# Patient Record
Sex: MALE | Race: BLACK OR AFRICAN AMERICAN | NOT HISPANIC OR LATINO | Employment: OTHER | URBAN - METROPOLITAN AREA
[De-identification: names, ages, dates, MRNs, and addresses within clinical notes are randomized per-mention and may not be internally consistent; named-entity substitution may affect disease eponyms.]

---

## 2017-03-09 ENCOUNTER — LAB (OUTPATIENT)
Dept: LAB | Facility: HOSPITAL | Age: 62
End: 2017-03-09

## 2017-03-09 ENCOUNTER — OFFICE VISIT (OUTPATIENT)
Dept: ONCOLOGY | Facility: CLINIC | Age: 62
End: 2017-03-09

## 2017-03-09 VITALS
HEIGHT: 71 IN | DIASTOLIC BLOOD PRESSURE: 86 MMHG | HEART RATE: 71 BPM | TEMPERATURE: 97.5 F | RESPIRATION RATE: 20 BRPM | OXYGEN SATURATION: 98 % | SYSTOLIC BLOOD PRESSURE: 152 MMHG | WEIGHT: 245.6 LBS | BODY MASS INDEX: 34.38 KG/M2

## 2017-03-09 DIAGNOSIS — D59.10 AUTOIMMUNE HEMOLYTIC ANEMIA (HCC): ICD-10-CM

## 2017-03-09 DIAGNOSIS — C91.10 CLL (CHRONIC LYMPHOCYTIC LEUKEMIA) (HCC): ICD-10-CM

## 2017-03-09 DIAGNOSIS — C91.10 CLL (CHRONIC LYMPHOCYTIC LEUKEMIA) (HCC): Primary | ICD-10-CM

## 2017-03-09 LAB
BASOPHILS # BLD AUTO: 0.04 10*3/MM3 (ref 0–0.1)
BASOPHILS NFR BLD AUTO: 0.4 % (ref 0–1.1)
DEPRECATED RDW RBC AUTO: 43.8 FL (ref 37–49)
EOSINOPHIL # BLD AUTO: 0.13 10*3/MM3 (ref 0–0.36)
EOSINOPHIL NFR BLD AUTO: 1.3 % (ref 1–5)
ERYTHROCYTE [DISTWIDTH] IN BLOOD BY AUTOMATED COUNT: 12.6 % (ref 11.7–14.5)
HCT VFR BLD AUTO: 40.5 % (ref 40–49)
HGB BLD-MCNC: 13.5 G/DL (ref 13.5–16.5)
HGB RETIC QN: 34.8 PG (ref 29.8–36.1)
IMM GRANULOCYTES # BLD: 0.02 10*3/MM3 (ref 0–0.03)
IMM GRANULOCYTES NFR BLD: 0.2 % (ref 0–0.5)
IMM RETICS NFR: 8.2 % (ref 3–15.8)
LYMPHOCYTES # BLD AUTO: 7.1 10*3/MM3 (ref 1–3.5)
LYMPHOCYTES NFR BLD AUTO: 72.6 % (ref 20–49)
MCH RBC QN AUTO: 31.3 PG (ref 27–33)
MCHC RBC AUTO-ENTMCNC: 33.3 G/DL (ref 32–35)
MCV RBC AUTO: 93.8 FL (ref 83–97)
MONOCYTES # BLD AUTO: 0.38 10*3/MM3 (ref 0.25–0.8)
MONOCYTES NFR BLD AUTO: 3.9 % (ref 4–12)
NEUTROPHILS # BLD AUTO: 2.11 10*3/MM3 (ref 1.5–7)
NEUTROPHILS NFR BLD AUTO: 21.6 % (ref 39–75)
NRBC BLD MANUAL-RTO: 0 /100 WBC (ref 0–0)
PLATELET # BLD AUTO: 185 10*3/MM3 (ref 150–375)
PMV BLD AUTO: 10.6 FL (ref 8.9–12.1)
RBC # BLD AUTO: 4.32 10*6/MM3 (ref 4.3–5.5)
RETICS/RBC NFR AUTO: 1.66 % (ref 0.6–2)
WBC NRBC COR # BLD: 9.78 10*3/MM3 (ref 4–10)

## 2017-03-09 PROCEDURE — 36416 COLLJ CAPILLARY BLOOD SPEC: CPT | Performed by: INTERNAL MEDICINE

## 2017-03-09 PROCEDURE — 99213 OFFICE O/P EST LOW 20 MIN: CPT | Performed by: INTERNAL MEDICINE

## 2017-03-09 PROCEDURE — 85025 COMPLETE CBC W/AUTO DIFF WBC: CPT | Performed by: INTERNAL MEDICINE

## 2017-03-09 PROCEDURE — 85046 RETICYTE/HGB CONCENTRATE: CPT | Performed by: INTERNAL MEDICINE

## 2017-03-09 RX ORDER — ATORVASTATIN CALCIUM 40 MG/1
TABLET, FILM COATED ORAL
Refills: 3 | Status: ON HOLD | COMMUNITY
Start: 2017-01-09 | End: 2022-05-05 | Stop reason: SDUPTHER

## 2017-03-09 NOTE — PROGRESS NOTES
[facility] GROUP OUTPATIENT FOLLOW UP CLINIC VISIT    REASON FOR FOLLOW-UP:    1.  Chronic lymphocytic leukemia  2.  Autoimmune hemolytic anemia associated with CLL.  He is now off of steroids.  He received 4 weeks of weekly Rituxan.         CLL (chronic lymphocytic leukemia)    11/25/2015 Initial Diagnosis    CLL (chronic lymphocytic leukemia)      12/1/2015 - 12/23/2015 Chemotherapy    Rituxan weekly x4         HISTORY OF PRESENT ILLNESS:  Aida Bal is a 62 y.o. male who returns today for follow up of the above issue.  He continues to do well.  His only issue currently is erectile dysfunction and he is going to speak with his primary care provider regarding this and his upcoming visit.  He just wanted to make sure today that any potential treatment of this would not affect the CLL.  He denies any fevers or chills.  No palpable lymphadenopathy.  No jaundice.      PAST MEDICAL, SURGICAL, FAMILY, AND SOCIAL HISTORIES were reviewed with the patient and in the electronic medical record, and were updated if indicated.    ALLERGIES:  No Known Allergies    MEDICATIONS:  The medication list has been reviewed with the patient by the medical assistant, and the list has been updated in the electronic medical record, which I reviewed.  Medication dosages and frequencies were confirmed to be accurate.    REVIEW OF SYSTEMS:  PAIN:  See Vital Signs below.  GENERAL:  No fevers, chills, night sweats, or unintended weight loss.  SKIN:  No rashes or non-healing lesions.  HEME/LYMPH:  No abnormal bleeding.  No palpable lymphadenopathy.  EYES:  No vision changes or diplopia.  ENT:  No sore throat or difficulty swallowing.  Otherwise see history of present illness  RESPIRATORY:  No cough, shortness of breath, hemoptysis, or wheezing.  CARDIOVASCULAR:  No chest pain, palpitations, orthopnea, or dyspnea on exertion.  GASTROINTESTINAL:  No abdominal pain, nausea, vomiting, constipation, diarrhea, melena, or  "hematochezia.  GENITOURINARY:  No dysuria or hematuria.  Erectile dysfunction  MUSCULOSKELETAL:  No muscle or joint pain.  NEUROLOGIC:  No dizziness, loss of consciousness, or seizures.  PSYCHIATRIC:  No depression, anxiety, or mood changes.    Vitals:    03/09/17 1114   BP: 152/86   Pulse: 71   Resp: 20   Temp: 97.5 °F (36.4 °C)   TempSrc: Oral   SpO2: 98%   Weight: 245 lb 9.6 oz (111 kg)   Height: 70.67\" (179.5 cm)   PainSc: 0-No pain       PHYSICAL EXAMINATION:  GENERAL:  Well-developed well-nourished male; awake, alert and oriented, in no acute distress.  SKIN:  Warm and dry, without rashes, purpura, or petechiae.  HEAD:  Normocephalic, atraumatic.  EYES:  Pupils equal, round and reactive to light.  Extraocular movements intact.  Conjunctivae normal.  EARS:  Hearing intact.  NOSE:  Septum midline.  No excoriations or nasal discharge.  MOUTH:  No stomatitis or ulcers.  Lips are normal.  THROAT:  Oropharynx without lesions or exudates.  NECK:  Supple with good range of motion; no thyromegaly or masses; no JVD or bruits.  LYMPHATICS:  No cervical, supraclavicular, axillary, or inguinal lymphadenopathy.  CHEST:  Lungs are clear to auscultation bilaterally.  No wheezes, rales, or rhonchi.  HEART:  Regular rate; normal rhythm.  No murmurs, gallops or rubs.  ABDOMEN:  Soft, non-tender, non-distended.  Normal active bowel sounds.  No organomegaly.  EXTREMITIES:  No clubbing, cyanosis, or edema.  NEUROLOGICAL:  No focal neurologic deficits.    DIAGNOSTIC DATA:  Lab Results   Component Value Date    WBC 9.78 03/09/2017    HGB 13.5 03/09/2017    HCT 40.5 03/09/2017    MCV 93.8 03/09/2017     03/09/2017       IMAGING:  None for review    ASSESSMENT:  This is a 62 y.o. male with chronic lymphocytic leukemia and related autoimmune hemolytic anemia, currently in remission with no evidence for hemolysis.    1.  CLL: Outside of the Rituxan he received for 4 weeks for this and the autoimmune hemolytic anemia he has not " required any specific treatment.  His white blood cell count is now normal.  2.  Autoimmune hemolytic anemia: He remains off steroids.  His hemoglobin is normal.  3.  Steroid related diabetes: He is now off of insulin and states that his blood sugars are within normal limits.  He is no longer seeing endocrinology.    PLAN:  1.  Return in 6 months for follow-up with a CBC that day.  He will notify us if he is having any new problems prior to that.

## 2017-05-23 ENCOUNTER — PREP FOR SURGERY (OUTPATIENT)
Dept: OTHER | Facility: HOSPITAL | Age: 62
End: 2017-05-23

## 2017-05-23 DIAGNOSIS — Z86.010 HISTORY OF COLON POLYPS: ICD-10-CM

## 2017-05-23 DIAGNOSIS — Z12.11 COLON CANCER SCREENING: Primary | ICD-10-CM

## 2017-05-23 DIAGNOSIS — Z80.0 FAMILY HISTORY OF COLON CANCER: ICD-10-CM

## 2017-07-24 ENCOUNTER — HOSPITAL ENCOUNTER (OUTPATIENT)
Facility: HOSPITAL | Age: 62
Setting detail: HOSPITAL OUTPATIENT SURGERY
Discharge: HOME | End: 2017-07-24
Attending: SURGERY | Admitting: SURGERY

## 2017-07-24 ENCOUNTER — ANESTHESIA (OUTPATIENT)
Dept: GASTROENTEROLOGY | Facility: HOSPITAL | Age: 62
End: 2017-07-24

## 2017-07-24 ENCOUNTER — ANESTHESIA EVENT (OUTPATIENT)
Dept: GASTROENTEROLOGY | Facility: HOSPITAL | Age: 62
End: 2017-07-24

## 2017-07-24 VITALS
TEMPERATURE: 97.7 F | OXYGEN SATURATION: 99 % | BODY MASS INDEX: 32.18 KG/M2 | HEIGHT: 72 IN | DIASTOLIC BLOOD PRESSURE: 85 MMHG | HEART RATE: 67 BPM | RESPIRATION RATE: 20 BRPM | SYSTOLIC BLOOD PRESSURE: 137 MMHG | WEIGHT: 237.56 LBS

## 2017-07-24 DIAGNOSIS — Z86.010 HISTORY OF COLON POLYPS: ICD-10-CM

## 2017-07-24 DIAGNOSIS — Z80.0 FAMILY HISTORY OF COLON CANCER: ICD-10-CM

## 2017-07-24 DIAGNOSIS — Z12.11 COLON CANCER SCREENING: ICD-10-CM

## 2017-07-24 PROCEDURE — S0260 H&P FOR SURGERY: HCPCS | Performed by: SURGERY

## 2017-07-24 PROCEDURE — 45380 COLONOSCOPY AND BIOPSY: CPT | Performed by: SURGERY

## 2017-07-24 PROCEDURE — 45381 COLONOSCOPY SUBMUCOUS NJX: CPT | Performed by: SURGERY

## 2017-07-24 PROCEDURE — 25010000002 PROPOFOL 10 MG/ML EMULSION: Performed by: ANESTHESIOLOGY

## 2017-07-24 PROCEDURE — 88305 TISSUE EXAM BY PATHOLOGIST: CPT | Performed by: SURGERY

## 2017-07-24 RX ORDER — PROMETHAZINE HYDROCHLORIDE 25 MG/ML
12.5 INJECTION, SOLUTION INTRAMUSCULAR; INTRAVENOUS ONCE AS NEEDED
Status: DISCONTINUED | OUTPATIENT
Start: 2017-07-24 | End: 2017-07-24 | Stop reason: HOSPADM

## 2017-07-24 RX ORDER — SODIUM CHLORIDE, SODIUM LACTATE, POTASSIUM CHLORIDE, CALCIUM CHLORIDE 600; 310; 30; 20 MG/100ML; MG/100ML; MG/100ML; MG/100ML
1000 INJECTION, SOLUTION INTRAVENOUS CONTINUOUS PRN
Status: DISCONTINUED | OUTPATIENT
Start: 2017-07-24 | End: 2017-07-24 | Stop reason: HOSPADM

## 2017-07-24 RX ORDER — PROMETHAZINE HYDROCHLORIDE 25 MG/1
25 SUPPOSITORY RECTAL ONCE AS NEEDED
Status: DISCONTINUED | OUTPATIENT
Start: 2017-07-24 | End: 2017-07-24 | Stop reason: HOSPADM

## 2017-07-24 RX ORDER — PROPOFOL 10 MG/ML
VIAL (ML) INTRAVENOUS CONTINUOUS PRN
Status: DISCONTINUED | OUTPATIENT
Start: 2017-07-24 | End: 2017-07-24 | Stop reason: SURG

## 2017-07-24 RX ORDER — PROMETHAZINE HYDROCHLORIDE 25 MG/1
25 TABLET ORAL ONCE AS NEEDED
Status: DISCONTINUED | OUTPATIENT
Start: 2017-07-24 | End: 2017-07-24 | Stop reason: HOSPADM

## 2017-07-24 RX ADMIN — SODIUM CHLORIDE, POTASSIUM CHLORIDE, SODIUM LACTATE AND CALCIUM CHLORIDE 1000 ML: 600; 310; 30; 20 INJECTION, SOLUTION INTRAVENOUS at 13:29

## 2017-07-24 RX ADMIN — SODIUM CHLORIDE, POTASSIUM CHLORIDE, SODIUM LACTATE AND CALCIUM CHLORIDE: 600; 310; 30; 20 INJECTION, SOLUTION INTRAVENOUS at 13:47

## 2017-07-24 RX ADMIN — PROPOFOL 200 MCG/KG/MIN: 10 INJECTION, EMULSION INTRAVENOUS at 13:50

## 2017-07-24 NOTE — ANESTHESIA POSTPROCEDURE EVALUATION
Patient: Aida Bal    Procedure Summary     Date Anesthesia Start Anesthesia Stop Room / Location    07/24/17 [id number]  [facility] 7 /  [facility]       Procedure Diagnosis Surgeon Provider    COLONOSCOPY TO CECUM WITH COLD POLYPECTOMY, TATOO PLACEMENT (N/A ) Family history of colon cancer; Colon cancer screening; History of colon polyps  (Family history of colon cancer [Z80.0]; Colon cancer screening [Z12.11]; History of colon polyps [Z86.010]) MD Luther Doyle MD          Anesthesia Type: MAC  Last vitals  BP   137/85 (07/24/17 1455)    Temp   36.5 °C (97.7 °F) (07/24/17 1445)    Pulse   67 (07/24/17 1455)   Resp   20 (07/24/17 1455)    SpO2   99 % (07/24/17 1455)      Post Anesthesia Care and Evaluation    Patient location during evaluation: bedside  Patient participation: complete - patient participated  Level of consciousness: awake and alert  Pain management: adequate  Airway patency: patent  Anesthetic complications: No anesthetic complications  PONV Status: none  Cardiovascular status: acceptable  Respiratory status: acceptable  Hydration status: acceptable

## 2017-07-24 NOTE — DISCHARGE INSTRUCTIONS
WHAT ARE DIVERTICULOSIS AND DIVERTICULITIS?  Many people have small pouches in their colons that bulge outward through weak spots, like an inner tube that pokes through weak places in a tire.  Each pouch is called a diverticulum.  The condition of having diverticula is DIVERTICULOSIS.  The condition becomes more common as people age.  About half of all people over the age of 60 have diverticulosis    When pouches become infected or inflamed, the condition is called DIVERTICULITIS.  This happens in 10% to 25% of people with diverticulosis.  Diverticulosis and diverticulitis are also called DIVERTICULAR DISEASE.     WHAT ARE THE SYMPTOMS?  Diverticulosis - Most people do not have any discomfort or symptoms.  However, symptoms may include mild cramps, bloating, and constipation.  Other diseases such as irritable bowel syndrome (IBS) and stomach ulcers cause similar problems, so these symptoms do not always mean a person has diverticulosis.  You should visit your doctor if you have these troubling symptoms.    Diverticulitis - The most common symptom is abdominal pain.  The most common sign is tenderness around the left side of the lower abdomen.  If infection is the cause, fever, nausea, vomiting, chills, cramping, and constipation may occur as well.  The severity depends on the extent of the infection and complications.    WHAT ARE THE COMPLICATIONS?  Diverticulitis can lead to bleeding, infections,perforations or tears, or blockages.  These complications always require treatment to prevent them from proggressing and causing serous illness.    Bleeding from a diverticula is a rare complication.  When this occurs, blood may appear in the toilet or in your stool.  Bleeding can be severe, but it may stop by itself and not require treatment.  Doctors believe bleeding diverticula are caused by a small blood vessel in a diverticulum that weakens and finally bursts.  If you have bleeding from the rectum, you should see your  doctor.  If the bleeding does not stop you may need surgery.    Abscess, Perforation, and Peritonitis - The infection causing diverticulitis often clears up after a few days of treatment with antibiotics.  If the condition gets worse, an abscess may form in the colon.  An abscess is an infected area with pus that may cause swelling and destroy tissue.  Sometimes the infected diverticula may develop small holes, called perforations.  These perforations allow pus to leak out of the colon into the abdominal area.  If the abscess is small and remains in the colon, it may clear up after treatment with antibiotics.  If not, the doctor may need to drain it.  A large abscess can become a serious problem if the infection leaks out and contaminates areas outside the colon.  Infection that spreads into the abdominal cavity is called peritonitis.  Peritonitis requires immediate surgery toclean the abdominal cavity and remove the damaged part of the colon.  Without surgery, peritonitis can be fatal.    FISTULA  A fistula is an abnormal connection of tissue between two organs or between an organ and the skin.  When damaged tissues come into contact with each other during infection, they sometimes stick together.  If they heal that way, a fistula forms.  When diverticulitis-related infection spreads through out the colon, the colon's tissue may stick to nearby tissues.  The organs usually involved are the bladder, small intestine, and skin.  The problem can be corrected with surgery to remove the fistula and affected part of the colon.    INTESTINAL OBSTRUCTION  The scarring caused by infection may cause partial or total blockage of the large intestine.  When this happens, the colon is unable to move bowel contents normally.  When the obstruction totally blocks the intestine, emergency surgery is necessary.  Partial blockage is not an emergency, so the surgery to correct it can be planned.    WHAT CAUSES DIVERTICULAR  DISEASE  Although not proven, the dominant theory is that a low-fiber diet is the main cause of diverticular disease.  The disease was first noticed in the [place] in the early 1900s.  At about the same time, processed foods were introduced into the American diet.  Many processed foods contain refined, low-fiber flour.  Unlike whole-wheat flour, refined flour has no wheat bran.    Diverticular disease is common in developed or industrialized countries-particularly the [place], [place], and [place]-where low-fiber diets are common.  The disease is rare in countries of [place] and [place], where people eat high-fiber vegetable diets.    Fiber is the part of fruits, vegetables, and whole grains that the body cannot digest.  Some fiber dissolves easily in water (soluble fiber).  It takes on a soft, jelly-like texture in the intestines.  Some fiber passes almost unchanged through the intestines (insoluble fiber).  Both kinds of fiber help make stools soft and easy to pass.  Fiber also prevents constipation.    Constipation makes the muscles strain to move stool that is too hard.  It is the main cause of increased pressure in the colon.  This excess pressure might cause the weak spots in the colon to bulge out and become diverticula.  Diverticulitis occurs when diverticula become infected or inflamed.  Doctors are not certain what causes the infection.  It may begin when stool or bacteria are caught in the diverticula.  An attack of diverticulitis can develop suddenly and without warning.    HOW DOES THE DOCTOR DIAGNOSE DIVERTICULAR DISEASE  The doctor asks about medical history, does a physical exam, and may perform one or more diagnostic tests.  Because most people do not have symptoms, diverticulosis is often found through tests ordered for another ailment.    When taking a medical history, the doctor may ask about bowel habits, symptoms, pain, diet, and medications.  The physical exam usually involves a  digital rectal exam.  To preform this test. The doctor inserts a gloved, lubricated finger into the rectum to detect tenderness, blockage, or blood.  The doctor may check stool for signs of bleeding and test blood for signs of infection.  The doctor may also order x-rays or other tests.    WHAT IS THE TREATMENT FOR DIVERTICULAR DISEASE  Increasing the amount of fiber in the diet may reduce symptoms of diverticulosis and prevent complications such as diverticulitis.  Fiber keeps stool soft and lowers pressure inside the colon so that bowel contents can move through easily.  The American Dietetic Association. Recommends 20 to 35 grams of fiber each day.  The doctor may also recommend taking a fiber product such as Citrucel or Metamucil once a dya.  These products are mixed water and provide about 2 to 3.5 grams of fiber per  Tablespoon, mixed with 8 ounces of water.    Avoidance of nuts, popcorn, and sunflower, pumpkin, hope, and sesame seeds has been recommended by physicians out of fear that food particles could enter, block, or irritate the diverticula.  However, no scientific data support this treatment measure.  Eating a high-fiber diet is the only requirement highly emphasized across the medical literature.  Eliminating specific foods is not necessary.  The seeds in tomatoes, zucchini, cucumbers, strawberries, and raspberries, as well as poppy seeds, are generally considered harmless.  People differ in amounts and types of foods the can eat.  Decisions about diet should be made based on what works best for each person.  Keeping a food diary may help identify what foods may cause symptoms.    If cramps, bloating, and constipation are problems, the doctor may prescribe  Short course of pain medication.  However, many medications affect emptying of the colon, an undesirable side effect for people with diverticulosis.    DIVERTICULITIS  Treatment focuses on clearing up the infection and inflammation, resting the  colon, and preventing or minimizing complications.  An attack of diverticulitis without complications may respond to antibiotics within a few days if treated early.  To help the colon rest, the doctor may recommend bed rest and a liquid diet, along with a pain reliever.    An acute attack with severe pain or sever infection may require a hospital stay.  Most acute cases of diverticulitis are treated with antibiotics and a liquid diet.  The antibiotics are given by injection into a vein.  In some cases, however, surgery may be necessary.    WHEN IS SURGERY NECESSARY  If attacks are severe or frequent, the doctor may advise surgery.  The surgeon removes the affected part of the colon and joins the remaining sections.  This typed of surgery, called colon resection, aims to keep attacks from coming back and to prevent complications.  The doctor may also recommend surgery for complications of a fistula or intestinal obstruction.    If antibiotics do not correct an attack, emergency surgery may be required.  Other reasons for emergency surgery include a large abscess, perforation, peritonitis, or continued bleeding.    Emergency surgery usually involves 2 operations.  The first will clear the infected abdominal cavity and remove part of the colon.  Because infection and sometimes obstruction, it is not safe to rejoin the colon during the first operation.  Instead, the surgeon creates a temporary hole, or stoma, in the abdomen.  The end of the colon is connected to the hole, a procedure called a colostomy, to allow normal eating and bowel movements.  The stool goes into a bag attached to the opening in the abdomen.  In the second operation, the surgeon rejoins the ends of the colon.      For the next 24 hours patient needs to be with a responsible adult.    For 24 hours DO NOT drive, operate machinery, appliances, drink alcohol, make important decisions or sign legal documents.    Start with a light or bland diet and  advance to regular diet as tolerated.    Follow recommendations on procedure report provided by your doctor.    Call Dr Caro for problems [phone]. Call for pathology results in one week.    Problems may include but not limited to: large amounts of bleeding, trouble breathing, repeated vomiting, severe unrelieved pain, fever or chills.

## 2017-07-24 NOTE — PLAN OF CARE
Problem: Patient Care Overview (Adult)  Goal: Discharge Needs Assessment  Outcome: Ongoing (interventions implemented as appropriate)    Problem: GI Endoscopy (Adult)  Goal: Signs and Symptoms of Listed Potential Problems Will be Absent or Manageable (GI Endoscopy)  Outcome: Ongoing (interventions implemented as appropriate)    07/24/17 1313   GI Endoscopy   Problems Assessed (GI Endoscopy) all   Problems Present (GI Endoscopy) none

## 2017-07-24 NOTE — H&P
General Surgery  History and Physical    CC: Screening for colon cancer, family history of colon cancer    HPI: The patient is a pleasant 62 y.o. year-old gentleman who presents today for a repeat screening colonoscopy.  His last colonoscopy was in 2011 by Dr. Han and significant only for diverticulosis but no polyps.  He had a previous colonoscopy 20 years ago that was significant for a few benign polyps.  He denies any melena, hematochezia, or unintentional weight loss.  His mother has a history of colon cancer diagnosed at age 72.    Past Medical History: Autoimmune hemolytic anemia, left carotid artery occlusion, coronary artery disease, diabetes mellitus, gout, hyper lipidemia, hypertension, leukemia    Past Surgical History: Knee surgery    Medications: Atorvastatin, aspirin, prazosin, losartan, amlodipine, vitamin D3, allopurinol    Allergies: No known drug allergies    Family History: Mother with history of colon cancer, father with history of throat cancer    Social History: , no alcohol use, nonsmoker    ROS: A comprehensive review of systems was conducted and negative for melena, hematochezia, or unintentional weight loss  All other systems reviewed and negative    Physical Exam:  Vitals:    07/24/17 1322   BP: 134/75   Pulse: 80   Temp: 97.2 °F (36.2 °C)   SpO2: 98%     General: No acute distress, well-nourished & well-developed  HEAD: normocephalic, atraumatic  EYES: normal conjunctiva, sclera anicteric  EARS: grossly normal hearing  NECK: supple, no thyromegaly  CARDIOVASCULAR: regular rate and rhythm  RESPIRATORY: clear to auscultation bilaterally  GASTROINTESTINAL: soft, nontender, non-distended  PSYCHIATRIC: oriented x3, normal mood and affect    ASSESSMENT & PLAN  Mr. Bal is a 62-year-old gentleman with a family history of colon cancer in his mother who presents today for a repeat screening colonoscopy.  He has been counseled on the risks of the procedure to include bleeding from  polypectomy, abdominal discomfort following the procedure, and possible colon perforation.  Despite these risks, he has consented to proceed.    Fannie Caro MD  General and Endoscopic Surgery  [facility]    [address], [address]  [place], [place], [address]  P: [phone]  F: [phone]

## 2017-07-24 NOTE — ANESTHESIA PREPROCEDURE EVALUATION
Anesthesia Evaluation            Airway   Mallampati: I  TM distance: >3 FB  Neck ROM: full  no difficulty expected  Dental - normal exam     Pulmonary     breath sounds clear to auscultation  Cardiovascular     Rhythm: regular  Rate: normal    (+) hypertension, CAD, PVD, hyperlipidemia      Neuro/Psych  GI/Hepatic/Renal/Endo    (+)  diabetes mellitus,     Musculoskeletal     Abdominal    Substance History      OB/GYN          Other   (+) blood dyscrasia   history of cancer remission                                    Anesthesia Plan    ASA 3     MAC

## 2017-07-24 NOTE — OP NOTE
Operative Note :  Fannie Caro MD      Aida Bal  1955    Procedure Date: 17    Pre-op Diagnosis:  · Screening for colon cancer, family history of colon cancer, personal history of polyps    Post-op Diagnosis:  · Large, sessile cecal polyp and minimal sigmoid diverticulosis    Procedure:   · Flexible colonoscopy to the cecum with piecemeal cold forcep polypectomy and tattoo with Bisi ink    Surgeon: Fannie Caro MD    Assistant: None    Anesthesia:  MAC (monitored anesthetic care)    Estimated Blood Loss: None    Specimens: Cecal polyp (removed piecemeal)    Complications: None    Indications:  · Mr. Bal is a 62-year-old Greek with a personal history of colon polyps seen on a colonoscopy over 20 years ago as well as a family history of colon cancer in his mother who was diagnosed at age 72 with advanced disease.  She was  at age 78, 6 years after her diagnosis.  He has no melena, hematochezia, or unintentional weight loss.  He has been counseled on the risks of the procedure, and has consented to proceed.    Findings:   · Large, 4 cm x 5 cm sessile colon polyp within the cecum just adjacent to the piecemeal orifice removed piecemeal fashion with the cold biopsy forceps and tattooed with Bisi ink    Description of procedure:  The patient was brought to the endoscopy suite and cristal in the left lateral decubitus position.  A surgical timeout was completed.  Continuous propofol anesthesia was administered.  A digital rectal exam was performed, revealing no abnormalities.  An adult colonoscope was then inserted through the anus and passed under direct visualization to the level of the cecum.  The cecum was identified via the ileocecal valve as well as the appendiceal orifice.  There is a large, 4 cm x 5 cm, sessile polyp within the cecal bowel just adjacent to the appendiceal orifice.  This was removed in a piecemeal fashion with the cold biopsy forceps and then tattooed with  Bisi ink.  The scope was then further withdrawn, examining all circumferential walls of the ascending, transverse, descending, and sigmoid colon.  There were 2 small sigmoid diverticula which were not bleeding or acutely inflamed.  There were no other noted colon polyps.  The scope was then retroflexed in the rectum, revealing no internal hemorrhoids.  The scope was then withdrawn and the colon desufflated.  The patient tolerated the procedure quite well and was transferred to the recovery area in stable condition.  He had a very good bowel prep.  I will contact him as soon as the pathology results are available to discuss further recommendations.  If the pathology returns cancerous, this will warrant staging CTs and CEA level measurement followed by likely surgical resection of his ascending colon.  If the pathology results are benign, he will require a repeat colonoscopy in 3 months time to ensure that the polyp has not regrown.    Fannie Caro MD  General and Endoscopic Surgery  [facility]    [address], [address]  [place], [place], [address]  P: [phone]  F: [phone]

## 2017-07-25 ENCOUNTER — TELEPHONE (OUTPATIENT)
Dept: SURGERY | Facility: CLINIC | Age: 62
End: 2017-07-25

## 2017-07-25 LAB
LAB AP CASE REPORT: NORMAL
Lab: NORMAL
PATH REPORT.FINAL DX SPEC: NORMAL
PATH REPORT.GROSS SPEC: NORMAL

## 2017-07-25 NOTE — TELEPHONE ENCOUNTER
I called and spoke with the patient relaying the pathology information of a large tubulovillous adenoma of the cecum that was removed piecemeal yesterday during his colonoscopy.  Luckily, there are no signs of high-grade dysplasia or carcinoma in situ identified on histopathology.  For this reason, I would like to go ahead and repeat a colonoscopy in 3 months time to ensure that the large sessile polyp has not grown back.  The patient expressed understanding.    Fannie Caro MD  General and Endoscopic Surgery  [facility]    [address], [address]  [place], [place], [address]  P: [phone]  F: [phone]

## 2017-10-02 ENCOUNTER — OFFICE VISIT (OUTPATIENT)
Dept: ONCOLOGY | Facility: CLINIC | Age: 62
End: 2017-10-02

## 2017-10-02 ENCOUNTER — LAB (OUTPATIENT)
Dept: LAB | Facility: HOSPITAL | Age: 62
End: 2017-10-02

## 2017-10-02 VITALS
HEIGHT: 72 IN | DIASTOLIC BLOOD PRESSURE: 84 MMHG | WEIGHT: 240.8 LBS | TEMPERATURE: 97.6 F | SYSTOLIC BLOOD PRESSURE: 146 MMHG | HEART RATE: 85 BPM | OXYGEN SATURATION: 100 % | BODY MASS INDEX: 32.61 KG/M2 | RESPIRATION RATE: 18 BRPM

## 2017-10-02 DIAGNOSIS — C91.10 CLL (CHRONIC LYMPHOCYTIC LEUKEMIA) (HCC): ICD-10-CM

## 2017-10-02 DIAGNOSIS — D59.10 AUTOIMMUNE HEMOLYTIC ANEMIA (HCC): ICD-10-CM

## 2017-10-02 DIAGNOSIS — C91.10 CLL (CHRONIC LYMPHOCYTIC LEUKEMIA) (HCC): Primary | ICD-10-CM

## 2017-10-02 LAB
ALBUMIN SERPL-MCNC: 4.3 G/DL (ref 3.5–5.2)
ALBUMIN/GLOB SERPL: 1.7 G/DL (ref 1.1–2.4)
ALP SERPL-CCNC: 121 U/L (ref 38–116)
ALT SERPL W P-5'-P-CCNC: 15 U/L (ref 0–41)
ANION GAP SERPL CALCULATED.3IONS-SCNC: 12.2 MMOL/L
AST SERPL-CCNC: 19 U/L (ref 0–40)
BASOPHILS # BLD AUTO: 0.07 10*3/MM3 (ref 0–0.1)
BASOPHILS NFR BLD AUTO: 0.4 % (ref 0–1.1)
BILIRUB SERPL-MCNC: 0.6 MG/DL (ref 0.1–1.2)
BUN BLD-MCNC: 15 MG/DL (ref 6–20)
BUN/CREAT SERPL: 14.4 (ref 7.3–30)
CALCIUM SPEC-SCNC: 9.6 MG/DL (ref 8.5–10.2)
CHLORIDE SERPL-SCNC: 101 MMOL/L (ref 98–107)
CO2 SERPL-SCNC: 28.8 MMOL/L (ref 22–29)
CREAT BLD-MCNC: 1.04 MG/DL (ref 0.7–1.3)
DEPRECATED RDW RBC AUTO: 44.5 FL (ref 37–49)
EOSINOPHIL # BLD AUTO: 0.2 10*3/MM3 (ref 0–0.36)
EOSINOPHIL NFR BLD AUTO: 1.1 % (ref 1–5)
ERYTHROCYTE [DISTWIDTH] IN BLOOD BY AUTOMATED COUNT: 12.4 % (ref 11.7–14.5)
GFR SERPL CREATININE-BSD FRML MDRD: 87 ML/MIN/1.73
GLOBULIN UR ELPH-MCNC: 2.6 GM/DL (ref 1.8–3.5)
GLUCOSE BLD-MCNC: 103 MG/DL (ref 74–124)
HAPTOGLOB SERPL-MCNC: 181 MG/DL (ref 30–200)
HCT VFR BLD AUTO: 37.1 % (ref 40–49)
HGB BLD-MCNC: 12 G/DL (ref 13.5–16.5)
HGB RETIC QN: 36 PG (ref 29.8–36.1)
IMM GRANULOCYTES # BLD: 0.05 10*3/MM3 (ref 0–0.03)
IMM GRANULOCYTES NFR BLD: 0.3 % (ref 0–0.5)
IMM RETICS NFR: 11.1 % (ref 3–15.8)
LDH SERPL-CCNC: 202 U/L (ref 99–259)
LYMPHOCYTES # BLD AUTO: 13.11 10*3/MM3 (ref 1–3.5)
LYMPHOCYTES NFR BLD AUTO: 73.8 % (ref 20–49)
MCH RBC QN AUTO: 31.5 PG (ref 27–33)
MCHC RBC AUTO-ENTMCNC: 32.3 G/DL (ref 32–35)
MCV RBC AUTO: 97.4 FL (ref 83–97)
MONOCYTES # BLD AUTO: 0.51 10*3/MM3 (ref 0.25–0.8)
MONOCYTES NFR BLD AUTO: 2.9 % (ref 4–12)
NEUTROPHILS # BLD AUTO: 3.83 10*3/MM3 (ref 1.5–7)
NEUTROPHILS NFR BLD AUTO: 21.5 % (ref 39–75)
NRBC BLD MANUAL-RTO: 0 /100 WBC (ref 0–0)
PLATELET # BLD AUTO: 232 10*3/MM3 (ref 150–375)
PMV BLD AUTO: 10.1 FL (ref 8.9–12.1)
POTASSIUM BLD-SCNC: 4 MMOL/L (ref 3.5–4.7)
PROT SERPL-MCNC: 6.9 G/DL (ref 6.3–8)
RBC # BLD AUTO: 3.81 10*6/MM3 (ref 4.3–5.5)
RETICS/RBC NFR AUTO: 1.84 % (ref 0.6–2)
SODIUM BLD-SCNC: 142 MMOL/L (ref 134–145)
WBC NRBC COR # BLD: 17.77 10*3/MM3 (ref 4–10)

## 2017-10-02 PROCEDURE — 86880 COOMBS TEST DIRECT: CPT | Performed by: INTERNAL MEDICINE

## 2017-10-02 PROCEDURE — 80053 COMPREHEN METABOLIC PANEL: CPT | Performed by: INTERNAL MEDICINE

## 2017-10-02 PROCEDURE — 83615 LACTATE (LD) (LDH) ENZYME: CPT | Performed by: INTERNAL MEDICINE

## 2017-10-02 PROCEDURE — 36415 COLL VENOUS BLD VENIPUNCTURE: CPT | Performed by: INTERNAL MEDICINE

## 2017-10-02 PROCEDURE — 83010 ASSAY OF HAPTOGLOBIN QUANT: CPT | Performed by: INTERNAL MEDICINE

## 2017-10-02 PROCEDURE — 36416 COLLJ CAPILLARY BLOOD SPEC: CPT | Performed by: INTERNAL MEDICINE

## 2017-10-02 PROCEDURE — 85046 RETICYTE/HGB CONCENTRATE: CPT | Performed by: INTERNAL MEDICINE

## 2017-10-02 PROCEDURE — 85025 COMPLETE CBC W/AUTO DIFF WBC: CPT | Performed by: INTERNAL MEDICINE

## 2017-10-02 PROCEDURE — 99213 OFFICE O/P EST LOW 20 MIN: CPT | Performed by: INTERNAL MEDICINE

## 2017-10-02 NOTE — PROGRESS NOTES
[facility] GROUP OUTPATIENT FOLLOW UP CLINIC VISIT    REASON FOR FOLLOW-UP:    1.  Chronic lymphocytic leukemia  2.  Autoimmune hemolytic anemia associated with CLL.  He was treated with steroids and he received 4 weeks of weekly Rituxan.         CLL (chronic lymphocytic leukemia)    11/25/2015 Initial Diagnosis     CLL (chronic lymphocytic leukemia)         12/1/2015 - 12/23/2015 Chemotherapy     Rituxan weekly x4            HISTORY OF PRESENT ILLNESS:  Aida Bal is a 62 y.o. male who returns today for follow up of the above issue.  He generally feels well.  He does have some current issues with allergic rhinitis with nasal congestion.  He is on antihistamine and nasal steroid.  He follows up with Dr. Marsh his primary care provider on Wednesday.    PAST MEDICAL, SURGICAL, FAMILY, AND SOCIAL HISTORIES were reviewed with the patient and in the electronic medical record, and were updated if indicated.    ALLERGIES:  No Known Allergies    MEDICATIONS:  The medication list has been reviewed with the patient by the medical assistant, and the list has been updated in the electronic medical record, which I reviewed.  Medication dosages and frequencies were confirmed to be accurate.    REVIEW OF SYSTEMS:  PAIN:  See Vital Signs below.  GENERAL:  No fevers, chills, night sweats, or unintended weight loss.  SKIN:  No rashes or non-healing lesions.  HEME/LYMPH:  No abnormal bleeding.  No palpable lymphadenopathy.  EYES:  No vision changes or diplopia.  ENT:  No sore throat or difficulty swallowing.  Otherwise see history of present illness  RESPIRATORY:  No cough, shortness of breath, hemoptysis, or wheezing.  CARDIOVASCULAR:  No chest pain, palpitations, orthopnea, or dyspnea on exertion.  GASTROINTESTINAL:  No abdominal pain, nausea, vomiting, constipation, diarrhea, melena, or hematochezia.  GENITOURINARY:  No dysuria or hematuria.  Erectile dysfunction  MUSCULOSKELETAL:  No muscle or joint  "pain.  NEUROLOGIC:  No dizziness, loss of consciousness, or seizures.  PSYCHIATRIC:  No depression, anxiety, or mood changes.    Vitals:    10/02/17 1033   BP: 146/84   Pulse: 85   Resp: 18   Temp: 97.6 °F (36.4 °C)   TempSrc: Oral   SpO2: 100%   Weight: 240 lb 12.8 oz (109 kg)   Height: 72\" (182.9 cm)   PainSc: 0-No pain       PHYSICAL EXAMINATION:  GENERAL:  Well-developed well-nourished male; awake, alert and oriented, in no acute distress.  SKIN:  Warm and dry, without rashes, purpura, or petechiae.  HEAD:  Normocephalic, atraumatic.  EYES:  Pupils equal, round and reactive to light.  Extraocular movements intact.  Conjunctivae normal.  EARS:  Hearing intact.  NOSE:  Septum midline.  No excoriations or nasal discharge.  MOUTH:  No stomatitis or ulcers.  Lips are normal.  THROAT:  Oropharynx without lesions or exudates.  NECK:  Supple with good range of motion; no thyromegaly or masses; no JVD or bruits.  LYMPHATICS:  No cervical, supraclavicular, or axillary lymphadenopathy.  CHEST:  Lungs are clear to auscultation bilaterally.  No wheezes, rales, or rhonchi.  HEART:  Regular rate; normal rhythm.  No murmurs, gallops or rubs.  ABDOMEN:  Soft, non-tender, non-distended.  Normal active bowel sounds.  No organomegaly.  EXTREMITIES:  No clubbing, cyanosis, or edema.  NEUROLOGICAL:  No focal neurologic deficits.    DIAGNOSTIC DATA:  Lab Results   Component Value Date    WBC 17.77 (H) 10/02/2017    HGB 12.0 (L) 10/02/2017    HCT 37.1 (L) 10/02/2017    MCV 97.4 (H) 10/02/2017     10/02/2017       IMAGING:  None for review    ASSESSMENT:  This is a 62 y.o. male with chronic lymphocytic leukemia and related autoimmune hemolytic anemia.He has been in remission but now his white blood cell count is more elevated and his hemoglobin has dropped.    1.  CLL: Outside of the Rituxan he received for 4 weeks for this and the autoimmune hemolytic anemia he has not required any specific treatment.  His white blood cell count " has been normal but now is more elevated.  This alone is not a reason to treat but his hemoglobin is lower making me concerned about recurrent hemolysis.  2.  Autoimmune hemolytic anemia:  Hemoglobin is a little bit lower.  Further evaluation as noted below.  3.  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  4.  Allergic rhinitis: Continues antihistamines and nasal steroid and he will follow up with primary care.    PLAN:  1.  Labs today including reticulocyte count, CMP, LDH, haptoglobin, and ADOLFO.  2.  I will notify him of any abnormal results.  3.  I will see him back for close follow-up in about 4-6 weeks with a CBC and reticulocyte count.  If his labs are looking worse we may need to consider further treatment for his CLL.

## 2017-10-03 ENCOUNTER — TELEPHONE (OUTPATIENT)
Dept: ONCOLOGY | Facility: CLINIC | Age: 62
End: 2017-10-03

## 2017-10-03 LAB
DAT C3: NEGATIVE
DAT IGG-SP REAG RBC-IMP: POSITIVE
DAT POLY-SP REAG RBC QL: POSITIVE

## 2017-10-03 NOTE — TELEPHONE ENCOUNTER
Spoke with wife and advised her that his hemolysis labs were negative, so he does not seem to be destroying red blood cells at this time.  Follow up as scheduled.

## 2017-11-14 ENCOUNTER — LAB (OUTPATIENT)
Dept: LAB | Facility: HOSPITAL | Age: 62
End: 2017-11-14

## 2017-11-14 ENCOUNTER — OFFICE VISIT (OUTPATIENT)
Dept: ONCOLOGY | Facility: CLINIC | Age: 62
End: 2017-11-14

## 2017-11-14 VITALS
DIASTOLIC BLOOD PRESSURE: 62 MMHG | HEART RATE: 78 BPM | RESPIRATION RATE: 18 BRPM | SYSTOLIC BLOOD PRESSURE: 124 MMHG | OXYGEN SATURATION: 99 % | HEIGHT: 72 IN | BODY MASS INDEX: 32.56 KG/M2 | TEMPERATURE: 97.7 F | WEIGHT: 240.4 LBS

## 2017-11-14 DIAGNOSIS — C91.10 CLL (CHRONIC LYMPHOCYTIC LEUKEMIA) (HCC): ICD-10-CM

## 2017-11-14 DIAGNOSIS — C91.10 CLL (CHRONIC LYMPHOCYTIC LEUKEMIA) (HCC): Primary | ICD-10-CM

## 2017-11-14 DIAGNOSIS — D59.10 AUTOIMMUNE HEMOLYTIC ANEMIA (HCC): ICD-10-CM

## 2017-11-14 LAB
BASOPHILS # BLD AUTO: 0.04 10*3/MM3 (ref 0–0.1)
BASOPHILS NFR BLD AUTO: 0.2 % (ref 0–1.1)
DEPRECATED RDW RBC AUTO: 46.5 FL (ref 37–49)
EOSINOPHIL # BLD AUTO: 0.12 10*3/MM3 (ref 0–0.36)
EOSINOPHIL NFR BLD AUTO: 0.6 % (ref 1–5)
ERYTHROCYTE [DISTWIDTH] IN BLOOD BY AUTOMATED COUNT: 13 % (ref 11.7–14.5)
HCT VFR BLD AUTO: 36.6 % (ref 40–49)
HGB BLD-MCNC: 12.1 G/DL (ref 13.5–16.5)
HGB RETIC QN: 34.9 PG (ref 29.8–36.1)
IMM GRANULOCYTES # BLD: 0.05 10*3/MM3 (ref 0–0.03)
IMM GRANULOCYTES NFR BLD: 0.2 % (ref 0–0.5)
IMM RETICS NFR: 10.6 % (ref 3–15.8)
LYMPHOCYTES # BLD AUTO: 17.85 10*3/MM3 (ref 1–3.5)
LYMPHOCYTES NFR BLD AUTO: 84.6 % (ref 20–49)
MCH RBC QN AUTO: 32.5 PG (ref 27–33)
MCHC RBC AUTO-ENTMCNC: 33.1 G/DL (ref 32–35)
MCV RBC AUTO: 98.4 FL (ref 83–97)
MONOCYTES # BLD AUTO: 0.88 10*3/MM3 (ref 0.25–0.8)
MONOCYTES NFR BLD AUTO: 4.2 % (ref 4–12)
NEUTROPHILS # BLD AUTO: 2.15 10*3/MM3 (ref 1.5–7)
NEUTROPHILS NFR BLD AUTO: 10.2 % (ref 39–75)
NRBC BLD MANUAL-RTO: 0 /100 WBC (ref 0–0)
PLATELET # BLD AUTO: 221 10*3/MM3 (ref 150–375)
PMV BLD AUTO: 10.1 FL (ref 8.9–12.1)
RBC # BLD AUTO: 3.72 10*6/MM3 (ref 4.3–5.5)
RETICS/RBC NFR AUTO: 2.05 % (ref 0.6–2)
WBC NRBC COR # BLD: 21.09 10*3/MM3 (ref 4–10)

## 2017-11-14 PROCEDURE — 36415 COLL VENOUS BLD VENIPUNCTURE: CPT | Performed by: INTERNAL MEDICINE

## 2017-11-14 PROCEDURE — 85046 RETICYTE/HGB CONCENTRATE: CPT | Performed by: INTERNAL MEDICINE

## 2017-11-14 PROCEDURE — 99213 OFFICE O/P EST LOW 20 MIN: CPT | Performed by: INTERNAL MEDICINE

## 2017-11-14 PROCEDURE — 85025 COMPLETE CBC W/AUTO DIFF WBC: CPT | Performed by: INTERNAL MEDICINE

## 2017-11-14 NOTE — PROGRESS NOTES
[facility] GROUP OUTPATIENT FOLLOW UP CLINIC VISIT    REASON FOR FOLLOW-UP:    1.  Chronic lymphocytic leukemia  2.  Autoimmune hemolytic anemia associated with CLL.  He was treated with steroids and he received 4 weeks of weekly Rituxan.         CLL (chronic lymphocytic leukemia)    11/25/2015 Initial Diagnosis     CLL (chronic lymphocytic leukemia)         12/1/2015 - 12/23/2015 Chemotherapy     Rituxan weekly x4            HISTORY OF PRESENT ILLNESS:  Aida Bal is a 62 y.o. male who returns today for follow up of the above issue.I last saw him on 10/2/2017.  At that time his hemoglobin had dropped a little bit to 12 and his white blood cell count was elevated at 17,000.  A complete laboratory panel was performed and there was no evidence for hemolysis.    Today his white blood cell count is up slightly but his hemoglobin remained stable.  He continues to feel very well.  No jaundice.  No lymphadenopathy.  No fatigue.  No fevers or chills.    PAST MEDICAL, SURGICAL, FAMILY, AND SOCIAL HISTORIES were reviewed with the patient and in the electronic medical record, and were updated if indicated.    ALLERGIES:  No Known Allergies    MEDICATIONS:  The medication list has been reviewed with the patient by the medical assistant, and the list has been updated in the electronic medical record, which I reviewed.  Medication dosages and frequencies were confirmed to be accurate.    REVIEW OF SYSTEMS:  PAIN:  See Vital Signs below.  GENERAL:  No fevers, chills, night sweats, or unintended weight loss.  SKIN:  No rashes or non-healing lesions.  HEME/LYMPH:  No abnormal bleeding.  No palpable lymphadenopathy.  EYES:  No vision changes or diplopia.  ENT:  No sore throat or difficulty swallowing.  Otherwise see history of present illness  RESPIRATORY:  No cough, shortness of breath, hemoptysis, or wheezing.  CARDIOVASCULAR:  No chest pain, palpitations, orthopnea, or dyspnea on exertion.  GASTROINTESTINAL:  No  "abdominal pain, nausea, vomiting, constipation, diarrhea, melena, or hematochezia.  GENITOURINARY:  No dysuria or hematuria.  Erectile dysfunction  MUSCULOSKELETAL:  No muscle or joint pain.  NEUROLOGIC:  No dizziness, loss of consciousness, or seizures.  PSYCHIATRIC:  No depression, anxiety, or mood changes.    Vitals:    11/14/17 1057   BP: 124/62   Pulse: 78   Resp: 18   Temp: 97.7 °F (36.5 °C)   TempSrc: Oral   SpO2: 99%   Weight: 240 lb 6.4 oz (109 kg)   Height: 72\" (182.9 cm)   PainSc: 0-No pain       PHYSICAL EXAMINATION:  GENERAL:  Well-developed well-nourished male; awake, alert and oriented, in no acute distress.  SKIN:  Warm and dry, without rashes, purpura, or petechiae.  HEAD:  Normocephalic, atraumatic.  EYES:  Pupils equal, round and reactive to light.  Extraocular movements intact.  Conjunctivae normal.  EARS:  Hearing intact.  NOSE:  Septum midline.  No excoriations or nasal discharge.  MOUTH:  No stomatitis or ulcers.  Lips are normal.  THROAT:  Oropharynx without lesions or exudates.  NECK:  Supple with good range of motion; no thyromegaly or masses; no JVD or bruits.  LYMPHATICS:  No cervical, supraclavicular, or axillary lymphadenopathy.  CHEST:  Lungs are clear to auscultation bilaterally.  No wheezes, rales, or rhonchi.  HEART:  Regular rate; normal rhythm.  No murmurs, gallops or rubs.  ABDOMEN:  Soft, non-tender, non-distended.  Normal active bowel sounds.  No organomegaly.  EXTREMITIES:  No clubbing, cyanosis, or edema.  NEUROLOGICAL:  No focal neurologic deficits.    DIAGNOSTIC DATA:  Lab Results   Component Value Date    WBC 21.09 (H) 11/14/2017    HGB 12.1 (L) 11/14/2017    HCT 36.6 (L) 11/14/2017    MCV 98.4 (H) 11/14/2017     11/14/2017       IMAGING:  None for review    ASSESSMENT:  This is a 62 y.o. male with chronic lymphocytic leukemia and related autoimmune hemolytic anemia.  He has been in remission.  His white blood cell count now is a little bit high and he is mildly " anemic.  Numbers are stable compared with early October.  We will continue to monitor.  He is asymptomatic.    1.  CLL: Outside of the Rituxan he received for 4 weeks for this and the autoimmune hemolytic anemia he has not required any specific treatment.  His white blood cell count has been normal but now is more elevated.  This alone is not a reason to treat.  When I saw him on 10/2/2017 his hemoglobin was lower at 12.0.  I was concerned about hemolysis but a complete laboratory evaluation for this was unremarkable.  Labs today are stable.  2.  Autoimmune hemolytic anemia:  Hemoglobin is a little bit lower than it had been.  Hemolysis labs negative.  Stable.  3.  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    PLAN:  1.  Return in 3 months for follow-up with a CBC and reticulocyte count.  I advised him today to notify us if he develops any new symptoms prior to that.

## 2018-02-06 ENCOUNTER — LAB (OUTPATIENT)
Dept: LAB | Facility: HOSPITAL | Age: 63
End: 2018-02-06

## 2018-02-06 ENCOUNTER — OFFICE VISIT (OUTPATIENT)
Dept: ONCOLOGY | Facility: CLINIC | Age: 63
End: 2018-02-06

## 2018-02-06 VITALS
HEART RATE: 70 BPM | TEMPERATURE: 98.2 F | OXYGEN SATURATION: 99 % | RESPIRATION RATE: 16 BRPM | HEIGHT: 74 IN | DIASTOLIC BLOOD PRESSURE: 72 MMHG | WEIGHT: 242 LBS | SYSTOLIC BLOOD PRESSURE: 122 MMHG | BODY MASS INDEX: 31.06 KG/M2

## 2018-02-06 DIAGNOSIS — C91.10 CLL (CHRONIC LYMPHOCYTIC LEUKEMIA) (HCC): ICD-10-CM

## 2018-02-06 DIAGNOSIS — C91.10 CLL (CHRONIC LYMPHOCYTIC LEUKEMIA) (HCC): Primary | ICD-10-CM

## 2018-02-06 LAB
BASOPHILS # BLD AUTO: 0.07 10*3/MM3 (ref 0–0.1)
BASOPHILS NFR BLD AUTO: 0.2 % (ref 0–1.1)
DEPRECATED RDW RBC AUTO: 47.3 FL (ref 37–49)
EOSINOPHIL # BLD AUTO: 0.26 10*3/MM3 (ref 0–0.36)
EOSINOPHIL NFR BLD AUTO: 0.8 % (ref 1–5)
ERYTHROCYTE [DISTWIDTH] IN BLOOD BY AUTOMATED COUNT: 13.1 % (ref 11.7–14.5)
HCT VFR BLD AUTO: 34.5 % (ref 40–49)
HGB BLD-MCNC: 11.1 G/DL (ref 13.5–16.5)
HGB RETIC QN: 34.2 PG (ref 29.8–36.1)
IMM GRANULOCYTES # BLD: 0.04 10*3/MM3 (ref 0–0.03)
IMM GRANULOCYTES NFR BLD: 0.1 % (ref 0–0.5)
IMM RETICS NFR: 21.8 % (ref 3–15.8)
LYMPHOCYTES # BLD AUTO: 28.9 10*3/MM3 (ref 1–3.5)
LYMPHOCYTES NFR BLD AUTO: 90.2 % (ref 20–49)
MCH RBC QN AUTO: 32.2 PG (ref 27–33)
MCHC RBC AUTO-ENTMCNC: 32.2 G/DL (ref 32–35)
MCV RBC AUTO: 100 FL (ref 83–97)
MONOCYTES # BLD AUTO: 0.71 10*3/MM3 (ref 0.25–0.8)
MONOCYTES NFR BLD AUTO: 2.2 % (ref 4–12)
NEUTROPHILS # BLD AUTO: 2.07 10*3/MM3 (ref 1.5–7)
NEUTROPHILS NFR BLD AUTO: 6.5 % (ref 39–75)
NRBC BLD MANUAL-RTO: 0 /100 WBC (ref 0–0)
PLATELET # BLD AUTO: 234 10*3/MM3 (ref 150–375)
PMV BLD AUTO: 10.2 FL (ref 8.9–12.1)
RBC # BLD AUTO: 3.45 10*6/MM3 (ref 4.3–5.5)
RETICS/RBC NFR AUTO: 2.18 % (ref 0.6–2)
WBC NRBC COR # BLD: 32.05 10*3/MM3 (ref 4–10)

## 2018-02-06 PROCEDURE — 36416 COLLJ CAPILLARY BLOOD SPEC: CPT | Performed by: INTERNAL MEDICINE

## 2018-02-06 PROCEDURE — 85046 RETICYTE/HGB CONCENTRATE: CPT | Performed by: INTERNAL MEDICINE

## 2018-02-06 PROCEDURE — 99213 OFFICE O/P EST LOW 20 MIN: CPT | Performed by: INTERNAL MEDICINE

## 2018-02-06 PROCEDURE — 85025 COMPLETE CBC W/AUTO DIFF WBC: CPT | Performed by: INTERNAL MEDICINE

## 2018-02-06 NOTE — PROGRESS NOTES
"[facility] GROUP OUTPATIENT FOLLOW UP CLINIC VISIT    REASON FOR FOLLOW-UP:    1.  Chronic lymphocytic leukemia  2.  Autoimmune hemolytic anemia associated with CLL.  He was treated with steroids and he received 4 weeks of weekly Rituxan.         CLL (chronic lymphocytic leukemia)    11/25/2015 Initial Diagnosis     CLL (chronic lymphocytic leukemia)         12/1/2015 - 12/23/2015 Chemotherapy     Rituxan weekly x4            HISTORY OF PRESENT ILLNESS:  Aida Bal is a 62 y.o. male who returns today for follow up of the above issue.  He is recovering from a severe upper respiratory infection which caused fever to 102, cough, congestion, and profound fatigue.  He states that a flu test was negative.  He medicated with over the counter medication.  He is \"90%\" better at this point.  No lymphadenopathy.      PAST MEDICAL, SURGICAL, FAMILY, AND SOCIAL HISTORIES were reviewed with the patient and in the electronic medical record, and were updated if indicated.    ALLERGIES:  No Known Allergies    MEDICATIONS:  The medication list has been reviewed with the patient by the medical assistant, and the list has been updated in the electronic medical record, which I reviewed.  Medication dosages and frequencies were confirmed to be accurate.    REVIEW OF SYSTEMS:  PAIN:  See Vital Signs below.  GENERAL:  No fevers, chills, night sweats, or unintended weight loss.  See HPI.    SKIN:  No rashes or non-healing lesions.  HEME/LYMPH:  No abnormal bleeding.  No palpable lymphadenopathy.  EYES:  No vision changes or diplopia.  ENT:  See history of present illness  RESPIRATORY:  See HPI  CARDIOVASCULAR:  No chest pain, palpitations, orthopnea, or dyspnea on exertion.  GASTROINTESTINAL:  No abdominal pain, nausea, vomiting, constipation, diarrhea, melena, or hematochezia.  GENITOURINARY:  No dysuria or hematuria.  Erectile dysfunction  MUSCULOSKELETAL:  No muscle or joint pain.  NEUROLOGIC:  No dizziness, loss of " "consciousness, or seizures.  PSYCHIATRIC:  No depression, anxiety, or mood changes.    Vitals:    02/06/18 1135   BP: 122/72   Pulse: 70   Resp: 16   Temp: 98.2 °F (36.8 °C)   TempSrc: Oral   SpO2: 99%   Weight: 110 kg (242 lb)   Height: 189.2 cm (74.49\")   PainSc: 0-No pain       PHYSICAL EXAMINATION:  GENERAL:  Well-developed well-nourished male; awake, alert and oriented, in no acute distress.  SKIN:  Warm and dry, without rashes, purpura, or petechiae.  HEAD:  Normocephalic, atraumatic.  EYES:  Pupils equal, round and reactive to light.  Extraocular movements intact.  Conjunctivae normal.  EARS:  Hearing intact.  NOSE:  Septum midline.  No excoriations or nasal discharge.  MOUTH:  No stomatitis or ulcers.  Lips are normal.  THROAT:  Oropharynx without lesions or exudates.  NECK:  Supple with good range of motion; no thyromegaly or masses; no JVD or bruits.  LYMPHATICS:  No cervical, supraclavicular, or axillary lymphadenopathy.  CHEST:  Lungs are clear to auscultation bilaterally.  No wheezes, rales, or rhonchi.  HEART:  Regular rate; normal rhythm.  No murmurs, gallops or rubs.  ABDOMEN:  Soft, non-tender, non-distended.  Normal active bowel sounds.  No organomegaly.  EXTREMITIES:  No clubbing, cyanosis, or edema.  NEUROLOGICAL:  No focal neurologic deficits.    DIAGNOSTIC DATA:  Lab Results   Component Value Date    WBC 32.05 (H) 02/06/2018    HGB 11.1 (L) 02/06/2018    HCT 34.5 (L) 02/06/2018    .0 (H) 02/06/2018     02/06/2018       IMAGING:  None for review    ASSESSMENT:  This is a 62 y.o. male with chronic lymphocytic leukemia and related autoimmune hemolytic anemia.    1.  CLL: Outside of the Rituxan he received for 4 weeks for this and the autoimmune hemolytic anemia he has not required any specific treatment.  His white blood cell count continues to elevate.  He is otherwise asymptomatic.  He did have a recent viral upper respiratory infection which could be contributing to this.  There " is no palpable lymphadenopathy.  If his white blood cell count continues to increase we will plan for CT imaging and he may require treatment later this year.    2.  Autoimmune hemolytic anemia:  Hemoglobin is a little bit lower than it had been.  Hemolysis labs negative.    3.  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    4.  Recent viral upper respiratory infection which he is recovering from    PLAN:  1.  I will see him back in about 2 months for follow-up with a CBC and reticulocyte count.  If his white blood cell count continues to increase we will pursue CT imaging to evaluate for lymphadenopathy.  If his hemoglobin continues to decrease we will further evaluate for hemolysis although recent hemolysis labs were negative.

## 2018-04-10 ENCOUNTER — TELEPHONE (OUTPATIENT)
Dept: SURGERY | Facility: CLINIC | Age: 63
End: 2018-04-10

## 2018-04-10 NOTE — TELEPHONE ENCOUNTER
----- Message from Fannie Caro MD sent at 4/9/2018 12:08 PM EDT -----  Regarding: RE: COLONOSCOPY  Yes, please!  ----- Message -----  From: Aileen Campos  Sent: 4/9/2018   9:49 AM  To: Fannei Caro MD  Subject: COLONOSCOPY                                      Dr Marsh's ofc called asking when the patient was due for a repeat cscope. According to the telephone message in July 2017, he was due in Oct 2017. There was never a recall set up. Do you want me to go ahead and send him out a cscope packet?     04/10/18 sent cscope packet to patient.

## 2018-04-17 ENCOUNTER — LAB (OUTPATIENT)
Dept: LAB | Facility: HOSPITAL | Age: 63
End: 2018-04-17

## 2018-04-17 ENCOUNTER — OFFICE VISIT (OUTPATIENT)
Dept: ONCOLOGY | Facility: CLINIC | Age: 63
End: 2018-04-17

## 2018-04-17 VITALS
RESPIRATION RATE: 16 BRPM | DIASTOLIC BLOOD PRESSURE: 64 MMHG | HEIGHT: 74 IN | BODY MASS INDEX: 31.44 KG/M2 | HEART RATE: 79 BPM | WEIGHT: 245 LBS | SYSTOLIC BLOOD PRESSURE: 132 MMHG | OXYGEN SATURATION: 99 % | TEMPERATURE: 98.5 F

## 2018-04-17 DIAGNOSIS — C91.10 CLL (CHRONIC LYMPHOCYTIC LEUKEMIA) (HCC): ICD-10-CM

## 2018-04-17 DIAGNOSIS — C91.10 CLL (CHRONIC LYMPHOCYTIC LEUKEMIA) (HCC): Primary | ICD-10-CM

## 2018-04-17 LAB
BASOPHILS # BLD AUTO: 0.04 10*3/MM3 (ref 0–0.1)
BASOPHILS NFR BLD AUTO: 0.1 % (ref 0–1.1)
DEPRECATED RDW RBC AUTO: 50 FL (ref 37–49)
EOSINOPHIL # BLD AUTO: 0.1 10*3/MM3 (ref 0–0.36)
EOSINOPHIL NFR BLD AUTO: 0.2 % (ref 1–5)
ERYTHROCYTE [DISTWIDTH] IN BLOOD BY AUTOMATED COUNT: 13.6 % (ref 11.7–14.5)
HCT VFR BLD AUTO: 33.6 % (ref 40–49)
HGB BLD-MCNC: 10.9 G/DL (ref 13.5–16.5)
HGB RETIC QN: 37.3 PG (ref 29.8–36.1)
IMM GRANULOCYTES # BLD: 0.11 10*3/MM3 (ref 0–0.03)
IMM GRANULOCYTES NFR BLD: 0.2 % (ref 0–0.5)
IMM RETICS NFR: 15.4 % (ref 3–15.8)
LYMPHOCYTES # BLD AUTO: 48.72 10*3/MM3 (ref 1–3.5)
LYMPHOCYTES NFR BLD AUTO: 89.3 % (ref 20–49)
MCH RBC QN AUTO: 33 PG (ref 27–33)
MCHC RBC AUTO-ENTMCNC: 32.4 G/DL (ref 32–35)
MCV RBC AUTO: 101.8 FL (ref 83–97)
MONOCYTES # BLD AUTO: 2.94 10*3/MM3 (ref 0.25–0.8)
MONOCYTES NFR BLD AUTO: 5.4 % (ref 4–12)
NEUTROPHILS # BLD AUTO: 2.66 10*3/MM3 (ref 1.5–7)
NEUTROPHILS NFR BLD AUTO: 4.8 % (ref 39–75)
NRBC BLD MANUAL-RTO: 0 /100 WBC (ref 0–0)
PLATELET # BLD AUTO: 206 10*3/MM3 (ref 150–375)
PMV BLD AUTO: 9.9 FL (ref 8.9–12.1)
RBC # BLD AUTO: 3.3 10*6/MM3 (ref 4.3–5.5)
RETICS/RBC NFR AUTO: 2.37 % (ref 0.6–2)
WBC NRBC COR # BLD: 54.57 10*3/MM3 (ref 4–10)

## 2018-04-17 PROCEDURE — 85025 COMPLETE CBC W/AUTO DIFF WBC: CPT | Performed by: INTERNAL MEDICINE

## 2018-04-17 PROCEDURE — 99213 OFFICE O/P EST LOW 20 MIN: CPT | Performed by: INTERNAL MEDICINE

## 2018-04-17 PROCEDURE — 36416 COLLJ CAPILLARY BLOOD SPEC: CPT | Performed by: INTERNAL MEDICINE

## 2018-04-17 PROCEDURE — 85046 RETICYTE/HGB CONCENTRATE: CPT | Performed by: INTERNAL MEDICINE

## 2018-04-17 RX ORDER — DOXAZOSIN MESYLATE 4 MG/1
TABLET ORAL
Refills: 3 | COMMUNITY
Start: 2018-04-04

## 2018-04-17 NOTE — PROGRESS NOTES
[facility] GROUP OUTPATIENT FOLLOW UP CLINIC VISIT    REASON FOR FOLLOW-UP:    1.  Chronic lymphocytic leukemia  2.  Autoimmune hemolytic anemia associated with CLL.  He was treated with steroids and he received 4 weeks of weekly Rituxan.         CLL (chronic lymphocytic leukemia)    11/25/2015 Initial Diagnosis     CLL (chronic lymphocytic leukemia)         12/1/2015 - 12/23/2015 Chemotherapy     Rituxan weekly x4            HISTORY OF PRESENT ILLNESS:  Aida Bal is a 63 y.o. male who returns today for follow up of the above issue.  He overall is feeling well.  He denies any fevers or chills.  No palpable lymphadenopathy.  He was prescribed an incorrect dose of an antihypertensive recently and was taking too much medication as a result of this with resulting lightheadedness.  This has been corrected.     PAST MEDICAL, SURGICAL, FAMILY, AND SOCIAL HISTORIES were reviewed with the patient and in the electronic medical record, and were updated if indicated.    ALLERGIES:  No Known Allergies    MEDICATIONS:  The medication list has been reviewed with the patient by the medical assistant, and the list has been updated in the electronic medical record, which I reviewed.  Medication dosages and frequencies were confirmed to be accurate.    REVIEW OF SYSTEMS:  PAIN:  See Vital Signs below.  GENERAL:  No fevers, chills, night sweats, or unintended weight loss.   SKIN:  No rashes or non-healing lesions.  HEME/LYMPH:  No abnormal bleeding.  No palpable lymphadenopathy.  EYES:  No vision changes or diplopia.  ENT:  See history of present illness  RESPIRATORY:  See HPI  CARDIOVASCULAR:  No chest pain, palpitations, orthopnea, or dyspnea on exertion.  GASTROINTESTINAL:  No abdominal pain, nausea, vomiting, constipation, diarrhea, melena, or hematochezia.  GENITOURINARY:  No dysuria or hematuria.  Erectile dysfunction  MUSCULOSKELETAL:  No muscle or joint pain.  NEUROLOGIC:  No dizziness, loss of consciousness, or  "seizures.  PSYCHIATRIC:  No depression, anxiety, or mood changes.    Vitals:    04/17/18 1431   BP: 132/64   Pulse: 79   Resp: 16   Temp: 98.5 °F (36.9 °C)   TempSrc: Oral   SpO2: 99%   Weight: 111 kg (245 lb)   Height: 189.2 cm (74.49\")   PainSc: 0-No pain       PHYSICAL EXAMINATION:  GENERAL:  Well-developed well-nourished male; awake, alert and oriented, in no acute distress.  SKIN:  Warm and dry, without rashes, purpura, or petechiae.  HEAD:  Normocephalic, atraumatic.  EYES:  Pupils equal, round and reactive to light.  Extraocular movements intact.  Conjunctivae normal.  EARS:  Hearing intact.  NOSE:  Septum midline.  No excoriations or nasal discharge.  MOUTH:  No stomatitis or ulcers.  Lips are normal.  THROAT:  Oropharynx without lesions or exudates.  NECK:  Supple with good range of motion; no thyromegaly or masses; no JVD or bruits.  LYMPHATICS:  No cervical, supraclavicular, axillary or inguinal lymphadenopathy.  CHEST:  Lungs are clear to auscultation bilaterally.  No wheezes, rales, or rhonchi.  HEART:  Regular rate; normal rhythm.  No murmurs, gallops or rubs.  ABDOMEN:  Soft, non-tender, non-distended.  Normal active bowel sounds.  No organomegaly.  EXTREMITIES:  No clubbing, cyanosis, or edema.  NEUROLOGICAL:  No focal neurologic deficits.    DIAGNOSTIC DATA:  Results for orders placed or performed in visit on 04/17/18   Retic With IRF & RET-He   Result Value Ref Range    Immature Reticulocyte Fraction 15.4 3.0 - 15.8 %    Reticulocyte % 2.37 (H) 0.60 - 2.00 %    Reticulocyte Hgb 37.3 (H) 29.8 - 36.1 pg   CBC Auto Differential   Result Value Ref Range    WBC 54.57 (H) 4.00 - 10.00 10*3/mm3    RBC 3.30 (L) 4.30 - 5.50 10*6/mm3    Hemoglobin 10.9 (L) 13.5 - 16.5 g/dL    Hematocrit 33.6 (L) 40.0 - 49.0 %    .8 (H) 83.0 - 97.0 fL    MCH 33.0 27.0 - 33.0 pg    MCHC 32.4 32.0 - 35.0 g/dL    RDW 13.6 11.7 - 14.5 %    RDW-SD 50.0 (H) 37.0 - 49.0 fl    MPV 9.9 8.9 - 12.1 fL    Platelets 206 150 - 375 " 10*3/mm3    Neutrophil % 4.8 (L) 39.0 - 75.0 %    Lymphocyte % 89.3 (H) 20.0 - 49.0 %    Monocyte % 5.4 4.0 - 12.0 %    Eosinophil % 0.2 (L) 1.0 - 5.0 %    Basophil % 0.1 0.0 - 1.1 %    Immature Grans % 0.2 0.0 - 0.5 %    Neutrophils, Absolute 2.66 1.50 - 7.00 10*3/mm3    Lymphocytes, Absolute 48.72 (H) 1.00 - 3.50 10*3/mm3    Monocytes, Absolute 2.94 (H) 0.25 - 0.80 10*3/mm3    Eosinophils, Absolute 0.10 0.00 - 0.36 10*3/mm3    Basophils, Absolute 0.04 0.00 - 0.10 10*3/mm3    Immature Grans, Absolute 0.11 (H) 0.00 - 0.03 10*3/mm3    nRBC 0.0 0.0 - 0.0 /100 WBC     IMAGING:  None reviewed    ASSESSMENT:  This is a 63 y.o. male with chronic lymphocytic leukemia and related autoimmune hemolytic anemia.    1.  CLL: Outside of the Rituxan he received for 4 weeks for this and the autoimmune hemolytic anemia he has not required any specific treatment.  His white blood cell count continues to elevate.  He is otherwise asymptomatic.  There is no palpable lymphadenopathy.  Because his white blood cell count continues to increase I will request a CT scan of the chest abdomen and pelvis to further evaluate for lymphadenopathy.  If lymphadenopathy is present, we will need to consider some treatment.  I will need to review old molecular studies if available before deciding on any therapy.    2.  Autoimmune hemolytic anemia:  Hemoglobin is a little bit lower than it had been.  Hemolysis labs were negative a few months ago.  Retic count is stable.      3.  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    PLAN:  1.  CT chest abdomen and pelvis to look for lymphadenopathy  2.  I will notify him with the results.  If no significant lymphadenopathy, I plan to see him back in about 2 months with labs.

## 2018-04-20 ENCOUNTER — APPOINTMENT (OUTPATIENT)
Dept: LAB | Facility: HOSPITAL | Age: 63
End: 2018-04-20

## 2018-04-25 ENCOUNTER — HOSPITAL ENCOUNTER (OUTPATIENT)
Dept: CT IMAGING | Facility: HOSPITAL | Age: 63
Discharge: HOME | End: 2018-04-25
Attending: INTERNAL MEDICINE | Admitting: INTERNAL MEDICINE

## 2018-04-25 DIAGNOSIS — C91.10 CLL (CHRONIC LYMPHOCYTIC LEUKEMIA) (HCC): ICD-10-CM

## 2018-04-25 LAB — CREAT BLDA-MCNC: 1 MG/DL (ref 0.6–1.3)

## 2018-04-25 PROCEDURE — 25010000002 IOPAMIDOL 61 % SOLUTION: Performed by: INTERNAL MEDICINE

## 2018-04-25 PROCEDURE — 71260 CT THORAX DX C+: CPT

## 2018-04-25 PROCEDURE — 74177 CT ABD & PELVIS W/CONTRAST: CPT

## 2018-04-25 PROCEDURE — 82565 ASSAY OF CREATININE: CPT

## 2018-04-25 RX ADMIN — IOPAMIDOL 85 ML: 612 INJECTION, SOLUTION INTRAVENOUS at 07:57

## 2018-05-03 NOTE — PROGRESS NOTES
FISH results from the time of diagnosis reviewed and negative for tested abnormalities.    CT imaging from 4/25/2018 reviewed.  New splenomegaly at 13.6 cm.  Lymphadenopathy in the abdomen measuring 3.2 cm in greatest extent.  Other slightly enlarged lymphadenopathy as well.  CT chest with some new mildly enlarged bilateral axillary nodes, largest on the left measuring 1.8 cm.      I spoke with him on the phone regarding this.  I do think that some treatment will be necessary in the near future.  I can see him back as scheduled in June and we will see what his white blood count is doing at that time and make some decisions regarding therapy.  Potentially bendamustine and Rituxan will be recommended.

## 2018-05-24 ENCOUNTER — PREP FOR SURGERY (OUTPATIENT)
Dept: OTHER | Facility: HOSPITAL | Age: 63
End: 2018-05-24

## 2018-05-24 DIAGNOSIS — D12.6 TUBULOVILLOUS ADENOMA OF COLON: Primary | ICD-10-CM

## 2018-06-04 PROBLEM — D12.6 TUBULOVILLOUS ADENOMA OF COLON: Status: ACTIVE | Noted: 2018-06-04

## 2018-06-20 ENCOUNTER — OFFICE VISIT (OUTPATIENT)
Dept: ONCOLOGY | Facility: CLINIC | Age: 63
End: 2018-06-20

## 2018-06-20 ENCOUNTER — LAB (OUTPATIENT)
Dept: LAB | Facility: HOSPITAL | Age: 63
End: 2018-06-20

## 2018-06-20 VITALS
HEART RATE: 90 BPM | SYSTOLIC BLOOD PRESSURE: 130 MMHG | WEIGHT: 239.2 LBS | DIASTOLIC BLOOD PRESSURE: 60 MMHG | OXYGEN SATURATION: 100 % | BODY MASS INDEX: 30.7 KG/M2 | RESPIRATION RATE: 16 BRPM | HEIGHT: 74 IN | TEMPERATURE: 98.3 F

## 2018-06-20 DIAGNOSIS — D53.9 MACROCYTIC ANEMIA: ICD-10-CM

## 2018-06-20 DIAGNOSIS — C91.10 CLL (CHRONIC LYMPHOCYTIC LEUKEMIA) (HCC): ICD-10-CM

## 2018-06-20 DIAGNOSIS — C91.10 CLL (CHRONIC LYMPHOCYTIC LEUKEMIA) (HCC): Primary | ICD-10-CM

## 2018-06-20 LAB
ABO GROUP BLD: NORMAL
ALBUMIN SERPL-MCNC: 4.4 G/DL (ref 3.5–5.2)
ALBUMIN/GLOB SERPL: 1.7 G/DL (ref 1.1–2.4)
ALP SERPL-CCNC: 139 U/L (ref 38–116)
ALT SERPL W P-5'-P-CCNC: 10 U/L (ref 0–41)
ANION GAP SERPL CALCULATED.3IONS-SCNC: 11.8 MMOL/L
AST SERPL-CCNC: 19 U/L (ref 0–40)
BASOPHILS # BLD AUTO: 0.01 10*3/MM3 (ref 0–0.1)
BASOPHILS NFR BLD AUTO: 0 % (ref 0–1.1)
BILIRUB SERPL-MCNC: 0.5 MG/DL (ref 0.1–1.2)
BLD GP AB SCN SERPL QL: POSITIVE
BUN BLD-MCNC: 23 MG/DL (ref 6–20)
BUN/CREAT SERPL: 18.7 (ref 7.3–30)
CALCIUM SPEC-SCNC: 9.7 MG/DL (ref 8.5–10.2)
CHLORIDE SERPL-SCNC: 102 MMOL/L (ref 98–107)
CO2 SERPL-SCNC: 28.2 MMOL/L (ref 22–29)
CREAT BLD-MCNC: 1.23 MG/DL (ref 0.7–1.3)
DAT C3: NEGATIVE
DAT IGG-SP REAG RBC-IMP: POSITIVE
DAT POLY-SP REAG RBC QL: POSITIVE
DEPRECATED RDW RBC AUTO: 56.8 FL (ref 37–49)
EOSINOPHIL # BLD AUTO: 0.09 10*3/MM3 (ref 0–0.36)
EOSINOPHIL NFR BLD AUTO: 0.1 % (ref 1–5)
ERYTHROCYTE [DISTWIDTH] IN BLOOD BY AUTOMATED COUNT: 14.1 % (ref 11.7–14.5)
FERRITIN SERPL-MCNC: 359.5 NG/ML (ref 30–400)
FOLATE SERPL-MCNC: 17.12 NG/ML (ref 4.78–24.2)
GFR SERPL CREATININE-BSD FRML MDRD: 72 ML/MIN/1.73
GLOBULIN UR ELPH-MCNC: 2.6 GM/DL (ref 1.8–3.5)
GLUCOSE BLD-MCNC: 105 MG/DL (ref 74–124)
HAPTOGLOB SERPL-MCNC: 155 MG/DL (ref 30–200)
HCT VFR BLD AUTO: 23.2 % (ref 40–49)
HGB BLD-MCNC: 6.9 G/DL (ref 13.5–16.5)
HGB RETIC QN: 36.9 PG (ref 29.8–36.1)
IMM GRANULOCYTES # BLD: 0.1 10*3/MM3 (ref 0–0.03)
IMM GRANULOCYTES NFR BLD: 0.1 % (ref 0–0.5)
IMM RETICS NFR: 13.7 % (ref 3–15.8)
IRON 24H UR-MRATE: 82 MCG/DL (ref 59–158)
IRON SATN MFR SERPL: 31 % (ref 14–48)
LDH SERPL-CCNC: 291 U/L (ref 99–259)
LYMPHOCYTES # BLD AUTO: 78.24 10*3/MM3 (ref 1–3.5)
LYMPHOCYTES NFR BLD AUTO: 92.7 % (ref 20–49)
MCH RBC QN AUTO: 33.5 PG (ref 27–33)
MCHC RBC AUTO-ENTMCNC: 29.7 G/DL (ref 32–35)
MCV RBC AUTO: 112.6 FL (ref 83–97)
MONOCYTES # BLD AUTO: 3.87 10*3/MM3 (ref 0.25–0.8)
MONOCYTES NFR BLD AUTO: 4.6 % (ref 4–12)
NEUTROPHILS # BLD AUTO: 2.05 10*3/MM3 (ref 1.5–7)
NEUTROPHILS NFR BLD AUTO: 2.5 % (ref 39–75)
NRBC BLD MANUAL-RTO: 0 /100 WBC (ref 0–0)
PLATELET # BLD AUTO: 251 10*3/MM3 (ref 150–375)
PMV BLD AUTO: 10 FL (ref 8.9–12.1)
POTASSIUM BLD-SCNC: 4.2 MMOL/L (ref 3.5–4.7)
PROT SERPL-MCNC: 7 G/DL (ref 6.3–8)
RBC # BLD AUTO: 2.06 10*6/MM3 (ref 4.3–5.5)
RETICS/RBC NFR AUTO: 1.22 % (ref 0.6–2)
RH BLD: POSITIVE
SODIUM BLD-SCNC: 142 MMOL/L (ref 134–145)
T&S EXPIRATION DATE: NORMAL
TIBC SERPL-MCNC: 265 MCG/DL (ref 249–505)
TRANSFERRIN SERPL-MCNC: 189 MG/DL (ref 200–360)
VIT B12 BLD-MCNC: 781 PG/ML (ref 211–946)
WBC NRBC COR # BLD: 84.36 10*3/MM3 (ref 4–10)

## 2018-06-20 PROCEDURE — 36415 COLL VENOUS BLD VENIPUNCTURE: CPT | Performed by: INTERNAL MEDICINE

## 2018-06-20 PROCEDURE — 83010 ASSAY OF HAPTOGLOBIN QUANT: CPT | Performed by: INTERNAL MEDICINE

## 2018-06-20 PROCEDURE — 86850 RBC ANTIBODY SCREEN: CPT

## 2018-06-20 PROCEDURE — 99215 OFFICE O/P EST HI 40 MIN: CPT | Performed by: INTERNAL MEDICINE

## 2018-06-20 PROCEDURE — 82746 ASSAY OF FOLIC ACID SERUM: CPT | Performed by: INTERNAL MEDICINE

## 2018-06-20 PROCEDURE — 86920 COMPATIBILITY TEST SPIN: CPT

## 2018-06-20 PROCEDURE — 84466 ASSAY OF TRANSFERRIN: CPT | Performed by: INTERNAL MEDICINE

## 2018-06-20 PROCEDURE — 82607 VITAMIN B-12: CPT | Performed by: INTERNAL MEDICINE

## 2018-06-20 PROCEDURE — 85025 COMPLETE CBC W/AUTO DIFF WBC: CPT

## 2018-06-20 PROCEDURE — 86901 BLOOD TYPING SEROLOGIC RH(D): CPT

## 2018-06-20 PROCEDURE — 83540 ASSAY OF IRON: CPT | Performed by: INTERNAL MEDICINE

## 2018-06-20 PROCEDURE — 86922 COMPATIBILITY TEST ANTIGLOB: CPT

## 2018-06-20 PROCEDURE — 86880 COOMBS TEST DIRECT: CPT | Performed by: INTERNAL MEDICINE

## 2018-06-20 PROCEDURE — 82728 ASSAY OF FERRITIN: CPT | Performed by: INTERNAL MEDICINE

## 2018-06-20 PROCEDURE — 80053 COMPREHEN METABOLIC PANEL: CPT

## 2018-06-20 PROCEDURE — 86870 RBC ANTIBODY IDENTIFICATION: CPT

## 2018-06-20 PROCEDURE — 85046 RETICYTE/HGB CONCENTRATE: CPT

## 2018-06-20 PROCEDURE — 83615 LACTATE (LD) (LDH) ENZYME: CPT

## 2018-06-20 PROCEDURE — 86900 BLOOD TYPING SEROLOGIC ABO: CPT

## 2018-06-20 RX ORDER — SODIUM CHLORIDE 9 MG/ML
250 INJECTION, SOLUTION INTRAVENOUS AS NEEDED
Status: CANCELLED | OUTPATIENT
Start: 2018-06-20

## 2018-06-20 RX ORDER — ZOLPIDEM TARTRATE 10 MG/1
TABLET ORAL
Refills: 0 | COMMUNITY
Start: 2018-04-18 | End: 2018-06-20

## 2018-06-20 RX ORDER — ACETAMINOPHEN 325 MG/1
650 TABLET ORAL ONCE
Status: CANCELLED | OUTPATIENT
Start: 2018-06-20 | End: 2018-06-20

## 2018-06-20 NOTE — PROGRESS NOTES
[facility] GROUP OUTPATIENT FOLLOW UP CLINIC VISIT    REASON FOR FOLLOW-UP:    1.  Chronic lymphocytic leukemia diagnosed in November 2015.  CLL FISH panel negative at that time.    2.  Autoimmune hemolytic anemia associated with CLL.  He was treated with steroids and he received 4 weeks of weekly Rituxan.  Otherwise no treatment has been performed  3.  CT imaging of the chest abdomen and pelvis from 4/25/2018 showed lymphadenopathy with mildly enlarged retroperitoneal and mesenteric lymph nodes with the largest measuring about 3.2 cm.  The spleen measured 13.6 cm.  Slight increase in lymphadenopathy compared with 11/27/2015.         CLL (chronic lymphocytic leukemia)    11/25/2015 Initial Diagnosis     CLL (chronic lymphocytic leukemia)         12/1/2015 - 12/23/2015 Chemotherapy     Rituxan weekly x4            HISTORY OF PRESENT ILLNESS:  Aida Bal is a 63 y.o. male who returns today for follow up of the above issue.  He has generally been feeling well although does have some fatigue.  No palpable lymphadenopathy.  No abdominal pain.  No visible scleral icterus.    PAST MEDICAL, SURGICAL, FAMILY, AND SOCIAL HISTORIES were reviewed with the patient and in the electronic medical record, and were updated if indicated.    ALLERGIES:  No Known Allergies    MEDICATIONS:  The medication list has been reviewed with the patient by the medical assistant, and the list has been updated in the electronic medical record, which I reviewed.  Medication dosages and frequencies were confirmed to be accurate.    REVIEW OF SYSTEMS:  PAIN:  See Vital Signs below.  GENERAL:  No fevers, chills, night sweats, or unintended weight loss.  Mild fatigue.  SKIN:  No rashes or non-healing lesions.  HEME/LYMPH:  No abnormal bleeding.  No palpable lymphadenopathy.  EYES:  No vision changes or diplopia.  ENT:  No sore throat or rhinorrhea  RESPIRATORY:  No shortness of breath or cough  CARDIOVASCULAR:  No chest pain, palpitations,  "orthopnea, or dyspnea on exertion.  GASTROINTESTINAL:  No abdominal pain, nausea, vomiting, constipation, diarrhea, melena, or hematochezia.  GENITOURINARY:  No dysuria or hematuria.  Erectile dysfunction  MUSCULOSKELETAL:  No muscle or joint pain.  NEUROLOGIC:  No dizziness, loss of consciousness, or seizures.  PSYCHIATRIC:  No depression, anxiety, or mood changes.    Vitals:    06/20/18 1103   BP: 130/60   Pulse: 90   Resp: 16   Temp: 98.3 °F (36.8 °C)   SpO2: 100%   Weight: 109 kg (239 lb 3.2 oz)   Height: 189.2 cm (74.49\")   PainSc: 0-No pain       PHYSICAL EXAMINATION:  GENERAL:  Well-developed well-nourished male; awake, alert and oriented, in no acute distress.  SKIN:  Warm and dry, without rashes, purpura, or petechiae.  HEAD:  Normocephalic, atraumatic.  EYES:  Pupils equal, round and reactive to light.  Extraocular movements intact.  Conjunctivae muddy.  EARS:  Hearing intact.  NOSE:  Septum midline.  No excoriations or nasal discharge.  MOUTH:  No stomatitis or ulcers.  Lips are normal.  THROAT:  Oropharynx without lesions or exudates.  NECK:  Supple with good range of motion; no thyromegaly or masses; no JVD or bruits.  LYMPHATICS:  No cervical, supraclavicular, axillary or inguinal lymphadenopathy.  CHEST:  Lungs are clear to auscultation bilaterally.  No wheezes, rales, or rhonchi.  HEART:  Regular rate; normal rhythm.  No murmurs, gallops or rubs.  ABDOMEN:  Soft, non-tender, non-distended.  Normal active bowel sounds.  No organomegaly.  EXTREMITIES:  No clubbing, cyanosis, or edema.  NEUROLOGICAL:  No focal neurologic deficits.    DIAGNOSTIC DATA:  Results for orders placed or performed in visit on 06/20/18   Retic With IRF & RET-He   Result Value Ref Range    Immature Reticulocyte Fraction 13.7 3.0 - 15.8 %    Reticulocyte % 1.22 0.60 - 2.00 %    Reticulocyte Hgb 36.9 (H) 29.8 - 36.1 pg   CBC Auto Differential   Result Value Ref Range    WBC 84.36 (H) 4.00 - 10.00 10*3/mm3    RBC 2.06 (L) 4.30 - " 5.50 10*6/mm3    Hemoglobin 6.9 (C) 13.5 - 16.5 g/dL    Hematocrit 23.2 (L) 40.0 - 49.0 %    .6 (H) 83.0 - 97.0 fL    MCH 33.5 (H) 27.0 - 33.0 pg    MCHC 29.7 (L) 32.0 - 35.0 g/dL    RDW 14.1 11.7 - 14.5 %    RDW-SD 56.8 (H) 37.0 - 49.0 fl    MPV 10.0 8.9 - 12.1 fL    Platelets 251 150 - 375 10*3/mm3    Neutrophil % 2.5 (L) 39.0 - 75.0 %    Lymphocyte % 92.7 (H) 20.0 - 49.0 %    Monocyte % 4.6 4.0 - 12.0 %    Eosinophil % 0.1 (L) 1.0 - 5.0 %    Basophil % 0.0 0.0 - 1.1 %    Immature Grans % 0.1 0.0 - 0.5 %    Neutrophils, Absolute 2.05 1.50 - 7.00 10*3/mm3    Lymphocytes, Absolute 78.24 (H) 1.00 - 3.50 10*3/mm3    Monocytes, Absolute 3.87 (H) 0.25 - 0.80 10*3/mm3    Eosinophils, Absolute 0.09 0.00 - 0.36 10*3/mm3    Basophils, Absolute 0.01 0.00 - 0.10 10*3/mm3    Immature Grans, Absolute 0.10 (H) 0.00 - 0.03 10*3/mm3    nRBC 0.0 0.0 - 0.0 /100 WBC     IMAGING:  CT images from 4/25/2018 personally reviewed.  Multiple mildly enlarged lymph nodes are present with mild bilateral axillary lymphadenopathy that was new since 2015 with the largest axillary lymph node and left axilla measuring 1.8 cm.  Multiple retroperitoneal and mesenteric lymph nodes noted as well.    I have reviewed his peripheral blood smear.  There is some anisocytosis and poikilocytosis present.  Possibility of red cells.  There is an increased population of mature-appearing lymphocytes present.  No smudge cells.  Platelets are adequate in number and normal in morphology.       IMPRESSION:  Mild bilateral axillary lymphadenopathy that is new since  the preceding CT chest in 2015. The largest axillary lymph node is in  the left axilla, and measures up to 1.8 cm in diameter.    IMPRESSION:  Multiple mildly enlarged lymph nodes in the retroperitoneum  and small bowel mesentery and both pelvic sidewalls showing slight  increase in size since the preceding CT scan dated 11/27/2015.  Borderline splenomegaly is also now evident.    ASSESSMENT:  This  is a 63 y.o. male with chronic lymphocytic leukemia and related autoimmune hemolytic anemia.    1.  CLL: Outside of the Rituxan he received for 4 weeks for this and the autoimmune hemolytic anemia he has not required any specific treatment.  His white blood cell count continues to elevate.  He is otherwise asymptomatic.  There is no palpable lymphadenopathy.  At his last visit his white blood cell count was increasing and I requested a CT scan of the chest abdomen pelvis which did indicate some lymphadenopathy but nothing large enough to warrant treatment.  His white blood cell count has increased again today.  See below.    2.  Macrocytic anemia: He has a history of autoimmune hemolytic anemia when he presented with CLL back in November 2015.  His reticulocyte count was very elevated at that time.  His hemoglobin dropped to as low as about 5.  He responded to steroids initially intravenously and then with prednisone and he was treated with 4 weeks of Rituxan.    Today he has worsening anemia with a hemoglobin of 6.9.  The obvious concern would be recurrent hemolysis.  He denies any bleeding.  However, his reticulocyte count is normal at 1.22%.  His bilirubin is normal (it was mildly elevated when he had hemolysis in 2015).  His LDH is mildly elevated but it was extremely elevated when he presented with hemolysis in the past.  Therefore, laboratory values today at this point are not necessarily consistent with recurrent hemolytic anemia.  His platelet count is normal which is reassuring that his bone marrow is not packed with CLL.  We await the results of iron studies, vitamin B12, and folic acid levels.  I have also requested a ADOLFO and haptoglobin.  We will transfuse 2 units of packed red blood cells.  Once I have the results of his other laboratory studies we will determine how to proceed.  If things seem to be more consistent with hemolysis we will plan for 4 weeks of Rituxan and I will start him on prednisone 1  mg/kg daily.  If not, we will treat the CLL most likely with enema skin and Rituxan.    3.  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  If he requires further steroids this will become an issue again.    PLAN:  1.  Follow-up pending labs as noted above to determine the cause of his anemia.  2.  Transfuse 2 units of packed red blood cells.  3.  Follow-up and further plans based on laboratory results.  I will call him later today once I have more laboratory data.

## 2018-06-21 ENCOUNTER — INFUSION (OUTPATIENT)
Dept: ONCOLOGY | Facility: HOSPITAL | Age: 63
End: 2018-06-21

## 2018-06-21 ENCOUNTER — TELEPHONE (OUTPATIENT)
Dept: ONCOLOGY | Facility: CLINIC | Age: 63
End: 2018-06-21

## 2018-06-21 DIAGNOSIS — C91.10 CLL (CHRONIC LYMPHOCYTIC LEUKEMIA) (HCC): ICD-10-CM

## 2018-06-21 DIAGNOSIS — C91.10 CLL (CHRONIC LYMPHOID LEUKEMIA) WITH FAILED REMISSION (HCC): Primary | ICD-10-CM

## 2018-06-21 DIAGNOSIS — D53.9 MACROCYTIC ANEMIA: ICD-10-CM

## 2018-06-21 LAB
FOLATE BLD-MCNC: 355.2 NG/ML
FOLATE RBC-MCNC: 1538 NG/ML
HCT VFR BLD AUTO: 23.1 % (ref 37.5–51)

## 2018-06-21 PROCEDURE — 86901 BLOOD TYPING SEROLOGIC RH(D): CPT

## 2018-06-21 PROCEDURE — 86880 COOMBS TEST DIRECT: CPT

## 2018-06-21 PROCEDURE — 86870 RBC ANTIBODY IDENTIFICATION: CPT

## 2018-06-21 PROCEDURE — 86978 RBC PRETREATMENT SERUM: CPT

## 2018-06-21 PROCEDURE — 86900 BLOOD TYPING SEROLOGIC ABO: CPT

## 2018-06-21 PROCEDURE — 86905 BLOOD TYPING RBC ANTIGENS: CPT

## 2018-06-21 PROCEDURE — 86860 RBC ANTIBODY ELUTION: CPT

## 2018-06-21 PROCEDURE — 86850 RBC ANTIBODY SCREEN: CPT

## 2018-06-21 NOTE — TELEPHONE ENCOUNTER
----- Message from Toñito Cheng MD sent at 6/21/2018 12:24 PM EDT -----  Regarding: follow up  Please arrange:    1.  CBC with nurse review in 1 week  2.  1 unit appointment with me with a CBC in 2 weeks, mid-day one day during my hospital week.     Thanks,  SANDRA

## 2018-06-21 NOTE — TELEPHONE ENCOUNTER
His ADOLFO is positive but it has been positive and otherwise labs are not consistent with hemolysis.  Therefore, the etiology of his anemia remains obscure.  He has red blood cell transfusion scheduled for Saturday.  We will check a CBC next week with nurse review and I will see him back in 2 weeks with labs.  We may elect to pursue a bone marrow aspiration and biopsy.  We may simply need to treat the CLL.  There has been no obvious bleeding.  He is not iron deficient.  He is not vitamin B12 deficient.      I spoke with him regarding this on the telephone today.

## 2018-06-22 PROCEDURE — 86902 BLOOD TYPE ANTIGEN DONOR EA: CPT

## 2018-06-23 ENCOUNTER — INFUSION (OUTPATIENT)
Dept: ONCOLOGY | Facility: HOSPITAL | Age: 63
End: 2018-06-23

## 2018-06-23 VITALS
OXYGEN SATURATION: 98 % | DIASTOLIC BLOOD PRESSURE: 67 MMHG | TEMPERATURE: 98 F | RESPIRATION RATE: 18 BRPM | HEART RATE: 75 BPM | SYSTOLIC BLOOD PRESSURE: 111 MMHG

## 2018-06-23 DIAGNOSIS — D53.9 MACROCYTIC ANEMIA: ICD-10-CM

## 2018-06-23 DIAGNOSIS — C91.10 CLL (CHRONIC LYMPHOCYTIC LEUKEMIA) (HCC): ICD-10-CM

## 2018-06-23 PROCEDURE — 36430 TRANSFUSION BLD/BLD COMPNT: CPT

## 2018-06-23 PROCEDURE — 86900 BLOOD TYPING SEROLOGIC ABO: CPT

## 2018-06-23 PROCEDURE — P9016 RBC LEUKOCYTES REDUCED: HCPCS

## 2018-06-23 RX ORDER — ACETAMINOPHEN 325 MG/1
650 TABLET ORAL ONCE
Status: COMPLETED | OUTPATIENT
Start: 2018-06-23 | End: 2018-06-23

## 2018-06-23 RX ORDER — SODIUM CHLORIDE 9 MG/ML
250 INJECTION, SOLUTION INTRAVENOUS AS NEEDED
Status: DISCONTINUED | OUTPATIENT
Start: 2018-06-23 | End: 2018-06-23 | Stop reason: HOSPADM

## 2018-06-23 RX ADMIN — ACETAMINOPHEN 650 MG: 325 TABLET, FILM COATED ORAL at 07:37

## 2018-06-24 LAB
ABO + RH BLD: NORMAL
ABO + RH BLD: NORMAL
BH BB BLOOD EXPIRATION DATE: NORMAL
BH BB BLOOD EXPIRATION DATE: NORMAL
BH BB BLOOD TYPE BARCODE: 9500
BH BB BLOOD TYPE BARCODE: 9500
BH BB DISPENSE STATUS: NORMAL
BH BB DISPENSE STATUS: NORMAL
BH BB PRODUCT CODE: NORMAL
BH BB PRODUCT CODE: NORMAL
BH BB UNIT NUMBER: NORMAL
BH BB UNIT NUMBER: NORMAL
CROSSMATCH INTERPRETATION: NORMAL
CROSSMATCH INTERPRETATION: NORMAL
UNIT  ABO: NORMAL
UNIT  ABO: NORMAL
UNIT  RH: NORMAL
UNIT  RH: NORMAL

## 2018-06-27 ENCOUNTER — APPOINTMENT (OUTPATIENT)
Dept: LAB | Facility: HOSPITAL | Age: 63
End: 2018-06-27

## 2018-06-27 ENCOUNTER — TELEPHONE (OUTPATIENT)
Dept: ONCOLOGY | Facility: CLINIC | Age: 63
End: 2018-06-27

## 2018-06-27 ENCOUNTER — CLINICAL SUPPORT (OUTPATIENT)
Dept: ONCOLOGY | Facility: HOSPITAL | Age: 63
End: 2018-06-27

## 2018-06-27 ENCOUNTER — LAB (OUTPATIENT)
Dept: LAB | Facility: HOSPITAL | Age: 63
End: 2018-06-27

## 2018-06-27 DIAGNOSIS — D59.10 AUTOIMMUNE HEMOLYTIC ANEMIA (HCC): Primary | ICD-10-CM

## 2018-06-27 DIAGNOSIS — D53.9 MACROCYTIC ANEMIA: Primary | ICD-10-CM

## 2018-06-27 DIAGNOSIS — C91.10 CLL (CHRONIC LYMPHOCYTIC LEUKEMIA) (HCC): Primary | ICD-10-CM

## 2018-06-27 DIAGNOSIS — C91.10 CLL (CHRONIC LYMPHOID LEUKEMIA) WITH FAILED REMISSION (HCC): ICD-10-CM

## 2018-06-27 DIAGNOSIS — D53.9 MACROCYTIC ANEMIA: ICD-10-CM

## 2018-06-27 LAB
ABO GROUP BLD: NORMAL
BASOPHILS # BLD AUTO: 0.03 10*3/MM3 (ref 0–0.1)
BASOPHILS NFR BLD AUTO: 0 % (ref 0–1.1)
BLD GP AB SCN SERPL QL: POSITIVE
DEPRECATED RDW RBC AUTO: 61.7 FL (ref 37–49)
EOSINOPHIL # BLD AUTO: 0.09 10*3/MM3 (ref 0–0.36)
EOSINOPHIL NFR BLD AUTO: 0.1 % (ref 1–5)
ERYTHROCYTE [DISTWIDTH] IN BLOOD BY AUTOMATED COUNT: 16.9 % (ref 11.7–14.5)
HCT VFR BLD AUTO: 24.3 % (ref 40–49)
HGB BLD-MCNC: 7.6 G/DL (ref 13.5–16.5)
HGB RETIC QN: 36 PG (ref 29.8–36.1)
IMM GRANULOCYTES # BLD: 0.13 10*3/MM3 (ref 0–0.03)
IMM GRANULOCYTES NFR BLD: 0.2 % (ref 0–0.5)
IMM RETICS NFR: 14.9 % (ref 3–15.8)
LYMPHOCYTES # BLD AUTO: 66.84 10*3/MM3 (ref 1–3.5)
LYMPHOCYTES NFR BLD AUTO: 89 % (ref 20–49)
MCH RBC QN AUTO: 32.2 PG (ref 27–33)
MCHC RBC AUTO-ENTMCNC: 31.3 G/DL (ref 32–35)
MCV RBC AUTO: 103 FL (ref 83–97)
MONOCYTES # BLD AUTO: 4.25 10*3/MM3 (ref 0.25–0.8)
MONOCYTES NFR BLD AUTO: 5.7 % (ref 4–12)
NEUTROPHILS # BLD AUTO: 3.78 10*3/MM3 (ref 1.5–7)
NEUTROPHILS NFR BLD AUTO: 5 % (ref 39–75)
NRBC BLD MANUAL-RTO: 0 /100 WBC (ref 0–0)
PLATELET # BLD AUTO: 218 10*3/MM3 (ref 150–375)
PMV BLD AUTO: 10.4 FL (ref 8.9–12.1)
RBC # BLD AUTO: 2.36 10*6/MM3 (ref 4.3–5.5)
RETICS/RBC NFR AUTO: 0.83 % (ref 0.6–2)
RH BLD: POSITIVE
T&S EXPIRATION DATE: NORMAL
WARM AUTOANTIBODY: NORMAL
WBC NRBC COR # BLD: 75.12 10*3/MM3 (ref 4–10)

## 2018-06-27 PROCEDURE — 86901 BLOOD TYPING SEROLOGIC RH(D): CPT | Performed by: INTERNAL MEDICINE

## 2018-06-27 PROCEDURE — 85025 COMPLETE CBC W/AUTO DIFF WBC: CPT | Performed by: INTERNAL MEDICINE

## 2018-06-27 PROCEDURE — 86880 COOMBS TEST DIRECT: CPT

## 2018-06-27 PROCEDURE — 86870 RBC ANTIBODY IDENTIFICATION: CPT

## 2018-06-27 PROCEDURE — 86901 BLOOD TYPING SEROLOGIC RH(D): CPT

## 2018-06-27 PROCEDURE — 86978 RBC PRETREATMENT SERUM: CPT

## 2018-06-27 PROCEDURE — 86900 BLOOD TYPING SEROLOGIC ABO: CPT | Performed by: INTERNAL MEDICINE

## 2018-06-27 PROCEDURE — 86920 COMPATIBILITY TEST SPIN: CPT

## 2018-06-27 PROCEDURE — 36416 COLLJ CAPILLARY BLOOD SPEC: CPT | Performed by: INTERNAL MEDICINE

## 2018-06-27 PROCEDURE — 86921 COMPATIBILITY TEST INCUBATE: CPT

## 2018-06-27 PROCEDURE — 36415 COLL VENOUS BLD VENIPUNCTURE: CPT | Performed by: INTERNAL MEDICINE

## 2018-06-27 PROCEDURE — 85046 RETICYTE/HGB CONCENTRATE: CPT | Performed by: INTERNAL MEDICINE

## 2018-06-27 PROCEDURE — 86900 BLOOD TYPING SEROLOGIC ABO: CPT

## 2018-06-27 PROCEDURE — 86922 COMPATIBILITY TEST ANTIGLOB: CPT

## 2018-06-27 PROCEDURE — 86850 RBC ANTIBODY SCREEN: CPT | Performed by: INTERNAL MEDICINE

## 2018-06-27 PROCEDURE — 86860 RBC ANTIBODY ELUTION: CPT

## 2018-06-27 PROCEDURE — 86902 BLOOD TYPE ANTIGEN DONOR EA: CPT

## 2018-06-27 RX ORDER — DIPHENHYDRAMINE HCL 25 MG
25 CAPSULE ORAL ONCE
Status: CANCELLED | OUTPATIENT
Start: 2018-06-27 | End: 2018-06-27

## 2018-06-27 RX ORDER — SODIUM CHLORIDE 9 MG/ML
250 INJECTION, SOLUTION INTRAVENOUS AS NEEDED
Status: CANCELLED | OUTPATIENT
Start: 2018-06-27

## 2018-06-27 RX ORDER — PREDNISONE 50 MG/1
100 TABLET ORAL DAILY
Qty: 10 TABLET | Refills: 0 | Status: SHIPPED | OUTPATIENT
Start: 2018-06-27 | End: 2018-07-05

## 2018-06-27 RX ORDER — ACETAMINOPHEN 325 MG/1
650 TABLET ORAL ONCE
Status: CANCELLED | OUTPATIENT
Start: 2018-06-27 | End: 2018-06-27

## 2018-06-27 NOTE — TELEPHONE ENCOUNTER
Left message on voice mail regarding the need for a bone marrow biopsy to determine the cause of his anemia.  Possibility of pure red cell aplasia.

## 2018-06-27 NOTE — PROGRESS NOTES
Pt presents for RN review.Pt has no complaints.  HGB 7.6. Per Dr. Cheng transfuse 2 units of PRBC. He will also start Prednisone 100 mg daily until his appt with Dr. Cheng on  7/5. Pt sent to lab for type and screen. Appt for transfusion set up by scheduling for 830 am Friday. His transfusions have antibodies added to them that requires a 2 day lead time. Pt V/U.     Lab Results   Component Value Date    WBC 75.12 (H) 06/27/2018    HGB 7.6 (C) 06/27/2018    HCT 24.3 (L) 06/27/2018    .0 (H) 06/27/2018     06/27/2018

## 2018-06-27 NOTE — PROGRESS NOTES
"Michelle at [facility] called stating patient needs 5 McAlester tubes when patient is typed and crossed and a full 48 hours after blood is received by [facility] to transfusion appointment. Patient called and asked to return to CBC today to have type and cross redrawn and new blood band applied. Patient stated he could return around 1 pm today 6/27/18. Spoke with Leonora at appointment desk and she added patient on for lab only today at 1 pm and was calling to reschedule transfusion from Friday 6/29/18 to Saturday 6/30/18. Spoke with Keiry in Lab and informed her patient will be returning at 1 pm today to have type and cross redrawn and will need a new blood band. Michelle at [facility] was called and informed patient will be returning today to have type and cross redrawn. Michelle requested that blood be \"walked over\" to [facility] as soon as possible. Keiry was called and informed of Michelle's request.  "

## 2018-06-28 ENCOUNTER — ANESTHESIA (OUTPATIENT)
Dept: GASTROENTEROLOGY | Facility: HOSPITAL | Age: 63
End: 2018-06-28

## 2018-06-28 ENCOUNTER — HOSPITAL ENCOUNTER (OUTPATIENT)
Facility: HOSPITAL | Age: 63
Setting detail: HOSPITAL OUTPATIENT SURGERY
Discharge: HOME | End: 2018-06-28
Attending: SURGERY | Admitting: SURGERY

## 2018-06-28 ENCOUNTER — ANESTHESIA EVENT (OUTPATIENT)
Dept: GASTROENTEROLOGY | Facility: HOSPITAL | Age: 63
End: 2018-06-28

## 2018-06-28 VITALS
SYSTOLIC BLOOD PRESSURE: 116 MMHG | BODY MASS INDEX: 30.67 KG/M2 | HEART RATE: 79 BPM | WEIGHT: 239 LBS | TEMPERATURE: 98.2 F | OXYGEN SATURATION: 100 % | HEIGHT: 74 IN | DIASTOLIC BLOOD PRESSURE: 67 MMHG | RESPIRATION RATE: 16 BRPM

## 2018-06-28 DIAGNOSIS — D12.6 TUBULOVILLOUS ADENOMA OF COLON: ICD-10-CM

## 2018-06-28 LAB
DAT C3: NEGATIVE
DAT IGG-SP REAG RBC-IMP: POSITIVE
DAT POLY-SP REAG RBC QL: POSITIVE
GLUCOSE BLDC GLUCOMTR-MCNC: 133 MG/DL (ref 70–130)

## 2018-06-28 PROCEDURE — 45385 COLONOSCOPY W/LESION REMOVAL: CPT | Performed by: SURGERY

## 2018-06-28 PROCEDURE — 45381 COLONOSCOPY SUBMUCOUS NJX: CPT | Performed by: SURGERY

## 2018-06-28 PROCEDURE — S0260 H&P FOR SURGERY: HCPCS | Performed by: SURGERY

## 2018-06-28 PROCEDURE — 25010000002 PROPOFOL 10 MG/ML EMULSION: Performed by: ANESTHESIOLOGY

## 2018-06-28 PROCEDURE — 82962 GLUCOSE BLOOD TEST: CPT

## 2018-06-28 PROCEDURE — 88305 TISSUE EXAM BY PATHOLOGIST: CPT | Performed by: SURGERY

## 2018-06-28 RX ORDER — SODIUM CHLORIDE, SODIUM LACTATE, POTASSIUM CHLORIDE, CALCIUM CHLORIDE 600; 310; 30; 20 MG/100ML; MG/100ML; MG/100ML; MG/100ML
30 INJECTION, SOLUTION INTRAVENOUS CONTINUOUS PRN
Status: DISCONTINUED | OUTPATIENT
Start: 2018-06-28 | End: 2018-06-28 | Stop reason: HOSPADM

## 2018-06-28 RX ORDER — PROPOFOL 10 MG/ML
VIAL (ML) INTRAVENOUS AS NEEDED
Status: DISCONTINUED | OUTPATIENT
Start: 2018-06-28 | End: 2018-06-28 | Stop reason: SURG

## 2018-06-28 RX ADMIN — PROPOFOL 200 MG: 10 INJECTION, EMULSION INTRAVENOUS at 09:13

## 2018-06-28 RX ADMIN — SODIUM CHLORIDE, POTASSIUM CHLORIDE, SODIUM LACTATE AND CALCIUM CHLORIDE 30 ML/HR: 600; 310; 30; 20 INJECTION, SOLUTION INTRAVENOUS at 08:25

## 2018-06-28 RX ADMIN — PROPOFOL 150 MG: 10 INJECTION, EMULSION INTRAVENOUS at 09:20

## 2018-06-28 NOTE — ANESTHESIA POSTPROCEDURE EVALUATION
"Patient: Aida Bal    Procedure Summary     Date:  06/28/18 Room / Location:  [facility] ENDOSCOPY 6 / [facility] ENDOSCOPY    Anesthesia Start:  0913 Anesthesia Stop:  0948    Procedure:  COLONOSCOPY to Cecum with Hot Snare Polypectomies, injected with 6 ML NS (N/A ) Diagnosis:       Tubulovillous adenoma of colon      (Tubulovillous adenoma of colon [D12.6])    Surgeon:  Fannie Caro MD Provider:  Haydee Bassett MD    Anesthesia Type:  MAC ASA Status:  3          Anesthesia Type: MAC  Last vitals  BP   116/67 (06/28/18 0958)   Temp   36.8 °C (98.2 °F) (06/28/18 0812)   Pulse   79 (06/28/18 0958)   Resp   16 (06/28/18 0958)     SpO2   100 % (06/28/18 0958)     Post Anesthesia Care and Evaluation    Patient location during evaluation: bedside  Patient participation: complete - patient participated  Level of consciousness: awake  Pain score: 0  Pain management: adequate  Airway patency: patent  Anesthetic complications: No anesthetic complications    Cardiovascular status: acceptable  Respiratory status: acceptable  Hydration status: acceptable    Comments: Blood pressure 116/67, pulse 79, temperature 36.8 °C (98.2 °F), temperature source Oral, resp. rate 16, height 189 cm (74.41\"), weight 108 kg (239 lb), SpO2 100 %.    No anesthesia care post op    "

## 2018-06-28 NOTE — ANESTHESIA PREPROCEDURE EVALUATION
Anesthesia Evaluation     Patient summary reviewed and Nursing notes reviewed   no history of anesthetic complications:  NPO Solid Status: > 8 hours  NPO Liquid Status: > 8 hours           Airway   Mallampati: II  TM distance: >3 FB  Neck ROM: full  no difficulty expected  Dental - normal exam   (+) upper dentures    Pulmonary - normal exam   (+) sleep apnea on CPAP,   (-) COPD, asthma, not a smoker, lung cancer  Cardiovascular - normal exam  Exercise tolerance: good (4-7 METS)    Rhythm: regular  Rate: normal    (+) hypertension well controlled less than 2 medications, CAD, PVD, hyperlipidemia,  carotid artery disease      Neuro/Psych  (+) TIA,     (-) seizures, CVA  GI/Hepatic/Renal/Endo    (+) obesity,   diabetes mellitus type 2 poorly controlled,   (-) hepatitis, liver disease, no renal disease, GI bleed    Musculoskeletal (-) negative ROS        ROS comment: Hx/o gout  Abdominal  - normal exam   Substance History - negative use     OB/GYN negative ob/gyn ROS         Other   (+) blood dyscrasia   history of cancer      Other Comment: Hx/o CLL                  Anesthesia Plan    ASA 3     MAC     intravenous induction   Anesthetic plan and risks discussed with patient.    Plan discussed with attending.

## 2018-06-29 ENCOUNTER — HOSPITAL ENCOUNTER (OUTPATIENT)
Dept: INFUSION THERAPY | Facility: HOSPITAL | Age: 63
End: 2018-06-29
Attending: INTERNAL MEDICINE

## 2018-06-29 DIAGNOSIS — C91.10 CLL (CHRONIC LYMPHOCYTIC LEUKEMIA) (HCC): ICD-10-CM

## 2018-06-29 LAB
CYTO UR: NORMAL
LAB AP CASE REPORT: NORMAL
PATH REPORT.FINAL DX SPEC: NORMAL
PATH REPORT.GROSS SPEC: NORMAL

## 2018-06-30 ENCOUNTER — INFUSION (OUTPATIENT)
Dept: ONCOLOGY | Facility: HOSPITAL | Age: 63
End: 2018-06-30

## 2018-06-30 VITALS
TEMPERATURE: 97.5 F | SYSTOLIC BLOOD PRESSURE: 112 MMHG | HEART RATE: 74 BPM | RESPIRATION RATE: 20 BRPM | DIASTOLIC BLOOD PRESSURE: 60 MMHG | OXYGEN SATURATION: 99 %

## 2018-06-30 DIAGNOSIS — D59.10 AUTOIMMUNE HEMOLYTIC ANEMIA (HCC): ICD-10-CM

## 2018-06-30 PROCEDURE — 86900 BLOOD TYPING SEROLOGIC ABO: CPT

## 2018-06-30 PROCEDURE — 63710000001 DIPHENHYDRAMINE PER 50 MG: Performed by: INTERNAL MEDICINE

## 2018-06-30 PROCEDURE — 36430 TRANSFUSION BLD/BLD COMPNT: CPT

## 2018-06-30 PROCEDURE — P9016 RBC LEUKOCYTES REDUCED: HCPCS

## 2018-06-30 RX ORDER — DIPHENHYDRAMINE HCL 25 MG
25 CAPSULE ORAL ONCE
Status: COMPLETED | OUTPATIENT
Start: 2018-06-30 | End: 2018-06-30

## 2018-06-30 RX ORDER — ACETAMINOPHEN 325 MG/1
650 TABLET ORAL ONCE
Status: COMPLETED | OUTPATIENT
Start: 2018-06-30 | End: 2018-06-30

## 2018-06-30 RX ORDER — SODIUM CHLORIDE 9 MG/ML
250 INJECTION, SOLUTION INTRAVENOUS AS NEEDED
Status: DISCONTINUED | OUTPATIENT
Start: 2018-06-30 | End: 2018-06-30 | Stop reason: HOSPADM

## 2018-06-30 RX ADMIN — ACETAMINOPHEN 650 MG: 325 TABLET, FILM COATED ORAL at 08:40

## 2018-06-30 RX ADMIN — DIPHENHYDRAMINE HYDROCHLORIDE 25 MG: 25 CAPSULE ORAL at 08:40

## 2018-07-02 LAB — WARM AUTOANTIBODY: NORMAL

## 2018-07-03 ENCOUNTER — HOSPITAL ENCOUNTER (OUTPATIENT)
Dept: CT IMAGING | Facility: HOSPITAL | Age: 63
Discharge: HOME | End: 2018-07-03
Attending: INTERNAL MEDICINE | Admitting: RADIOLOGY

## 2018-07-03 VITALS
HEIGHT: 72 IN | DIASTOLIC BLOOD PRESSURE: 76 MMHG | TEMPERATURE: 98.8 F | SYSTOLIC BLOOD PRESSURE: 124 MMHG | RESPIRATION RATE: 16 BRPM | BODY MASS INDEX: 32.1 KG/M2 | WEIGHT: 237 LBS | HEART RATE: 83 BPM | OXYGEN SATURATION: 98 %

## 2018-07-03 DIAGNOSIS — C91.10 CLL (CHRONIC LYMPHOCYTIC LEUKEMIA) (HCC): ICD-10-CM

## 2018-07-03 DIAGNOSIS — D53.9 MACROCYTIC ANEMIA: ICD-10-CM

## 2018-07-03 PROCEDURE — 77012 CT SCAN FOR NEEDLE BIOPSY: CPT

## 2018-07-03 PROCEDURE — 88305 TISSUE EXAM BY PATHOLOGIST: CPT | Performed by: INTERNAL MEDICINE

## 2018-07-03 RX ORDER — SODIUM CHLORIDE 0.9 % (FLUSH) 0.9 %
1-10 SYRINGE (ML) INJECTION AS NEEDED
Status: DISCONTINUED | OUTPATIENT
Start: 2018-07-03 | End: 2018-07-04 | Stop reason: HOSPADM

## 2018-07-03 RX ORDER — LIDOCAINE HYDROCHLORIDE 10 MG/ML
20 INJECTION, SOLUTION INFILTRATION; PERINEURAL ONCE
Status: COMPLETED | OUTPATIENT
Start: 2018-07-03 | End: 2018-07-03

## 2018-07-03 RX ADMIN — LIDOCAINE HYDROCHLORIDE 20 ML: 10 INJECTION, SOLUTION INFILTRATION; PERINEURAL at 08:15

## 2018-07-03 NOTE — DISCHARGE INSTRUCTIONS
EDUCATION /DISCHARGE INSTRUCTIONS  CT/US guided biopsy:  A biopsy is a procedure done to remove tissue for further analysis.  Before images are taken to locate the target area.  Images can be obtained using ultrasound, CT or MRI.  A physician will clean your skin with antiseptic soap, place a sterile towel around the site and administer a local anesthetic to numb the area.  The physician will then insert a special needle.  Sometimes images are taken of the needle after it is inserted to ensure the needle is in the correct area to be biopsied.   A sample is obtained and sent to the laboratory for study.  Occasionally the laboratory is unable to make a diagnosis from the sample and the procedure may need to be repeated.  Within a week the radiologist will send a report to your physician.  A pathologist will also examine the tissue and send a report.      Risks of the procedure include but are not limited to:   *  Bleeding    *  Infection   *  Puncture of surrounding organs *  Death     *  Lung collapse if the biopsy is near the chest which may require insertion of a       tube to re-inflate the lung if severe.      Benefits of the procedure:  Using x-ray helps to locate the area that requires a biopsy. The procedure is less invasive than a surgical procedure, there are no large incisions and it does not require anesthesia.      Alternatives to the procedure:  A biopsy can be performed surgically.  Risks of a surgical biopsy include exposure to anesthesia, infection, excessive bleeding and injury to abdominal organs.  A benefit of surgical biopsy is the ability to see the area to be biopsied and remove of a larger piece of tissue.    THIS EDUCATION INFORMATION WAS REVIEWED PRIOR TO PROCEDURE AND CONSENT. Patient initials__________________Time____0710_______________    Post Procedure:    *  Expect the biopsy site may be tender up to one week.    *  Rest today (no pushing pulling or straining).   *  Slowly increase  activity tomorrow.    *  If you received sedation do not drive for 24 hours.   *  Keep dressing clean and dry.   *  Leave dressing on puncture site for 24 hours.    *  You may shower when dressing removed.    Call your doctor if experiencing:   *  Signs of infection such as redness, swelling, excessive pain and / or foul        smelling drainage from the puncture site.   *  Chills or fever over 101 degrees (by mouth).   *  Unrelieved pain.   *  Any new or severe symptoms.   *  If experiencing sudden / severe shortness of breath or chest pain go to the       nearest emergency room.     Following the procedure:     Follow-up with the ordering physician as directed.    Continue to take other medications as directed by your physician unless    otherwise instructed.   If applicable, resume taking your blood thinners or Aspirin Wednesday July 4, 2018 after 12 noon       If you have any concerns please call the Radiology Nurses Desk at [phone].  You are the most important factor in your recovery.  Follow the above instructions carefully.

## 2018-07-03 NOTE — NURSING NOTE
To car via W/C with N/A.  Wife is driving him home.  No problems or concerns noted. Band aid and gauze dressing is dry and intact lower back.

## 2018-07-03 NOTE — H&P (VIEW-ONLY)
General Surgery  History and Physical     CC: Screening for colon cancer, history of adenomatous colon polyps     HPI: Mr. Bal is a pleasant 63 y.o. year-old gentleman who returns today for repeat colonoscopy. His last colonoscopy one year ago was significant for a large tubulovillous adenoma of the cecum removed piecemeal.  He has had two prior colonoscopies. One in 2011 by Dr. Han where diverticulosis was found but there were no polyps.  His first colonoscopy was 20 years ago and significant for a few benign polyps.  He denies any melena, hematochezia, or unintentional weight loss.  His mother has a history of colon cancer diagnosed at age 72.     Past Medical History: Autoimmune hemolytic anemia, left carotid artery occlusion, coronary artery disease, diabetes mellitus, gout, hyper lipidemia, hypertension, leukemia     Past Surgical History: Knee surgery     Medications: Atorvastatin, aspirin, prazosin, losartan, amlodipine, vitamin D3, allopurinol     Allergies: No known drug allergies     Family History: Mother with history of colon cancer, father with history of throat cancer     Social History: , no alcohol use, nonsmoker     ROS: A comprehensive review of systems was conducted and negative for melena, hematochezia, or unintentional weight loss  All other systems reviewed and negative    Physical Exam:  Vitals:    06/28/18 0812   BP: 128/63   Pulse: 86   Resp: 16   Temp: 98.2 °F (36.8 °C)   SpO2: 97%     General: No acute distress, well-nourished & well-developed  HEAD: normocephalic, atraumatic  EYES: normal conjunctiva, sclera anicteric  EARS: grossly normal hearing  NECK: supple, no thyromegaly  CARDIOVASCULAR: regular rate and rhythm  RESPIRATORY: clear to auscultation bilaterally  GASTROINTESTINAL: soft, nontender, non-distended  PSYCHIATRIC: oriented x3, normal mood and affect    ASSESSMENT & PLAN  Mr. Bal is a 63 year-old gentleman who returns today for repeat colonoscopy given a  history of a large TVA of the cecum removed piecemeal one year ago. He has been counseled on the risks of the procedure to include bleeding and possible colon perforation. Despite these risks, he has consented to proceed.    Fannie Caro MD  General and Endoscopic Surgery  [facility]    [address], [address]  [place], [place], [address]  P: [phone]  F: [phone]

## 2018-07-05 ENCOUNTER — OFFICE VISIT (OUTPATIENT)
Dept: ONCOLOGY | Facility: CLINIC | Age: 63
End: 2018-07-05

## 2018-07-05 ENCOUNTER — LAB (OUTPATIENT)
Dept: LAB | Facility: HOSPITAL | Age: 63
End: 2018-07-05

## 2018-07-05 VITALS
HEART RATE: 81 BPM | HEIGHT: 74 IN | TEMPERATURE: 98.5 F | BODY MASS INDEX: 31.42 KG/M2 | DIASTOLIC BLOOD PRESSURE: 64 MMHG | WEIGHT: 244.8 LBS | RESPIRATION RATE: 12 BRPM | OXYGEN SATURATION: 99 % | SYSTOLIC BLOOD PRESSURE: 122 MMHG

## 2018-07-05 DIAGNOSIS — C91.10 CLL (CHRONIC LYMPHOCYTIC LEUKEMIA) (HCC): Primary | ICD-10-CM

## 2018-07-05 DIAGNOSIS — D53.9 MACROCYTIC ANEMIA: ICD-10-CM

## 2018-07-05 LAB
BASOPHILS # BLD AUTO: 0.04 10*3/MM3 (ref 0–0.1)
BASOPHILS NFR BLD AUTO: 0.1 % (ref 0–1.1)
DEPRECATED RDW RBC AUTO: 69.3 FL (ref 37–49)
EOSINOPHIL # BLD AUTO: 0.1 10*3/MM3 (ref 0–0.36)
EOSINOPHIL NFR BLD AUTO: 0.3 % (ref 1–5)
ERYTHROCYTE [DISTWIDTH] IN BLOOD BY AUTOMATED COUNT: 18.6 % (ref 11.7–14.5)
HCT VFR BLD AUTO: 29.7 % (ref 40–49)
HGB BLD-MCNC: 9.5 G/DL (ref 13.5–16.5)
HGB RETIC QN: 36 PG (ref 29.8–36.1)
IMM GRANULOCYTES # BLD: 0.05 10*3/MM3 (ref 0–0.03)
IMM GRANULOCYTES NFR BLD: 0.2 % (ref 0–0.5)
IMM RETICS NFR: 8.2 % (ref 3–15.8)
LYMPHOCYTES # BLD AUTO: 26.31 10*3/MM3 (ref 1–3.5)
LYMPHOCYTES NFR BLD AUTO: 89 % (ref 20–49)
MCH RBC QN AUTO: 32.8 PG (ref 27–33)
MCHC RBC AUTO-ENTMCNC: 32 G/DL (ref 32–35)
MCV RBC AUTO: 102.4 FL (ref 83–97)
MONOCYTES # BLD AUTO: 0.71 10*3/MM3 (ref 0.25–0.8)
MONOCYTES NFR BLD AUTO: 2.4 % (ref 4–12)
NEUTROPHILS # BLD AUTO: 2.36 10*3/MM3 (ref 1.5–7)
NEUTROPHILS NFR BLD AUTO: 8 % (ref 39–75)
NRBC BLD MANUAL-RTO: 0 /100 WBC (ref 0–0)
PLATELET # BLD AUTO: 196 10*3/MM3 (ref 150–375)
PMV BLD AUTO: 10 FL (ref 8.9–12.1)
RBC # BLD AUTO: 2.9 10*6/MM3 (ref 4.3–5.5)
REF LAB TEST METHOD: NORMAL
RETICS/RBC NFR AUTO: 2.32 % (ref 0.6–2)
WBC NRBC COR # BLD: 29.57 10*3/MM3 (ref 4–10)

## 2018-07-05 PROCEDURE — 85025 COMPLETE CBC W/AUTO DIFF WBC: CPT | Performed by: INTERNAL MEDICINE

## 2018-07-05 PROCEDURE — 85046 RETICYTE/HGB CONCENTRATE: CPT | Performed by: INTERNAL MEDICINE

## 2018-07-05 PROCEDURE — 36415 COLL VENOUS BLD VENIPUNCTURE: CPT | Performed by: INTERNAL MEDICINE

## 2018-07-05 PROCEDURE — 99214 OFFICE O/P EST MOD 30 MIN: CPT | Performed by: INTERNAL MEDICINE

## 2018-07-05 RX ORDER — PREDNISONE 50 MG/1
100 TABLET ORAL DAILY
Qty: 14 TABLET | Refills: 0 | Status: SHIPPED | OUTPATIENT
Start: 2018-07-05 | End: 2018-07-12

## 2018-07-05 RX ORDER — SULFAMETHOXAZOLE AND TRIMETHOPRIM 800; 160 MG/1; MG/1
TABLET ORAL
Qty: 12 TABLET | Refills: 3 | Status: SHIPPED | OUTPATIENT
Start: 2018-07-05 | End: 2018-08-22

## 2018-07-05 NOTE — PROGRESS NOTES
[facility] GROUP OUTPATIENT FOLLOW UP CLINIC VISIT    REASON FOR FOLLOW-UP:    1.  Chronic lymphocytic leukemia diagnosed in November 2015.  CLL FISH panel negative at that time.    2.  Autoimmune hemolytic anemia associated with CLL.  He was treated with steroids and he received 4 weeks of weekly Rituxan.  Otherwise no treatment has been performed  3.  CT imaging of the chest abdomen and pelvis from 4/25/2018 showed lymphadenopathy with mildly enlarged retroperitoneal and mesenteric lymph nodes with the largest measuring about 3.2 cm.  The spleen measured 13.6 cm.  Slight increase in lymphadenopathy compared with 11/27/2015.         CLL (chronic lymphocytic leukemia) (CMS/[facility])    11/25/2015 Initial Diagnosis     CLL (chronic lymphocytic leukemia)         12/1/2015 - 12/23/2015 Chemotherapy     Rituxan weekly x4            HISTORY OF PRESENT ILLNESS:  Aida Bal is a 63 y.o. male who returns today for follow up of the above issue.      He was seen a couple of weeks ago his reticulocyte count was very low and he was very anemic.  There was no evidence for hemolysis.  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  I gave him prednisone 100 mg daily but somehow only 5 days were prescribed and he finished these several days ago.  He tolerated the steroids very well having no adverse effects from them.  He did have a bone marrow biopsy on Tuesday with the result pending.  He had a colonoscopy performed recently with benign findings.    PAST MEDICAL, SURGICAL, FAMILY, AND SOCIAL HISTORIES were reviewed with the patient and in the electronic medical record, and were updated if indicated.    ALLERGIES:  No Known Allergies    MEDICATIONS:  The medication list has been reviewed with the patient by the medical assistant, and the list has been updated in the electronic medical record, which I reviewed.  Medication dosages and frequencies were confirmed to be  "accurate.    REVIEW OF SYSTEMS:  PAIN:  See Vital Signs below.  GENERAL:  No fevers, chills, night sweats, or unintended weight loss.  Mild fatigue.  SKIN:  No rashes or non-healing lesions.  HEME/LYMPH:  No abnormal bleeding.  No palpable lymphadenopathy.  EYES:  No vision changes or diplopia.  ENT:  No sore throat or rhinorrhea  RESPIRATORY:  No shortness of breath or cough  CARDIOVASCULAR:  No chest pain, palpitations, orthopnea, or dyspnea on exertion.  GASTROINTESTINAL:  No abdominal pain, nausea, vomiting, constipation, diarrhea, melena, or hematochezia.  GENITOURINARY:  No dysuria or hematuria.  Erectile dysfunction  MUSCULOSKELETAL:  No muscle or joint pain.  NEUROLOGIC:  No dizziness, loss of consciousness, or seizures.  PSYCHIATRIC:  No depression, anxiety, or mood changes.    Vitals:    07/05/18 1134   BP: 122/64   Pulse: 81   Resp: 12   Temp: 98.5 °F (36.9 °C)   TempSrc: Oral   SpO2: 99%   Weight: 111 kg (244 lb 12.8 oz)   Height: 189.2 cm (74.49\")   PainSc: 0-No pain       PHYSICAL EXAMINATION:  GENERAL:  Well-developed well-nourished male; awake, alert and oriented, in no acute distress.  SKIN:  Warm and dry, without rashes, purpura, or petechiae.  HEAD:  Normocephalic, atraumatic.  EYES:  Pupils equal, round and reactive to light.  Extraocular movements intact.    [place] sclera  EARS:  Hearing intact.  NOSE:  Septum midline.  No excoriations or nasal discharge.  MOUTH:  No stomatitis or ulcers.  Lips are normal.  THROAT:  Oropharynx without lesions or exudates.  NECK:  Supple with good range of motion; no thyromegaly or masses; no JVD or bruits.  LYMPHATICS:  No cervical, supraclavicular, axillary or inguinal lymphadenopathy.  CHEST:  Lungs are clear to auscultation bilaterally.  No wheezes, rales, or rhonchi.  HEART:  Regular rate; normal rhythm.  No murmurs, gallops or rubs.  ABDOMEN:  Soft, non-tender, non-distended.  Normal active bowel sounds.  No organomegaly.  EXTREMITIES:  No clubbing, " cyanosis, or edema.  NEUROLOGICAL:  No focal neurologic deficits.    DIAGNOSTIC DATA:  Results for orders placed or performed in visit on 06/30/18   Type & Screen   Result Value Ref Range    ABO Type O     RH type Positive     Antibody Screen Positive     T&S Expiration Date 6/30/2018 11:59:59 PM    Antibody Identification   Result Value Ref Range    Warm Autoantibody WARM AUTO ANTIBODY    Direct Antiglobulin Test (Direct Sebastian)   Result Value Ref Range    ADOLFO Positive    ADOLFO, IgG   Result Value Ref Range    ADOLFO IgG Positive    Complement Direct Sebastian   Result Value Ref Range    ADOLFO C3 Negative    Elution & Antibody Identification, RBC   Result Value Ref Range    Panagglutination PANNAGGLUTINATION    Prepare RBC, 2 Units   Result Value Ref Range    Product Code I8545G42     Unit Number [id number]     UNIT  ABO O     UNIT  RH NEG     CROSSMATCH 1 INTERPRETATION Incompatible     Dispense Status PT     Blood Type ONEG     Blood Expiration Date 201807312359     Blood Type Barcode 9500     Product Code X1027D63     Unit Number [id number]     UNIT  ABO O     UNIT  RH NEG     CROSSMATCH 1 INTERPRETATION Incompatible     Dispense Status PT     Blood Type ONEG     Blood Expiration Date [id number]     Blood Type Barcode 9500      IMAGING:  CT images from 4/25/2018 personally reviewed.  Multiple mildly enlarged lymph nodes are present with mild bilateral axillary lymphadenopathy that was new since 2015 with the largest axillary lymph node and left axilla measuring 1.8 cm.  Multiple retroperitoneal and mesenteric lymph nodes noted as well.       IMPRESSION:  Mild bilateral axillary lymphadenopathy that is new since  the preceding CT chest in 2015. The largest axillary lymph node is in  the left axilla, and measures up to 1.8 cm in diameter.    IMPRESSION:  Multiple mildly enlarged lymph nodes in the retroperitoneum  and small bowel mesentery and both pelvic sidewalls showing slight  increase in size since the  preceding CT scan dated 11/27/2015.  Borderline splenomegaly is also now evident.    ASSESSMENT:  This is a 63 y.o. male with a history of chronic lymphocytic leukemia and related autoimmune hemolytic anemia.    1.  CLL: Outside of the Rituxan he received for 4 weeks for this and the autoimmune hemolytic anemia he has not required any specific treatment.  His white blood cell count continues to elevate.  He is otherwise asymptomatic.  There is no palpable lymphadenopathy.  At his last visit his white blood cell count was increasing and I requested a CT scan of the chest abdomen pelvis which did indicate some lymphadenopathy but nothing large enough to warrant treatment.  His white blood cell count has improved significantly today with steroids.    2.  Macrocytic anemia: He has a history of autoimmune hemolytic anemia when he presented with CLL back in November 2015.  His reticulocyte count was very elevated at that time.  His hemoglobin dropped to as low as about 5.  He responded to steroids initially intravenously and then with prednisone and he was treated with 4 weeks of Rituxan.    He was noted to have worsening anemia.  His reticulocyte count was low.  He initially did not respond very well to transfusions and he has required 4 units over the past couple of weeks.  There was no evidence for hemolysis although his ADOLFO is positive for IgG.  This has been persistently positive.  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  He got 5 days of prednisone at 100 mg which he finished several days ago.  I had intended for him to stay on this.  Therefore, I will again prescribed today prednisone 100 mg daily.  See the plan below.  Gradual taper over the next few weeks.    In addition, he did have a bone marrow aspiration and biopsy on 7/2 with the result pending.    3.  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  With further steroids this will become an issue again.    PLAN:  1.  Follow-up the bone  marrow biopsy results  2.  Resume prednisone 100 mg daily.  I gave him a prescription for 7 days today.  3.  Next Wednesday he will come in for a CBC with nurse review and I will review the results and if his hemoglobin is improved I will taper the prednisone to 80 mg daily.  4.  In 2 weeks he will return on Wednesday morning for a CBC with nurse review to show the results Dr. Orozco.  I spoke with her regarding this today.  If his numbers are better at that time we can reduce the prednisone dose to 60 mg daily.  5.  I will see him back in 3 weeks for follow-up with a CBC and reticulocyte count and CMP with further tapering of steroids if his blood counts are improved.  6.  I did prescribe Bactrim on Monday Wednesday and Friday for him today to take for PCP prophylaxis.

## 2018-07-06 LAB — ETHNIC BACKGROUND STATED: 73.9 MIU/ML (ref 2.6–18.5)

## 2018-07-10 ENCOUNTER — TELEPHONE (OUTPATIENT)
Dept: SURGERY | Facility: CLINIC | Age: 63
End: 2018-07-10

## 2018-07-10 ENCOUNTER — TELEPHONE (OUTPATIENT)
Dept: ONCOLOGY | Facility: HOSPITAL | Age: 63
End: 2018-07-10

## 2018-07-10 LAB
B19V DNA SPEC QL NAA+PROBE: NEGATIVE COPIES/ML
LOG10 PARVO QN PCR: NORMAL LOG10COPY/ML

## 2018-07-10 NOTE — TELEPHONE ENCOUNTER
PT'S WIFE CALLING ABOUT BIOPSY RESULTS. TOLD HER THAT MD WILL GO OVER THOSE RESULTS AT HIS MD APPT ON 7/25 A/O [facility] SHE V/U.

## 2018-07-10 NOTE — TELEPHONE ENCOUNTER
I called Mr. 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 I have recommended we repeat a colonoscopy in 1 year to ensure no recurrent polypoid tissue develops at the site of the TVA. He expressed understanding. Jacquelyn or Aileen, can you update his Health Maintenance tab and recall pool?    Thanks,  DEH

## 2018-07-10 NOTE — TELEPHONE ENCOUNTER
----- Message from Shanae Watts sent at 7/10/2018  1:10 PM EDT -----  Contact: [phone]  pts wife marilee  has questions  before his appt.

## 2018-07-11 ENCOUNTER — LAB (OUTPATIENT)
Dept: LAB | Facility: HOSPITAL | Age: 63
End: 2018-07-11

## 2018-07-11 ENCOUNTER — INFUSION (OUTPATIENT)
Dept: ONCOLOGY | Facility: HOSPITAL | Age: 63
End: 2018-07-11

## 2018-07-11 DIAGNOSIS — D53.9 MACROCYTIC ANEMIA: ICD-10-CM

## 2018-07-11 DIAGNOSIS — C91.10 CLL (CHRONIC LYMPHOCYTIC LEUKEMIA) (HCC): ICD-10-CM

## 2018-07-11 LAB
BASOPHILS # BLD AUTO: 0.02 10*3/MM3 (ref 0–0.1)
BASOPHILS NFR BLD AUTO: 0 % (ref 0–1.1)
DEPRECATED RDW RBC AUTO: 68.2 FL (ref 37–49)
EOSINOPHIL # BLD AUTO: 0 10*3/MM3 (ref 0–0.36)
EOSINOPHIL NFR BLD AUTO: 0 % (ref 1–5)
ERYTHROCYTE [DISTWIDTH] IN BLOOD BY AUTOMATED COUNT: 19 % (ref 11.7–14.5)
HCT VFR BLD AUTO: 30.7 % (ref 40–49)
HGB BLD-MCNC: 9.9 G/DL (ref 13.5–16.5)
HGB RETIC QN: 41.2 PG (ref 29.8–36.1)
IMM GRANULOCYTES # BLD: 0.21 10*3/MM3 (ref 0–0.03)
IMM GRANULOCYTES NFR BLD: 0.4 % (ref 0–0.5)
IMM RETICS NFR: 29.3 % (ref 3–15.8)
LYMPHOCYTES # BLD AUTO: 45.8 10*3/MM3 (ref 1–3.5)
LYMPHOCYTES NFR BLD AUTO: 86.5 % (ref 20–49)
MCH RBC QN AUTO: 32.6 PG (ref 27–33)
MCHC RBC AUTO-ENTMCNC: 32.2 G/DL (ref 32–35)
MCV RBC AUTO: 101 FL (ref 83–97)
MONOCYTES # BLD AUTO: 1.29 10*3/MM3 (ref 0.25–0.8)
MONOCYTES NFR BLD AUTO: 2.4 % (ref 4–12)
NEUTROPHILS # BLD AUTO: 5.62 10*3/MM3 (ref 1.5–7)
NEUTROPHILS NFR BLD AUTO: 10.7 % (ref 39–75)
NRBC BLD MANUAL-RTO: 0 /100 WBC (ref 0–0)
PLATELET # BLD AUTO: 210 10*3/MM3 (ref 150–375)
PMV BLD AUTO: 10.4 FL (ref 8.9–12.1)
RBC # BLD AUTO: 3.04 10*6/MM3 (ref 4.3–5.5)
RETICS/RBC NFR AUTO: 2.91 % (ref 0.6–2)
WBC NRBC COR # BLD: 52.94 10*3/MM3 (ref 4–10)

## 2018-07-11 PROCEDURE — 36415 COLL VENOUS BLD VENIPUNCTURE: CPT | Performed by: INTERNAL MEDICINE

## 2018-07-11 PROCEDURE — 85046 RETICYTE/HGB CONCENTRATE: CPT | Performed by: INTERNAL MEDICINE

## 2018-07-11 PROCEDURE — 85025 COMPLETE CBC W/AUTO DIFF WBC: CPT | Performed by: INTERNAL MEDICINE

## 2018-07-11 RX ORDER — PREDNISONE 20 MG/1
80 TABLET ORAL DAILY
Qty: 120 TABLET | Refills: 0 | Status: SHIPPED | OUTPATIENT
Start: 2018-07-11 | End: 2018-08-22

## 2018-07-11 NOTE — PROGRESS NOTES
CBC reviewed with pt. Pt voices no complaints. He continues on prednisone 100mg daily. Reviewed with Dr. Cheng. Per Dr. Cheng, we can taper pt to prednisone 80mg daily. He will have another review next week and we can hopefully taper further. We also need to keep an eye on his escalating WBC count. Informed pt and he v/u. Prednisone 20mg tablets (take 4 tablets daily) escribed to [facility].    Lab Results   Component Value Date    WBC 52.94 (H) 07/11/2018    HGB 9.9 (L) 07/11/2018    HCT 30.7 (L) 07/11/2018    .0 (H) 07/11/2018     07/11/2018

## 2018-07-18 ENCOUNTER — LAB (OUTPATIENT)
Dept: LAB | Facility: HOSPITAL | Age: 63
End: 2018-07-18

## 2018-07-18 ENCOUNTER — INFUSION (OUTPATIENT)
Dept: ONCOLOGY | Facility: HOSPITAL | Age: 63
End: 2018-07-18

## 2018-07-18 DIAGNOSIS — C91.10 CLL (CHRONIC LYMPHOCYTIC LEUKEMIA) (HCC): ICD-10-CM

## 2018-07-18 DIAGNOSIS — D53.9 MACROCYTIC ANEMIA: ICD-10-CM

## 2018-07-18 LAB
BASOPHILS # BLD AUTO: 0.03 10*3/MM3 (ref 0–0.1)
BASOPHILS NFR BLD AUTO: 0.1 % (ref 0–1.1)
DEPRECATED RDW RBC AUTO: 73.6 FL (ref 37–49)
EOSINOPHIL # BLD AUTO: 0 10*3/MM3 (ref 0–0.36)
EOSINOPHIL NFR BLD AUTO: 0 % (ref 1–5)
ERYTHROCYTE [DISTWIDTH] IN BLOOD BY AUTOMATED COUNT: 19.9 % (ref 11.7–14.5)
HCT VFR BLD AUTO: 32 % (ref 40–49)
HGB BLD-MCNC: 10.4 G/DL (ref 13.5–16.5)
HGB RETIC QN: 38.6 PG (ref 29.8–36.1)
IMM GRANULOCYTES # BLD: 0.16 10*3/MM3 (ref 0–0.03)
IMM GRANULOCYTES NFR BLD: 0.6 % (ref 0–0.5)
IMM RETICS NFR: 17.7 % (ref 3–15.8)
LYMPHOCYTES # BLD AUTO: 20.27 10*3/MM3 (ref 1–3.5)
LYMPHOCYTES NFR BLD AUTO: 76.7 % (ref 20–49)
MCH RBC QN AUTO: 33.2 PG (ref 27–33)
MCHC RBC AUTO-ENTMCNC: 32.5 G/DL (ref 32–35)
MCV RBC AUTO: 102.2 FL (ref 83–97)
MONOCYTES # BLD AUTO: 0.49 10*3/MM3 (ref 0.25–0.8)
MONOCYTES NFR BLD AUTO: 1.9 % (ref 4–12)
NEUTROPHILS # BLD AUTO: 5.47 10*3/MM3 (ref 1.5–7)
NEUTROPHILS NFR BLD AUTO: 20.7 % (ref 39–75)
NRBC BLD MANUAL-RTO: 0 /100 WBC (ref 0–0)
PLATELET # BLD AUTO: 163 10*3/MM3 (ref 150–375)
PMV BLD AUTO: 10.3 FL (ref 8.9–12.1)
RBC # BLD AUTO: 3.13 10*6/MM3 (ref 4.3–5.5)
RETICS/RBC NFR AUTO: 4 % (ref 0.6–2)
WBC NRBC COR # BLD: 26.42 10*3/MM3 (ref 4–10)

## 2018-07-18 PROCEDURE — 36415 COLL VENOUS BLD VENIPUNCTURE: CPT | Performed by: INTERNAL MEDICINE

## 2018-07-18 PROCEDURE — 85046 RETICYTE/HGB CONCENTRATE: CPT | Performed by: INTERNAL MEDICINE

## 2018-07-18 PROCEDURE — 85025 COMPLETE CBC W/AUTO DIFF WBC: CPT | Performed by: INTERNAL MEDICINE

## 2018-07-18 NOTE — PROGRESS NOTES
Lab Results   Component Value Date    WBC 26.42 (H) 07/18/2018    HGB 10.4 (L) 07/18/2018    HCT 32.0 (L) 07/18/2018    .2 (H) 07/18/2018     07/18/2018   feels good. No problems. Reviewed labs with Dr. Orozco. No changes. To continue on Prednisone 80 mg. To return in one week.

## 2018-07-25 ENCOUNTER — LAB (OUTPATIENT)
Dept: LAB | Facility: HOSPITAL | Age: 63
End: 2018-07-25

## 2018-07-25 ENCOUNTER — DOCUMENTATION (OUTPATIENT)
Dept: ONCOLOGY | Facility: CLINIC | Age: 63
End: 2018-07-25

## 2018-07-25 ENCOUNTER — OFFICE VISIT (OUTPATIENT)
Dept: ONCOLOGY | Facility: CLINIC | Age: 63
End: 2018-07-25

## 2018-07-25 VITALS
HEIGHT: 74 IN | DIASTOLIC BLOOD PRESSURE: 70 MMHG | TEMPERATURE: 98 F | BODY MASS INDEX: 30.72 KG/M2 | RESPIRATION RATE: 18 BRPM | HEART RATE: 68 BPM | WEIGHT: 239.4 LBS | OXYGEN SATURATION: 96 % | SYSTOLIC BLOOD PRESSURE: 124 MMHG

## 2018-07-25 DIAGNOSIS — D53.9 MACROCYTIC ANEMIA: ICD-10-CM

## 2018-07-25 DIAGNOSIS — C91.10 CLL (CHRONIC LYMPHOCYTIC LEUKEMIA) (HCC): Primary | ICD-10-CM

## 2018-07-25 DIAGNOSIS — C91.10 CLL (CHRONIC LYMPHOCYTIC LEUKEMIA) (HCC): ICD-10-CM

## 2018-07-25 LAB
ALBUMIN SERPL-MCNC: 4 G/DL (ref 3.5–5.2)
ALBUMIN/GLOB SERPL: 1.9 G/DL (ref 1.1–2.4)
ALP SERPL-CCNC: 80 U/L (ref 38–116)
ALT SERPL W P-5'-P-CCNC: 16 U/L (ref 0–41)
ANION GAP SERPL CALCULATED.3IONS-SCNC: 11.3 MMOL/L
AST SERPL-CCNC: 11 U/L (ref 0–40)
BASOPHILS # BLD AUTO: 0.03 10*3/MM3 (ref 0–0.1)
BASOPHILS NFR BLD AUTO: 0.1 % (ref 0–1.1)
BILIRUB SERPL-MCNC: 0.5 MG/DL (ref 0.1–1.2)
BUN BLD-MCNC: 24 MG/DL (ref 6–20)
BUN/CREAT SERPL: 22 (ref 7.3–30)
CALCIUM SPEC-SCNC: 9.5 MG/DL (ref 8.5–10.2)
CHLORIDE SERPL-SCNC: 99 MMOL/L (ref 98–107)
CO2 SERPL-SCNC: 29.7 MMOL/L (ref 22–29)
CREAT BLD-MCNC: 1.09 MG/DL (ref 0.7–1.3)
DEPRECATED RDW RBC AUTO: 77 FL (ref 37–49)
EOSINOPHIL # BLD AUTO: 0 10*3/MM3 (ref 0–0.36)
EOSINOPHIL NFR BLD AUTO: 0 % (ref 1–5)
ERYTHROCYTE [DISTWIDTH] IN BLOOD BY AUTOMATED COUNT: 19.9 % (ref 11.7–14.5)
GFR SERPL CREATININE-BSD FRML MDRD: 82 ML/MIN/1.73
GLOBULIN UR ELPH-MCNC: 2.1 GM/DL (ref 1.8–3.5)
GLUCOSE BLD-MCNC: 125 MG/DL (ref 74–124)
HCT VFR BLD AUTO: 33.3 % (ref 40–49)
HGB BLD-MCNC: 10.5 G/DL (ref 13.5–16.5)
HGB RETIC QN: 40.2 PG (ref 29.8–36.1)
IMM GRANULOCYTES # BLD: 0.16 10*3/MM3 (ref 0–0.03)
IMM GRANULOCYTES NFR BLD: 0.6 % (ref 0–0.5)
IMM RETICS NFR: 11.4 % (ref 3–15.8)
LYMPHOCYTES # BLD AUTO: 24.45 10*3/MM3 (ref 1–3.5)
LYMPHOCYTES NFR BLD AUTO: 84.5 % (ref 20–49)
MCH RBC QN AUTO: 33.4 PG (ref 27–33)
MCHC RBC AUTO-ENTMCNC: 31.5 G/DL (ref 32–35)
MCV RBC AUTO: 106.1 FL (ref 83–97)
MONOCYTES # BLD AUTO: 0.94 10*3/MM3 (ref 0.25–0.8)
MONOCYTES NFR BLD AUTO: 3.2 % (ref 4–12)
NEUTROPHILS # BLD AUTO: 3.36 10*3/MM3 (ref 1.5–7)
NEUTROPHILS NFR BLD AUTO: 11.6 % (ref 39–75)
NRBC BLD MANUAL-RTO: 0 /100 WBC (ref 0–0)
PLATELET # BLD AUTO: 144 10*3/MM3 (ref 150–375)
PMV BLD AUTO: 9.9 FL (ref 8.9–12.1)
POTASSIUM BLD-SCNC: 3.8 MMOL/L (ref 3.5–4.7)
PROT SERPL-MCNC: 6.1 G/DL (ref 6.3–8)
RBC # BLD AUTO: 3.14 10*6/MM3 (ref 4.3–5.5)
RETICS/RBC NFR AUTO: 2.67 % (ref 0.6–2)
SODIUM BLD-SCNC: 140 MMOL/L (ref 134–145)
WBC NRBC COR # BLD: 28.94 10*3/MM3 (ref 4–10)

## 2018-07-25 PROCEDURE — 99215 OFFICE O/P EST HI 40 MIN: CPT | Performed by: INTERNAL MEDICINE

## 2018-07-25 PROCEDURE — 80053 COMPREHEN METABOLIC PANEL: CPT | Performed by: INTERNAL MEDICINE

## 2018-07-25 PROCEDURE — 85025 COMPLETE CBC W/AUTO DIFF WBC: CPT | Performed by: INTERNAL MEDICINE

## 2018-07-25 PROCEDURE — 36415 COLL VENOUS BLD VENIPUNCTURE: CPT | Performed by: INTERNAL MEDICINE

## 2018-07-25 PROCEDURE — 85046 RETICYTE/HGB CONCENTRATE: CPT | Performed by: INTERNAL MEDICINE

## 2018-07-25 NOTE — PROGRESS NOTES
[facility] GROUP OUTPATIENT FOLLOW UP CLINIC VISIT    REASON FOR FOLLOW-UP:    1.  Chronic lymphocytic leukemia diagnosed in November 2015.  CLL FISH panel negative at that time.    2.  Autoimmune hemolytic anemia associated with CLL.  He was treated with steroids and he received 4 weeks of weekly Rituxan.  Otherwise no treatment has been performed  3.  CT imaging of the chest abdomen and pelvis from 4/25/2018 showed lymphadenopathy with mildly enlarged retroperitoneal and mesenteric lymph nodes with the largest measuring about 3.2 cm.  The spleen measured 13.6 cm.  Slight increase in lymphadenopathy compared with 11/27/2015.  4.  Acute anemia with a hemoglobin of 6.9 as of 6/20/2018.  Low reticulocyte count.  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  Prednisone initiated.  Erythropoietin 73.9.  Parvovirus B19 PCR negative.    5.  Bone marrow biopsy on 7/3/2018 with 70% CLL (87% by flow cytometry) with a t(14;18) translocation by cytogenetics, 3 copies of IGH in 87.5% of cells but negative for CCND1/IGH rearrangement.  Negative for all other rearrangements/deletions.  IgVH somatic hypermutation was not detected.                   CLL (chronic lymphocytic leukemia) (CMS/[facility])    11/25/2015 Initial Diagnosis     CLL (chronic lymphocytic leukemia)         12/1/2015 - 12/23/2015 Chemotherapy     Rituxan weekly x4            HISTORY OF PRESENT ILLNESS:  Aida Bal is a 63 y.o. male who returns today for follow up of the above issue.      He continues prednisone 80 mg daily which he tolerates well aside from increased energy in the mornings.  He has cut out sugar from his diet but has not checked his blood glucose levels.  He denies any lymphadenopathy.  No fevers or chills.  He continues Bactrim for prophylaxis.  He otherwise is doing well.    PAST MEDICAL, SURGICAL, FAMILY, AND SOCIAL HISTORIES were reviewed with the patient and in the electronic medical record, and were updated if indicated.    ALLERGIES:  No  "Known Allergies    MEDICATIONS:  The medication list has been reviewed with the patient by the medical assistant, and the list has been updated in the electronic medical record, which I reviewed.  Medication dosages and frequencies were confirmed to be accurate.    REVIEW OF SYSTEMS:  PAIN:  See Vital Signs below.  GENERAL:  No fevers, chills, night sweats, or unintended weight loss.  Mild fatigue.  SKIN:  No rashes or non-healing lesions.  HEME/LYMPH:  No abnormal bleeding.  No palpable lymphadenopathy.  EYES:  No vision changes or diplopia.  ENT:  No sore throat or rhinorrhea  RESPIRATORY:  No shortness of breath or cough  CARDIOVASCULAR:  No chest pain, palpitations, orthopnea, or dyspnea on exertion.  GASTROINTESTINAL:  No abdominal pain, nausea, vomiting, constipation, diarrhea, melena, or hematochezia.  GENITOURINARY:  No dysuria or hematuria.  Erectile dysfunction  MUSCULOSKELETAL:  No muscle or joint pain.  NEUROLOGIC:  No dizziness, loss of consciousness, or seizures.  PSYCHIATRIC:  No depression, anxiety, or mood changes.    Vitals:    07/25/18 0920   BP: 124/70   Pulse: 68   Resp: 18   Temp: 98 °F (36.7 °C)   TempSrc: Oral   SpO2: 96%   Weight: 109 kg (239 lb 6.4 oz)   Height: 189.2 cm (74.49\")   PainSc: 0-No pain       PHYSICAL EXAMINATION:  GENERAL:  Well-developed well-nourished male; awake, alert and oriented, in no acute distress.  SKIN:  Warm and dry, without rashes, purpura, or petechiae.  HEAD:  Normocephalic, atraumatic.  EYES:  Pupils equal, round and reactive to light.  Extraocular movements intact.    [place] sclera  EARS:  Hearing intact.  NOSE:  Septum midline.  No excoriations or nasal discharge.  MOUTH:  No stomatitis or ulcers.  Lips are normal.  THROAT:  Oropharynx without lesions or exudates.  NECK:  Supple with good range of motion; no thyromegaly or masses; no JVD or bruits.  LYMPHATICS:  No cervical, supraclavicular, axillary or inguinal lymphadenopathy.  CHEST:  Lungs are clear to " auscultation bilaterally.  No wheezes, rales, or rhonchi.  HEART:  Regular rate; normal rhythm.  No murmurs, gallops or rubs.  EXTREMITIES:  No clubbing, cyanosis, or edema.  NEUROLOGICAL:  No focal neurologic deficits.    DIAGNOSTIC DATA:  Results for orders placed or performed in visit on 07/25/18   Retic With IRF & RET-He   Result Value Ref Range    Immature Reticulocyte Fraction 11.4 3.0 - 15.8 %    Reticulocyte % 2.67 (H) 0.60 - 2.00 %    Reticulocyte Hgb 40.2 (H) 29.8 - 36.1 pg   CBC Auto Differential   Result Value Ref Range    WBC 28.94 (H) 4.00 - 10.00 10*3/mm3    RBC 3.14 (L) 4.30 - 5.50 10*6/mm3    Hemoglobin 10.5 (L) 13.5 - 16.5 g/dL    Hematocrit 33.3 (L) 40.0 - 49.0 %    .1 (H) 83.0 - 97.0 fL    MCH 33.4 (H) 27.0 - 33.0 pg    MCHC 31.5 (L) 32.0 - 35.0 g/dL    RDW 19.9 (H) 11.7 - 14.5 %    RDW-SD 77.0 (H) 37.0 - 49.0 fl    MPV 9.9 8.9 - 12.1 fL    Platelets 144 (L) 150 - 375 10*3/mm3    Neutrophil % 11.6 (L) 39.0 - 75.0 %    Lymphocyte % 84.5 (H) 20.0 - 49.0 %    Monocyte % 3.2 (L) 4.0 - 12.0 %    Eosinophil % 0.0 (L) 1.0 - 5.0 %    Basophil % 0.1 0.0 - 1.1 %    Immature Grans % 0.6 (H) 0.0 - 0.5 %    Neutrophils, Absolute 3.36 1.50 - 7.00 10*3/mm3    Lymphocytes, Absolute 24.45 (H) 1.00 - 3.50 10*3/mm3    Monocytes, Absolute 0.94 (H) 0.25 - 0.80 10*3/mm3    Eosinophils, Absolute 0.00 0.00 - 0.36 10*3/mm3    Basophils, Absolute 0.03 0.00 - 0.10 10*3/mm3    Immature Grans, Absolute 0.16 (H) 0.00 - 0.03 10*3/mm3    nRBC 0.0 0.0 - 0.0 /100 WBC     IMAGING:  CT images from 4/25/2018 personally reviewed.  Multiple mildly enlarged lymph nodes are present with mild bilateral axillary lymphadenopathy that was new since 2015 with the largest axillary lymph node and left axilla measuring 1.8 cm.  Multiple retroperitoneal and mesenteric lymph nodes noted as well.       IMPRESSION:  Mild bilateral axillary lymphadenopathy that is new since  the preceding CT chest in 2015. The largest axillary lymph node is  in  the left axilla, and measures up to 1.8 cm in diameter.    IMPRESSION:  Multiple mildly enlarged lymph nodes in the retroperitoneum  and small bowel mesentery and both pelvic sidewalls showing slight  increase in size since the preceding CT scan dated 11/27/2015.  Borderline splenomegaly is also now evident.    ASSESSMENT:  This is a 63 y.o. male with a history of chronic lymphocytic leukemia and related autoimmune hemolytic anemia.    1.  CLL: Outside of the Rituxan he received for 4 weeks for this and the autoimmune hemolytic anemia he has not required any specific treatment.  His white blood cell count continues to elevate.  He is otherwise asymptomatic.  There is no palpable lymphadenopathy.  His white blood cell count was increasing and I requested a CT scan of the chest abdomen pelvis which did indicate some lymphadenopathy but nothing large enough to warrant treatment.  His white blood cell count has improved significantly today with steroids.    At this point given the worsening anemia even though it has responded to steroids we need to treat his CLL particularly given the 70% involvement on his bone marrow biopsy.  We will plan to treat with ibrutinib 420 mg daily.  I explained potential adverse effects and gave him some written information regarding the medication today.  He will have a teaching session.    2.  Macrocytic anemia: He has a history of autoimmune hemolytic anemia when he presented with CLL back in November 2015.  His reticulocyte count was very elevated at that time.  His hemoglobin dropped to as low as about 5.  He responded to steroids initially intravenously and then with prednisone and he was treated with 4 weeks of Rituxan.    He was noted to have worsening anemia.  His reticulocyte count was low.  He initially did not respond very well to transfusions and he has required 4 units over the past couple of weeks.  There was no evidence for hemolysis although his ADOLFO is positive for IgG.   This has been persistently positive.  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  He has been on prednisone first at 100 mg daily and now 80 mg daily.  His hemoglobin has improved.  We will wean the prednisone.  He will take 60 mg daily for 5 days and then 40 mg daily for 5 days and then he will continue 20 mg daily until he sees us back.    PLAN:  1.  He will wean prednisone as follows: He will take 60 mg daily for 5 days and then 40 mg daily for 5 days and then 20 mg until he sees us back in the office with a CBC.  We will then decide if we can wean the prednisone further.  2.  We will plan to initiate therapy with ibrutinib 420 mg daily.  I gave him some written information regarding the drug today and explained potential adverse effects.  He will have a teaching session.  We will work to obtain this medication for him.  3.  He will continue Bactrim for prophylaxis while he is on greater than or equal to 20 mg of prednisone daily.  4.  Teaching session in the near future.  I will then have a nurse practitioner see him back a couple of weeks after that for oral adherence.  I will see him back in a couple weeks after that.

## 2018-07-25 NOTE — PROGRESS NOTES
Imbruvica PA submitted to [employer] through covermymeds.    Request approved-office will be faxed approval letter with dates.

## 2018-07-25 NOTE — PROGRESS NOTES
Imbruvica rx with copay card ($10 copay) faxed to Amber FORREST [phone]  Phone: [phone]    I have left a VM on pts mobile phone asking for a return call back. I wanted to let pt know the above information.

## 2018-07-25 NOTE — PROGRESS NOTES
Staff message rec from Dr Cheng about pt starting Imbruvica. See below.    Toñito Cheng MD sent to Haylee Montanez             Need to start ibrutinib 420 mg daily for him please.  Thanks,  AJ      I also rec a call from Danielle SMALL at scheduling-pt was making appts. I spoke with pt and let him know the process. I confirmed his rx plan is through his [employer] (commecial) and let him know that he is eligible for the Imbruvica copay card. I told pt I will start the process for approval through [employer].

## 2018-07-26 ENCOUNTER — DOCUMENTATION (OUTPATIENT)
Dept: ONCOLOGY | Facility: CLINIC | Age: 63
End: 2018-07-26

## 2018-07-31 ENCOUNTER — OFFICE VISIT (OUTPATIENT)
Dept: ONCOLOGY | Facility: CLINIC | Age: 63
End: 2018-07-31

## 2018-07-31 ENCOUNTER — APPOINTMENT (OUTPATIENT)
Dept: LAB | Facility: HOSPITAL | Age: 63
End: 2018-07-31

## 2018-07-31 VITALS — BODY MASS INDEX: 30.54 KG/M2 | WEIGHT: 241 LBS

## 2018-07-31 DIAGNOSIS — C91.10 CLL (CHRONIC LYMPHOCYTIC LEUKEMIA) (HCC): Primary | ICD-10-CM

## 2018-07-31 PROCEDURE — 99215 OFFICE O/P EST HI 40 MIN: CPT | Performed by: NURSE PRACTITIONER

## 2018-07-31 PROCEDURE — G0463 HOSPITAL OUTPT CLINIC VISIT: HCPCS | Performed by: NURSE PRACTITIONER

## 2018-07-31 RX ORDER — ONDANSETRON HYDROCHLORIDE 8 MG/1
TABLET, FILM COATED ORAL
Qty: 30 TABLET | Refills: 2 | Status: SHIPPED | OUTPATIENT
Start: 2018-07-31 | End: 2020-10-21 | Stop reason: SDDI

## 2018-07-31 NOTE — PROGRESS NOTES
Subjective     No primary care provider on file.    PATIENT NAME:  Aida Bal  YOB: 1955  PATIENTS AGE:  63 y.o.  PATIENTS SEX:  male  DATE OF SERVICE:  07/31/2018  PROVIDER:  ABDIEL Don      __________ORAL CHEMOTHERAPY PATIENT EDUCATION__________    PATIENT EDUCATION:  Today I met with the patient to discuss ORAL chemotherapy/biotherapy recommended for treatment of his disease.  Also discussed were medication administration, adherence, and proper handling/disposal.  The patient received the Oral Chemotherapy/Biotherapy Plan Summary including diagnosis and specific treatment plan.    SIDE EFFECTS:  Common side effects were discussed with the patient and significant other.  Discussion included hair loss/discoloration, anemia/fatigue, infection/chills/fever, appetite, bleeding risk/precautions, constipation, diarrhea, mouth sores, taste alteration, loss of appetite,nausea/vomiting, peripheral neuropathy, skin/nail changes, rash, muscle aches/weakness, photosensitivity, weight gain/loss, hearing loss, dizziness, menopausal symptoms, menstrrual irregularity, reproductive risk, high blood pressure, heart damage, liver damage, lung damage, kidney damage, DVT/PE risk, fluid retention, pleural/pericardial effusion, somnolence, electrolyte/LFT imbalance.    Discussion also included side effects specific to drugs in the treatment plan, specifically Ibrutinib.    A total of 40 minutes were spent with the patient, with 100% of time spent in education and counseling.  The patient's wife would like it noted that the patient does have frequent sinus issues and we reviewed the reasons to call.      ABDIEL Don

## 2018-08-02 LAB
CYTO UR: NORMAL
LAB AP CASE REPORT: NORMAL
LAB AP CLINICAL INFORMATION: NORMAL
Lab: NORMAL
PATH REPORT.ADDENDUM SPEC: NORMAL
PATH REPORT.FINAL DX SPEC: NORMAL
PATH REPORT.GROSS SPEC: NORMAL

## 2018-08-14 LAB — PANAGGLUTINATION: NORMAL

## 2018-08-15 ENCOUNTER — LAB (OUTPATIENT)
Dept: LAB | Facility: HOSPITAL | Age: 63
End: 2018-08-15

## 2018-08-15 ENCOUNTER — OFFICE VISIT (OUTPATIENT)
Dept: ONCOLOGY | Facility: CLINIC | Age: 63
End: 2018-08-15

## 2018-08-15 VITALS
DIASTOLIC BLOOD PRESSURE: 68 MMHG | WEIGHT: 238.6 LBS | OXYGEN SATURATION: 99 % | RESPIRATION RATE: 14 BRPM | BODY MASS INDEX: 30.62 KG/M2 | HEART RATE: 76 BPM | TEMPERATURE: 98.1 F | SYSTOLIC BLOOD PRESSURE: 132 MMHG | HEIGHT: 74 IN

## 2018-08-15 DIAGNOSIS — C91.10 CLL (CHRONIC LYMPHOCYTIC LEUKEMIA) (HCC): Primary | ICD-10-CM

## 2018-08-15 DIAGNOSIS — C91.10 CLL (CHRONIC LYMPHOCYTIC LEUKEMIA) (HCC): ICD-10-CM

## 2018-08-15 DIAGNOSIS — D53.9 MACROCYTIC ANEMIA: ICD-10-CM

## 2018-08-15 DIAGNOSIS — T45.1X5A CHEMOTHERAPY INDUCED NEUTROPENIA (HCC): ICD-10-CM

## 2018-08-15 DIAGNOSIS — D70.1 CHEMOTHERAPY INDUCED NEUTROPENIA (HCC): ICD-10-CM

## 2018-08-15 LAB
ALBUMIN SERPL-MCNC: 4.3 G/DL (ref 3.5–5.2)
ALBUMIN/GLOB SERPL: 2.3 G/DL (ref 1.1–2.4)
ALP SERPL-CCNC: 79 U/L (ref 38–116)
ALT SERPL W P-5'-P-CCNC: 20 U/L (ref 0–41)
ANION GAP SERPL CALCULATED.3IONS-SCNC: 12.6 MMOL/L
AST SERPL-CCNC: 14 U/L (ref 0–40)
BASOPHILS # BLD AUTO: 0 10*3/MM3 (ref 0–0.1)
BASOPHILS NFR BLD AUTO: 0 % (ref 0–1.1)
BILIRUB SERPL-MCNC: 0.5 MG/DL (ref 0.1–1.2)
BUN BLD-MCNC: 17 MG/DL (ref 6–20)
BUN/CREAT SERPL: 17.5 (ref 7.3–30)
CALCIUM SPEC-SCNC: 9.3 MG/DL (ref 8.5–10.2)
CHLORIDE SERPL-SCNC: 100 MMOL/L (ref 98–107)
CO2 SERPL-SCNC: 28.4 MMOL/L (ref 22–29)
CREAT BLD-MCNC: 0.97 MG/DL (ref 0.7–1.3)
DEPRECATED RDW RBC AUTO: 69.6 FL (ref 37–49)
EOSINOPHIL # BLD AUTO: 0 10*3/MM3 (ref 0–0.36)
EOSINOPHIL NFR BLD AUTO: 0 % (ref 1–5)
ERYTHROCYTE [DISTWIDTH] IN BLOOD BY AUTOMATED COUNT: 17.5 % (ref 11.7–14.5)
GFR SERPL CREATININE-BSD FRML MDRD: 94 ML/MIN/1.73
GLOBULIN UR ELPH-MCNC: 1.9 GM/DL (ref 1.8–3.5)
GLUCOSE BLD-MCNC: 153 MG/DL (ref 74–124)
HCT VFR BLD AUTO: 33.6 % (ref 40–49)
HGB BLD-MCNC: 10.7 G/DL (ref 13.5–16.5)
IMM GRANULOCYTES # BLD: 0.08 10*3/MM3 (ref 0–0.03)
IMM GRANULOCYTES NFR BLD: 1.3 % (ref 0–0.5)
LYMPHOCYTES # BLD AUTO: 5.7 10*3/MM3 (ref 1–3.5)
LYMPHOCYTES NFR BLD AUTO: 89.5 % (ref 20–49)
MCH RBC QN AUTO: 34.3 PG (ref 27–33)
MCHC RBC AUTO-ENTMCNC: 31.8 G/DL (ref 32–35)
MCV RBC AUTO: 107.7 FL (ref 83–97)
MONOCYTES # BLD AUTO: 0.05 10*3/MM3 (ref 0.25–0.8)
MONOCYTES NFR BLD AUTO: 0.8 % (ref 4–12)
NEUTROPHILS # BLD AUTO: 0.54 10*3/MM3 (ref 1.5–7)
NEUTROPHILS NFR BLD AUTO: 8.4 % (ref 39–75)
NRBC BLD MANUAL-RTO: 0 /100 WBC (ref 0–0)
PLATELET # BLD AUTO: 129 10*3/MM3 (ref 150–375)
PMV BLD AUTO: 10.2 FL (ref 8.9–12.1)
POTASSIUM BLD-SCNC: 3.9 MMOL/L (ref 3.5–4.7)
PROT SERPL-MCNC: 6.2 G/DL (ref 6.3–8)
RBC # BLD AUTO: 3.12 10*6/MM3 (ref 4.3–5.5)
SODIUM BLD-SCNC: 141 MMOL/L (ref 134–145)
WBC NRBC COR # BLD: 6.37 10*3/MM3 (ref 4–10)

## 2018-08-15 PROCEDURE — 99214 OFFICE O/P EST MOD 30 MIN: CPT | Performed by: NURSE PRACTITIONER

## 2018-08-15 PROCEDURE — 85025 COMPLETE CBC W/AUTO DIFF WBC: CPT | Performed by: INTERNAL MEDICINE

## 2018-08-15 PROCEDURE — 80053 COMPREHEN METABOLIC PANEL: CPT | Performed by: INTERNAL MEDICINE

## 2018-08-15 PROCEDURE — 36415 COLL VENOUS BLD VENIPUNCTURE: CPT | Performed by: INTERNAL MEDICINE

## 2018-08-15 NOTE — PROGRESS NOTES
[facility] GROUP OUTPATIENT FOLLOW UP CLINIC VISIT    REASON FOR FOLLOW-UP:    1.  Chronic lymphocytic leukemia diagnosed in November 2015.  CLL FISH panel negative at that time.    2.  Autoimmune hemolytic anemia associated with CLL.  He was treated with steroids and he received 4 weeks of weekly Rituxan.  Otherwise no treatment has been performed  3.  CT imaging of the chest abdomen and pelvis from 4/25/2018 showed lymphadenopathy with mildly enlarged retroperitoneal and mesenteric lymph nodes with the largest measuring about 3.2 cm.  The spleen measured 13.6 cm.  Slight increase in lymphadenopathy compared with 11/27/2015.  4.  Acute anemia with a hemoglobin of 6.9 as of 6/20/2018.  Low reticulocyte count.  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  Prednisone initiated.  Erythropoietin 73.9.  Parvovirus B19 PCR negative.    5.  Bone marrow biopsy on 7/3/2018 with 70% CLL (87% by flow cytometry) with a t(14;18) translocation by cytogenetics, 3 copies of IGH in 87.5% of cells but negative for CCND1/IGH rearrangement.  Negative for all other rearrangements/deletions.  IgVH somatic hypermutation was not detected.                   CLL (chronic lymphocytic leukemia) (CMS/[facility])    11/25/2015 Initial Diagnosis     CLL (chronic lymphocytic leukemia)         12/1/2015 - 12/23/2015 Chemotherapy     Rituxan weekly x4            HISTORY OF PRESENT ILLNESS:  Aida Bal is a 63 y.o. male with the above-mentioned Ensure returns today for reevaluation.  He started on Imbruvica about 3 weeks ago.  He feels like he is tolerating this medication well.  He denies any noticeable side effects at all since starting the medication.  He also continues on prednisone 20 mg per day, which she has been on for the last week.  He takes this medicine in the morning with his breakfast.  He reports that he is sleeping well.  He denies fevers or chills.  He reports he is breathing well and  denies issues with shortness of breath or cough.   "He denies issues with nausea, vomiting, diarrhea, or constipation.  He denies skin rash, or other concerns.      PAST MEDICAL, SURGICAL, FAMILY, AND SOCIAL HISTORIES were reviewed with the patient and in the electronic medical record, and were updated if indicated.    ALLERGIES:  No Known Allergies    MEDICATIONS:  The medication list has been reviewed with the patient by the medical assistant, and the list has been updated in the electronic medical record, which I reviewed.  Medication dosages and frequencies were confirmed to be accurate.    Review of Systems   Constitutional: Positive for fatigue. Negative for appetite change, chills, fever and unexpected weight change.   HENT:   Negative for mouth sores, nosebleeds, sore throat and trouble swallowing.    Respiratory: Negative for cough and shortness of breath.    Cardiovascular: Negative for chest pain and leg swelling.   Gastrointestinal: Negative for abdominal pain, constipation, diarrhea, nausea and vomiting.   Endocrine: Negative for hot flashes.   Genitourinary: Negative for difficulty urinating.    Musculoskeletal: Negative for arthralgias and myalgias.   Skin: Negative for rash.   Neurological: Negative for dizziness and extremity weakness.   Hematological: Negative for adenopathy. Does not bruise/bleed easily.   Psychiatric/Behavioral: Negative for sleep disturbance.       Vitals:    08/15/18 1038   BP: 132/68   Pulse: 76   Resp: 14   Temp: 98.1 °F (36.7 °C)   TempSrc: Oral   SpO2: 99%   Weight: 108 kg (238 lb 9.6 oz)   Height: 189.2 cm (74.49\")   PainSc: 0-No pain     Physical Exam   Constitutional: He is oriented to person, place, and time. He appears well-developed and well-nourished. No distress.   HENT:   Head: Normocephalic and atraumatic.   Mouth/Throat: Oropharynx is clear and moist and mucous membranes are normal. No oropharyngeal exudate.   Eyes: Pupils are equal, round, and reactive to light.   Neck: Normal range of motion.   Cardiovascular: " Normal rate, regular rhythm and normal heart sounds.    No murmur heard.  Pulmonary/Chest: Effort normal and breath sounds normal. No respiratory distress. He has no wheezes. He has no rhonchi. He has no rales.   Abdominal: Soft. Normal appearance and bowel sounds are normal. He exhibits no distension. There is no hepatosplenomegaly.   Musculoskeletal: Normal range of motion. He exhibits no edema.   Neurological: He is alert and oriented to person, place, and time.   Skin: Skin is warm and dry. No rash noted.   Psychiatric: He has a normal mood and affect.   Vitals reviewed.    DIAGNOSTIC DATA:  Results for orders placed or performed in visit on 08/15/18   Comprehensive Metabolic Panel   Result Value Ref Range    Glucose 153 (H) 74 - 124 mg/dL    BUN 17 6 - 20 mg/dL    Creatinine 0.97 0.70 - 1.30 mg/dL    Sodium 141 134 - 145 mmol/L    Potassium 3.9 3.5 - 4.7 mmol/L    Chloride 100 98 - 107 mmol/L    CO2 28.4 22.0 - 29.0 mmol/L    Calcium 9.3 8.5 - 10.2 mg/dL    Total Protein 6.2 (L) 6.3 - 8.0 g/dL    Albumin 4.30 3.50 - 5.20 g/dL    ALT (SGPT) 20 0 - 41 U/L    AST (SGOT) 14 0 - 40 U/L    Alkaline Phosphatase 79 38 - 116 U/L    Total Bilirubin 0.5 0.1 - 1.2 mg/dL    eGFR  African Amer 94 >60 mL/min/1.73    Globulin 1.9 1.8 - 3.5 gm/dL    A/G Ratio 2.3 1.1 - 2.4 g/dL    BUN/Creatinine Ratio 17.5 7.3 - 30.0    Anion Gap 12.6 mmol/L   CBC Auto Differential   Result Value Ref Range    WBC 6.37 4.00 - 10.00 10*3/mm3    RBC 3.12 (L) 4.30 - 5.50 10*6/mm3    Hemoglobin 10.7 (L) 13.5 - 16.5 g/dL    Hematocrit 33.6 (L) 40.0 - 49.0 %    .7 (H) 83.0 - 97.0 fL    MCH 34.3 (H) 27.0 - 33.0 pg    MCHC 31.8 (L) 32.0 - 35.0 g/dL    RDW 17.5 (H) 11.7 - 14.5 %    RDW-SD 69.6 (H) 37.0 - 49.0 fl    MPV 10.2 8.9 - 12.1 fL    Platelets 129 (L) 150 - 375 10*3/mm3    Neutrophil % 8.4 (L) 39.0 - 75.0 %    Lymphocyte % 89.5 (H) 20.0 - 49.0 %    Monocyte % 0.8 (L) 4.0 - 12.0 %    Eosinophil % 0.0 (L) 1.0 - 5.0 %    Basophil % 0.0 0.0 -  1.1 %    Immature Grans % 1.3 (H) 0.0 - 0.5 %    Neutrophils, Absolute 0.54 (L) 1.50 - 7.00 10*3/mm3    Lymphocytes, Absolute 5.70 (H) 1.00 - 3.50 10*3/mm3    Monocytes, Absolute 0.05 (L) 0.25 - 0.80 10*3/mm3    Eosinophils, Absolute 0.00 0.00 - 0.36 10*3/mm3    Basophils, Absolute 0.00 0.00 - 0.10 10*3/mm3    Immature Grans, Absolute 0.08 (H) 0.00 - 0.03 10*3/mm3    nRBC 0.0 0.0 - 0.0 /100 WBC     IMAGING:  CT images from 4/25/2018 personally reviewed.  Multiple mildly enlarged lymph nodes are present with mild bilateral axillary lymphadenopathy that was new since 2015 with the largest axillary lymph node and left axilla measuring 1.8 cm.  Multiple retroperitoneal and mesenteric lymph nodes noted as well.       IMPRESSION:  Mild bilateral axillary lymphadenopathy that is new since  the preceding CT chest in 2015. The largest axillary lymph node is in  the left axilla, and measures up to 1.8 cm in diameter.    IMPRESSION:  Multiple mildly enlarged lymph nodes in the retroperitoneum  and small bowel mesentery and both pelvic sidewalls showing slight  increase in size since the preceding CT scan dated 11/27/2015.  Borderline splenomegaly is also now evident.    ASSESSMENT:  This is a 63 y.o. male with a history of chronic lymphocytic leukemia and related autoimmune hemolytic anemia.    1.  CLL: Outside of the Rituxan he received for 4 weeks for this and the autoimmune hemolytic anemia he has not required any specific treatment.  His white blood cell count continues to elevate.  He is otherwise asymptomatic.  There is no palpable lymphadenopathy.  His white blood cell count was increasing and I requested a CT scan of the chest abdomen pelvis which did indicate some lymphadenopathy but nothing large enough to warrant treatment.  His white blood cell count has improved significantly today with steroids.    At this point given the worsening anemia even though it has responded to steroids we need to treat his CLL  particularly given the 70% involvement on his bone marrow biopsy.  We will plan to treat with ibrutinib 420 mg daily.     Patient started Imbruvica about 3 weeks ago (he received medication on 7/27/18).  He is tolerating the medication well so far without any noticeable side effects.  His white blood cell count today has decreased to 6.37, with an ANC of 0.54.  I have reviewed with Dr. Cheng.  The patient will continue on his current dose of Imbruvica.  We'll bring him back in one week to recheck a CBC with nurse practitioner follow-up.  I reviewed neutropenic cautions with the patient as well.    2.  Macrocytic anemia: He has a history of autoimmune hemolytic anemia when he presented with CLL back in November 2015.  His reticulocyte count was very elevated at that time.  His hemoglobin dropped to as low as about 5.  He responded to steroids initially intravenously and then with prednisone and he was treated with 4 weeks of Rituxan.    He was noted to have worsening anemia.  His reticulocyte count was low.  He initially did not respond very well to transfusions and he has required 4 units over the past couple of weeks.  There was no evidence for hemolysis although his ADOLFO is positive for IgG.  This has been persistently positive.  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  He has been on prednisone first at 100 mg daily and now 80 mg daily.  His hemoglobin has improved.  We will wean the prednisone.  He will take 60 mg daily for 5 days and then 40 mg daily for 5 days and then he will continue 20 mg daily until he sees us back.    As of 8/15/2018 patient will decrease the prednisone to 10 mg daily.  When he returns in one week for reevaluation we will decide if we can further taper the prednisone.    PLAN:  1.  Continue Imbruvica 420 mg daily.  2.  Decrease prednisone to 10 mg daily.  3.  Neutropenic precautions were reviewed with the patient, especially to call for fever of 100.5 or greater or any  infectious symptoms.  4.  Return for follow-up visit with nurse practitioner in 1 week with repeat CBC and reevaluation.          A conversation was held with the patient today regarding CHEMO/BIOTHERAPY, specifically Imbruvica.   We reviewed the proper handling of the medication, as well as proper storage.  The patient also knows to dispose of the medication through the pharmacy or the office.  The treatment schedule was reviewed, including any scheduled breaks in therapy.  The patient verbalized understanding of how to take the medication, with or without food, and any food-drug interactions of which they should be aware.  The patient’s status was reviewed, including any possible side effects s/he may be experiencing.  Lab and appointment schedule were reviewed.  Patient concerns and questions were addressed and answered.  Approximately 20 minutes were spent in education and counseling on oral medication adherence via face to face counseling.

## 2018-08-22 ENCOUNTER — OFFICE VISIT (OUTPATIENT)
Dept: ONCOLOGY | Facility: CLINIC | Age: 63
End: 2018-08-22

## 2018-08-22 ENCOUNTER — LAB (OUTPATIENT)
Dept: LAB | Facility: HOSPITAL | Age: 63
End: 2018-08-22

## 2018-08-22 VITALS
HEIGHT: 74 IN | RESPIRATION RATE: 16 BRPM | HEART RATE: 75 BPM | BODY MASS INDEX: 30.47 KG/M2 | TEMPERATURE: 97.9 F | OXYGEN SATURATION: 100 % | DIASTOLIC BLOOD PRESSURE: 70 MMHG | WEIGHT: 237.4 LBS | SYSTOLIC BLOOD PRESSURE: 140 MMHG

## 2018-08-22 DIAGNOSIS — C91.10 CLL (CHRONIC LYMPHOCYTIC LEUKEMIA) (HCC): Primary | ICD-10-CM

## 2018-08-22 DIAGNOSIS — D53.9 MACROCYTIC ANEMIA: ICD-10-CM

## 2018-08-22 DIAGNOSIS — C91.10 CLL (CHRONIC LYMPHOCYTIC LEUKEMIA) (HCC): ICD-10-CM

## 2018-08-22 DIAGNOSIS — D59.10 AUTOIMMUNE HEMOLYTIC ANEMIA (HCC): ICD-10-CM

## 2018-08-22 LAB
BASOPHILS # BLD AUTO: 0.02 10*3/MM3 (ref 0–0.1)
BASOPHILS NFR BLD AUTO: 0.1 % (ref 0–1.1)
DEPRECATED RDW RBC AUTO: 65.5 FL (ref 37–49)
EOSINOPHIL # BLD AUTO: 0.02 10*3/MM3 (ref 0–0.36)
EOSINOPHIL NFR BLD AUTO: 0.1 % (ref 1–5)
ERYTHROCYTE [DISTWIDTH] IN BLOOD BY AUTOMATED COUNT: 16.7 % (ref 11.7–14.5)
HCT VFR BLD AUTO: 33.3 % (ref 40–49)
HGB BLD-MCNC: 10.6 G/DL (ref 13.5–16.5)
IMM GRANULOCYTES # BLD: 0.08 10*3/MM3 (ref 0–0.03)
IMM GRANULOCYTES NFR BLD: 0.4 % (ref 0–0.5)
LYMPHOCYTES # BLD AUTO: 15.78 10*3/MM3 (ref 1–3.5)
LYMPHOCYTES NFR BLD AUTO: 82.5 % (ref 20–49)
MCH RBC QN AUTO: 34.1 PG (ref 27–33)
MCHC RBC AUTO-ENTMCNC: 31.8 G/DL (ref 32–35)
MCV RBC AUTO: 107.1 FL (ref 83–97)
MONOCYTES # BLD AUTO: 1.06 10*3/MM3 (ref 0.25–0.8)
MONOCYTES NFR BLD AUTO: 5.5 % (ref 4–12)
NEUTROPHILS # BLD AUTO: 2.17 10*3/MM3 (ref 1.5–7)
NEUTROPHILS NFR BLD AUTO: 11.4 % (ref 39–75)
NRBC BLD MANUAL-RTO: 0.2 /100 WBC (ref 0–0)
PLATELET # BLD AUTO: 177 10*3/MM3 (ref 150–375)
PMV BLD AUTO: 10.2 FL (ref 8.9–12.1)
RBC # BLD AUTO: 3.11 10*6/MM3 (ref 4.3–5.5)
WBC NRBC COR # BLD: 19.13 10*3/MM3 (ref 4–10)

## 2018-08-22 PROCEDURE — 99213 OFFICE O/P EST LOW 20 MIN: CPT | Performed by: NURSE PRACTITIONER

## 2018-08-22 PROCEDURE — 36415 COLL VENOUS BLD VENIPUNCTURE: CPT | Performed by: NURSE PRACTITIONER

## 2018-08-22 PROCEDURE — 85025 COMPLETE CBC W/AUTO DIFF WBC: CPT | Performed by: NURSE PRACTITIONER

## 2018-08-22 NOTE — PROGRESS NOTES
[facility] GROUP OUTPATIENT FOLLOW UP CLINIC VISIT    REASON FOR FOLLOW-UP:    1.  Chronic lymphocytic leukemia diagnosed in November 2015.  CLL FISH panel negative at that time.    2.  Autoimmune hemolytic anemia associated with CLL.  He was treated with steroids and he received 4 weeks of weekly Rituxan.  Otherwise no treatment has been performed  3.  CT imaging of the chest abdomen and pelvis from 4/25/2018 showed lymphadenopathy with mildly enlarged retroperitoneal and mesenteric lymph nodes with the largest measuring about 3.2 cm.  The spleen measured 13.6 cm.  Slight increase in lymphadenopathy compared with 11/27/2015.  4.  Acute anemia with a hemoglobin of 6.9 as of 6/20/2018.  Low reticulocyte count.  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  Prednisone initiated.  Erythropoietin 73.9.  Parvovirus B19 PCR negative.    5.  Bone marrow biopsy on 7/3/2018 with 70% CLL (87% by flow cytometry) with a t(14;18) translocation by cytogenetics, 3 copies of IGH in 87.5% of cells but negative for CCND1/IGH rearrangement.  Negative for all other rearrangements/deletions.  IgVH somatic hypermutation was not detected.                   CLL (chronic lymphocytic leukemia) (CMS/[facility])    11/25/2015 Initial Diagnosis     CLL (chronic lymphocytic leukemia)         12/1/2015 - 12/23/2015 Chemotherapy     Rituxan weekly x4            HISTORY OF PRESENT ILLNESS:  Aida Bal is a 63 y.o. male with the above-mentioned history who is here today for reevaluation.  He started on Imbruvica about a month ago.  He is overall tolerating this medication quite well.  He denies any side effects since starting the medication.  We also have him on prednisone, currently at 10 mg per day since the past week.  He denies issues with nausea, vomiting, diarrhea, constipation.  Denies fevers or chills.  He denies any skin rash.  He denies shortness of breath or cough.    PAST MEDICAL, SURGICAL, FAMILY, AND SOCIAL HISTORIES were reviewed with the  "patient and in the electronic medical record, and were updated if indicated.    ALLERGIES:  No Known Allergies    MEDICATIONS:  The medication list has been reviewed with the patient by the medical assistant, and the list has been updated in the electronic medical record, which I reviewed.  Medication dosages and frequencies were confirmed to be accurate.    Review of Systems   Constitutional: Positive for fatigue. Negative for appetite change, chills, fever and unexpected weight change.   HENT:   Negative for mouth sores, nosebleeds, sore throat and trouble swallowing.    Respiratory: Negative for cough and shortness of breath.    Cardiovascular: Negative for chest pain and leg swelling.   Gastrointestinal: Negative for abdominal pain, constipation, diarrhea, nausea and vomiting.   Endocrine: Negative for hot flashes.   Genitourinary: Negative for difficulty urinating.    Musculoskeletal: Negative for arthralgias and myalgias.   Skin: Negative for rash.   Neurological: Negative for dizziness and extremity weakness.   Hematological: Negative for adenopathy. Does not bruise/bleed easily.   Psychiatric/Behavioral: Negative for sleep disturbance.   8/22/2018 review of systems is unchanged from above.    Vitals:    08/22/18 1121   BP: 140/70   Pulse: 75   Resp: 16   Temp: 97.9 °F (36.6 °C)   SpO2: 100%   Weight: 108 kg (237 lb 6.4 oz)   Height: 189.2 cm (74.49\")   PainSc: 0-No pain     Physical Exam   Constitutional: He is oriented to person, place, and time. He appears well-developed and well-nourished. No distress.   HENT:   Head: Normocephalic and atraumatic.   Eyes: Pupils are equal, round, and reactive to light.   Neck: Normal range of motion.   Cardiovascular: Normal rate, regular rhythm and normal heart sounds.    No murmur heard.  Pulmonary/Chest: Effort normal and breath sounds normal. No respiratory distress. He has no wheezes. He has no rhonchi. He has no rales.   Musculoskeletal: Normal range of motion. He " exhibits no edema.   Neurological: He is alert and oriented to person, place, and time.   Skin: Skin is warm and dry. No rash noted.   Psychiatric: He has a normal mood and affect.   Vitals reviewed.    DIAGNOSTIC DATA:  Results for orders placed or performed in visit on 08/22/18   CBC Auto Differential   Result Value Ref Range    WBC 19.13 (H) 4.00 - 10.00 10*3/mm3    RBC 3.11 (L) 4.30 - 5.50 10*6/mm3    Hemoglobin 10.6 (L) 13.5 - 16.5 g/dL    Hematocrit 33.3 (L) 40.0 - 49.0 %    .1 (H) 83.0 - 97.0 fL    MCH 34.1 (H) 27.0 - 33.0 pg    MCHC 31.8 (L) 32.0 - 35.0 g/dL    RDW 16.7 (H) 11.7 - 14.5 %    RDW-SD 65.5 (H) 37.0 - 49.0 fl    MPV 10.2 8.9 - 12.1 fL    Platelets 177 150 - 375 10*3/mm3    Neutrophil % 11.4 (L) 39.0 - 75.0 %    Lymphocyte % 82.5 (H) 20.0 - 49.0 %    Monocyte % 5.5 4.0 - 12.0 %    Eosinophil % 0.1 (L) 1.0 - 5.0 %    Basophil % 0.1 0.0 - 1.1 %    Immature Grans % 0.4 0.0 - 0.5 %    Neutrophils, Absolute 2.17 1.50 - 7.00 10*3/mm3    Lymphocytes, Absolute 15.78 (H) 1.00 - 3.50 10*3/mm3    Monocytes, Absolute 1.06 (H) 0.25 - 0.80 10*3/mm3    Eosinophils, Absolute 0.02 0.00 - 0.36 10*3/mm3    Basophils, Absolute 0.02 0.00 - 0.10 10*3/mm3    Immature Grans, Absolute 0.08 (H) 0.00 - 0.03 10*3/mm3    nRBC 0.2 (H) 0.0 - 0.0 /100 WBC     IMAGING:  CT images from 4/25/2018 personally reviewed.  Multiple mildly enlarged lymph nodes are present with mild bilateral axillary lymphadenopathy that was new since 2015 with the largest axillary lymph node and left axilla measuring 1.8 cm.  Multiple retroperitoneal and mesenteric lymph nodes noted as well.       IMPRESSION:  Mild bilateral axillary lymphadenopathy that is new since  the preceding CT chest in 2015. The largest axillary lymph node is in  the left axilla, and measures up to 1.8 cm in diameter.    IMPRESSION:  Multiple mildly enlarged lymph nodes in the retroperitoneum  and small bowel mesentery and both pelvic sidewalls showing slight  increase in  size since the preceding CT scan dated 11/27/2015.  Borderline splenomegaly is also now evident.    ASSESSMENT:  This is a 63 y.o. male with a history of chronic lymphocytic leukemia and related autoimmune hemolytic anemia.    1.  CLL: Outside of the Rituxan he received for 4 weeks for this and the autoimmune hemolytic anemia he has not required any specific treatment.  His white blood cell count continues to elevate.  He is otherwise asymptomatic.  There is no palpable lymphadenopathy.  His white blood cell count was increasing and I requested a CT scan of the chest abdomen pelvis which did indicate some lymphadenopathy but nothing large enough to warrant treatment.  His white blood cell count has improved significantly today with steroids.    At this point given the worsening anemia even though it has responded to steroids we need to treat his CLL particularly given the 70% involvement on his bone marrow biopsy.  We will plan to treat with ibrutinib 420 mg daily.     Patient started Imbruvica around the end of July  (he received medication on 7/27/18).  He is tolerating the medication well so far without any noticeable side effects. 8/15/18 WBC decreased to 6.37, with an ANC of 0.54.  Patient continued on his current dose of Imbruvica and we had him return to day for reevaluation.  He continues to tolerate Imbruvica quite well.  His white blood cell count today is 19.13.  He will continue his current dose of Imbruvica and return in one week for follow-up visit with Dr. Cheng.      2.  Macrocytic anemia: He has a history of autoimmune hemolytic anemia when he presented with CLL back in November 2015.  His reticulocyte count was very elevated at that time.  His hemoglobin dropped to as low as about 5.  He responded to steroids initially intravenously and then with prednisone and he was treated with 4 weeks of Rituxan.    He was noted to have worsening anemia.  His reticulocyte count was low.  He initially did not  respond very well to transfusions and he has required 4 units over the past couple of weeks.  There was no evidence for hemolysis although his ADOLFO is positive for IgG.  This has been persistently positive.  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  He has been on prednisone first at 100 mg daily and now 80 mg daily.  His hemoglobin has improved.  We will wean the prednisone.  He will take 60 mg daily for 5 days and then 40 mg daily for 5 days and then he will continue 20 mg daily until he sees us back.    As of 8/15/2018 patient will decrease the prednisone to 10 mg daily.   8/22/2018 prednisone discontinued along with prophylactic Bactrim.    PLAN:  1.  Continue Imbruvica 420 mg daily.  2.  Discontinue prednisone and Bactrim.  3.  Return in one week for reevaluation by Dr. Cheng with repeat CBC and CMP.        A conversation was held with the patient today regarding CHEMO/BIOTHERAPY, specifically Imbruvica.   We reviewed the proper handling of the medication, as well as proper storage.  The patient also knows to dispose of the medication through the pharmacy or the office.  The treatment schedule was reviewed, including any scheduled breaks in therapy.  The patient verbalized understanding of how to take the medication, with or without food, and any food-drug interactions of which they should be aware.  The patient’s status was reviewed, including any possible side effects s/he may be experiencing.  Lab and appointment schedule were reviewed.  Patient concerns and questions were addressed and answered.  Approximately 15 minutes were spent in education and counseling on oral medication adherence via face to face counseling.

## 2018-08-29 ENCOUNTER — OFFICE VISIT (OUTPATIENT)
Dept: ONCOLOGY | Facility: CLINIC | Age: 63
End: 2018-08-29

## 2018-08-29 ENCOUNTER — LAB (OUTPATIENT)
Dept: LAB | Facility: HOSPITAL | Age: 63
End: 2018-08-29

## 2018-08-29 VITALS
SYSTOLIC BLOOD PRESSURE: 118 MMHG | OXYGEN SATURATION: 97 % | DIASTOLIC BLOOD PRESSURE: 56 MMHG | BODY MASS INDEX: 30.24 KG/M2 | RESPIRATION RATE: 18 BRPM | HEART RATE: 80 BPM | TEMPERATURE: 97.8 F | HEIGHT: 74 IN | WEIGHT: 235.6 LBS

## 2018-08-29 DIAGNOSIS — C91.10 CLL (CHRONIC LYMPHOCYTIC LEUKEMIA) (HCC): Primary | ICD-10-CM

## 2018-08-29 DIAGNOSIS — C91.10 CLL (CHRONIC LYMPHOCYTIC LEUKEMIA) (HCC): ICD-10-CM

## 2018-08-29 DIAGNOSIS — D59.10 AUTOIMMUNE HEMOLYTIC ANEMIA (HCC): ICD-10-CM

## 2018-08-29 LAB
ALBUMIN SERPL-MCNC: 4.4 G/DL (ref 3.5–5.2)
ALBUMIN/GLOB SERPL: 2.1 G/DL (ref 1.1–2.4)
ALP SERPL-CCNC: 105 U/L (ref 38–116)
ALT SERPL W P-5'-P-CCNC: 15 U/L (ref 0–41)
ANION GAP SERPL CALCULATED.3IONS-SCNC: 11.6 MMOL/L
AST SERPL-CCNC: 17 U/L (ref 0–40)
BASOPHILS # BLD AUTO: 0.09 10*3/MM3 (ref 0–0.1)
BASOPHILS NFR BLD AUTO: 0.2 % (ref 0–1.1)
BILIRUB SERPL-MCNC: 0.4 MG/DL (ref 0.1–1.2)
BUN BLD-MCNC: 19 MG/DL (ref 6–20)
BUN/CREAT SERPL: 17.4 (ref 7.3–30)
CALCIUM SPEC-SCNC: 9.3 MG/DL (ref 8.5–10.2)
CHLORIDE SERPL-SCNC: 102 MMOL/L (ref 98–107)
CO2 SERPL-SCNC: 28.4 MMOL/L (ref 22–29)
CREAT BLD-MCNC: 1.09 MG/DL (ref 0.7–1.3)
DEPRECATED RDW RBC AUTO: 64.8 FL (ref 37–49)
EOSINOPHIL # BLD AUTO: 0.08 10*3/MM3 (ref 0–0.36)
EOSINOPHIL NFR BLD AUTO: 0.2 % (ref 1–5)
ERYTHROCYTE [DISTWIDTH] IN BLOOD BY AUTOMATED COUNT: 16.1 % (ref 11.7–14.5)
GFR SERPL CREATININE-BSD FRML MDRD: 82 ML/MIN/1.73
GLOBULIN UR ELPH-MCNC: 2.1 GM/DL (ref 1.8–3.5)
GLUCOSE BLD-MCNC: 176 MG/DL (ref 74–124)
HCT VFR BLD AUTO: 36 % (ref 40–49)
HGB BLD-MCNC: 11.1 G/DL (ref 13.5–16.5)
IMM GRANULOCYTES # BLD: 0.07 10*3/MM3 (ref 0–0.03)
IMM GRANULOCYTES NFR BLD: 0.2 % (ref 0–0.5)
LYMPHOCYTES # BLD AUTO: 40.01 10*3/MM3 (ref 1–3.5)
LYMPHOCYTES NFR BLD AUTO: 93.2 % (ref 20–49)
MCH RBC QN AUTO: 33.5 PG (ref 27–33)
MCHC RBC AUTO-ENTMCNC: 30.8 G/DL (ref 32–35)
MCV RBC AUTO: 108.8 FL (ref 83–97)
MONOCYTES # BLD AUTO: 0.5 10*3/MM3 (ref 0.25–0.8)
MONOCYTES NFR BLD AUTO: 1.2 % (ref 4–12)
NEUTROPHILS # BLD AUTO: 2.2 10*3/MM3 (ref 1.5–7)
NEUTROPHILS NFR BLD AUTO: 5 % (ref 39–75)
NRBC BLD MANUAL-RTO: 0 /100 WBC (ref 0–0)
PLATELET # BLD AUTO: 151 10*3/MM3 (ref 150–375)
PMV BLD AUTO: 10 FL (ref 8.9–12.1)
POTASSIUM BLD-SCNC: 3.7 MMOL/L (ref 3.5–4.7)
PROT SERPL-MCNC: 6.5 G/DL (ref 6.3–8)
RBC # BLD AUTO: 3.31 10*6/MM3 (ref 4.3–5.5)
SODIUM BLD-SCNC: 142 MMOL/L (ref 134–145)
WBC NRBC COR # BLD: 42.95 10*3/MM3 (ref 4–10)

## 2018-08-29 PROCEDURE — 99214 OFFICE O/P EST MOD 30 MIN: CPT | Performed by: INTERNAL MEDICINE

## 2018-08-29 PROCEDURE — 36415 COLL VENOUS BLD VENIPUNCTURE: CPT | Performed by: INTERNAL MEDICINE

## 2018-08-29 PROCEDURE — 85025 COMPLETE CBC W/AUTO DIFF WBC: CPT | Performed by: INTERNAL MEDICINE

## 2018-08-29 PROCEDURE — 80053 COMPREHEN METABOLIC PANEL: CPT | Performed by: INTERNAL MEDICINE

## 2018-08-29 NOTE — PROGRESS NOTES
[facility] GROUP OUTPATIENT FOLLOW UP CLINIC VISIT    REASON FOR FOLLOW-UP:    1.  Chronic lymphocytic leukemia diagnosed in November 2015.  CLL FISH panel negative at that time.    2.  Autoimmune hemolytic anemia associated with CLL.  He was treated with steroids and he received 4 weeks of weekly Rituxan.  Otherwise no treatment has been performed  3.  CT imaging of the chest abdomen and pelvis from 4/25/2018 showed lymphadenopathy with mildly enlarged retroperitoneal and mesenteric lymph nodes with the largest measuring about 3.2 cm.  The spleen measured 13.6 cm.  Slight increase in lymphadenopathy compared with 11/27/2015.  4.  Acute anemia with a hemoglobin of 6.9 as of 6/20/2018.  Low reticulocyte count.  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  Prednisone initiated.  Erythropoietin 73.9.  Parvovirus B19 PCR negative.  Steroids complete on 8/22/2018.  5.  Bone marrow biopsy on 7/3/2018 with 70% CLL (87% by flow cytometry) with a t(14;18) translocation by cytogenetics, 3 copies of IGH in 87.5% of cells but negative for CCND1/IGH rearrangement.  Negative for all other rearrangements/deletions.  IgVH somatic hypermutation was not detected.            6.  Therapy with ibrutinib initiated at the end of July 2018      HISTORY OF PRESENT ILLNESS:  Aida Bal is a 63 y.o. male who returns today for follow up of the above issue.      He continues ibrutinib which was initiated about a month ago.  Last week he discontinued steroids and Bactrim.  He tolerates the ibrutinib very well with no adverse effects at this point.  He is having some left knee pain and will see orthopedic surgery next week for this.  He has had surgery on his knee before.  He denies any redness or swelling.    PAST MEDICAL, SURGICAL, FAMILY, AND SOCIAL HISTORIES were reviewed with the patient and in the electronic medical record, and were updated if indicated.    ALLERGIES:  No Known Allergies    MEDICATIONS:  The medication list has been  "reviewed with the patient by the medical assistant, and the list has been updated in the electronic medical record, which I reviewed.  Medication dosages and frequencies were confirmed to be accurate.    REVIEW OF SYSTEMS:  PAIN:  See Vital Signs below.  GENERAL:  No fevers, chills, night sweats, or unintended weight loss.  Mild fatigue.  SKIN:  No rashes or non-healing lesions.  HEME/LYMPH:  No abnormal bleeding.  No palpable lymphadenopathy.  EYES:  No vision changes or diplopia.  ENT:  No sore throat or rhinorrhea  RESPIRATORY:  No shortness of breath or cough  CARDIOVASCULAR:  No chest pain, palpitations, orthopnea, or dyspnea on exertion.  GASTROINTESTINAL:  No abdominal pain, nausea, vomiting, constipation, diarrhea, melena, or hematochezia.  GENITOURINARY:  No dysuria or hematuria.  Erectile dysfunction  MUSCULOSKELETAL:  Left knee pain  NEUROLOGIC:  No dizziness, loss of consciousness, or seizures.  PSYCHIATRIC:  No depression, anxiety, or mood changes.    Vitals:    08/29/18 0906   BP: 118/56   Pulse: 80   Resp: 18   Temp: 97.8 °F (36.6 °C)   TempSrc: Oral   SpO2: 97%   Weight: 107 kg (235 lb 9.6 oz)   Height: 189.2 cm (74.49\")   PainSc: 5  Comment: left knee pain       PHYSICAL EXAMINATION:  GENERAL:  Well-developed well-nourished male; awake, alert and oriented, in no acute distress.  SKIN:  Warm and dry, without rashes, purpura, or petechiae.  HEAD:  Normocephalic, atraumatic.  EYES:  Pupils equal, round and reactive to light.  Extraocular movements intact.    [place] sclera  EARS:  Hearing intact.  NOSE:  Septum midline.  No excoriations or nasal discharge.  MOUTH:  No stomatitis or ulcers.  Lips are normal.  THROAT:  Oropharynx without lesions or exudates.  NECK:  Supple with good range of motion; no thyromegaly or masses; no JVD or bruits.  LYMPHATICS:  No cervical, supraclavicular, axillary lymphadenopathy.  CHEST:  Lungs are clear to auscultation bilaterally.  No wheezes, rales, or rhonchi.  HEART:  " Regular rate; normal rhythm.  No murmurs, gallops or rubs.  EXTREMITIES:  No clubbing, cyanosis, or edema.  NEUROLOGICAL:  No focal neurologic deficits.    DIAGNOSTIC DATA:  Results for orders placed or performed in visit on 08/29/18   CBC Auto Differential   Result Value Ref Range    WBC 42.95 (H) 4.00 - 10.00 10*3/mm3    RBC 3.31 (L) 4.30 - 5.50 10*6/mm3    Hemoglobin 11.1 (L) 13.5 - 16.5 g/dL    Hematocrit 36.0 (L) 40.0 - 49.0 %    .8 (H) 83.0 - 97.0 fL    MCH 33.5 (H) 27.0 - 33.0 pg    MCHC 30.8 (L) 32.0 - 35.0 g/dL    RDW 16.1 (H) 11.7 - 14.5 %    RDW-SD 64.8 (H) 37.0 - 49.0 fl    MPV 10.0 8.9 - 12.1 fL    Platelets 151 150 - 375 10*3/mm3    Neutrophil % 5.0 (L) 39.0 - 75.0 %    Lymphocyte % 93.2 (H) 20.0 - 49.0 %    Monocyte % 1.2 (L) 4.0 - 12.0 %    Eosinophil % 0.2 (L) 1.0 - 5.0 %    Basophil % 0.2 0.0 - 1.1 %    Immature Grans % 0.2 0.0 - 0.5 %    Neutrophils, Absolute 2.20 1.50 - 7.00 10*3/mm3    Lymphocytes, Absolute 40.01 (H) 1.00 - 3.50 10*3/mm3    Monocytes, Absolute 0.50 0.25 - 0.80 10*3/mm3    Eosinophils, Absolute 0.08 0.00 - 0.36 10*3/mm3    Basophils, Absolute 0.09 0.00 - 0.10 10*3/mm3    Immature Grans, Absolute 0.07 (H) 0.00 - 0.03 10*3/mm3    nRBC 0.0 0.0 - 0.0 /100 WBC     IMAGING:  CT images from 4/25/2018 personally reviewed.  Multiple mildly enlarged lymph nodes are present with mild bilateral axillary lymphadenopathy that was new since 2015 with the largest axillary lymph node and left axilla measuring 1.8 cm.  Multiple retroperitoneal and mesenteric lymph nodes noted as well.       IMPRESSION:  Mild bilateral axillary lymphadenopathy that is new since  the preceding CT chest in 2015. The largest axillary lymph node is in  the left axilla, and measures up to 1.8 cm in diameter.    IMPRESSION:  Multiple mildly enlarged lymph nodes in the retroperitoneum  and small bowel mesentery and both pelvic sidewalls showing slight  increase in size since the preceding CT scan dated  11/27/2015.  Borderline splenomegaly is also now evident.    ASSESSMENT:  This is a 63 y.o. male with a history of chronic lymphocytic leukemia and related autoimmune hemolytic anemia.    1.  CLL: Outside of the Rituxan he received for 4 weeks for this and the autoimmune hemolytic anemia he has not required any specific treatment.  His white blood cell count continues to elevate.  He is otherwise asymptomatic.  There is no palpable lymphadenopathy.  His white blood cell count was increasing and I requested a CT scan of the chest abdomen pelvis which did indicate some lymphadenopathy but nothing large enough to warrant treatment.  His white blood cell count improved significantly with steroids.    At this point given the worsening anemia even though it has responded to steroids we needed to treat his CLL particularly given the 70% involvement on his bone marrow biopsy.  We are treating with ibrutinib 420 mg daily, which was initiated at the end of July 2018.    2.  Macrocytic anemia: He has a history of autoimmune hemolytic anemia when he presented with CLL back in November 2015.  His reticulocyte count was very elevated at that time.  His hemoglobin dropped to as low as about 5.  He responded to steroids initially intravenously and then with prednisone and he was treated with 4 weeks of Rituxan.    He was noted to have worsening anemia.  His reticulocyte count was low.  He initially did not respond very well to transfusions and he required 4 units of packed red cells  There was no evidence for hemolysis although his ADOLFO is positive for IgG.  This has been persistently positive.  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  He responded to steroids.  He discontinued steroids as of 8/22/2018.    PLAN:  1.  Continue ibrutinib 420 mg daily  2.  CBC with nurse review in 3 weeks and I will see him back in 6 weeks with a CBC and CMP  3.  He is going to have some sun exposure over the weekend and I advised  applying sunscreen and wearing UV protective clothing

## 2018-09-19 ENCOUNTER — CLINICAL SUPPORT (OUTPATIENT)
Dept: ONCOLOGY | Facility: HOSPITAL | Age: 63
End: 2018-09-19

## 2018-09-19 ENCOUNTER — LAB (OUTPATIENT)
Dept: LAB | Facility: HOSPITAL | Age: 63
End: 2018-09-19

## 2018-09-19 DIAGNOSIS — C91.10 CLL (CHRONIC LYMPHOCYTIC LEUKEMIA) (HCC): ICD-10-CM

## 2018-09-19 DIAGNOSIS — D59.10 AUTOIMMUNE HEMOLYTIC ANEMIA (HCC): ICD-10-CM

## 2018-09-19 LAB
BASOPHILS # BLD AUTO: 0.08 10*3/MM3 (ref 0–0.1)
BASOPHILS NFR BLD AUTO: 0.2 % (ref 0–1.1)
DEPRECATED RDW RBC AUTO: 54.9 FL (ref 37–49)
EOSINOPHIL # BLD AUTO: 0.08 10*3/MM3 (ref 0–0.36)
EOSINOPHIL NFR BLD AUTO: 0.2 % (ref 1–5)
ERYTHROCYTE [DISTWIDTH] IN BLOOD BY AUTOMATED COUNT: 14.2 % (ref 11.7–14.5)
HCT VFR BLD AUTO: 35.9 % (ref 40–49)
HGB BLD-MCNC: 11.4 G/DL (ref 13.5–16.5)
IMM GRANULOCYTES # BLD: 0.05 10*3/MM3 (ref 0–0.03)
IMM GRANULOCYTES NFR BLD: 0.1 % (ref 0–0.5)
LYMPHOCYTES # BLD AUTO: 32.75 10*3/MM3 (ref 1–3.5)
LYMPHOCYTES NFR BLD AUTO: 92.3 % (ref 20–49)
MCH RBC QN AUTO: 33.6 PG (ref 27–33)
MCHC RBC AUTO-ENTMCNC: 31.8 G/DL (ref 32–35)
MCV RBC AUTO: 105.9 FL (ref 83–97)
MONOCYTES # BLD AUTO: 0.09 10*3/MM3 (ref 0.25–0.8)
MONOCYTES NFR BLD AUTO: 0.3 % (ref 4–12)
NEUTROPHILS # BLD AUTO: 2.44 10*3/MM3 (ref 1.5–7)
NEUTROPHILS NFR BLD AUTO: 6.9 % (ref 39–75)
NRBC BLD MANUAL-RTO: 0 /100 WBC (ref 0–0)
PLATELET # BLD AUTO: 137 10*3/MM3 (ref 150–375)
PMV BLD AUTO: 11.2 FL (ref 8.9–12.1)
RBC # BLD AUTO: 3.39 10*6/MM3 (ref 4.3–5.5)
WBC NRBC COR # BLD: 35.49 10*3/MM3 (ref 4–10)

## 2018-09-19 PROCEDURE — 85025 COMPLETE CBC W/AUTO DIFF WBC: CPT | Performed by: INTERNAL MEDICINE

## 2018-09-19 PROCEDURE — 36416 COLLJ CAPILLARY BLOOD SPEC: CPT | Performed by: INTERNAL MEDICINE

## 2018-09-19 NOTE — PROGRESS NOTES
Pt presents for RN review. He states he feels great. CBC reviewed and is satisfactory for this pt at this time. He is tolerating the 420 mg of Imbruvia without issue. Copy of labs given and next appt confirmed. Pt V/U.   Lab Results   Component Value Date    WBC 35.49 (H) 09/19/2018    HGB 11.4 (L) 09/19/2018    HCT 35.9 (L) 09/19/2018    .9 (H) 09/19/2018     (L) 09/19/2018

## 2018-10-10 ENCOUNTER — LAB (OUTPATIENT)
Dept: LAB | Facility: HOSPITAL | Age: 63
End: 2018-10-10

## 2018-10-10 ENCOUNTER — OFFICE VISIT (OUTPATIENT)
Dept: ONCOLOGY | Facility: CLINIC | Age: 63
End: 2018-10-10

## 2018-10-10 VITALS
DIASTOLIC BLOOD PRESSURE: 74 MMHG | RESPIRATION RATE: 18 BRPM | HEART RATE: 70 BPM | SYSTOLIC BLOOD PRESSURE: 126 MMHG | BODY MASS INDEX: 30.72 KG/M2 | HEIGHT: 74 IN | TEMPERATURE: 98 F | OXYGEN SATURATION: 99 % | WEIGHT: 239.4 LBS

## 2018-10-10 DIAGNOSIS — C91.10 CLL (CHRONIC LYMPHOCYTIC LEUKEMIA) (HCC): Primary | ICD-10-CM

## 2018-10-10 DIAGNOSIS — C91.10 CLL (CHRONIC LYMPHOCYTIC LEUKEMIA) (HCC): ICD-10-CM

## 2018-10-10 DIAGNOSIS — D59.10 AUTOIMMUNE HEMOLYTIC ANEMIA (HCC): ICD-10-CM

## 2018-10-10 LAB
ALBUMIN SERPL-MCNC: 4.5 G/DL (ref 3.5–5.2)
ALBUMIN/GLOB SERPL: 2.6 G/DL (ref 1.1–2.4)
ALP SERPL-CCNC: 103 U/L (ref 38–116)
ALT SERPL W P-5'-P-CCNC: 10 U/L (ref 0–41)
ANION GAP SERPL CALCULATED.3IONS-SCNC: 9.2 MMOL/L
AST SERPL-CCNC: 16 U/L (ref 0–40)
BASOPHILS # BLD AUTO: 0.03 10*3/MM3 (ref 0–0.1)
BASOPHILS NFR BLD AUTO: 0.2 % (ref 0–1.1)
BILIRUB SERPL-MCNC: 0.6 MG/DL (ref 0.1–1.2)
BUN BLD-MCNC: 15 MG/DL (ref 6–20)
BUN/CREAT SERPL: 15.6 (ref 7.3–30)
CALCIUM SPEC-SCNC: 9.1 MG/DL (ref 8.5–10.2)
CHLORIDE SERPL-SCNC: 102 MMOL/L (ref 98–107)
CO2 SERPL-SCNC: 30.8 MMOL/L (ref 22–29)
CREAT BLD-MCNC: 0.96 MG/DL (ref 0.7–1.3)
DEPRECATED RDW RBC AUTO: 52.1 FL (ref 37–49)
EOSINOPHIL # BLD AUTO: 0.06 10*3/MM3 (ref 0–0.36)
EOSINOPHIL NFR BLD AUTO: 0.3 % (ref 1–5)
ERYTHROCYTE [DISTWIDTH] IN BLOOD BY AUTOMATED COUNT: 13.4 % (ref 11.7–14.5)
GFR SERPL CREATININE-BSD FRML MDRD: 95 ML/MIN/1.73
GLOBULIN UR ELPH-MCNC: 1.7 GM/DL (ref 1.8–3.5)
GLUCOSE BLD-MCNC: 108 MG/DL (ref 74–124)
HCT VFR BLD AUTO: 36.2 % (ref 40–49)
HGB BLD-MCNC: 11.3 G/DL (ref 13.5–16.5)
IMM GRANULOCYTES # BLD: 0.02 10*3/MM3 (ref 0–0.03)
IMM GRANULOCYTES NFR BLD: 0.1 % (ref 0–0.5)
LYMPHOCYTES # BLD AUTO: 18.01 10*3/MM3 (ref 1–3.5)
LYMPHOCYTES NFR BLD AUTO: 92.8 % (ref 20–49)
MCH RBC QN AUTO: 33.3 PG (ref 27–33)
MCHC RBC AUTO-ENTMCNC: 31.2 G/DL (ref 32–35)
MCV RBC AUTO: 106.8 FL (ref 83–97)
MONOCYTES # BLD AUTO: 0.15 10*3/MM3 (ref 0.25–0.8)
MONOCYTES NFR BLD AUTO: 0.8 % (ref 4–12)
NEUTROPHILS # BLD AUTO: 1.14 10*3/MM3 (ref 1.5–7)
NEUTROPHILS NFR BLD AUTO: 5.8 % (ref 39–75)
NRBC BLD MANUAL-RTO: 0 /100 WBC (ref 0–0)
PLATELET # BLD AUTO: 141 10*3/MM3 (ref 150–375)
PMV BLD AUTO: 10.7 FL (ref 8.9–12.1)
POTASSIUM BLD-SCNC: 3.7 MMOL/L (ref 3.5–4.7)
PROT SERPL-MCNC: 6.2 G/DL (ref 6.3–8)
RBC # BLD AUTO: 3.39 10*6/MM3 (ref 4.3–5.5)
SODIUM BLD-SCNC: 142 MMOL/L (ref 134–145)
WBC NRBC COR # BLD: 19.41 10*3/MM3 (ref 4–10)

## 2018-10-10 PROCEDURE — 80053 COMPREHEN METABOLIC PANEL: CPT | Performed by: INTERNAL MEDICINE

## 2018-10-10 PROCEDURE — 99214 OFFICE O/P EST MOD 30 MIN: CPT | Performed by: INTERNAL MEDICINE

## 2018-10-10 PROCEDURE — 36415 COLL VENOUS BLD VENIPUNCTURE: CPT | Performed by: INTERNAL MEDICINE

## 2018-10-10 PROCEDURE — 85025 COMPLETE CBC W/AUTO DIFF WBC: CPT | Performed by: INTERNAL MEDICINE

## 2018-10-10 RX ORDER — PROMETHAZINE HYDROCHLORIDE, PHENYLEPHRINE HYDROCHLORIDE AND CODEINE PHOSPHATE 6.25; 5; 1 MG/5ML; MG/5ML; MG/5ML
SOLUTION ORAL AS NEEDED
COMMUNITY
Start: 2018-10-09 | End: 2020-10-21

## 2018-10-10 NOTE — PROGRESS NOTES
[facility] GROUP OUTPATIENT FOLLOW UP CLINIC VISIT    REASON FOR FOLLOW-UP:    1.  Chronic lymphocytic leukemia diagnosed in November 2015.  CLL FISH panel negative at that time.    2.  Autoimmune hemolytic anemia associated with CLL.  He was treated with steroids and he received 4 weeks of weekly Rituxan.  Otherwise no treatment has been performed  3.  CT imaging of the chest abdomen and pelvis from 4/25/2018 showed lymphadenopathy with mildly enlarged retroperitoneal and mesenteric lymph nodes with the largest measuring about 3.2 cm.  The spleen measured 13.6 cm.  Slight increase in lymphadenopathy compared with 11/27/2015.  4.  Acute anemia with a hemoglobin of 6.9 as of 6/20/2018.  Low reticulocyte count.  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  Prednisone initiated.  Erythropoietin 73.9.  Parvovirus B19 PCR negative.  Steroids complete on 8/22/2018.  5.  Bone marrow biopsy on 7/3/2018 with 70% CLL (87% by flow cytometry) with a t(14;18) translocation by cytogenetics, 3 copies of IGH in 87.5% of cells but negative for CCND1/IGH rearrangement.  Negative for all other rearrangements/deletions.  IgVH somatic hypermutation was not detected.            6.  Therapy with ibrutinib initiated at the end of July 2018      HISTORY OF PRESENT ILLNESS:  Aida Bal is a 63 y.o. male who returns today for follow up of the above issue.      He continues ibrutinib which she tolerates very well.  He denies any diarrhea but his bowel movements might be a little more frequent.  He denies any other adverse effects of the medication.    He does inquired today as to whether he can get a shingles shot.    PAST MEDICAL, SURGICAL, FAMILY, AND SOCIAL HISTORIES were reviewed with the patient and in the electronic medical record, and were updated if indicated.    ALLERGIES:  No Known Allergies    MEDICATIONS:  The medication list has been reviewed with the patient by the medical assistant, and the list has been updated in the  "electronic medical record, which I reviewed.  Medication dosages and frequencies were confirmed to be accurate.    REVIEW OF SYSTEMS:  PAIN:  See Vital Signs below.  GENERAL:  No fevers, chills, night sweats, or unintended weight loss.  Mild fatigue.  SKIN:  No rashes or non-healing lesions.  HEME/LYMPH:  No abnormal bleeding.  No palpable lymphadenopathy.  EYES:  No vision changes or diplopia.  ENT:  No sore throat or rhinorrhea  RESPIRATORY:  No shortness of breath or cough  CARDIOVASCULAR:  No chest pain, palpitations, orthopnea, or dyspnea on exertion.  GASTROINTESTINAL:  No abdominal pain, nausea, vomiting, constipation, diarrhea, melena, or hematochezia.  GENITOURINARY:  No dysuria or hematuria.  Erectile dysfunction  MUSCULOSKELETAL:  Left knee pain  NEUROLOGIC:  No dizziness, loss of consciousness, or seizures.  PSYCHIATRIC:  No depression, anxiety, or mood changes.    Vitals:    10/10/18 0936   BP: 126/74   Pulse: 70   Resp: 18   Temp: 98 °F (36.7 °C)   TempSrc: Oral   SpO2: 99%   Weight: 109 kg (239 lb 6.4 oz)   Height: 189.2 cm (74.49\")   PainSc: 0-No pain       PHYSICAL EXAMINATION:  GENERAL:  Well-developed well-nourished male; awake, alert and oriented, in no acute distress.  SKIN:  Warm and dry, without rashes, purpura, or petechiae.  HEAD:  Normocephalic, atraumatic.  EYES:  Pupils equal, round and reactive to light.  Extraocular movements intact.    [place] sclera  EARS:  Hearing intact.  NOSE:  Septum midline.  No excoriations or nasal discharge.  MOUTH:  No stomatitis or ulcers.  Lips are normal.  THROAT:  Oropharynx without lesions or exudates.  NECK:  Supple with good range of motion; no thyromegaly or masses; no JVD or bruits.  LYMPHATICS:  No cervical, supraclavicular, axillary lymphadenopathy.  CHEST:  Lungs are clear to auscultation bilaterally.  No wheezes, rales, or rhonchi.  HEART:  Regular rate; normal rhythm.  No murmurs, gallops or rubs.  EXTREMITIES:  No clubbing, cyanosis, or " edema.  NEUROLOGICAL:  No focal neurologic deficits.    DIAGNOSTIC DATA:  Results for orders placed or performed in visit on 10/10/18   CBC Auto Differential   Result Value Ref Range    WBC 19.41 (H) 4.00 - 10.00 10*3/mm3    RBC 3.39 (L) 4.30 - 5.50 10*6/mm3    Hemoglobin 11.3 (L) 13.5 - 16.5 g/dL    Hematocrit 36.2 (L) 40.0 - 49.0 %    .8 (H) 83.0 - 97.0 fL    MCH 33.3 (H) 27.0 - 33.0 pg    MCHC 31.2 (L) 32.0 - 35.0 g/dL    RDW 13.4 11.7 - 14.5 %    RDW-SD 52.1 (H) 37.0 - 49.0 fl    MPV 10.7 8.9 - 12.1 fL    Platelets 141 (L) 150 - 375 10*3/mm3    Neutrophil % 5.8 (L) 39.0 - 75.0 %    Lymphocyte % 92.8 (H) 20.0 - 49.0 %    Monocyte % 0.8 (L) 4.0 - 12.0 %    Eosinophil % 0.3 (L) 1.0 - 5.0 %    Basophil % 0.2 0.0 - 1.1 %    Immature Grans % 0.1 0.0 - 0.5 %    Neutrophils, Absolute 1.14 (L) 1.50 - 7.00 10*3/mm3    Lymphocytes, Absolute 18.01 (H) 1.00 - 3.50 10*3/mm3    Monocytes, Absolute 0.15 (L) 0.25 - 0.80 10*3/mm3    Eosinophils, Absolute 0.06 0.00 - 0.36 10*3/mm3    Basophils, Absolute 0.03 0.00 - 0.10 10*3/mm3    Immature Grans, Absolute 0.02 0.00 - 0.03 10*3/mm3    nRBC 0.0 0.0 - 0.0 /100 WBC     IMAGING:  CT images from 4/25/2018 personally reviewed.  Multiple mildly enlarged lymph nodes are present with mild bilateral axillary lymphadenopathy that was new since 2015 with the largest axillary lymph node and left axilla measuring 1.8 cm.  Multiple retroperitoneal and mesenteric lymph nodes noted as well.       IMPRESSION:  Mild bilateral axillary lymphadenopathy that is new since  the preceding CT chest in 2015. The largest axillary lymph node is in  the left axilla, and measures up to 1.8 cm in diameter.    IMPRESSION:  Multiple mildly enlarged lymph nodes in the retroperitoneum  and small bowel mesentery and both pelvic sidewalls showing slight  increase in size since the preceding CT scan dated 11/27/2015.  Borderline splenomegaly is also now evident.    ASSESSMENT:  This is a 63 y.o. male with a  history of chronic lymphocytic leukemia and related autoimmune hemolytic anemia.    1.  CLL: Outside of the Rituxan he received for 4 weeks for this and the autoimmune hemolytic anemia he has not required any specific treatment.  His white blood cell count continues to elevate.  He is otherwise asymptomatic.  There is no palpable lymphadenopathy.  His white blood cell count was increasing and I requested a CT scan of the chest abdomen pelvis which did indicate some lymphadenopathy but nothing large enough to warrant treatment.  His white blood cell count improved significantly with steroids.    Given the worsening anemia even though it has responded to steroids we needed to treat his CLL particularly given the 70% involvement on his bone marrow biopsy.  We are treating with ibrutinib 420 mg daily, which was initiated at the end of July 2018.    2.  Macrocytic anemia: He has a history of autoimmune hemolytic anemia when he presented with CLL back in November 2015.  His reticulocyte count was very elevated at that time.  His hemoglobin dropped to as low as about 5.  He responded to steroids initially intravenously and then with prednisone and he was treated with 4 weeks of Rituxan.    He was noted to have worsening anemia.  His reticulocyte count was low.  He initially did not respond very well to transfusions and he required 4 units of packed red cells  There was no evidence for hemolysis although his ADOLFO is positive for IgG.  This has been persistently positive.  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  He responded to steroids.  He discontinued steroids as of 8/22/2018.  Hemoglobin has overall improved.    PLAN:  1.  Continue ibrutinib 420 mg daily  2.  I will see him back in about 2 months for follow-up with a CBC and CMP.  3.  Due to the underlying CLL and immunosuppression related to that, I do NOT recommend that he receive a zoster vaccination.

## 2018-12-05 ENCOUNTER — LAB (OUTPATIENT)
Dept: LAB | Facility: HOSPITAL | Age: 63
End: 2018-12-05

## 2018-12-05 ENCOUNTER — OFFICE VISIT (OUTPATIENT)
Dept: ONCOLOGY | Facility: CLINIC | Age: 63
End: 2018-12-05

## 2018-12-05 VITALS
DIASTOLIC BLOOD PRESSURE: 78 MMHG | HEART RATE: 72 BPM | TEMPERATURE: 98 F | WEIGHT: 243.3 LBS | RESPIRATION RATE: 16 BRPM | BODY MASS INDEX: 31.22 KG/M2 | HEIGHT: 74 IN | SYSTOLIC BLOOD PRESSURE: 156 MMHG | OXYGEN SATURATION: 98 %

## 2018-12-05 DIAGNOSIS — C91.10 CLL (CHRONIC LYMPHOCYTIC LEUKEMIA) (HCC): Primary | ICD-10-CM

## 2018-12-05 DIAGNOSIS — C91.10 CLL (CHRONIC LYMPHOCYTIC LEUKEMIA) (HCC): ICD-10-CM

## 2018-12-05 LAB
ALBUMIN SERPL-MCNC: 4.7 G/DL (ref 3.5–5.2)
ALBUMIN/GLOB SERPL: 2.4 G/DL (ref 1.1–2.4)
ALP SERPL-CCNC: 118 U/L (ref 38–116)
ALT SERPL W P-5'-P-CCNC: 14 U/L (ref 0–41)
ANION GAP SERPL CALCULATED.3IONS-SCNC: 10.9 MMOL/L
AST SERPL-CCNC: 19 U/L (ref 0–40)
BASOPHILS # BLD AUTO: 0.03 10*3/MM3 (ref 0–0.1)
BASOPHILS NFR BLD AUTO: 0.2 % (ref 0–1.1)
BILIRUB SERPL-MCNC: 0.6 MG/DL (ref 0.1–1.2)
BUN BLD-MCNC: 15 MG/DL (ref 6–20)
BUN/CREAT SERPL: 13.8 (ref 7.3–30)
CALCIUM SPEC-SCNC: 9.5 MG/DL (ref 8.5–10.2)
CHLORIDE SERPL-SCNC: 102 MMOL/L (ref 98–107)
CO2 SERPL-SCNC: 30.1 MMOL/L (ref 22–29)
CREAT BLD-MCNC: 1.09 MG/DL (ref 0.7–1.3)
DEPRECATED RDW RBC AUTO: 50.5 FL (ref 37–49)
EOSINOPHIL # BLD AUTO: 0.05 10*3/MM3 (ref 0–0.36)
EOSINOPHIL NFR BLD AUTO: 0.3 % (ref 1–5)
ERYTHROCYTE [DISTWIDTH] IN BLOOD BY AUTOMATED COUNT: 13.2 % (ref 11.7–14.5)
GFR SERPL CREATININE-BSD FRML MDRD: 82 ML/MIN/1.73
GLOBULIN UR ELPH-MCNC: 2 GM/DL (ref 1.8–3.5)
GLUCOSE BLD-MCNC: 103 MG/DL (ref 74–124)
HCT VFR BLD AUTO: 36.5 % (ref 40–49)
HGB BLD-MCNC: 11.5 G/DL (ref 13.5–16.5)
IMM GRANULOCYTES # BLD: 0.01 10*3/MM3 (ref 0–0.03)
IMM GRANULOCYTES NFR BLD: 0.1 % (ref 0–0.5)
LYMPHOCYTES # BLD AUTO: 18.48 10*3/MM3 (ref 1–3.5)
LYMPHOCYTES NFR BLD AUTO: 93.5 % (ref 20–49)
MCH RBC QN AUTO: 33.2 PG (ref 27–33)
MCHC RBC AUTO-ENTMCNC: 31.5 G/DL (ref 32–35)
MCV RBC AUTO: 105.5 FL (ref 83–97)
MONOCYTES # BLD AUTO: 0.23 10*3/MM3 (ref 0.25–0.8)
MONOCYTES NFR BLD AUTO: 1.2 % (ref 4–12)
NEUTROPHILS # BLD AUTO: 0.96 10*3/MM3 (ref 1.5–7)
NEUTROPHILS NFR BLD AUTO: 4.7 % (ref 39–75)
NRBC BLD MANUAL-RTO: 0 /100 WBC (ref 0–0)
PLATELET # BLD AUTO: 171 10*3/MM3 (ref 150–375)
PMV BLD AUTO: 10.6 FL (ref 8.9–12.1)
POTASSIUM BLD-SCNC: 3.9 MMOL/L (ref 3.5–4.7)
PROT SERPL-MCNC: 6.7 G/DL (ref 6.3–8)
RBC # BLD AUTO: 3.46 10*6/MM3 (ref 4.3–5.5)
SODIUM BLD-SCNC: 143 MMOL/L (ref 134–145)
WBC NRBC COR # BLD: 19.76 10*3/MM3 (ref 4–10)

## 2018-12-05 PROCEDURE — 99213 OFFICE O/P EST LOW 20 MIN: CPT | Performed by: INTERNAL MEDICINE

## 2018-12-05 PROCEDURE — 80053 COMPREHEN METABOLIC PANEL: CPT | Performed by: INTERNAL MEDICINE

## 2018-12-05 PROCEDURE — 85025 COMPLETE CBC W/AUTO DIFF WBC: CPT | Performed by: INTERNAL MEDICINE

## 2018-12-05 PROCEDURE — 36415 COLL VENOUS BLD VENIPUNCTURE: CPT | Performed by: INTERNAL MEDICINE

## 2018-12-05 NOTE — PROGRESS NOTES
[facility] GROUP OUTPATIENT FOLLOW UP CLINIC VISIT    REASON FOR FOLLOW-UP:    1.  Chronic lymphocytic leukemia diagnosed in November 2015.  CLL FISH panel negative at that time.    2.  Autoimmune hemolytic anemia associated with CLL.  He was treated with steroids and he received 4 weeks of weekly Rituxan.  Otherwise no treatment has been performed  3.  CT imaging of the chest abdomen and pelvis from 4/25/2018 showed lymphadenopathy with mildly enlarged retroperitoneal and mesenteric lymph nodes with the largest measuring about 3.2 cm.  The spleen measured 13.6 cm.  Slight increase in lymphadenopathy compared with 11/27/2015.  4.  Acute anemia with a hemoglobin of 6.9 as of 6/20/2018.  Low reticulocyte count.  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  Prednisone initiated.  Erythropoietin 73.9.  Parvovirus B19 PCR negative.  Steroids complete on 8/22/2018.  5.  Bone marrow biopsy on 7/3/2018 with 70% CLL (87% by flow cytometry) with a t(14;18) translocation by cytogenetics, 3 copies of IGH in 87.5% of cells but negative for CCND1/IGH rearrangement.  Negative for all other rearrangements/deletions.  IgVH somatic hypermutation was not detected.            6.  Therapy with ibrutinib initiated at the end of July 2018      HISTORY OF PRESENT ILLNESS:  Aida Bal is a 63 y.o. male who returns today for follow up of the above issue.      He continues ibrutinib which he tolerates very well.      He denies any fevers or chills.  No lymphadenopathy.  Energy level is excellent.  He does complain of some left knee pain due to osteoarthritis.  This is limiting his activity.    PAST MEDICAL, SURGICAL, FAMILY, AND SOCIAL HISTORIES were reviewed with the patient and in the electronic medical record, and were updated if indicated.    ALLERGIES:  No Known Allergies    MEDICATIONS:  The medication list has been reviewed with the patient by the medical assistant, and the list has been updated in the electronic medical record,  "which I reviewed.  Medication dosages and frequencies were confirmed to be accurate.    REVIEW OF SYSTEMS:  PAIN:  See Vital Signs below.  GENERAL:  No fevers, chills, night sweats, or unintended weight loss.  Mild fatigue.  SKIN:  No rashes or non-healing lesions.  HEME/LYMPH:  No abnormal bleeding.  No palpable lymphadenopathy.  EYES:  No vision changes or diplopia.  ENT:  No sore throat or rhinorrhea  RESPIRATORY:  No shortness of breath or cough  CARDIOVASCULAR:  No chest pain, palpitations, orthopnea, or dyspnea on exertion.  GASTROINTESTINAL:  No abdominal pain, nausea, vomiting, constipation, diarrhea, melena, or hematochezia.  GENITOURINARY:  No dysuria or hematuria.  Erectile dysfunction  MUSCULOSKELETAL:  Left knee pain  NEUROLOGIC:  No dizziness, loss of consciousness, or seizures.  PSYCHIATRIC:  No depression, anxiety, or mood changes.    Vitals:    12/05/18 1016   BP: 156/78   Pulse: 72   Resp: 16   Temp: 98 °F (36.7 °C)   TempSrc: Oral   SpO2: 98%   Weight: 110 kg (243 lb 4.8 oz)   Height: 189.2 cm (74.49\")   PainSc: 0-No pain  Comment: leukemia       PHYSICAL EXAMINATION:  GENERAL:  Well-developed well-nourished male; awake, alert and oriented, in no acute distress.  SKIN:  Warm and dry, without rashes, purpura, or petechiae.  HEAD:  Normocephalic, atraumatic.  EYES:  Pupils equal, round and reactive to light.  Extraocular movements intact.    [place] sclera  EARS:  Hearing intact.  NOSE:  Septum midline.  No excoriations or nasal discharge.  MOUTH:  No stomatitis or ulcers.  Lips are normal.  THROAT:  Oropharynx without lesions or exudates.  NECK:  Supple with good range of motion; no thyromegaly or masses; no JVD or bruits.  LYMPHATICS:  No cervical, supraclavicular, axillary lymphadenopathy.  CHEST:  Lungs are clear to auscultation bilaterally.  No wheezes, rales, or rhonchi.  HEART:  Regular rate; normal rhythm.  No murmurs, gallops or rubs.  EXTREMITIES:  No clubbing, cyanosis, or " edema.  NEUROLOGICAL:  No focal neurologic deficits.    DIAGNOSTIC DATA:  Results for orders placed or performed in visit on 12/05/18   CBC Auto Differential   Result Value Ref Range    WBC 19.76 (H) 4.00 - 10.00 10*3/mm3    RBC 3.46 (L) 4.30 - 5.50 10*6/mm3    Hemoglobin 11.5 (L) 13.5 - 16.5 g/dL    Hematocrit 36.5 (L) 40.0 - 49.0 %    .5 (H) 83.0 - 97.0 fL    MCH 33.2 (H) 27.0 - 33.0 pg    MCHC 31.5 (L) 32.0 - 35.0 g/dL    RDW 13.2 11.7 - 14.5 %    RDW-SD 50.5 (H) 37.0 - 49.0 fl    MPV 10.6 8.9 - 12.1 fL    Platelets 171 150 - 375 10*3/mm3    Neutrophil % 4.7 (L) 39.0 - 75.0 %    Lymphocyte % 93.5 (H) 20.0 - 49.0 %    Monocyte % 1.2 (L) 4.0 - 12.0 %    Eosinophil % 0.3 (L) 1.0 - 5.0 %    Basophil % 0.2 0.0 - 1.1 %    Immature Grans % 0.1 0.0 - 0.5 %    Neutrophils, Absolute 0.96 (L) 1.50 - 7.00 10*3/mm3    Lymphocytes, Absolute 18.48 (H) 1.00 - 3.50 10*3/mm3    Monocytes, Absolute 0.23 (L) 0.25 - 0.80 10*3/mm3    Eosinophils, Absolute 0.05 0.00 - 0.36 10*3/mm3    Basophils, Absolute 0.03 0.00 - 0.10 10*3/mm3    Immature Grans, Absolute 0.01 0.00 - 0.03 10*3/mm3    nRBC 0.0 0.0 - 0.0 /100 WBC     IMAGING:  CT images from 4/25/2018 personally reviewed.  Multiple mildly enlarged lymph nodes are present with mild bilateral axillary lymphadenopathy that was new since 2015 with the largest axillary lymph node and left axilla measuring 1.8 cm.  Multiple retroperitoneal and mesenteric lymph nodes noted as well.       IMPRESSION:  Mild bilateral axillary lymphadenopathy that is new since  the preceding CT chest in 2015. The largest axillary lymph node is in  the left axilla, and measures up to 1.8 cm in diameter.    IMPRESSION:  Multiple mildly enlarged lymph nodes in the retroperitoneum  and small bowel mesentery and both pelvic sidewalls showing slight  increase in size since the preceding CT scan dated 11/27/2015.  Borderline splenomegaly is also now evident.    ASSESSMENT:  This is a 63 y.o. male with a history  of chronic lymphocytic leukemia and related autoimmune hemolytic anemia.    1.  CLL: Outside of the Rituxan he received for 4 weeks for this and the autoimmune hemolytic anemia he has not required any specific treatment.  His white blood cell count continues to elevate.  He is otherwise asymptomatic.  There is no palpable lymphadenopathy.  His white blood cell count was increasing and I requested a CT scan of the chest abdomen pelvis which did indicate some lymphadenopathy but nothing large enough to warrant treatment.  His white blood cell count improved significantly with steroids.    Given the worsening anemia even though it responded to steroids we needed to treat his CLL particularly given the 70% involvement on his bone marrow biopsy.  We are treating with ibrutinib 420 mg daily, which was initiated at the end of July 2018.    His white blood cell count has stabilized at this point.  Other blood counts are improving.  Continue ibrutinib.      2.  Macrocytic anemia: He has a history of autoimmune hemolytic anemia when he presented with CLL back in November 2015.  His reticulocyte count was very elevated at that time.  His hemoglobin dropped to as low as about 5.  He responded to steroids initially intravenously and then with prednisone and he was treated with 4 weeks of Rituxan.    He was noted to have worsening anemia.  His reticulocyte count was low.  He initially did not respond very well to transfusions and he required 4 units of packed red cells  There was no evidence for hemolysis although his ADOLFO is positive for IgG.  This has been persistently positive.  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  He responded to steroids.  He discontinued steroids as of 8/22/2018.  Hemoglobin has overall improved and continues to improve.      PLAN:  1.  Continue ibrutinib 420 mg daily  2.  I will see him back in about 2 months for follow-up with a CBC and CMP.

## 2019-02-11 ENCOUNTER — LAB (OUTPATIENT)
Dept: LAB | Facility: HOSPITAL | Age: 64
End: 2019-02-11

## 2019-02-11 ENCOUNTER — OFFICE VISIT (OUTPATIENT)
Dept: ONCOLOGY | Facility: CLINIC | Age: 64
End: 2019-02-11

## 2019-02-11 VITALS
SYSTOLIC BLOOD PRESSURE: 152 MMHG | HEART RATE: 71 BPM | TEMPERATURE: 97.8 F | BODY MASS INDEX: 32.34 KG/M2 | OXYGEN SATURATION: 97 % | DIASTOLIC BLOOD PRESSURE: 76 MMHG | HEIGHT: 74 IN | WEIGHT: 252 LBS | RESPIRATION RATE: 18 BRPM

## 2019-02-11 DIAGNOSIS — D59.10 AUTOIMMUNE HEMOLYTIC ANEMIA (HCC): ICD-10-CM

## 2019-02-11 DIAGNOSIS — C91.10 CLL (CHRONIC LYMPHOCYTIC LEUKEMIA) (HCC): ICD-10-CM

## 2019-02-11 DIAGNOSIS — C91.10 CLL (CHRONIC LYMPHOCYTIC LEUKEMIA) (HCC): Primary | ICD-10-CM

## 2019-02-11 LAB
ALBUMIN SERPL-MCNC: 4.5 G/DL (ref 3.5–5.2)
ALBUMIN/GLOB SERPL: 2.4 G/DL (ref 1.1–2.4)
ALP SERPL-CCNC: 106 U/L (ref 38–116)
ALT SERPL W P-5'-P-CCNC: 14 U/L (ref 0–41)
ANION GAP SERPL CALCULATED.3IONS-SCNC: 11.9 MMOL/L
AST SERPL-CCNC: 18 U/L (ref 0–40)
BASOPHILS # BLD AUTO: 0.04 10*3/MM3 (ref 0–0.2)
BASOPHILS NFR BLD AUTO: 0.2 % (ref 0–1.5)
BILIRUB SERPL-MCNC: 0.5 MG/DL (ref 0.1–1.2)
BUN BLD-MCNC: 18 MG/DL (ref 6–20)
BUN/CREAT SERPL: 16.5 (ref 7.3–30)
CALCIUM SPEC-SCNC: 9.2 MG/DL (ref 8.5–10.2)
CHLORIDE SERPL-SCNC: 101 MMOL/L (ref 98–107)
CO2 SERPL-SCNC: 30.1 MMOL/L (ref 22–29)
CREAT BLD-MCNC: 1.09 MG/DL (ref 0.7–1.3)
DEPRECATED RDW RBC AUTO: 50.5 FL (ref 37–54)
EOSINOPHIL # BLD AUTO: 0.08 10*3/MM3 (ref 0–0.4)
EOSINOPHIL NFR BLD AUTO: 0.4 % (ref 0.3–6.2)
ERYTHROCYTE [DISTWIDTH] IN BLOOD BY AUTOMATED COUNT: 13.2 % (ref 12.3–15.4)
GFR SERPL CREATININE-BSD FRML MDRD: 82 ML/MIN/1.73
GLOBULIN UR ELPH-MCNC: 1.9 GM/DL (ref 1.8–3.5)
GLUCOSE BLD-MCNC: 113 MG/DL (ref 74–124)
HCT VFR BLD AUTO: 35.2 % (ref 37.5–51)
HGB BLD-MCNC: 10.9 G/DL (ref 13–17.7)
IMM GRANULOCYTES # BLD AUTO: 0.02 10*3/MM3 (ref 0–0.05)
IMM GRANULOCYTES NFR BLD AUTO: 0.1 % (ref 0–0.5)
LYMPHOCYTES # BLD AUTO: 19.24 10*3/MM3 (ref 0.7–3.1)
LYMPHOCYTES NFR BLD AUTO: 92.3 % (ref 19.6–45.3)
MCH RBC QN AUTO: 32.3 PG (ref 26.6–33)
MCHC RBC AUTO-ENTMCNC: 31 G/DL (ref 31.5–35.7)
MCV RBC AUTO: 104.5 FL (ref 79–97)
MONOCYTES # BLD AUTO: 0.26 10*3/MM3 (ref 0.1–0.9)
MONOCYTES NFR BLD AUTO: 1.2 % (ref 5–12)
NEUTROPHILS # BLD AUTO: 1.21 10*3/MM3 (ref 1.4–7)
NEUTROPHILS NFR BLD AUTO: 5.8 % (ref 42.7–76)
NRBC BLD AUTO-RTO: 0 /100 WBC (ref 0–0)
PLATELET # BLD AUTO: 152 10*3/MM3 (ref 140–450)
PMV BLD AUTO: 10.9 FL (ref 6–12)
POTASSIUM BLD-SCNC: 3.6 MMOL/L (ref 3.5–4.7)
PROT SERPL-MCNC: 6.4 G/DL (ref 6.3–8)
RBC # BLD AUTO: 3.37 10*6/MM3 (ref 4.14–5.8)
SODIUM BLD-SCNC: 143 MMOL/L (ref 134–145)
WBC NRBC COR # BLD: 20.85 10*3/MM3 (ref 3.4–10.8)

## 2019-02-11 PROCEDURE — 99214 OFFICE O/P EST MOD 30 MIN: CPT | Performed by: INTERNAL MEDICINE

## 2019-02-11 PROCEDURE — 85025 COMPLETE CBC W/AUTO DIFF WBC: CPT | Performed by: INTERNAL MEDICINE

## 2019-02-11 PROCEDURE — 80053 COMPREHEN METABOLIC PANEL: CPT | Performed by: INTERNAL MEDICINE

## 2019-02-11 PROCEDURE — 36415 COLL VENOUS BLD VENIPUNCTURE: CPT | Performed by: INTERNAL MEDICINE

## 2019-02-11 NOTE — PROGRESS NOTES
[facility] GROUP OUTPATIENT FOLLOW UP CLINIC VISIT    REASON FOR FOLLOW-UP:    1.  Chronic lymphocytic leukemia diagnosed in November 2015.  CLL FISH panel negative at that time.    2.  Autoimmune hemolytic anemia associated with CLL.  He was treated with steroids and he received 4 weeks of weekly Rituxan.  Otherwise no treatment has been performed  3.  CT imaging of the chest abdomen and pelvis from 4/25/2018 showed lymphadenopathy with mildly enlarged retroperitoneal and mesenteric lymph nodes with the largest measuring about 3.2 cm.  The spleen measured 13.6 cm.  Slight increase in lymphadenopathy compared with 11/27/2015.  4.  Acute anemia with a hemoglobin of 6.9 as of 6/20/2018.  Low reticulocyte count.  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  Prednisone initiated.  Erythropoietin 73.9.  Parvovirus B19 PCR negative.  Steroids complete on 8/22/2018.  5.  Bone marrow biopsy on 7/3/2018 with 70% CLL (87% by flow cytometry) with a t(14;18) translocation by cytogenetics, 3 copies of IGH in 87.5% of cells but negative for CCND1/IGH rearrangement.  Negative for all other rearrangements/deletions.  IgVH somatic hypermutation was not detected.            6.  Therapy with ibrutinib initiated at the end of July 2018      HISTORY OF PRESENT ILLNESS:  Aida Bal Jr. is a 63 y.o. male who returns today for follow up of the above issue.      He continues ibrutinib which he tolerates very well.  No adverse effects.    He denies any fevers or chills.  No lymphadenopathy.  Energy level is excellent.  He does complain of some left knee pain due to osteoarthritis but this has improved significantly after an injection.     PAST MEDICAL, SURGICAL, FAMILY, AND SOCIAL HISTORIES were reviewed with the patient and in the electronic medical record, and were updated if indicated.    ALLERGIES:  No Known Allergies    MEDICATIONS:  The medication list has been reviewed with the patient by the medical assistant, and the list has  "been updated in the electronic medical record, which I reviewed.  Medication dosages and frequencies were confirmed to be accurate.    REVIEW OF SYSTEMS:  PAIN:  See Vital Signs below.  GENERAL:  No fevers, chills, night sweats, or unintended weight loss.  Mild fatigue.  SKIN:  No rashes or non-healing lesions.  HEME/LYMPH:  No abnormal bleeding.  No palpable lymphadenopathy.  EYES:  No vision changes or diplopia.  ENT:  No sore throat or rhinorrhea  RESPIRATORY:  No shortness of breath or cough  CARDIOVASCULAR:  No chest pain, palpitations, orthopnea, or dyspnea on exertion.  GASTROINTESTINAL:  No abdominal pain, nausea, vomiting, constipation, diarrhea, melena, or hematochezia.  GENITOURINARY:  No dysuria or hematuria.  Erectile dysfunction  MUSCULOSKELETAL:  Left knee pain, improved significantly  NEUROLOGIC:  No dizziness, loss of consciousness, or seizures.  PSYCHIATRIC:  No depression, anxiety, or mood changes.    Vitals:    02/11/19 1603   BP: 152/76   Pulse: 71   Resp: 18   Temp: 97.8 °F (36.6 °C)   TempSrc: Oral   SpO2: 97%   Weight: 114 kg (252 lb)   Height: 189 cm (74.41\")   PainSc: 0-No pain  Comment: leukemia       PHYSICAL EXAMINATION:  GENERAL:  Well-developed well-nourished male; awake, alert and oriented, in no acute distress.  SKIN:  Warm and dry, without rashes, purpura, or petechiae.  HEAD:  Normocephalic, atraumatic.  EYES:  Pupils equal, round and reactive to light.  Extraocular movements intact.    [place] sclera  EARS:  Hearing intact.  NOSE:  Septum midline.  No excoriations or nasal discharge.  MOUTH:  No stomatitis or ulcers.  Lips are normal.  THROAT:  Oropharynx without lesions or exudates.  NECK:  Supple with good range of motion; no thyromegaly or masses; no JVD or bruits.  LYMPHATICS:  No cervical, supraclavicular, axillary lymphadenopathy.  CHEST:  Lungs are clear to auscultation bilaterally.  No wheezes, rales, or rhonchi.  HEART:  Regular rate; normal rhythm.  No murmurs, gallops or " rubs.  EXTREMITIES:  No clubbing, cyanosis, or edema.  NEUROLOGICAL:  No focal neurologic deficits.    DIAGNOSTIC DATA:  Results for orders placed or performed in visit on 02/11/19   CBC Auto Differential   Result Value Ref Range    WBC 20.85 (H) 3.40 - 10.80 10*3/mm3    RBC 3.37 (L) 4.14 - 5.80 10*6/mm3    Hemoglobin 10.9 (L) 13.0 - 17.7 g/dL    Hematocrit 35.2 (L) 37.5 - 51.0 %    .5 (H) 79.0 - 97.0 fL    MCH 32.3 26.6 - 33.0 pg    MCHC 31.0 (L) 31.5 - 35.7 g/dL    RDW 13.2 12.3 - 15.4 %    RDW-SD 50.5 37.0 - 54.0 fl    MPV 10.9 6.0 - 12.0 fL    Platelets 152 140 - 450 10*3/mm3    Neutrophil % 5.8 (L) 42.7 - 76.0 %    Lymphocyte % 92.3 (H) 19.6 - 45.3 %    Monocyte % 1.2 (L) 5.0 - 12.0 %    Eosinophil % 0.4 0.3 - 6.2 %    Basophil % 0.2 0.0 - 1.5 %    Immature Grans % 0.1 0.0 - 0.5 %    Neutrophils, Absolute 1.21 (L) 1.40 - 7.00 10*3/mm3    Lymphocytes, Absolute 19.24 (H) 0.70 - 3.10 10*3/mm3    Monocytes, Absolute 0.26 0.10 - 0.90 10*3/mm3    Eosinophils, Absolute 0.08 0.00 - 0.40 10*3/mm3    Basophils, Absolute 0.04 0.00 - 0.20 10*3/mm3    Immature Grans, Absolute 0.02 0.00 - 0.05 10*3/mm3    nRBC 0.0 0.0 - 0.0 /100 WBC     IMAGING:  CT images from 4/25/2018 personally reviewed.  Multiple mildly enlarged lymph nodes are present with mild bilateral axillary lymphadenopathy that was new since 2015 with the largest axillary lymph node and left axilla measuring 1.8 cm.  Multiple retroperitoneal and mesenteric lymph nodes noted as well.       IMPRESSION:  Mild bilateral axillary lymphadenopathy that is new since  the preceding CT chest in 2015. The largest axillary lymph node is in  the left axilla, and measures up to 1.8 cm in diameter.    IMPRESSION:  Multiple mildly enlarged lymph nodes in the retroperitoneum  and small bowel mesentery and both pelvic sidewalls showing slight  increase in size since the preceding CT scan dated 11/27/2015.  Borderline splenomegaly is also now evident.    ASSESSMENT:  This is  a 63 y.o. male with a history of chronic lymphocytic leukemia and related autoimmune hemolytic anemia.    1.  CLL: Outside of the Rituxan he received for 4 weeks for this and the autoimmune hemolytic anemia he has not required any specific treatment.  His white blood cell count continues to elevate.  He is otherwise asymptomatic.  There is no palpable lymphadenopathy.  His white blood cell count was increasing and I requested a CT scan of the chest abdomen pelvis which did indicate some lymphadenopathy but nothing large enough to warrant treatment.  His white blood cell count improved significantly with steroids.    Given the worsening anemia even though it responded to steroids we needed to treat his CLL particularly given the 70% involvement on his bone marrow biopsy.  We are treating with ibrutinib 420 mg daily, which was initiated at the end of July 2018.    His white blood cell count has stabilized at this point.  Other blood counts are stable.  Continue ibrutinib.      2.  Macrocytic anemia: He has a history of autoimmune hemolytic anemia when he presented with CLL back in November 2015.  His reticulocyte count was very elevated at that time.  His hemoglobin dropped to as low as about 5.  He responded to steroids initially intravenously and then with prednisone and he was treated with 4 weeks of Rituxan.    He was noted to have worsening anemia.  His reticulocyte count was low.  He initially did not respond very well to transfusions and he required 4 units of packed red cells  There was no evidence for hemolysis although his ADOLFO is positive for IgG.  This was persistently positive.  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  He responded to steroids.  He discontinued steroids as of 8/22/2018.  Hemoglobin has stabilized at this point on ibrutinib.    PLAN:  1.  Continue ibrutinib 420 mg daily  2.  I will see him back in about 2 months for follow-up with a CBC and CMP.  He will notify us prior  to that if he has any new problems.

## 2019-02-13 RX ORDER — IBRUTINIB 420 MG/1
TABLET, FILM COATED ORAL
Qty: 28 TABLET | Refills: 5 | Status: SHIPPED | OUTPATIENT
Start: 2019-02-13 | End: 2019-07-26 | Stop reason: SDUPTHER

## 2019-02-13 NOTE — TELEPHONE ENCOUNTER
Imbruvica refill request rec electronically from [facility]. Per 2/11/19 office note from Dr Cheng-pt is to continue Imbruvica 420 mg daily. Request approved.

## 2019-04-17 ENCOUNTER — OFFICE VISIT (OUTPATIENT)
Dept: ONCOLOGY | Facility: CLINIC | Age: 64
End: 2019-04-17

## 2019-04-17 ENCOUNTER — LAB (OUTPATIENT)
Dept: LAB | Facility: HOSPITAL | Age: 64
End: 2019-04-17

## 2019-04-17 VITALS
RESPIRATION RATE: 16 BRPM | OXYGEN SATURATION: 97 % | HEART RATE: 77 BPM | HEIGHT: 74 IN | WEIGHT: 248.7 LBS | BODY MASS INDEX: 31.92 KG/M2 | DIASTOLIC BLOOD PRESSURE: 70 MMHG | SYSTOLIC BLOOD PRESSURE: 149 MMHG | TEMPERATURE: 97.7 F

## 2019-04-17 DIAGNOSIS — C91.10 CLL (CHRONIC LYMPHOCYTIC LEUKEMIA) (HCC): Primary | ICD-10-CM

## 2019-04-17 DIAGNOSIS — D59.10 AUTOIMMUNE HEMOLYTIC ANEMIA (HCC): ICD-10-CM

## 2019-04-17 DIAGNOSIS — C91.10 CLL (CHRONIC LYMPHOCYTIC LEUKEMIA) (HCC): ICD-10-CM

## 2019-04-17 LAB
ALBUMIN SERPL-MCNC: 4.4 G/DL (ref 3.5–5.2)
ALBUMIN/GLOB SERPL: 2 G/DL (ref 1.1–2.4)
ALP SERPL-CCNC: 113 U/L (ref 38–116)
ALT SERPL W P-5'-P-CCNC: 13 U/L (ref 0–41)
ANION GAP SERPL CALCULATED.3IONS-SCNC: 13.3 MMOL/L
AST SERPL-CCNC: 20 U/L (ref 0–40)
BASOPHILS # BLD AUTO: 0.05 10*3/MM3 (ref 0–0.2)
BASOPHILS NFR BLD AUTO: 0.3 % (ref 0–1.5)
BILIRUB SERPL-MCNC: 0.5 MG/DL (ref 0.2–1.2)
BUN BLD-MCNC: 16 MG/DL (ref 6–20)
BUN/CREAT SERPL: 14.4 (ref 7.3–30)
CALCIUM SPEC-SCNC: 9.3 MG/DL (ref 8.5–10.2)
CHLORIDE SERPL-SCNC: 100 MMOL/L (ref 98–107)
CO2 SERPL-SCNC: 29.7 MMOL/L (ref 22–29)
CREAT BLD-MCNC: 1.11 MG/DL (ref 0.7–1.3)
DEPRECATED RDW RBC AUTO: 49.4 FL (ref 37–54)
EOSINOPHIL # BLD AUTO: 0.16 10*3/MM3 (ref 0–0.4)
EOSINOPHIL NFR BLD AUTO: 0.9 % (ref 0.3–6.2)
ERYTHROCYTE [DISTWIDTH] IN BLOOD BY AUTOMATED COUNT: 13.2 % (ref 12.3–15.4)
GFR SERPL CREATININE-BSD FRML MDRD: 80 ML/MIN/1.73
GLOBULIN UR ELPH-MCNC: 2.2 GM/DL (ref 1.8–3.5)
GLUCOSE BLD-MCNC: 120 MG/DL (ref 74–124)
HCT VFR BLD AUTO: 37.6 % (ref 37.5–51)
HGB BLD-MCNC: 11.6 G/DL (ref 13–17.7)
HGB RETIC QN AUTO: 33.4 PG (ref 29.8–36.1)
IMM GRANULOCYTES # BLD AUTO: 0.03 10*3/MM3 (ref 0–0.05)
IMM GRANULOCYTES NFR BLD AUTO: 0.2 % (ref 0–0.5)
IMM RETICS NFR: 11.1 % (ref 3–15.8)
LYMPHOCYTES # BLD AUTO: 16.54 10*3/MM3 (ref 0.7–3.1)
LYMPHOCYTES NFR BLD AUTO: 89.9 % (ref 19.6–45.3)
MCH RBC QN AUTO: 31.4 PG (ref 26.6–33)
MCHC RBC AUTO-ENTMCNC: 30.9 G/DL (ref 31.5–35.7)
MCV RBC AUTO: 101.9 FL (ref 79–97)
MONOCYTES # BLD AUTO: 0.29 10*3/MM3 (ref 0.1–0.9)
MONOCYTES NFR BLD AUTO: 1.6 % (ref 5–12)
NEUTROPHILS # BLD AUTO: 1.33 10*3/MM3 (ref 1.4–7)
NEUTROPHILS NFR BLD AUTO: 7.1 % (ref 42.7–76)
NRBC BLD AUTO-RTO: 0.1 /100 WBC (ref 0–0)
PLATELET # BLD AUTO: 191 10*3/MM3 (ref 140–450)
PMV BLD AUTO: 10.8 FL (ref 6–12)
POTASSIUM BLD-SCNC: 3.6 MMOL/L (ref 3.5–4.7)
PROT SERPL-MCNC: 6.6 G/DL (ref 6.3–8)
RBC # BLD AUTO: 3.69 10*6/MM3 (ref 4.14–5.8)
RETICS/RBC NFR AUTO: 1.62 % (ref 0.5–1.5)
SODIUM BLD-SCNC: 143 MMOL/L (ref 134–145)
WBC NRBC COR # BLD: 18.4 10*3/MM3 (ref 3.4–10.8)

## 2019-04-17 PROCEDURE — 85025 COMPLETE CBC W/AUTO DIFF WBC: CPT | Performed by: INTERNAL MEDICINE

## 2019-04-17 PROCEDURE — 36415 COLL VENOUS BLD VENIPUNCTURE: CPT | Performed by: INTERNAL MEDICINE

## 2019-04-17 PROCEDURE — 99214 OFFICE O/P EST MOD 30 MIN: CPT | Performed by: INTERNAL MEDICINE

## 2019-04-17 PROCEDURE — 80053 COMPREHEN METABOLIC PANEL: CPT | Performed by: INTERNAL MEDICINE

## 2019-04-17 PROCEDURE — 85046 RETICYTE/HGB CONCENTRATE: CPT | Performed by: INTERNAL MEDICINE

## 2019-04-17 NOTE — PROGRESS NOTES
[facility] GROUP OUTPATIENT FOLLOW UP CLINIC VISIT    REASON FOR FOLLOW-UP:    1.  Chronic lymphocytic leukemia diagnosed in November 2015.  CLL FISH panel negative at that time.    2.  Autoimmune hemolytic anemia associated with CLL.  He was treated with steroids and he received 4 weeks of weekly Rituxan.  Otherwise no treatment has been performed  3.  CT imaging of the chest abdomen and pelvis from 4/25/2018 showed lymphadenopathy with mildly enlarged retroperitoneal and mesenteric lymph nodes with the largest measuring about 3.2 cm.  The spleen measured 13.6 cm.  Slight increase in lymphadenopathy compared with 11/27/2015.  4.  Acute anemia with a hemoglobin of 6.9 as of 6/20/2018.  Low reticulocyte count.  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  Prednisone initiated.  Erythropoietin 73.9.  Parvovirus B19 PCR negative.  Steroids complete on 8/22/2018.  5.  Bone marrow biopsy on 7/3/2018 with 70% CLL (87% by flow cytometry) with a t(14;18) translocation by cytogenetics, 3 copies of IGH in 87.5% of cells but negative for CCND1/IGH rearrangement.  Negative for all other rearrangements/deletions.  IgVH somatic hypermutation was not detected.            6.  Therapy with ibrutinib initiated at the end of July 2018      HISTORY OF PRESENT ILLNESS:  Aida Bal Jr. is a 64 y.o. male who returns today for follow up of the above issue.      He continues ibrutinib which he tolerates very well.  No adverse effects.    Left knee pain continues to be under good control following an injection.  Energy level has been acceptable.  Over the past couple of weeks his seasonal allergic rhinitis has really flared.  He got a steroid injection earlier this week which is helping.    PAST MEDICAL, SURGICAL, FAMILY, AND SOCIAL HISTORIES were reviewed with the patient and in the electronic medical record, and were updated if indicated.    ALLERGIES:  No Known Allergies    MEDICATIONS:  The medication list has been reviewed with the  "patient by the medical assistant, and the list has been updated in the electronic medical record, which I reviewed.  Medication dosages and frequencies were confirmed to be accurate.    REVIEW OF SYSTEMS:  PAIN:  See Vital Signs below.  GENERAL:  No fevers, chills, night sweats, or unintended weight loss.  Mild fatigue.  SKIN:  No rashes or non-healing lesions.  HEME/LYMPH:  No abnormal bleeding.  No palpable lymphadenopathy.  EYES:  No vision changes or diplopia.  ENT: Allergic rhinitis  RESPIRATORY:  No shortness of breath or cough  CARDIOVASCULAR:  No chest pain, palpitations, orthopnea, or dyspnea on exertion.  GASTROINTESTINAL:  No abdominal pain, nausea, vomiting, constipation, diarrhea, melena, or hematochezia.  GENITOURINARY:  No dysuria or hematuria.    MUSCULOSKELETAL:  Left knee pain, improved significantly  NEUROLOGIC:  No dizziness, loss of consciousness, or seizures.  PSYCHIATRIC:  No depression, anxiety, or mood changes.    Vitals:    04/17/19 1542   BP: 149/70   Pulse: 77   Resp: 16   Temp: 97.7 °F (36.5 °C)   SpO2: 97%   Weight: 113 kg (248 lb 11.2 oz)   Height: 189 cm (74.41\")   PainSc: 0-No pain       PHYSICAL EXAMINATION:  GENERAL:  Well-developed well-nourished male; awake, alert and oriented, in no acute distress.  SKIN:  Warm and dry, without rashes, purpura, or petechiae.  HEAD:  Normocephalic, atraumatic.  EYES:  Pupils equal, round and reactive to light.  Extraocular movements intact.    [place] sclera  EARS:  Hearing intact.  NOSE:  Septum midline.  No excoriations or nasal discharge.  MOUTH:  No stomatitis or ulcers.  Lips are normal.  THROAT:  Oropharynx without lesions or exudates.  NECK:  Supple with good range of motion; no thyromegaly or masses; no JVD or bruits.  LYMPHATICS:  No cervical, supraclavicular, axillary lymphadenopathy.  CHEST:  Lungs are clear to auscultation bilaterally.  No wheezes, rales, or rhonchi.  HEART:  Regular rate; normal rhythm.  No murmurs, gallops or " rubs.  Abdomen: Soft, nontender, nondistended, normal active bowel sounds, spleen not palpable.  EXTREMITIES:  No clubbing, cyanosis, or edema.  NEUROLOGICAL:  No focal neurologic deficits.    DIAGNOSTIC DATA:  Results for orders placed or performed in visit on 04/17/19   CBC Auto Differential   Result Value Ref Range    WBC 18.40 (H) 3.40 - 10.80 10*3/mm3    RBC 3.69 (L) 4.14 - 5.80 10*6/mm3    Hemoglobin 11.6 (L) 13.0 - 17.7 g/dL    Hematocrit 37.6 37.5 - 51.0 %    .9 (H) 79.0 - 97.0 fL    MCH 31.4 26.6 - 33.0 pg    MCHC 30.9 (L) 31.5 - 35.7 g/dL    RDW 13.2 12.3 - 15.4 %    RDW-SD 49.4 37.0 - 54.0 fl    MPV 10.8 6.0 - 12.0 fL    Platelets 191 140 - 450 10*3/mm3    Neutrophil % 7.1 (L) 42.7 - 76.0 %    Lymphocyte % 89.9 (H) 19.6 - 45.3 %    Monocyte % 1.6 (L) 5.0 - 12.0 %    Eosinophil % 0.9 0.3 - 6.2 %    Basophil % 0.3 0.0 - 1.5 %    Immature Grans % 0.2 0.0 - 0.5 %    Neutrophils, Absolute 1.33 (L) 1.40 - 7.00 10*3/mm3    Lymphocytes, Absolute 16.54 (H) 0.70 - 3.10 10*3/mm3    Monocytes, Absolute 0.29 0.10 - 0.90 10*3/mm3    Eosinophils, Absolute 0.16 0.00 - 0.40 10*3/mm3    Basophils, Absolute 0.05 0.00 - 0.20 10*3/mm3    Immature Grans, Absolute 0.03 0.00 - 0.05 10*3/mm3    nRBC 0.1 (H) 0.0 - 0.0 /100 WBC     IMAGING:  CT images from 4/25/2018 personally reviewed.  Multiple mildly enlarged lymph nodes are present with mild bilateral axillary lymphadenopathy that was new since 2015 with the largest axillary lymph node and left axilla measuring 1.8 cm.  Multiple retroperitoneal and mesenteric lymph nodes noted as well.       IMPRESSION:  Mild bilateral axillary lymphadenopathy that is new since  the preceding CT chest in 2015. The largest axillary lymph node is in  the left axilla, and measures up to 1.8 cm in diameter.    IMPRESSION:  Multiple mildly enlarged lymph nodes in the retroperitoneum  and small bowel mesentery and both pelvic sidewalls showing slight  increase in size since the preceding CT  scan dated 11/27/2015.  Borderline splenomegaly is also now evident.    ASSESSMENT:  This is a 64 y.o. male with a history of chronic lymphocytic leukemia and related autoimmune hemolytic anemia.    1.  CLL: Initially received Rituxan for 4 weeks at diagnosis for this and the autoimmune hemolytic anemia.    Given the worsening anemia even though it responded to steroids we needed to treat his CLL particularly given the 70% involvement on his bone marrow biopsy.  We started treating with ibrutinib 420 mg daily, initiated at the end of July 2018.    His white blood cell count has stabilized at this point, though lymphocytosis persists.  Other blood counts are stable.  Continue ibrutinib.      2.  Macrocytic anemia: He has a history of autoimmune hemolytic anemia when he presented with CLL back in November 2015.  His reticulocyte count was very elevated at that time.  His hemoglobin dropped to as low as about 5.  He responded to steroids initially intravenously and then with prednisone and he was treated with 4 weeks of Rituxan.    He was noted to have worsening anemia.  His reticulocyte count was low.  He initially did not respond very well to transfusions and he required 4 units of packed red cells  There was no evidence for hemolysis although his ADOLFO is positive for IgG.  This was persistently positive.  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  He responded to steroids.  He discontinued steroids as of 8/22/2018.  Hemoglobin has stabilized at this point on ibrutinib.    PLAN:  1.  Continue ibrutinib 420 mg daily  2.  I will see him back in about 3-4 months for follow-up with a CBC.  He will notify us if he has any new problems prior to his scheduled follow-up appointment.

## 2019-05-17 ENCOUNTER — PREP FOR SURGERY (OUTPATIENT)
Dept: OTHER | Facility: HOSPITAL | Age: 64
End: 2019-05-17

## 2019-05-17 DIAGNOSIS — D12.6 TUBULOVILLOUS ADENOMA OF COLON: Primary | ICD-10-CM

## 2019-06-27 ENCOUNTER — ANESTHESIA EVENT (OUTPATIENT)
Dept: GASTROENTEROLOGY | Facility: HOSPITAL | Age: 64
End: 2019-06-27

## 2019-06-27 ENCOUNTER — ANESTHESIA (OUTPATIENT)
Dept: GASTROENTEROLOGY | Facility: HOSPITAL | Age: 64
End: 2019-06-27

## 2019-06-27 ENCOUNTER — HOSPITAL ENCOUNTER (OUTPATIENT)
Facility: HOSPITAL | Age: 64
Setting detail: HOSPITAL OUTPATIENT SURGERY
Discharge: HOME | End: 2019-06-27
Attending: SURGERY | Admitting: SURGERY

## 2019-06-27 VITALS
DIASTOLIC BLOOD PRESSURE: 69 MMHG | TEMPERATURE: 98.1 F | OXYGEN SATURATION: 98 % | SYSTOLIC BLOOD PRESSURE: 119 MMHG | HEART RATE: 65 BPM | BODY MASS INDEX: 32.18 KG/M2 | RESPIRATION RATE: 16 BRPM | WEIGHT: 237.56 LBS | HEIGHT: 72 IN

## 2019-06-27 DIAGNOSIS — D12.6 TUBULOVILLOUS ADENOMA OF COLON: ICD-10-CM

## 2019-06-27 PROCEDURE — 25010000002 PROPOFOL 10 MG/ML EMULSION: Performed by: NURSE ANESTHETIST, CERTIFIED REGISTERED

## 2019-06-27 PROCEDURE — S0260 H&P FOR SURGERY: HCPCS | Performed by: SURGERY

## 2019-06-27 PROCEDURE — 88305 TISSUE EXAM BY PATHOLOGIST: CPT | Performed by: SURGERY

## 2019-06-27 PROCEDURE — 45380 COLONOSCOPY AND BIOPSY: CPT | Performed by: SURGERY

## 2019-06-27 PROCEDURE — 45385 COLONOSCOPY W/LESION REMOVAL: CPT | Performed by: SURGERY

## 2019-06-27 RX ORDER — PROPOFOL 10 MG/ML
VIAL (ML) INTRAVENOUS AS NEEDED
Status: DISCONTINUED | OUTPATIENT
Start: 2019-06-27 | End: 2019-06-27 | Stop reason: SURG

## 2019-06-27 RX ORDER — SODIUM CHLORIDE, SODIUM LACTATE, POTASSIUM CHLORIDE, CALCIUM CHLORIDE 600; 310; 30; 20 MG/100ML; MG/100ML; MG/100ML; MG/100ML
30 INJECTION, SOLUTION INTRAVENOUS CONTINUOUS PRN
Status: DISCONTINUED | OUTPATIENT
Start: 2019-06-27 | End: 2019-06-27 | Stop reason: HOSPADM

## 2019-06-27 RX ORDER — LIDOCAINE HYDROCHLORIDE 20 MG/ML
INJECTION, SOLUTION INFILTRATION; PERINEURAL AS NEEDED
Status: DISCONTINUED | OUTPATIENT
Start: 2019-06-27 | End: 2019-06-27 | Stop reason: SURG

## 2019-06-27 RX ORDER — PROPOFOL 10 MG/ML
VIAL (ML) INTRAVENOUS CONTINUOUS PRN
Status: DISCONTINUED | OUTPATIENT
Start: 2019-06-27 | End: 2019-06-27 | Stop reason: SURG

## 2019-06-27 RX ORDER — SODIUM CHLORIDE 0.9 % (FLUSH) 0.9 %
3 SYRINGE (ML) INJECTION AS NEEDED
Status: DISCONTINUED | OUTPATIENT
Start: 2019-06-27 | End: 2019-06-27 | Stop reason: HOSPADM

## 2019-06-27 RX ADMIN — PROPOFOL 50 MG: 10 INJECTION, EMULSION INTRAVENOUS at 08:52

## 2019-06-27 RX ADMIN — LIDOCAINE HYDROCHLORIDE 100 MG: 20 INJECTION, SOLUTION INFILTRATION; PERINEURAL at 08:52

## 2019-06-27 RX ADMIN — SODIUM CHLORIDE, POTASSIUM CHLORIDE, SODIUM LACTATE AND CALCIUM CHLORIDE 30 ML/HR: 600; 310; 30; 20 INJECTION, SOLUTION INTRAVENOUS at 08:28

## 2019-06-27 RX ADMIN — PROPOFOL 300 MCG/KG/MIN: 10 INJECTION, EMULSION INTRAVENOUS at 08:53

## 2019-06-27 NOTE — ANESTHESIA PREPROCEDURE EVALUATION
Anesthesia Evaluation     Patient summary reviewed and Nursing notes reviewed                Airway   Mallampati: I  TM distance: >3 FB  Neck ROM: full  No difficulty expected  Dental - normal exam     Pulmonary - normal exam   (+) sleep apnea,   Cardiovascular - normal exam    (+) hypertension, CAD, PVD, hyperlipidemia,  carotid artery disease      Neuro/Psych  (+) TIA,     GI/Hepatic/Renal/Endo    (+)   diabetes mellitus,     Musculoskeletal (-) negative ROS    Abdominal  - normal exam    Bowel sounds: normal.   Substance History - negative use     OB/GYN negative ob/gyn ROS         Other   (+) blood dyscrasia   history of cancer                    Anesthesia Plan    ASA 3     MAC     Anesthetic plan, all risks, benefits, and alternatives have been provided, discussed and informed consent has been obtained with: patient.

## 2019-07-31 RX ORDER — IBRUTINIB 420 MG/1
TABLET, FILM COATED ORAL
Qty: 28 TABLET | Refills: 2 | Status: SHIPPED | OUTPATIENT
Start: 2019-07-31 | End: 2019-10-28 | Stop reason: SDUPTHER

## 2019-08-08 ENCOUNTER — OFFICE VISIT (OUTPATIENT)
Dept: ONCOLOGY | Facility: CLINIC | Age: 64
End: 2019-08-08

## 2019-08-08 ENCOUNTER — LAB (OUTPATIENT)
Dept: LAB | Facility: HOSPITAL | Age: 64
End: 2019-08-08

## 2019-08-08 VITALS
TEMPERATURE: 98.3 F | HEART RATE: 64 BPM | WEIGHT: 246.7 LBS | RESPIRATION RATE: 16 BRPM | BODY MASS INDEX: 33.41 KG/M2 | DIASTOLIC BLOOD PRESSURE: 50 MMHG | SYSTOLIC BLOOD PRESSURE: 164 MMHG | OXYGEN SATURATION: 98 % | HEIGHT: 72 IN

## 2019-08-08 DIAGNOSIS — C91.10 CLL (CHRONIC LYMPHOCYTIC LEUKEMIA) (HCC): Primary | ICD-10-CM

## 2019-08-08 DIAGNOSIS — C91.10 CLL (CHRONIC LYMPHOCYTIC LEUKEMIA) (HCC): ICD-10-CM

## 2019-08-08 LAB
BASOPHILS # BLD AUTO: 0.03 10*3/MM3 (ref 0–0.2)
BASOPHILS NFR BLD AUTO: 0.3 % (ref 0–1.5)
DEPRECATED RDW RBC AUTO: 50.4 FL (ref 37–54)
EOSINOPHIL # BLD AUTO: 0.03 10*3/MM3 (ref 0–0.4)
EOSINOPHIL NFR BLD AUTO: 0.3 % (ref 0.3–6.2)
ERYTHROCYTE [DISTWIDTH] IN BLOOD BY AUTOMATED COUNT: 13.6 % (ref 12.3–15.4)
HCT VFR BLD AUTO: 36.5 % (ref 37.5–51)
HGB BLD-MCNC: 11.3 G/DL (ref 13–17.7)
IMM GRANULOCYTES # BLD AUTO: 0.03 10*3/MM3 (ref 0–0.05)
IMM GRANULOCYTES NFR BLD AUTO: 0.3 % (ref 0–0.5)
LYMPHOCYTES # BLD AUTO: 7.74 10*3/MM3 (ref 0.7–3.1)
LYMPHOCYTES NFR BLD AUTO: 82.7 % (ref 19.6–45.3)
MCH RBC QN AUTO: 31.2 PG (ref 26.6–33)
MCHC RBC AUTO-ENTMCNC: 31 G/DL (ref 31.5–35.7)
MCV RBC AUTO: 100.8 FL (ref 79–97)
MONOCYTES # BLD AUTO: 0.18 10*3/MM3 (ref 0.1–0.9)
MONOCYTES NFR BLD AUTO: 1.9 % (ref 5–12)
NEUTROPHILS # BLD AUTO: 1.35 10*3/MM3 (ref 1.7–7)
NEUTROPHILS NFR BLD AUTO: 14.5 % (ref 42.7–76)
NRBC BLD AUTO-RTO: 0.2 /100 WBC (ref 0–0.2)
PLATELET # BLD AUTO: 155 10*3/MM3 (ref 140–450)
PMV BLD AUTO: 11.6 FL (ref 6–12)
RBC # BLD AUTO: 3.62 10*6/MM3 (ref 4.14–5.8)
WBC NRBC COR # BLD: 9.36 10*3/MM3 (ref 3.4–10.8)

## 2019-08-08 PROCEDURE — 36416 COLLJ CAPILLARY BLOOD SPEC: CPT | Performed by: INTERNAL MEDICINE

## 2019-08-08 PROCEDURE — 99214 OFFICE O/P EST MOD 30 MIN: CPT | Performed by: INTERNAL MEDICINE

## 2019-08-08 PROCEDURE — 85025 COMPLETE CBC W/AUTO DIFF WBC: CPT | Performed by: INTERNAL MEDICINE

## 2019-08-08 NOTE — PROGRESS NOTES
[facility] GROUP OUTPATIENT FOLLOW UP CLINIC VISIT    REASON FOR FOLLOW-UP:    1.  Chronic lymphocytic leukemia diagnosed in November 2015.  CLL FISH panel negative at that time.    2.  Autoimmune hemolytic anemia associated with CLL.  He was treated with steroids and he received 4 weeks of weekly Rituxan.  Otherwise no treatment has been performed  3.  CT imaging of the chest abdomen and pelvis from 4/25/2018 showed lymphadenopathy with mildly enlarged retroperitoneal and mesenteric lymph nodes with the largest measuring about 3.2 cm.  The spleen measured 13.6 cm.  Slight increase in lymphadenopathy compared with 11/27/2015.  4.  Acute anemia with a hemoglobin of 6.9 as of 6/20/2018.  Low reticulocyte count.  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  Prednisone initiated.  Erythropoietin 73.9.  Parvovirus B19 PCR negative.  Steroids complete on 8/22/2018.  5.  Bone marrow biopsy on 7/3/2018 with 70% CLL (87% by flow cytometry) with a t(14;18) translocation by cytogenetics, 3 copies of IGH in 87.5% of cells but negative for CCND1/IGH rearrangement.  Negative for all other rearrangements/deletions.  IgVH somatic hypermutation was not detected.            6.  Therapy with ibrutinib initiated at the end of July 2018      HISTORY OF PRESENT ILLNESS:  Aida Bal Jr. is a 64 y.o. male who returns today for follow up of the above issue.      He continues ibrutinib which he tolerates very well.  No adverse effects.  No bleeding. No nausea or vomiting.      PAST MEDICAL, SURGICAL, FAMILY, AND SOCIAL HISTORIES were reviewed with the patient and in the electronic medical record, and were updated if indicated.    ALLERGIES:  No Known Allergies    MEDICATIONS:  The medication list has been reviewed with the patient by the medical assistant, and the list has been updated in the electronic medical record, which I reviewed.  Medication dosages and frequencies were confirmed to be accurate.    REVIEW OF SYSTEMS:  PAIN:  See  "Vital Signs below.  GENERAL:  No fevers, chills, night sweats, or unintended weight loss.  Mild fatigue.  SKIN:  No rashes or non-healing lesions.  HEME/LYMPH:  No abnormal bleeding.  No palpable lymphadenopathy.  EYES:  No vision changes or diplopia.  ENT: Allergic rhinitis  RESPIRATORY:  No shortness of breath or cough  CARDIOVASCULAR:  No chest pain, palpitations, orthopnea, or dyspnea on exertion.  GASTROINTESTINAL:  No abdominal pain, nausea, vomiting, constipation, diarrhea, melena, or hematochezia.  GENITOURINARY:  No dysuria or hematuria.    MUSCULOSKELETAL:  Left knee pain, improved significantly  NEUROLOGIC:  No dizziness, loss of consciousness, or seizures.  PSYCHIATRIC:  No depression, anxiety, or mood changes.    Vitals:    08/08/19 1117   BP: 164/50   Pulse: 64   Resp: 16   Temp: 98.3 °F (36.8 °C)   TempSrc: Oral   SpO2: 98%   Weight: 112 kg (246 lb 11.2 oz)   Height: 182.9 cm (72.01\")   PainSc: 0-No pain       PHYSICAL EXAMINATION:  GENERAL:  Well-developed well-nourished male; awake, alert and oriented, in no acute distress.  SKIN:  Warm and dry, without rashes, purpura, or petechiae.  HEAD:  Normocephalic, atraumatic.  EYES:  Pupils equal, round and reactive to light.  Extraocular movements intact.    [place] sclera  EARS:  Hearing intact.  NOSE:  Septum midline.  No excoriations or nasal discharge.  MOUTH:  No stomatitis or ulcers.  Lips are normal.  THROAT:  Oropharynx without lesions or exudates.  NECK:  Supple with good range of motion; no thyromegaly or masses; no JVD or bruits.  LYMPHATICS:  No cervical, supraclavicular, axillary lymphadenopathy.  CHEST:  Lungs are clear to auscultation bilaterally.  No wheezes, rales, or rhonchi.  HEART:  Regular rate; normal rhythm.  Grade 2 out of 6 systolic murmur.  EXTREMITIES:  No clubbing, cyanosis, or edema.  NEUROLOGICAL:  No focal neurologic deficits.    DIAGNOSTIC DATA:  Results for orders placed or performed in visit on 08/08/19   CBC Auto " Differential   Result Value Ref Range    WBC 9.36 3.40 - 10.80 10*3/mm3    RBC 3.62 (L) 4.14 - 5.80 10*6/mm3    Hemoglobin 11.3 (L) 13.0 - 17.7 g/dL    Hematocrit 36.5 (L) 37.5 - 51.0 %    .8 (H) 79.0 - 97.0 fL    MCH 31.2 26.6 - 33.0 pg    MCHC 31.0 (L) 31.5 - 35.7 g/dL    RDW 13.6 12.3 - 15.4 %    RDW-SD 50.4 37.0 - 54.0 fl    MPV 11.6 6.0 - 12.0 fL    Platelets 155 140 - 450 10*3/mm3    Neutrophil % 14.5 (L) 42.7 - 76.0 %    Lymphocyte % 82.7 (H) 19.6 - 45.3 %    Monocyte % 1.9 (L) 5.0 - 12.0 %    Eosinophil % 0.3 0.3 - 6.2 %    Basophil % 0.3 0.0 - 1.5 %    Immature Grans % 0.3 0.0 - 0.5 %    Neutrophils, Absolute 1.35 (L) 1.70 - 7.00 10*3/mm3    Lymphocytes, Absolute 7.74 (H) 0.70 - 3.10 10*3/mm3    Monocytes, Absolute 0.18 0.10 - 0.90 10*3/mm3    Eosinophils, Absolute 0.03 0.00 - 0.40 10*3/mm3    Basophils, Absolute 0.03 0.00 - 0.20 10*3/mm3    Immature Grans, Absolute 0.03 0.00 - 0.05 10*3/mm3    nRBC 0.2 0.0 - 0.2 /100 WBC     IMAGING:  CT images from 4/25/2018 personally reviewed.  Multiple mildly enlarged lymph nodes are present with mild bilateral axillary lymphadenopathy that was new since 2015 with the largest axillary lymph node and left axilla measuring 1.8 cm.  Multiple retroperitoneal and mesenteric lymph nodes noted as well.       IMPRESSION:  Mild bilateral axillary lymphadenopathy that is new since  the preceding CT chest in 2015. The largest axillary lymph node is in  the left axilla, and measures up to 1.8 cm in diameter.    IMPRESSION:  Multiple mildly enlarged lymph nodes in the retroperitoneum  and small bowel mesentery and both pelvic sidewalls showing slight  increase in size since the preceding CT scan dated 11/27/2015.  Borderline splenomegaly is also now evident.    ASSESSMENT:  This is a 64 y.o. male with a history of chronic lymphocytic leukemia and related autoimmune hemolytic anemia.    1.  CLL: Initially received Rituxan for 4 weeks at diagnosis for this and the autoimmune  hemolytic anemia.    Given the worsening anemia even though it responded to steroids we needed to treat his CLL particularly given the 70% involvement on his bone marrow biopsy.  We started treating with ibrutinib 420 mg daily, initiated at the end of July 2018.    White blood cell count has finally normalized.  Lymphocytosis persists with improving neutropenia.    2.  Macrocytic anemia: He has a history of autoimmune hemolytic anemia when he presented with CLL back in November 2015.  His reticulocyte count was very elevated at that time.  His hemoglobin dropped to as low as about 5.  He responded to steroids initially intravenously and then with prednisone and he was treated with 4 weeks of Rituxan.    He was noted to have worsening anemia.  His reticulocyte count was low.  He initially did not respond very well to transfusions and he required 4 units of packed red cells  There was no evidence for hemolysis although his ADOLFO is positive for IgG.  This was persistently positive.  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  He responded to steroids.  He discontinued steroids as of 8/22/2018.  Hemoglobin has stabilized at this point on ibrutinib.    3.  Hypertension: Blood pressure is a little high today.  He states that he takes it at home and elsewhere and it is usually better in the 130s over 70s.  Continue monitoring.    PLAN:  1.  Continue ibrutinib 420 mg daily  2.  I will see him back in about 4 months for follow-up with a CBC and CMP.  I advised him to notify us if he has any new problems prior to his scheduled follow-up appointment.

## 2019-10-28 RX ORDER — IBRUTINIB 420 MG/1
TABLET, FILM COATED ORAL
Qty: 28 TABLET | Refills: 1 | Status: SHIPPED | OUTPATIENT
Start: 2019-10-28 | End: 2019-12-19 | Stop reason: SDUPTHER

## 2019-12-19 ENCOUNTER — LAB (OUTPATIENT)
Dept: LAB | Facility: HOSPITAL | Age: 64
End: 2019-12-19

## 2019-12-19 ENCOUNTER — OFFICE VISIT (OUTPATIENT)
Dept: ONCOLOGY | Facility: CLINIC | Age: 64
End: 2019-12-19

## 2019-12-19 ENCOUNTER — DOCUMENTATION (OUTPATIENT)
Dept: ONCOLOGY | Facility: CLINIC | Age: 64
End: 2019-12-19

## 2019-12-19 VITALS
DIASTOLIC BLOOD PRESSURE: 74 MMHG | HEART RATE: 72 BPM | OXYGEN SATURATION: 98 % | SYSTOLIC BLOOD PRESSURE: 158 MMHG | WEIGHT: 249.6 LBS | RESPIRATION RATE: 18 BRPM | BODY MASS INDEX: 33.81 KG/M2 | HEIGHT: 72 IN | TEMPERATURE: 98 F

## 2019-12-19 DIAGNOSIS — C91.10 CLL (CHRONIC LYMPHOCYTIC LEUKEMIA) (HCC): Primary | ICD-10-CM

## 2019-12-19 DIAGNOSIS — C91.10 CLL (CHRONIC LYMPHOCYTIC LEUKEMIA) (HCC): ICD-10-CM

## 2019-12-19 LAB
ALBUMIN SERPL-MCNC: 4.3 G/DL (ref 3.5–5.2)
ALBUMIN/GLOB SERPL: 2 G/DL (ref 1.1–2.4)
ALP SERPL-CCNC: 93 U/L (ref 38–116)
ALT SERPL W P-5'-P-CCNC: 18 U/L (ref 0–41)
ANION GAP SERPL CALCULATED.3IONS-SCNC: 11.8 MMOL/L (ref 5–15)
AST SERPL-CCNC: 21 U/L (ref 0–40)
BASOPHILS # BLD AUTO: 0.01 10*3/MM3 (ref 0–0.2)
BASOPHILS NFR BLD AUTO: 0.2 % (ref 0–1.5)
BILIRUB SERPL-MCNC: 0.7 MG/DL (ref 0.2–1.2)
BUN BLD-MCNC: 17 MG/DL (ref 6–20)
BUN/CREAT SERPL: 15.7 (ref 7.3–30)
CALCIUM SPEC-SCNC: 9.2 MG/DL (ref 8.5–10.2)
CHLORIDE SERPL-SCNC: 105 MMOL/L (ref 98–107)
CO2 SERPL-SCNC: 27.2 MMOL/L (ref 22–29)
CREAT BLD-MCNC: 1.08 MG/DL (ref 0.7–1.3)
DEPRECATED RDW RBC AUTO: 53.1 FL (ref 37–54)
EOSINOPHIL # BLD AUTO: 0.02 10*3/MM3 (ref 0–0.4)
EOSINOPHIL NFR BLD AUTO: 0.3 % (ref 0.3–6.2)
ERYTHROCYTE [DISTWIDTH] IN BLOOD BY AUTOMATED COUNT: 14.2 % (ref 12.3–15.4)
GFR SERPL CREATININE-BSD FRML MDRD: 83 ML/MIN/1.73
GLOBULIN UR ELPH-MCNC: 2.1 GM/DL (ref 1.8–3.5)
GLUCOSE BLD-MCNC: 93 MG/DL (ref 74–124)
HCT VFR BLD AUTO: 36.6 % (ref 37.5–51)
HGB BLD-MCNC: 11.1 G/DL (ref 13–17.7)
IMM GRANULOCYTES # BLD AUTO: 0.01 10*3/MM3 (ref 0–0.05)
IMM GRANULOCYTES NFR BLD AUTO: 0.2 % (ref 0–0.5)
LYMPHOCYTES # BLD AUTO: 5.04 10*3/MM3 (ref 0.7–3.1)
LYMPHOCYTES NFR BLD AUTO: 82.2 % (ref 19.6–45.3)
MCH RBC QN AUTO: 30.7 PG (ref 26.6–33)
MCHC RBC AUTO-ENTMCNC: 30.3 G/DL (ref 31.5–35.7)
MCV RBC AUTO: 101.1 FL (ref 79–97)
MONOCYTES # BLD AUTO: 0.24 10*3/MM3 (ref 0.1–0.9)
MONOCYTES NFR BLD AUTO: 3.9 % (ref 5–12)
NEUTROPHILS # BLD AUTO: 0.81 10*3/MM3 (ref 1.7–7)
NEUTROPHILS NFR BLD AUTO: 13.2 % (ref 42.7–76)
NRBC BLD AUTO-RTO: 0 /100 WBC (ref 0–0.2)
PLATELET # BLD AUTO: 123 10*3/MM3 (ref 140–450)
PMV BLD AUTO: 10.8 FL (ref 6–12)
POTASSIUM BLD-SCNC: 3.8 MMOL/L (ref 3.5–4.7)
PROT SERPL-MCNC: 6.4 G/DL (ref 6.3–8)
RBC # BLD AUTO: 3.62 10*6/MM3 (ref 4.14–5.8)
SODIUM BLD-SCNC: 144 MMOL/L (ref 134–145)
WBC NRBC COR # BLD: 6.13 10*3/MM3 (ref 3.4–10.8)

## 2019-12-19 PROCEDURE — 85025 COMPLETE CBC W/AUTO DIFF WBC: CPT

## 2019-12-19 PROCEDURE — 99215 OFFICE O/P EST HI 40 MIN: CPT | Performed by: INTERNAL MEDICINE

## 2019-12-19 PROCEDURE — 80053 COMPREHEN METABOLIC PANEL: CPT

## 2019-12-19 PROCEDURE — 36415 COLL VENOUS BLD VENIPUNCTURE: CPT

## 2019-12-19 RX ORDER — AMLODIPINE BESYLATE 10 MG/1
TABLET ORAL
COMMUNITY
Start: 2019-10-22 | End: 2020-10-21 | Stop reason: DRUGHIGH

## 2019-12-19 NOTE — PROGRESS NOTES
Staff message rec from Dr Acosta to remain the same for pt. See below    Toñito Cheng MD sent to [facility] will continue.  He states that a new rx is required.       Thanks!  [facility]      Pt gets this filled through [facility]. I have escribed a new rx.

## 2019-12-19 NOTE — PROGRESS NOTES
[facility] GROUP OUTPATIENT FOLLOW UP CLINIC VISIT    REASON FOR FOLLOW-UP:    1.  Chronic lymphocytic leukemia diagnosed in November 2015.  CLL FISH panel negative at that time.    2.  Autoimmune hemolytic anemia associated with CLL.  He was treated with steroids and he received 4 weeks of weekly Rituxan.  Otherwise no treatment has been performed  3.  CT imaging of the chest abdomen and pelvis from 4/25/2018 showed lymphadenopathy with mildly enlarged retroperitoneal and mesenteric lymph nodes with the largest measuring about 3.2 cm.  The spleen measured 13.6 cm.  Slight increase in lymphadenopathy compared with 11/27/2015.  4.  Acute anemia with a hemoglobin of 6.9 as of 6/20/2018.  Low reticulocyte count.  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  Prednisone initiated.  Erythropoietin 73.9.  Parvovirus B19 PCR negative.  Steroids complete on 8/22/2018.  5.  Bone marrow biopsy on 7/3/2018 with 70% CLL (87% by flow cytometry) with a t(14;18) translocation by cytogenetics, 3 copies of IGH in 87.5% of cells but negative for CCND1/IGH rearrangement.  Negative for all other rearrangements/deletions.  IgVH somatic hypermutation was not detected.            6.  Therapy with ibrutinib initiated at the end of July 2018      HISTORY OF PRESENT ILLNESS:  Aida Bal Jr. is a 64 y.o. male who returns today for follow up of the above issue.      He continues ibrutinib which he tolerates very well.  No adverse effects.  No bleeding. No nausea or vomiting.  No fevers or chills.  Energy level is excellent.       PAST MEDICAL, SURGICAL, FAMILY, AND SOCIAL HISTORIES were reviewed with the patient and in the electronic medical record, and were updated if indicated.    ALLERGIES:  No Known Allergies    MEDICATIONS:  The medication list has been reviewed with the patient by the medical assistant, and the list has been updated in the electronic medical record, which I reviewed.  Medication dosages and frequencies were confirmed  "to be accurate.    REVIEW OF SYSTEMS:  PAIN:  See Vital Signs below.  GENERAL:  No fevers, chills, night sweats, or unintended weight loss.  SKIN:  No rashes or non-healing lesions.  HEME/LYMPH:  No abnormal bleeding.  No palpable lymphadenopathy.  EYES:  No vision changes or diplopia.  ENT: Allergic rhinitis  RESPIRATORY:  No shortness of breath or cough  CARDIOVASCULAR:  No chest pain, palpitations, orthopnea, or dyspnea on exertion.  GASTROINTESTINAL:  No abdominal pain, nausea, vomiting, constipation, diarrhea, melena, or hematochezia.  GENITOURINARY:  No dysuria or hematuria.    MUSCULOSKELETAL:  Left knee pain, improved significantly, continues injections every 6 months  NEUROLOGIC:  No dizziness, loss of consciousness, or seizures.  PSYCHIATRIC:  No depression, anxiety, or mood changes.    Vitals:    12/19/19 1114   BP: 158/74   Pulse: 72   Resp: 18   Temp: 98 °F (36.7 °C)   SpO2: 98%   Weight: 113 kg (249 lb 9.6 oz)   Height: 182.9 cm (72.01\")   PainSc: 0-No pain       PHYSICAL EXAMINATION:  GENERAL:  Well-developed well-nourished male; awake, alert and oriented, in no acute distress.  SKIN:  Warm and dry, without rashes, purpura, or petechiae.  HEAD:  Normocephalic, atraumatic.  EARS:  Hearing intact.  NOSE:  Septum midline.  No excoriations or nasal discharge.  MOUTH:  No stomatitis or ulcers.  Lips are normal.  THROAT:  Oropharynx without lesions or exudates.  LYMPHATICS:  No cervical, supraclavicular, axillary lymphadenopathy.  CHEST:  Lungs are clear to auscultation bilaterally.  No wheezes, rales, or rhonchi.  HEART:  Regular rate; normal rhythm.  Grade 2 out of 6 early systolic murmur.  EXTREMITIES:  No clubbing, cyanosis, or edema.  NEUROLOGICAL:  No focal neurologic deficits.    DIAGNOSTIC DATA:  Results for orders placed or performed in visit on 12/19/19   Comprehensive Metabolic Panel   Result Value Ref Range    Glucose 93 74 - 124 mg/dL    BUN 17 6 - 20 mg/dL    Creatinine 1.08 0.70 - 1.30 mg/dL "    Sodium 144 134 - 145 mmol/L    Potassium 3.8 3.5 - 4.7 mmol/L    Chloride 105 98 - 107 mmol/L    CO2 27.2 22.0 - 29.0 mmol/L    Calcium 9.2 8.5 - 10.2 mg/dL    Total Protein 6.4 6.3 - 8.0 g/dL    Albumin 4.30 3.50 - 5.20 g/dL    ALT (SGPT) 18 0 - 41 U/L    AST (SGOT) 21 0 - 40 U/L    Alkaline Phosphatase 93 38 - 116 U/L    Total Bilirubin 0.7 0.2 - 1.2 mg/dL    eGFR  African Amer 83 >60 mL/min/1.73    Globulin 2.1 1.8 - 3.5 gm/dL    A/G Ratio 2.0 1.1 - 2.4 g/dL    BUN/Creatinine Ratio 15.7 7.3 - 30.0    Anion Gap 11.8 5.0 - 15.0 mmol/L   CBC Auto Differential   Result Value Ref Range    WBC 6.13 3.40 - 10.80 10*3/mm3    RBC 3.62 (L) 4.14 - 5.80 10*6/mm3    Hemoglobin 11.1 (L) 13.0 - 17.7 g/dL    Hematocrit 36.6 (L) 37.5 - 51.0 %    .1 (H) 79.0 - 97.0 fL    MCH 30.7 26.6 - 33.0 pg    MCHC 30.3 (L) 31.5 - 35.7 g/dL    RDW 14.2 12.3 - 15.4 %    RDW-SD 53.1 37.0 - 54.0 fl    MPV 10.8 6.0 - 12.0 fL    Platelets 123 (L) 140 - 450 10*3/mm3    Neutrophil % 13.2 (L) 42.7 - 76.0 %    Lymphocyte % 82.2 (H) 19.6 - 45.3 %    Monocyte % 3.9 (L) 5.0 - 12.0 %    Eosinophil % 0.3 0.3 - 6.2 %    Basophil % 0.2 0.0 - 1.5 %    Immature Grans % 0.2 0.0 - 0.5 %    Neutrophils, Absolute 0.81 (L) 1.70 - 7.00 10*3/mm3    Lymphocytes, Absolute 5.04 (H) 0.70 - 3.10 10*3/mm3    Monocytes, Absolute 0.24 0.10 - 0.90 10*3/mm3    Eosinophils, Absolute 0.02 0.00 - 0.40 10*3/mm3    Basophils, Absolute 0.01 0.00 - 0.20 10*3/mm3    Immature Grans, Absolute 0.01 0.00 - 0.05 10*3/mm3    nRBC 0.0 0.0 - 0.2 /100 WBC     IMAGING:  None reviewed    IMPRESSION:  Multiple mildly enlarged lymph nodes in the retroperitoneum  and small bowel mesentery and both pelvic sidewalls showing slight  increase in size since the preceding CT scan dated 11/27/2015.  Borderline splenomegaly is also now evident.    ASSESSMENT:  This is a 64 y.o. male with a history of chronic lymphocytic leukemia and related autoimmune hemolytic anemia.    1.  CLL: Initially  received Rituxan for 4 weeks at diagnosis for this and the autoimmune hemolytic anemia.    Given the worsening anemia even though it responded to steroids we needed to treat his CLL particularly given the 70% involvement on his bone marrow biopsy.  We started treating with ibrutinib 420 mg daily, initiated at the end of July 2018.    White blood cell count has finally normalized.  Lymphocytosis persists with improving neutropenia.    2.  Macrocytic anemia: He has a history of autoimmune hemolytic anemia when he presented with CLL back in November 2015.  His reticulocyte count was very elevated at that time.  His hemoglobin dropped to as low as about 5.  He responded to steroids initially intravenously and then with prednisone and he was treated with 4 weeks of Rituxan.    He was noted to have worsening anemia.  His reticulocyte count was low.  He initially did not respond very well to transfusions and he required 4 units of packed red cells  There was no evidence for hemolysis although his ADOLFO is positive for IgG.  This was persistently positive.  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  He responded to steroids.  He discontinued steroids as of 8/22/2018.  Hemoglobin has stabilized at this point on ibrutinib.    3.  Hypertension: Blood pressure is a little high today.  States better at home.  Continue monitoring.    4.  Thrombocytopenia: This is mild and likely related to ibrutinib.  We will continue to monitor with imaging of his spleen if this worsens.  New issue discussed today.    PLAN:  1.  Continue ibrutinib 420 mg daily  2.  I will see him back in about 4-5 months for follow-up with a CBC and CMP.  I advised him to notify us if he has any new problems prior to his scheduled follow-up appointment.

## 2020-02-20 ENCOUNTER — MEDICATION THERAPY MANAGEMENT (OUTPATIENT)
Dept: ONCOLOGY | Facility: HOSPITAL | Age: 65
End: 2020-02-20

## 2020-02-20 RX ORDER — IBRUTINIB 420 MG/1
TABLET, FILM COATED ORAL
Qty: 28 TABLET | Refills: 6 | OUTPATIENT
Start: 2020-02-20

## 2020-02-20 NOTE — PROGRESS NOTES
MTM telephone encounter:  Attempted 2 calls today to check on adherence and side effects for Imbruvica.  No answer.

## 2020-02-20 NOTE — PROGRESS NOTES
Imbruvica refill request rec electronically from [facility]. Per last office note from Dr Cheng-pt is to continue. I have escribed a new rx to [facility]

## 2020-02-20 NOTE — PROGRESS NOTES
MTM telephone encounter:  Call for adherence and side effect check for refill.  Called 3 times today with no answer.  Voice mailbox is full.

## 2020-02-21 ENCOUNTER — MEDICATION THERAPY MANAGEMENT (OUTPATIENT)
Dept: ONCOLOGY | Facility: HOSPITAL | Age: 65
End: 2020-02-21

## 2020-02-21 NOTE — PROGRESS NOTES
MTM telephone encounter:  Add for adherence and side effects re Imbruvica    Pt states he called [facility] yesterday with new insurance information.  He denies any recent infection, bleeding, nausea, diarrhea, muscle or joint pain.  He states he has no side effects.  No concerns were voiced at this time.

## 2020-03-20 ENCOUNTER — MEDICATION THERAPY MANAGEMENT (OUTPATIENT)
Dept: PHARMACY | Facility: HOSPITAL | Age: 65
End: 2020-03-20

## 2020-03-20 NOTE — PROGRESS NOTES
MTM telephone encounter re adherence and side effects ( Imbruvica)    Pt states he has no side effects from taking Imbruvica.  He denies diarrhea, fatigue, any signs of infection, muscle/joint pain, fluid retention or nausea.  He is tolerating it without any concerns.

## 2020-04-23 ENCOUNTER — MEDICATION THERAPY MANAGEMENT (OUTPATIENT)
Dept: PHARMACY | Facility: HOSPITAL | Age: 65
End: 2020-04-23

## 2020-05-20 ENCOUNTER — SPECIALTY PHARMACY (OUTPATIENT)
Dept: ONCOLOGY | Facility: CLINIC | Age: 65
End: 2020-05-20

## 2020-05-20 ENCOUNTER — LAB (OUTPATIENT)
Dept: LAB | Facility: HOSPITAL | Age: 65
End: 2020-05-20

## 2020-05-20 ENCOUNTER — OFFICE VISIT (OUTPATIENT)
Dept: ONCOLOGY | Facility: CLINIC | Age: 65
End: 2020-05-20

## 2020-05-20 DIAGNOSIS — C91.10 CLL (CHRONIC LYMPHOCYTIC LEUKEMIA) (HCC): Primary | ICD-10-CM

## 2020-05-20 DIAGNOSIS — C91.10 CLL (CHRONIC LYMPHOCYTIC LEUKEMIA) (HCC): ICD-10-CM

## 2020-05-20 LAB
ALBUMIN SERPL-MCNC: 4.4 G/DL (ref 3.5–5.2)
ALBUMIN/GLOB SERPL: 2.3 G/DL (ref 1.1–2.4)
ALP SERPL-CCNC: 90 U/L (ref 38–116)
ALT SERPL W P-5'-P-CCNC: 15 U/L (ref 0–41)
ANION GAP SERPL CALCULATED.3IONS-SCNC: 13.8 MMOL/L (ref 5–15)
AST SERPL-CCNC: 21 U/L (ref 0–40)
BASOPHILS # BLD AUTO: 0.03 10*3/MM3 (ref 0–0.2)
BASOPHILS NFR BLD AUTO: 0.3 % (ref 0–1.5)
BILIRUB SERPL-MCNC: 0.5 MG/DL (ref 0.2–1.2)
BUN BLD-MCNC: 20 MG/DL (ref 6–20)
BUN/CREAT SERPL: 17.2 (ref 7.3–30)
CALCIUM SPEC-SCNC: 9.2 MG/DL (ref 8.5–10.2)
CHLORIDE SERPL-SCNC: 105 MMOL/L (ref 98–107)
CO2 SERPL-SCNC: 25.2 MMOL/L (ref 22–29)
CREAT BLD-MCNC: 1.16 MG/DL (ref 0.7–1.3)
DEPRECATED RDW RBC AUTO: 52 FL (ref 37–54)
EOSINOPHIL # BLD AUTO: 0.04 10*3/MM3 (ref 0–0.4)
EOSINOPHIL NFR BLD AUTO: 0.4 % (ref 0.3–6.2)
ERYTHROCYTE [DISTWIDTH] IN BLOOD BY AUTOMATED COUNT: 14 % (ref 12.3–15.4)
GFR SERPL CREATININE-BSD FRML MDRD: 76 ML/MIN/1.73
GLOBULIN UR ELPH-MCNC: 1.9 GM/DL (ref 1.8–3.5)
GLUCOSE BLD-MCNC: 187 MG/DL (ref 74–124)
HCT VFR BLD AUTO: 36.6 % (ref 37.5–51)
HGB BLD-MCNC: 11.3 G/DL (ref 13–17.7)
IMM GRANULOCYTES # BLD AUTO: 0.03 10*3/MM3 (ref 0–0.05)
IMM GRANULOCYTES NFR BLD AUTO: 0.3 % (ref 0–0.5)
LYMPHOCYTES # BLD AUTO: 7.73 10*3/MM3 (ref 0.7–3.1)
LYMPHOCYTES NFR BLD AUTO: 79.3 % (ref 19.6–45.3)
MCH RBC QN AUTO: 31.2 PG (ref 26.6–33)
MCHC RBC AUTO-ENTMCNC: 30.9 G/DL (ref 31.5–35.7)
MCV RBC AUTO: 101.1 FL (ref 79–97)
MONOCYTES # BLD AUTO: 0.18 10*3/MM3 (ref 0.1–0.9)
MONOCYTES NFR BLD AUTO: 1.8 % (ref 5–12)
NEUTROPHILS # BLD AUTO: 1.74 10*3/MM3 (ref 1.7–7)
NEUTROPHILS NFR BLD AUTO: 17.9 % (ref 42.7–76)
NRBC BLD AUTO-RTO: 0 /100 WBC (ref 0–0.2)
PLATELET # BLD AUTO: 131 10*3/MM3 (ref 140–450)
PMV BLD AUTO: 11.3 FL (ref 6–12)
POTASSIUM BLD-SCNC: 3.6 MMOL/L (ref 3.5–4.7)
PROT SERPL-MCNC: 6.3 G/DL (ref 6.3–8)
RBC # BLD AUTO: 3.62 10*6/MM3 (ref 4.14–5.8)
SODIUM BLD-SCNC: 144 MMOL/L (ref 134–145)
WBC NRBC COR # BLD: 9.75 10*3/MM3 (ref 3.4–10.8)

## 2020-05-20 PROCEDURE — 99442 PR PHYS/QHP TELEPHONE EVALUATION 11-20 MIN: CPT | Performed by: INTERNAL MEDICINE

## 2020-05-20 PROCEDURE — 80053 COMPREHEN METABOLIC PANEL: CPT

## 2020-05-20 PROCEDURE — 36415 COLL VENOUS BLD VENIPUNCTURE: CPT

## 2020-05-20 PROCEDURE — 85025 COMPLETE CBC W/AUTO DIFF WBC: CPT

## 2020-05-20 NOTE — PROGRESS NOTES
[facility] GROUP OUTPATIENT FOLLOW UP CLINIC VISIT    REASON FOR FOLLOW-UP:    1.  Chronic lymphocytic leukemia diagnosed in November 2015.  CLL FISH panel negative at that time.    2.  Autoimmune hemolytic anemia associated with CLL.  He was treated with steroids and he received 4 weeks of weekly Rituxan.  Otherwise no treatment has been performed  3.  CT imaging of the chest abdomen and pelvis from 4/25/2018 showed lymphadenopathy with mildly enlarged retroperitoneal and mesenteric lymph nodes with the largest measuring about 3.2 cm.  The spleen measured 13.6 cm.  Slight increase in lymphadenopathy compared with 11/27/2015.  4.  Acute anemia with a hemoglobin of 6.9 as of 6/20/2018.  Low reticulocyte count.  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  Prednisone initiated.  Erythropoietin 73.9.  Parvovirus B19 PCR negative.  Steroids complete on 8/22/2018.  5.  Bone marrow biopsy on 7/3/2018 with 70% CLL (87% by flow cytometry) with a t(14;18) translocation by cytogenetics, 3 copies of IGH in 87.5% of cells but negative for CCND1/IGH rearrangement.  Negative for all other rearrangements/deletions.  IgVH somatic hypermutation was not detected.            6.  Therapy with ibrutinib initiated at the end of July 2018      HISTORY OF PRESENT ILLNESS:  Aida Bal Jr. is a 65 y.o. male who returns today for follow up of the above issue.      Overall he is doing very well.  He tolerates the ibrutinib very well.  No lymphadenopathy.  No fevers or chills.  No respiratory symptoms.    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  He was able to secure a loan that is helping him get by.    PAST MEDICAL, SURGICAL, FAMILY, AND SOCIAL HISTORIES were reviewed with the patient and in the electronic medical record, and were updated if indicated.    ALLERGIES:  No Known  Allergies    MEDICATIONS:  The medication list has been reviewed with the patient by the medical assistant, and the list has been updated in the electronic medical record, which I reviewed.  Medication dosages and frequencies were confirmed to be accurate.    REVIEW OF SYSTEMS:  PAIN:  See Vital Signs below.  GENERAL:  No fevers, chills, night sweats, or unintended weight loss.  SKIN:  No rashes or non-healing lesions.  HEME/LYMPH:  No abnormal bleeding.  No palpable lymphadenopathy.  EYES:  No vision changes or diplopia.  ENT: Allergic rhinitis  RESPIRATORY:  No shortness of breath or cough  CARDIOVASCULAR:  No chest pain, palpitations, orthopnea, or dyspnea on exertion.  GASTROINTESTINAL:  No abdominal pain, nausea, vomiting, constipation, diarrhea, melena, or hematochezia.  GENITOURINARY:  No dysuria or hematuria.    MUSCULOSKELETAL:  Left knee pain, improved significantly, continues injections every 6 months  NEUROLOGIC:  No dizziness, loss of consciousness, or seizures.  PSYCHIATRIC:  No depression, anxiety, or mood changes.    There were no vitals filed for this visit.    PHYSICAL EXAMINATION:  Telephone only visit today      DIAGNOSTIC DATA:  Results for orders placed or performed in visit on 05/20/20   Comprehensive Metabolic Panel   Result Value Ref Range    Glucose 187 (H) 74 - 124 mg/dL    BUN 20 6 - 20 mg/dL    Creatinine 1.16 0.70 - 1.30 mg/dL    Sodium 144 134 - 145 mmol/L    Potassium 3.6 3.5 - 4.7 mmol/L    Chloride 105 98 - 107 mmol/L    CO2 25.2 22.0 - 29.0 mmol/L    Calcium 9.2 8.5 - 10.2 mg/dL    Total Protein 6.3 6.3 - 8.0 g/dL    Albumin 4.40 3.50 - 5.20 g/dL    ALT (SGPT) 15 0 - 41 U/L    AST (SGOT) 21 0 - 40 U/L    Alkaline Phosphatase 90 38 - 116 U/L    Total Bilirubin 0.5 0.2 - 1.2 mg/dL    eGFR  African Amer 76 >60 mL/min/1.73    Globulin 1.9 1.8 - 3.5 gm/dL    A/G Ratio 2.3 1.1 - 2.4 g/dL    BUN/Creatinine Ratio 17.2 7.3 - 30.0    Anion Gap 13.8 5.0 - 15.0 mmol/L   CBC Auto Differential    Result Value Ref Range    WBC 9.75 3.40 - 10.80 10*3/mm3    RBC 3.62 (L) 4.14 - 5.80 10*6/mm3    Hemoglobin 11.3 (L) 13.0 - 17.7 g/dL    Hematocrit 36.6 (L) 37.5 - 51.0 %    .1 (H) 79.0 - 97.0 fL    MCH 31.2 26.6 - 33.0 pg    MCHC 30.9 (L) 31.5 - 35.7 g/dL    RDW 14.0 12.3 - 15.4 %    RDW-SD 52.0 37.0 - 54.0 fl    MPV 11.3 6.0 - 12.0 fL    Platelets 131 (L) 140 - 450 10*3/mm3    Neutrophil % 17.9 (L) 42.7 - 76.0 %    Lymphocyte % 79.3 (H) 19.6 - 45.3 %    Monocyte % 1.8 (L) 5.0 - 12.0 %    Eosinophil % 0.4 0.3 - 6.2 %    Basophil % 0.3 0.0 - 1.5 %    Immature Grans % 0.3 0.0 - 0.5 %    Neutrophils, Absolute 1.74 1.70 - 7.00 10*3/mm3    Lymphocytes, Absolute 7.73 (H) 0.70 - 3.10 10*3/mm3    Monocytes, Absolute 0.18 0.10 - 0.90 10*3/mm3    Eosinophils, Absolute 0.04 0.00 - 0.40 10*3/mm3    Basophils, Absolute 0.03 0.00 - 0.20 10*3/mm3    Immature Grans, Absolute 0.03 0.00 - 0.05 10*3/mm3    nRBC 0.0 0.0 - 0.2 /100 WBC     IMAGING:  None reviewed      ASSESSMENT:  This is a 65 y.o. male with a history of chronic lymphocytic leukemia and related autoimmune hemolytic anemia.    1.  CLL: Initially received Rituxan for 4 weeks at diagnosis for this and the autoimmune hemolytic anemia.    Given the worsening anemia even though it responded to steroids we needed to treat his CLL particularly given the 70% involvement on his bone marrow biopsy.  We started treating with ibrutinib 420 mg daily, initiated at the end of July 2018.    Blood counts are overall much better and stable at this point.    2.  Macrocytic anemia: He has a history of autoimmune hemolytic anemia when he presented with CLL back in November 2015.  His reticulocyte count was very elevated at that time.  His hemoglobin dropped to as low as about 5.  He responded to steroids initially intravenously and then with prednisone and he was treated with 4 weeks of Rituxan.    He was noted to have worsening anemia.  His reticulocyte count was low.  He  initially did not respond very well to transfusions and he required 4 units of packed red cells  There was no evidence for hemolysis although his ADOLFO is positive for IgG.  This was persistently positive.  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  He responded to steroids.  He discontinued steroids as of 8/22/2018.  Hemoglobin has stabilized at this point on ibrutinib.    3.  Hypertension: Continues home monitoring    4.  Thrombocytopenia: This is mild and likely related to ibrutinib.  We will continue to monitor with imaging of his spleen if this worsens.  Stable    PLAN:  1.  Continue ibrutinib 420 mg daily  2.  I will see him back in about 5 months for follow-up with a CBC and CMP.  I advised him to notify us if he has any new problems prior to his scheduled follow-up appointment.    You have chosen to receive care through a telephone visit. Do you consent to use a telephone visit for your medical care today? Yes    15 minutes

## 2020-05-20 NOTE — PROGRESS NOTES
[facility] in-person follow up- Imbruvica    Mr. Bal continues to do well with his Imbruvica. He reports no new issues or side effects. Medication administration and adherence seem appropriate; he reports no missed doses this past month. He has not had any recent medication changes. Aida has no additional questions today.

## 2020-05-21 ENCOUNTER — MEDICATION THERAPY MANAGEMENT (OUTPATIENT)
Dept: PHARMACY | Facility: HOSPITAL | Age: 65
End: 2020-05-21

## 2020-05-21 NOTE — PROGRESS NOTES
[facility] Lab Review- Imbruvica       Ref. Range 5/20/2020 08:11   Glucose Latest Ref Range: 74 - 124 mg/dL 187 (H)   Sodium Latest Ref Range: 134 - 145 mmol/L 144   Potassium Latest Ref Range: 3.5 - 4.7 mmol/L 3.6   CO2 Latest Ref Range: 22.0 - 29.0 mmol/L 25.2   Chloride Latest Ref Range: 98 - 107 mmol/L 105   Anion Gap Latest Ref Range: 5.0 - 15.0 mmol/L 13.8   Creatinine Latest Ref Range: 0.70 - 1.30 mg/dL 1.16   BUN Latest Ref Range: 6 - 20 mg/dL 20   BUN/Creatinine Ratio Latest Ref Range: 7.3 - 30.0  17.2   Calcium Latest Ref Range: 8.5 - 10.2 mg/dL 9.2   eGFR African Am Latest Ref Range: >60 mL/min/1.73 76   Alkaline Phosphatase Latest Ref Range: 38 - 116 U/L 90   Total Protein Latest Ref Range: 6.3 - 8.0 g/dL 6.3   ALT (SGPT) Latest Ref Range: 0 - 41 U/L 15   AST (SGOT) Latest Ref Range: 0 - 40 U/L 21   Total Bilirubin Latest Ref Range: 0.2 - 1.2 mg/dL 0.5   Albumin Latest Ref Range: 3.50 - 5.20 g/dL 4.40   Globulin Latest Ref Range: 1.8 - 3.5 gm/dL 1.9   A/G Ratio Latest Ref Range: 1.1 - 2.4 g/dL 2.3   WBC Latest Ref Range: 3.40 - 10.80 10*3/mm3 9.75   RBC Latest Ref Range: 4.14 - 5.80 10*6/mm3 3.62 (L)   Hemoglobin Latest Ref Range: 13.0 - 17.7 g/dL 11.3 (L)   Hematocrit Latest Ref Range: 37.5 - 51.0 % 36.6 (L)   RDW Latest Ref Range: 12.3 - 15.4 % 14.0   MCV Latest Ref Range: 79.0 - 97.0 fL 101.1 (H)   MCH Latest Ref Range: 26.6 - 33.0 pg 31.2   MCHC Latest Ref Range: 31.5 - 35.7 g/dL 30.9 (L)   MPV Latest Ref Range: 6.0 - 12.0 fL 11.3   Platelets Latest Ref Range: 140 - 450 10*3/mm3 131 (L)   RDW-SD Latest Ref Range: 37.0 - 54.0 fl 52.0   Neutrophil Rel % Latest Ref Range: 42.7 - 76.0 % 17.9 (L)   Lymphocyte Rel % Latest Ref Range: 19.6 - 45.3 % 79.3 (H)   Monocyte Rel % Latest Ref Range: 5.0 - 12.0 % 1.8 (L)   Eosinophil Rel % Latest Ref Range: 0.3 - 6.2 % 0.4   Basophil Rel % Latest Ref Range: 0.0 - 1.5 % 0.3   Immature Granulocyte Rel % Latest Ref Range: 0.0 - 0.5 % 0.3   Neutrophils Absolute Latest Ref  Range: 1.70 - 7.00 10*3/mm3 1.74     MD dictation noted.

## 2020-08-20 ENCOUNTER — MEDICATION THERAPY MANAGEMENT (OUTPATIENT)
Dept: PHARMACY | Facility: HOSPITAL | Age: 65
End: 2020-08-20

## 2020-08-20 NOTE — PROGRESS NOTES
MT telephone follow up- Imbruvica    No answer today.  direct line [phone].    Thanks,  Mery SteinbergD

## 2020-08-21 ENCOUNTER — MEDICATION THERAPY MANAGEMENT (OUTPATIENT)
Dept: PHARMACY | Facility: HOSPITAL | Age: 65
End: 2020-08-21

## 2020-08-21 NOTE — PROGRESS NOTES
MT telephone follow up - Imbruvica    No answer today.  direct line [phone].     Thanks,  Mery SteinbergD

## 2020-08-21 NOTE — PROGRESS NOTES
[facility] [place] telephone follow up- Imbruvica    Mr. Bal is doing well today and has no new issues or side effects with Imbruvica. He tells me that his [facility] has requested a doctor referral. I have reached out to Haylee Montanez for assistance on this. Medication administration and adherence seem appropriate; he reports no missed doses this past month. He denies any recent medication changes. Pharmacy will continue to follow.    Thanks,  Marianela Romeo, PharmD

## 2020-10-15 ENCOUNTER — MEDICATION THERAPY MANAGEMENT (OUTPATIENT)
Dept: PHARMACY | Facility: HOSPITAL | Age: 65
End: 2020-10-15

## 2020-10-15 NOTE — PROGRESS NOTES
[facility] telephone follow up- Imbruvica    Mr. Bal is doing well today. He has no new issues or side effects with his Imbruvica. Medication administration and adherence seem appropriate; he reports no missed doses this past month. He denies any recent medication or supplement changes. Mr. Bal tells me that he is running low on john money and he plans to call [facility] today. Pharmacy will continue to follow.     Thanks,   Marianela Romeo, PharmD

## 2020-10-20 NOTE — PROGRESS NOTES
[facility] GROUP OUTPATIENT FOLLOW UP CLINIC VISIT    REASON FOR FOLLOW-UP:    1.  Chronic lymphocytic leukemia diagnosed in November 2015.  CLL FISH panel negative at that time.    2.  Autoimmune hemolytic anemia associated with CLL.  He was treated with steroids and he received 4 weeks of weekly Rituxan.  Otherwise no treatment has been performed  3.  CT imaging of the chest abdomen and pelvis from 4/25/2018 showed lymphadenopathy with mildly enlarged retroperitoneal and mesenteric lymph nodes with the largest measuring about 3.2 cm.  The spleen measured 13.6 cm.  Slight increase in lymphadenopathy compared with 11/27/2015.  4.  Acute anemia with a hemoglobin of 6.9 as of 6/20/2018.  Low reticulocyte count.  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  Prednisone initiated.  Erythropoietin 73.9.  Parvovirus B19 PCR negative.  Steroids complete on 8/22/2018.  5.  Bone marrow biopsy on 7/3/2018 with 70% CLL (87% by flow cytometry) with a t(14;18) translocation by cytogenetics, 3 copies of IGH in 87.5% of cells but negative for CCND1/IGH rearrangement.  Negative for all other rearrangements/deletions.  IgVH somatic hypermutation was not detected.            6.  Therapy with ibrutinib initiated at the end of July 2018      HISTORY OF PRESENT ILLNESS:  Aida Bal Jr. is a 65 y.o. male who returns today for follow up of the above issue.      He continues to feel well.  He tolerates the Imbruvica well.  No fevers or chills.  No lymphadenopathy.  He has worsening left knee pain and is anticipating a left total knee arthroplasty.  He also has cataracts and plans to have these removed in the near future.  He is going to see nephrology to assist with his hypertension.    PAST MEDICAL, SURGICAL, FAMILY, AND SOCIAL HISTORIES were reviewed with the patient and in the electronic medical record, and were updated if indicated.    ALLERGIES:  No Known Allergies    MEDICATIONS:  The medication list has been reviewed with the  "patient by the medical assistant, and the list has been updated in the electronic medical record, which I reviewed.  Medication dosages and frequencies were confirmed to be accurate.    REVIEW OF SYSTEMS:  PAIN:  See Vital Signs below.  GENERAL:  No fevers, chills, night sweats, or unintended weight loss.  SKIN:  No rashes or non-healing lesions.  HEME/LYMPH:  No abnormal bleeding.  No palpable lymphadenopathy.  EYES:  No vision changes or diplopia.  ENT: Allergic rhinitis, improved  RESPIRATORY:  No shortness of breath or cough  CARDIOVASCULAR:  No chest pain, palpitations, orthopnea, or dyspnea on exertion.  GASTROINTESTINAL:  No abdominal pain, nausea, vomiting, constipation, diarrhea, melena, or hematochezia.  GENITOURINARY:  No dysuria or hematuria.    MUSCULOSKELETAL:  Left knee pain, worsening with plans for left total knee arthroplasty  NEUROLOGIC:  No dizziness, loss of consciousness, or seizures.  PSYCHIATRIC:  No depression, anxiety, or mood changes.  Reviewed 10/21/2020    Vitals:    10/21/20 1332   BP: 155/75   Pulse: 75   Resp: 16   Temp: 97.7 °F (36.5 °C)   TempSrc: Temporal   SpO2: 97%   Weight: 114 kg (251 lb 12.8 oz)   Height: 182.9 cm (72.01\")   PainSc: 0-No pain  Comment: CLL       PHYSICAL EXAMINATION:  GENERAL:  Well-developed well-nourished male; awake, alert and oriented, in no acute distress.  Wearing a facemask.  SKIN:  Warm and dry, without rashes, purpura, or petechiae.  HEAD:  Normocephalic, atraumatic.  EARS:  Hearing intact.  LYMPHATICS:  No cervical, supraclavicular, or axillary lymphadenopathy.  CHEST:  Lungs are clear to auscultation bilaterally.  No wheezes, rales, or rhonchi.  HEART:  Regular rate; normal rhythm.  Grade 2 out of 6 systolic murmur.  EXTREMITIES: No clubbing cyanosis or edema.  NEUROLOGICAL:  No focal neurologic deficits.        DIAGNOSTIC DATA:  Results for orders placed or performed in visit on 10/21/20   CBC Auto Differential    Specimen: Blood   Result Value Ref " Range    WBC 7.54 3.40 - 10.80 10*3/mm3    RBC 3.72 (L) 4.14 - 5.80 10*6/mm3    Hemoglobin 11.7 (L) 13.0 - 17.7 g/dL    Hematocrit 37.6 37.5 - 51.0 %    .1 (H) 79.0 - 97.0 fL    MCH 31.5 26.6 - 33.0 pg    MCHC 31.1 (L) 31.5 - 35.7 g/dL    RDW 13.4 12.3 - 15.4 %    RDW-SD 50.4 37.0 - 54.0 fl    MPV 10.7 6.0 - 12.0 fL    Platelets 157 140 - 450 10*3/mm3    Neutrophil % 19.8 (L) 42.7 - 76.0 %    Lymphocyte % 76.4 (H) 19.6 - 45.3 %    Monocyte % 2.7 (L) 5.0 - 12.0 %    Eosinophil % 0.7 0.3 - 6.2 %    Basophil % 0.3 0.0 - 1.5 %    Immature Grans % 0.1 0.0 - 0.5 %    Neutrophils, Absolute 1.50 (L) 1.70 - 7.00 10*3/mm3    Lymphocytes, Absolute 5.76 (H) 0.70 - 3.10 10*3/mm3    Monocytes, Absolute 0.20 0.10 - 0.90 10*3/mm3    Eosinophils, Absolute 0.05 0.00 - 0.40 10*3/mm3    Basophils, Absolute 0.02 0.00 - 0.20 10*3/mm3    Immature Grans, Absolute 0.01 0.00 - 0.05 10*3/mm3    nRBC 0.0 0.0 - 0.2 /100 WBC     IMAGING:  None reviewed      ASSESSMENT:  This is a 65 y.o. male with a history of chronic lymphocytic leukemia and related autoimmune hemolytic anemia.    *CLL:   · Initially received Rituxan for 4 weeks at diagnosis for this and the autoimmune hemolytic anemia.  · Given the worsening anemia even though it responded to steroids we needed to treat his CLL particularly given the 70% involvement on his bone marrow biopsy.  We started treating with ibrutinib 420 mg daily, initiated at the end of July 2018.  · Blood counts are overall much better and stable at this point.    *Macrocytic anemia:   · He has a history of autoimmune hemolytic anemia when he presented with CLL back in November 2015.  His reticulocyte count was very elevated at that time.  His hemoglobin dropped to as low as about 5.  He responded to steroids initially intravenously and then with prednisone and he was treated with 4 weeks of Rituxan.  · He was noted to have worsening anemia.  His reticulocyte count was low.  He initially did not respond  very well to transfusions and he required 4 units of packed red cells  There was no evidence for hemolysis although his ADOLFO is positive for IgG.  This was persistently positive.  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  He responded to steroids.  He discontinued steroids as of 8/22/2018.    · Hemoglobin stable and improving a little bit at this point    *Hypertension: He will see nephrology in the near future for this.    *Thrombocytopenia: Normal platelet count today.    PLAN:  1. Continue ibrutinib 420 mg daily  2. I will see him back in about 6 months for follow-up with a CBC and CMP.    3. I advised him to notify us if he has any new problems prior to his scheduled follow-up appointment.    High risk medication requiring intensive monitoring.

## 2020-10-21 ENCOUNTER — LAB (OUTPATIENT)
Dept: LAB | Facility: HOSPITAL | Age: 65
End: 2020-10-21

## 2020-10-21 ENCOUNTER — OFFICE VISIT (OUTPATIENT)
Dept: ONCOLOGY | Facility: CLINIC | Age: 65
End: 2020-10-21

## 2020-10-21 VITALS
RESPIRATION RATE: 16 BRPM | HEIGHT: 72 IN | WEIGHT: 251.8 LBS | OXYGEN SATURATION: 97 % | SYSTOLIC BLOOD PRESSURE: 155 MMHG | TEMPERATURE: 97.7 F | BODY MASS INDEX: 34.1 KG/M2 | HEART RATE: 75 BPM | DIASTOLIC BLOOD PRESSURE: 75 MMHG

## 2020-10-21 DIAGNOSIS — C91.10 CLL (CHRONIC LYMPHOCYTIC LEUKEMIA) (HCC): Primary | ICD-10-CM

## 2020-10-21 DIAGNOSIS — C91.10 CLL (CHRONIC LYMPHOCYTIC LEUKEMIA) (HCC): ICD-10-CM

## 2020-10-21 LAB
ALBUMIN SERPL-MCNC: 4.5 G/DL (ref 3.5–5.2)
ALBUMIN/GLOB SERPL: 2.4 G/DL (ref 1.1–2.4)
ALP SERPL-CCNC: 102 U/L (ref 38–116)
ALT SERPL W P-5'-P-CCNC: 13 U/L (ref 0–41)
ANION GAP SERPL CALCULATED.3IONS-SCNC: 11 MMOL/L (ref 5–15)
AST SERPL-CCNC: 19 U/L (ref 0–40)
BASOPHILS # BLD AUTO: 0.02 10*3/MM3 (ref 0–0.2)
BASOPHILS NFR BLD AUTO: 0.3 % (ref 0–1.5)
BILIRUB SERPL-MCNC: 0.6 MG/DL (ref 0.2–1.2)
BUN SERPL-MCNC: 13 MG/DL (ref 6–20)
BUN/CREAT SERPL: 11 (ref 7.3–30)
CALCIUM SPEC-SCNC: 9.3 MG/DL (ref 8.5–10.2)
CHLORIDE SERPL-SCNC: 104 MMOL/L (ref 98–107)
CO2 SERPL-SCNC: 28 MMOL/L (ref 22–29)
CREAT SERPL-MCNC: 1.18 MG/DL (ref 0.7–1.3)
DEPRECATED RDW RBC AUTO: 50.4 FL (ref 37–54)
EOSINOPHIL # BLD AUTO: 0.05 10*3/MM3 (ref 0–0.4)
EOSINOPHIL NFR BLD AUTO: 0.7 % (ref 0.3–6.2)
ERYTHROCYTE [DISTWIDTH] IN BLOOD BY AUTOMATED COUNT: 13.4 % (ref 12.3–15.4)
GFR SERPL CREATININE-BSD FRML MDRD: 75 ML/MIN/1.73
GLOBULIN UR ELPH-MCNC: 1.9 GM/DL (ref 1.8–3.5)
GLUCOSE SERPL-MCNC: 102 MG/DL (ref 74–124)
HCT VFR BLD AUTO: 37.6 % (ref 37.5–51)
HGB BLD-MCNC: 11.7 G/DL (ref 13–17.7)
IMM GRANULOCYTES # BLD AUTO: 0.01 10*3/MM3 (ref 0–0.05)
IMM GRANULOCYTES NFR BLD AUTO: 0.1 % (ref 0–0.5)
LYMPHOCYTES # BLD AUTO: 5.76 10*3/MM3 (ref 0.7–3.1)
LYMPHOCYTES NFR BLD AUTO: 76.4 % (ref 19.6–45.3)
MCH RBC QN AUTO: 31.5 PG (ref 26.6–33)
MCHC RBC AUTO-ENTMCNC: 31.1 G/DL (ref 31.5–35.7)
MCV RBC AUTO: 101.1 FL (ref 79–97)
MONOCYTES # BLD AUTO: 0.2 10*3/MM3 (ref 0.1–0.9)
MONOCYTES NFR BLD AUTO: 2.7 % (ref 5–12)
NEUTROPHILS NFR BLD AUTO: 1.5 10*3/MM3 (ref 1.7–7)
NEUTROPHILS NFR BLD AUTO: 19.8 % (ref 42.7–76)
NRBC BLD AUTO-RTO: 0 /100 WBC (ref 0–0.2)
PLATELET # BLD AUTO: 157 10*3/MM3 (ref 140–450)
PMV BLD AUTO: 10.7 FL (ref 6–12)
POTASSIUM SERPL-SCNC: 3.7 MMOL/L (ref 3.5–4.7)
PROT SERPL-MCNC: 6.4 G/DL (ref 6.3–8)
RBC # BLD AUTO: 3.72 10*6/MM3 (ref 4.14–5.8)
SODIUM SERPL-SCNC: 143 MMOL/L (ref 134–145)
WBC # BLD AUTO: 7.54 10*3/MM3 (ref 3.4–10.8)

## 2020-10-21 PROCEDURE — 85025 COMPLETE CBC W/AUTO DIFF WBC: CPT

## 2020-10-21 PROCEDURE — 99214 OFFICE O/P EST MOD 30 MIN: CPT | Performed by: INTERNAL MEDICINE

## 2020-10-21 PROCEDURE — 80053 COMPREHEN METABOLIC PANEL: CPT

## 2020-10-21 PROCEDURE — 36415 COLL VENOUS BLD VENIPUNCTURE: CPT

## 2020-10-21 RX ORDER — ASPIRIN 81 MG/1
TABLET, COATED ORAL
COMMUNITY
Start: 2020-08-28

## 2020-10-21 RX ORDER — OMEPRAZOLE 20 MG/1
CAPSULE, DELAYED RELEASE ORAL
COMMUNITY
Start: 2020-08-28 | End: 2020-10-21 | Stop reason: SDDI

## 2020-10-22 ENCOUNTER — MEDICATION THERAPY MANAGEMENT (OUTPATIENT)
Dept: PHARMACY | Facility: HOSPITAL | Age: 65
End: 2020-10-22

## 2020-10-22 NOTE — PROGRESS NOTES
[facility] Lab Review- [facility]      10/21/2020   WBC 3.40 - 10.80 10*3/mm3 7.54   Neutrophils Absolute 1.70 - 7.00 10*3/mm3 1.50 (A)   Hemoglobin 13.0 - 17.7 g/dL 11.7 (A)   Hematocrit 37.5 - 51.0 % 37.6   Platelets 140 - 450 10*3/mm3 157   Creatinine 0.70 - 1.30 mg/dL 1.18   eGFR African Am >60 mL/min/1.73 75   BUN 6 - 20 mg/dL 13   Sodium 134 - 145 mmol/L 143   Potassium 3.5 - 4.7 mmol/L 3.7   Glucose 74 - 124 mg/dL 102   Calcium 8.5 - 10.2 mg/dL 9.3   Albumin 3.50 - 5.20 g/dL 4.50   Total Protein 6.3 - 8.0 g/dL 6.4   AST (SGOT) 0 - 40 U/L 19   ALT (SGPT) 0 - 41 U/L 13   Alkaline Phosphatase 38 - 116 U/L 102   Total Bilirubin 0.2 - 1.2 mg/dL 0.6     MD dictation noted. Pharmacy will continue to follow.     Thanks,   Marianela Romeo, PharmD

## 2021-01-15 ENCOUNTER — MEDICATION THERAPY MANAGEMENT (OUTPATIENT)
Dept: PHARMACY | Facility: HOSPITAL | Age: 66
End: 2021-01-15

## 2021-01-15 NOTE — PROGRESS NOTES
[facility] telephone follow up- Imbruvica    Mr. Bal is doing well today. He has no new issues or side effects with his Imbruvica. Medication administration and adherence seem appropriate; he reports no missed doses this past month. He denies any recent medication or supplement changes. Mr. Bal has no additional questions or concerns. Pharmacy will continue to follow.     Thanks,   Marianela Romeo, PharmD

## 2021-03-19 ENCOUNTER — BULK ORDERING (OUTPATIENT)
Dept: CASE MANAGEMENT | Facility: OTHER | Age: 66
End: 2021-03-19

## 2021-03-19 DIAGNOSIS — Z23 IMMUNIZATION DUE: ICD-10-CM

## 2021-03-23 NOTE — TELEPHONE ENCOUNTER
Imbruvica refill request rec electronically from [facility]. Per last office note-pt is to continue.     Rx routed to Dr Cheng for signature. Once signed it will be escribed to [facility]    Confirmation rec that Dr Cheng has signed the Imbruvica rx. This was escribed to [facility]

## 2021-04-18 NOTE — PROGRESS NOTES
[facility] GROUP OUTPATIENT FOLLOW UP CLINIC VISIT    REASON FOR FOLLOW-UP:    1.  Chronic lymphocytic leukemia diagnosed in November 2015.  CLL FISH panel negative at that time.    2.  Autoimmune hemolytic anemia associated with CLL.  He was treated with steroids and he received 4 weeks of weekly Rituxan.  Otherwise no treatment has been performed  3.  CT imaging of the chest abdomen and pelvis from 4/25/2018 showed lymphadenopathy with mildly enlarged retroperitoneal and mesenteric lymph nodes with the largest measuring about 3.2 cm.  The spleen measured 13.6 cm.  Slight increase in lymphadenopathy compared with 11/27/2015.  4.  Acute anemia with a hemoglobin of 6.9 as of 6/20/2018.  Low reticulocyte count.  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  Prednisone initiated.  Erythropoietin 73.9.  Parvovirus B19 PCR negative.  Steroids complete on 8/22/2018.  5.  Bone marrow biopsy on 7/3/2018 with 70% CLL (87% by flow cytometry) with a t(14;18) translocation by cytogenetics, 3 copies of IGH in 87.5% of cells but negative for CCND1/IGH rearrangement.  Negative for all other rearrangements/deletions.  IgVH somatic hypermutation was not detected.            6.  Therapy with ibrutinib initiated at the end of July 2018      HISTORY OF PRESENT ILLNESS:  Aida Bal Jr. is a 66 y.o. male who returns today for follow up of the above issue.      He is doing well.  He is only working 1 day/week now.  He is doing a lot of work around his house.  He is exercising most days.  He continues to get injections into the left knee every 6 months.  He notes a little more pain at this point as he is due for an injection soon.  He follows with nephrology regarding hypertension.    REVIEW OF SYSTEMS:  PAIN:  See Vital Signs below.  GENERAL:  No fevers, chills, night sweats, or unintended weight loss.  SKIN:  No rashes or non-healing lesions.  HEME/LYMPH:  No abnormal bleeding.  No palpable lymphadenopathy.  EYES:  No vision changes  "or diplopia.  ENT: Allergic rhinitis, improved  RESPIRATORY:  No shortness of breath or cough  CARDIOVASCULAR:  No chest pain, palpitations, orthopnea, or dyspnea on exertion.  GASTROINTESTINAL:  No abdominal pain, nausea, vomiting, constipation, diarrhea, melena, or hematochezia.  GENITOURINARY:  No dysuria or hematuria.    MUSCULOSKELETAL: Left knee pain  NEUROLOGIC:  No dizziness, loss of consciousness, or seizures.  PSYCHIATRIC:  No depression, anxiety, or mood changes.    Vitals:    04/19/21 1337   BP: 163/69   Pulse: 71   Resp: 16   Temp: 97.7 °F (36.5 °C)   TempSrc: Temporal   SpO2: 98%   Weight: 113 kg (248 lb 11.2 oz)   Height: 182.9 cm (72.01\")   PainSc: 0-No pain  Comment: leukemia       PHYSICAL EXAMINATION:  GENERAL:  Well-developed well-nourished male; awake, alert and oriented, in no acute distress.  Wearing a facemask.  SKIN:  Warm and dry, without rashes, purpura, or petechiae.  HEAD:  Normocephalic, atraumatic.  EARS:  Hearing intact.  LYMPHATICS:  No cervical, supraclavicular, or axillary lymphadenopathy.  CHEST:  Lungs are clear to auscultation bilaterally.  No wheezes, rales, or rhonchi.  HEART:  Regular rate; normal rhythm.  Grade 2/6 systolic murmur  EXTREMITIES: No clubbing cyanosis or edema.  NEUROLOGICAL:  No focal neurologic deficits.        DIAGNOSTIC DATA:  CBC & Differential (04/19/2021 13:32)      IMAGING:  None reviewed      ASSESSMENT:  This is a 66 y.o. male with a history of chronic lymphocytic leukemia and related autoimmune hemolytic anemia.    *CLL:   · Initially received Rituxan for 4 weeks at diagnosis for this and the autoimmune hemolytic anemia.  · Given the worsening anemia even though it responded to steroids we needed to treat his CLL particularly given the 70% involvement on his bone marrow biopsy.  We started treating with ibrutinib 420 mg daily, initiated at the end of July 2018.  · His blood counts have improved and remain stable at this point with a normal white " blood cell count and low lymphocyte count.    *Macrocytic anemia:   · He has a history of autoimmune hemolytic anemia when he presented with CLL back in November 2015.  His reticulocyte count was very elevated at that time.  His hemoglobin dropped to as low as about 5.  He responded to steroids initially intravenously and then with prednisone and he was treated with 4 weeks of Rituxan.  · He was noted to have worsening anemia.  His reticulocyte count was low.  He initially did not respond very well to transfusions and he required 4 units of packed red cells  There was no evidence for hemolysis although his ADOLFO is positive for IgG.  This was persistently positive.  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  He responded to steroids.  He discontinued steroids as of 8/22/2018.    · Hemoglobin 11.8 today    *Hypertension: Systolic.  He is following up with nephrology.    *History of thrombocytopenia: Normal platelet count today at 145,000    PLAN:  1. Continue ibrutinib 420 mg daily  2. Follow-up in 6 months, CBC, CMP.  3. Notify us if he has any new problems prior to that.

## 2021-04-19 ENCOUNTER — OFFICE VISIT (OUTPATIENT)
Dept: ONCOLOGY | Facility: CLINIC | Age: 66
End: 2021-04-19

## 2021-04-19 ENCOUNTER — LAB (OUTPATIENT)
Dept: LAB | Facility: HOSPITAL | Age: 66
End: 2021-04-19

## 2021-04-19 ENCOUNTER — SPECIALTY PHARMACY (OUTPATIENT)
Dept: ONCOLOGY | Facility: HOSPITAL | Age: 66
End: 2021-04-19

## 2021-04-19 VITALS
HEART RATE: 71 BPM | DIASTOLIC BLOOD PRESSURE: 69 MMHG | HEIGHT: 72 IN | BODY MASS INDEX: 33.69 KG/M2 | RESPIRATION RATE: 16 BRPM | OXYGEN SATURATION: 98 % | SYSTOLIC BLOOD PRESSURE: 163 MMHG | WEIGHT: 248.7 LBS | TEMPERATURE: 97.7 F

## 2021-04-19 DIAGNOSIS — C91.10 CLL (CHRONIC LYMPHOCYTIC LEUKEMIA) (HCC): Primary | ICD-10-CM

## 2021-04-19 DIAGNOSIS — C91.10 CLL (CHRONIC LYMPHOCYTIC LEUKEMIA) (HCC): ICD-10-CM

## 2021-04-19 DIAGNOSIS — D53.9 MACROCYTIC ANEMIA: ICD-10-CM

## 2021-04-19 LAB
ALBUMIN SERPL-MCNC: 4.5 G/DL (ref 3.5–5.2)
ALBUMIN/GLOB SERPL: 2.1 G/DL (ref 1.1–2.4)
ALP SERPL-CCNC: 118 U/L (ref 38–116)
ALT SERPL W P-5'-P-CCNC: 16 U/L (ref 0–41)
ANION GAP SERPL CALCULATED.3IONS-SCNC: 9.6 MMOL/L (ref 5–15)
AST SERPL-CCNC: 23 U/L (ref 0–40)
BASOPHILS # BLD AUTO: 0.01 10*3/MM3 (ref 0–0.2)
BASOPHILS NFR BLD AUTO: 0.2 % (ref 0–1.5)
BILIRUB SERPL-MCNC: 0.9 MG/DL (ref 0.2–1.2)
BUN SERPL-MCNC: 17 MG/DL (ref 6–20)
BUN/CREAT SERPL: 13.7 (ref 7.3–30)
CALCIUM SPEC-SCNC: 9.6 MG/DL (ref 8.5–10.2)
CHLORIDE SERPL-SCNC: 105 MMOL/L (ref 98–107)
CO2 SERPL-SCNC: 29.4 MMOL/L (ref 22–29)
CREAT SERPL-MCNC: 1.24 MG/DL (ref 0.7–1.3)
DEPRECATED RDW RBC AUTO: 51.1 FL (ref 37–54)
EOSINOPHIL # BLD AUTO: 0.04 10*3/MM3 (ref 0–0.4)
EOSINOPHIL NFR BLD AUTO: 0.6 % (ref 0.3–6.2)
ERYTHROCYTE [DISTWIDTH] IN BLOOD BY AUTOMATED COUNT: 13.5 % (ref 12.3–15.4)
GFR SERPL CREATININE-BSD FRML MDRD: 70 ML/MIN/1.73
GLOBULIN UR ELPH-MCNC: 2.1 GM/DL (ref 1.8–3.5)
GLUCOSE SERPL-MCNC: 106 MG/DL (ref 74–124)
HCT VFR BLD AUTO: 38.2 % (ref 37.5–51)
HGB BLD-MCNC: 11.8 G/DL (ref 13–17.7)
IMM GRANULOCYTES # BLD AUTO: 0.02 10*3/MM3 (ref 0–0.05)
IMM GRANULOCYTES NFR BLD AUTO: 0.3 % (ref 0–0.5)
LYMPHOCYTES # BLD AUTO: 4.25 10*3/MM3 (ref 0.7–3.1)
LYMPHOCYTES NFR BLD AUTO: 66.9 % (ref 19.6–45.3)
MCH RBC QN AUTO: 31.6 PG (ref 26.6–33)
MCHC RBC AUTO-ENTMCNC: 30.9 G/DL (ref 31.5–35.7)
MCV RBC AUTO: 102.4 FL (ref 79–97)
MONOCYTES # BLD AUTO: 0.24 10*3/MM3 (ref 0.1–0.9)
MONOCYTES NFR BLD AUTO: 3.8 % (ref 5–12)
NEUTROPHILS NFR BLD AUTO: 1.79 10*3/MM3 (ref 1.7–7)
NEUTROPHILS NFR BLD AUTO: 28.2 % (ref 42.7–76)
NRBC BLD AUTO-RTO: 0 /100 WBC (ref 0–0.2)
PLATELET # BLD AUTO: 145 10*3/MM3 (ref 140–450)
PMV BLD AUTO: 10.9 FL (ref 6–12)
POTASSIUM SERPL-SCNC: 4.3 MMOL/L (ref 3.5–4.7)
PROT SERPL-MCNC: 6.6 G/DL (ref 6.3–8)
RBC # BLD AUTO: 3.73 10*6/MM3 (ref 4.14–5.8)
SODIUM SERPL-SCNC: 144 MMOL/L (ref 134–145)
WBC # BLD AUTO: 6.35 10*3/MM3 (ref 3.4–10.8)

## 2021-04-19 PROCEDURE — 99214 OFFICE O/P EST MOD 30 MIN: CPT | Performed by: INTERNAL MEDICINE

## 2021-04-19 PROCEDURE — 36415 COLL VENOUS BLD VENIPUNCTURE: CPT

## 2021-04-19 PROCEDURE — 85025 COMPLETE CBC W/AUTO DIFF WBC: CPT

## 2021-04-19 PROCEDURE — 80053 COMPREHEN METABOLIC PANEL: CPT

## 2021-04-19 RX ORDER — OMEPRAZOLE 20 MG/1
20 CAPSULE, DELAYED RELEASE ORAL
COMMUNITY
Start: 2021-02-25 | End: 2021-04-19 | Stop reason: SDDI

## 2021-04-19 RX ORDER — HYALURONATE SODIUM, STABILIZED 60 MG/3 ML
SYRINGE (ML) INTRAARTICULAR
COMMUNITY
Start: 2021-04-12 | End: 2021-04-19 | Stop reason: SDDI

## 2021-04-19 RX ORDER — IRBESARTAN AND HYDROCHLOROTHIAZIDE 300; 12.5 MG/1; MG/1
TABLET, FILM COATED ORAL
Status: ON HOLD | COMMUNITY
Start: 2020-10-28 | End: 2022-05-02 | Stop reason: ALTCHOICE

## 2021-04-19 RX ORDER — ROSUVASTATIN CALCIUM 5 MG/1
5 TABLET, COATED ORAL DAILY
COMMUNITY
End: 2021-10-06

## 2021-04-19 NOTE — PROGRESS NOTES
MTM in person encounter- Imbruvica    Mr. Bal is doing well today. He denies any issues or side effects with his Imbruvica. Medication administration and adherence seem appropriate; he has not missed any doses. Mr. Bal tells me that he has started omeprazole and this has helped tremendously for his stomach acid; no DDI identified per Up To Date. He has no additional questions or concerns for me today. Pharmacy will continue to follow.     Thanks,   Marianela Romeo, PharmD

## 2021-04-20 ENCOUNTER — MEDICATION THERAPY MANAGEMENT (OUTPATIENT)
Dept: PHARMACY | Facility: HOSPITAL | Age: 66
End: 2021-04-20

## 2021-04-20 NOTE — PROGRESS NOTES
[facility] Lab Review- Imbruvica      4/19/2021   WBC 3.40 - 10.80 10*3/mm3 6.35   Neutrophils Absolute 1.70 - 7.00 10*3/mm3 1.79   Hemoglobin 13.0 - 17.7 g/dL 11.8 (A)   Hematocrit 37.5 - 51.0 % 38.2   Platelets 140 - 450 10*3/mm3 145   Creatinine 0.70 - 1.30 mg/dL 1.24   eGFR African Am >60 mL/min/1.73 70   BUN 6 - 20 mg/dL 17   Sodium 134 - 145 mmol/L 144   Potassium 3.5 - 4.7 mmol/L 4.3   Glucose 74 - 124 mg/dL 106   Calcium 8.5 - 10.2 mg/dL 9.6   Albumin 3.50 - 5.20 g/dL 4.50   Total Protein 6.3 - 8.0 g/dL 6.6   AST (SGOT) 0 - 40 U/L 23   ALT (SGPT) 0 - 41 U/L 16   Alkaline Phosphatase 38 - 116 U/L 118 (A)   Total Bilirubin 0.2 - 1.2 mg/dL 0.9     MD dictation noted. Pharmacy will continue to follow.     Thanks,   Marianela Romeo, PharmD

## 2021-06-29 NOTE — ANESTHESIA POSTPROCEDURE EVALUATION
"Patient: Aida Bal Jr.    Procedure Summary     Date:  06/27/19 [facility] / [facility]    Anesthesia Start:  0848 Anesthesia Stop:  0923    Procedure:  COLONOSCOPY TO CECUM WITH COLD SNARE POLYPECTOMY AND COLD BIOPSY POLYPECTOMIES (N/A ) Diagnosis:       Tubulovillous adenoma of colon      (Tubulovillous adenoma of colon [D12.6])    Surgeon:  Fannie Caro MD Provider:  Ant Monroe MD    Anesthesia Type:  MAC ASA Status:  3          Anesthesia Type: MAC  Last vitals  BP   119/69 (06/27/19 0943)   Temp   36.7 °C (98.1 °F) (06/27/19 0812)   Pulse   65 (06/27/19 0932)   Resp   16 (06/27/19 0943)     SpO2   98 % (06/27/19 0943)     Post Anesthesia Care and Evaluation    Patient location during evaluation: PACU  Patient participation: complete - patient participated  Level of consciousness: awake  Pain score: 0  Pain management: adequate  Airway patency: patent  Anesthetic complications: No anesthetic complications  PONV Status: none  Cardiovascular status: acceptable  Respiratory status: acceptable  Hydration status: acceptable    Comments: /69 (BP Location: Left arm, Patient Position: Sitting)   Pulse 65   Temp 36.7 °C (98.1 °F) (Oral)   Resp 16   Ht 182.9 cm (72\")   Wt 108 kg (237 lb 9 oz)   SpO2 98%   BMI 32.22 kg/m²       " [facility] Pregnancy And Lactation Text: The risk during pregnancy and breastfeeding is uncertain with this medication.

## 2021-07-19 ENCOUNTER — MEDICATION THERAPY MANAGEMENT (OUTPATIENT)
Dept: PHARMACY | Facility: HOSPITAL | Age: 66
End: 2021-07-19

## 2021-07-19 NOTE — PROGRESS NOTES
MTM encounter (adherence/adverse effects) -- Imbruvica    Mr. Bal was doing well when I spoke with him today. He was getting ready for a vacation he is taking with his grandchildren soon. He takes his Imbruvica every day around 8 AM and never misses a dose. He does not endorse and side effects or recent medication changes. He had no questions for me today.    Thanks,  Monika Chaney, Pharmacy Intern

## 2021-10-03 NOTE — PROGRESS NOTES
"[facility] CBC GROUP OUTPATIENT FOLLOW UP CLINIC VISIT    REASON FOR FOLLOW-UP:    1.  Chronic lymphocytic leukemia diagnosed in November 2015.  CLL FISH panel negative at that time.    2.  Autoimmune hemolytic anemia associated with CLL.  He was treated with steroids and he received 4 weeks of weekly Rituxan.  Otherwise no treatment has been performed  3.  CT imaging of the chest abdomen and pelvis from 4/25/2018 showed lymphadenopathy with mildly enlarged retroperitoneal and mesenteric lymph nodes with the largest measuring about 3.2 cm.  The spleen measured 13.6 cm.  Slight increase in lymphadenopathy compared with 11/27/2015.  4.  Acute anemia with a hemoglobin of 6.9 as of 6/20/2018.  Low reticulocyte count.  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  Prednisone initiated.  Erythropoietin 73.9.  Parvovirus B19 PCR negative.  Steroids complete on 8/22/2018.  5.  Bone marrow biopsy on 7/3/2018 with 70% CLL (87% by flow cytometry) with a t(14;18) translocation by cytogenetics, 3 copies of IGH in 87.5% of cells but negative for CCND1/IGH rearrangement.  Negative for all other rearrangements/deletions.  IgVH somatic hypermutation was not detected.            6.  Therapy with ibrutinib initiated at the end of July 2018      HISTORY OF PRESENT ILLNESS:  Aida Bal Jr. is a 66 y.o. male who returns today for follow up of the above issue.      He continues to do well.  He does have some left knee and right hip pain related to arthritis.  He follows with orthopedics for these issues.  He tolerates the Imbruvica very well without any adverse effects otherwise.    REVIEW OF SYSTEMS:  Arthritis pain.    Vitals:    10/04/21 1259   BP: 158/70   Pulse: 59   Resp: 16   Temp: 97.7 °F (36.5 °C)   TempSrc: Temporal   SpO2: 99%   Weight: 111 kg (244 lb 14.4 oz)   Height: 182.9 cm (72.01\")   PainSc: 0-No pain  Comment: CLL       PHYSICAL EXAMINATION:  GENERAL:  Well-developed well-nourished male; awake, alert and oriented, in no " acute distress.  Wearing a facemask.  SKIN:  Warm and dry, without rashes, purpura, or petechiae.  HEAD:  Normocephalic, atraumatic.  EARS:  Hearing intact.  LYMPHATICS:  No cervical, supraclavicular, or axillary lymphadenopathy.  CHEST:  Lungs are clear to auscultation bilaterally.  No wheezes, rales, or rhonchi.  HEART:  Regular rate; normal rhythm.  Grade 2/6 systolic murmur  EXTREMITIES: No clubbing cyanosis or edema.  NEUROLOGICAL:  No focal neurologic deficits.    Examined today.  Appropriate changes noted.        DIAGNOSTIC DATA:  CBC & Differential (10/04/2021 12:48)      IMAGING:  None reviewed      ASSESSMENT:  This is a 66 y.o. male with a history of chronic lymphocytic leukemia and related autoimmune hemolytic anemia.    *CLL:   · Initially received Rituxan for 4 weeks at diagnosis for this and the autoimmune hemolytic anemia.  · Given the worsening anemia even though it responded to steroids we needed to treat his CLL particularly given the 70% involvement on his bone marrow biopsy.  We started treating with ibrutinib 420 mg daily, initiated at the end of July 2018.  · White blood cell count remains normal at 5.3.  Lymphocytes remain very mildly elevated.    *Macrocytic anemia:   · He has a history of autoimmune hemolytic anemia when he presented with CLL back in November 2015.  His reticulocyte count was very elevated at that time.  His hemoglobin dropped to as low as about 5.  He responded to steroids initially intravenously and then with prednisone and he was treated with 4 weeks of Rituxan.  · He was noted to have worsening anemia.  His reticulocyte count was low.  He initially did not respond very well to transfusions and he required 4 units of packed red cells  There was no evidence for hemolysis although his ADOLFO is positive for IgG.  This was persistently positive.  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  He responded to steroids.  He discontinued steroids as of 8/22/2018.     · Hemoglobin stable at 11.4 today    *Hypertension: Systolic.  He is following up with nephrology.  Blood pressure 158/70.    *History of thrombocytopenia: Platelets mildly low today at 133,000    *COVID-19 pandemic: He has received a total of 3 vaccinations.    PLAN:   1. Continue ibrutinib 420 mg daily  2. He already had his COVID-19 booster shot (third injection).  He will get his flu shot soon.  3. Follow-up in 6 months with a and CBC CMP.  4. I encouraged him to notify us if he has any new problems prior to that.

## 2021-10-04 ENCOUNTER — OFFICE VISIT (OUTPATIENT)
Dept: ONCOLOGY | Facility: CLINIC | Age: 66
End: 2021-10-04

## 2021-10-04 ENCOUNTER — LAB (OUTPATIENT)
Dept: LAB | Facility: HOSPITAL | Age: 66
End: 2021-10-04

## 2021-10-04 VITALS
WEIGHT: 244.9 LBS | HEART RATE: 59 BPM | HEIGHT: 72 IN | RESPIRATION RATE: 16 BRPM | TEMPERATURE: 97.7 F | SYSTOLIC BLOOD PRESSURE: 158 MMHG | OXYGEN SATURATION: 99 % | BODY MASS INDEX: 33.17 KG/M2 | DIASTOLIC BLOOD PRESSURE: 70 MMHG

## 2021-10-04 DIAGNOSIS — C91.10 CLL (CHRONIC LYMPHOCYTIC LEUKEMIA) (HCC): Primary | ICD-10-CM

## 2021-10-04 DIAGNOSIS — D69.6 THROMBOCYTOPENIA (HCC): ICD-10-CM

## 2021-10-04 DIAGNOSIS — D53.9 MACROCYTIC ANEMIA: ICD-10-CM

## 2021-10-04 DIAGNOSIS — C91.10 CLL (CHRONIC LYMPHOCYTIC LEUKEMIA) (HCC): ICD-10-CM

## 2021-10-04 LAB
ALBUMIN SERPL-MCNC: 4.4 G/DL (ref 3.5–5.2)
ALBUMIN/GLOB SERPL: 2.6 G/DL (ref 1.1–2.4)
ALP SERPL-CCNC: 103 U/L (ref 38–116)
ALT SERPL W P-5'-P-CCNC: 13 U/L (ref 0–41)
ANION GAP SERPL CALCULATED.3IONS-SCNC: 8.8 MMOL/L (ref 5–15)
AST SERPL-CCNC: 23 U/L (ref 0–40)
BASOPHILS # BLD AUTO: 0.02 10*3/MM3 (ref 0–0.2)
BASOPHILS NFR BLD AUTO: 0.4 % (ref 0–1.5)
BILIRUB SERPL-MCNC: 0.9 MG/DL (ref 0.2–1.2)
BUN SERPL-MCNC: 20 MG/DL (ref 6–20)
BUN/CREAT SERPL: 16.7 (ref 7.3–30)
CALCIUM SPEC-SCNC: 9.1 MG/DL (ref 8.5–10.2)
CHLORIDE SERPL-SCNC: 105 MMOL/L (ref 98–107)
CO2 SERPL-SCNC: 28.2 MMOL/L (ref 22–29)
CREAT SERPL-MCNC: 1.2 MG/DL (ref 0.7–1.3)
DEPRECATED RDW RBC AUTO: 52 FL (ref 37–54)
EOSINOPHIL # BLD AUTO: 0.03 10*3/MM3 (ref 0–0.4)
EOSINOPHIL NFR BLD AUTO: 0.6 % (ref 0.3–6.2)
ERYTHROCYTE [DISTWIDTH] IN BLOOD BY AUTOMATED COUNT: 13.3 % (ref 12.3–15.4)
GFR SERPL CREATININE-BSD FRML MDRD: 73 ML/MIN/1.73
GLOBULIN UR ELPH-MCNC: 1.7 GM/DL (ref 1.8–3.5)
GLUCOSE SERPL-MCNC: 100 MG/DL (ref 74–124)
HCT VFR BLD AUTO: 36.7 % (ref 37.5–51)
HGB BLD-MCNC: 11.4 G/DL (ref 13–17.7)
IMM GRANULOCYTES # BLD AUTO: 0.02 10*3/MM3 (ref 0–0.05)
IMM GRANULOCYTES NFR BLD AUTO: 0.4 % (ref 0–0.5)
LYMPHOCYTES # BLD AUTO: 3.29 10*3/MM3 (ref 0.7–3.1)
LYMPHOCYTES NFR BLD AUTO: 62.1 % (ref 19.6–45.3)
MCH RBC QN AUTO: 32.7 PG (ref 26.6–33)
MCHC RBC AUTO-ENTMCNC: 31.1 G/DL (ref 31.5–35.7)
MCV RBC AUTO: 105.2 FL (ref 79–97)
MONOCYTES # BLD AUTO: 0.22 10*3/MM3 (ref 0.1–0.9)
MONOCYTES NFR BLD AUTO: 4.2 % (ref 5–12)
NEUTROPHILS NFR BLD AUTO: 1.72 10*3/MM3 (ref 1.7–7)
NEUTROPHILS NFR BLD AUTO: 32.3 % (ref 42.7–76)
NRBC BLD AUTO-RTO: 0 /100 WBC (ref 0–0.2)
PLATELET # BLD AUTO: 133 10*3/MM3 (ref 140–450)
PMV BLD AUTO: 10.8 FL (ref 6–12)
POTASSIUM SERPL-SCNC: 4 MMOL/L (ref 3.5–4.7)
PROT SERPL-MCNC: 6.1 G/DL (ref 6.3–8)
RBC # BLD AUTO: 3.49 10*6/MM3 (ref 4.14–5.8)
SODIUM SERPL-SCNC: 142 MMOL/L (ref 134–145)
WBC # BLD AUTO: 5.3 10*3/MM3 (ref 3.4–10.8)

## 2021-10-04 PROCEDURE — 80053 COMPREHEN METABOLIC PANEL: CPT

## 2021-10-04 PROCEDURE — 99214 OFFICE O/P EST MOD 30 MIN: CPT | Performed by: INTERNAL MEDICINE

## 2021-10-04 PROCEDURE — 85025 COMPLETE CBC W/AUTO DIFF WBC: CPT

## 2021-10-04 PROCEDURE — 36415 COLL VENOUS BLD VENIPUNCTURE: CPT

## 2021-10-04 RX ORDER — OMEPRAZOLE 20 MG/1
20 CAPSULE, DELAYED RELEASE ORAL
COMMUNITY
Start: 2021-09-10 | End: 2022-01-22

## 2021-10-04 RX ORDER — AMLODIPINE BESYLATE 10 MG/1
10 TABLET ORAL EVERY MORNING
COMMUNITY
Start: 2021-07-26

## 2021-10-04 RX ORDER — AZELASTINE HYDROCHLORIDE 0.5 MG/ML
1 SOLUTION/ DROPS OPHTHALMIC
COMMUNITY
Start: 2021-06-15 | End: 2022-06-15

## 2021-10-04 RX ORDER — HYDROCHLOROTHIAZIDE 12.5 MG/1
12.5 CAPSULE, GELATIN COATED ORAL DAILY
COMMUNITY
Start: 2021-07-15 | End: 2022-04-22 | Stop reason: HOSPADM

## 2021-10-04 RX ORDER — DESOXIMETASONE 0.5 MG/G
0.05 GEL TOPICAL
COMMUNITY
Start: 2021-06-15 | End: 2022-04-15

## 2021-10-05 ENCOUNTER — MEDICATION THERAPY MANAGEMENT (OUTPATIENT)
Dept: PHARMACY | Facility: HOSPITAL | Age: 66
End: 2021-10-05

## 2021-10-05 NOTE — PROGRESS NOTES
[facility] Lab Review- Imbruvica      10/4/2021   WBC 3.40 - 10.80 10*3/mm3 5.30   Neutrophils Absolute 1.70 - 7.00 10*3/mm3 1.72   Hemoglobin 13.0 - 17.7 g/dL 11.4 (A)   Hematocrit 37.5 - 51.0 % 36.7 (A)   Platelets 140 - 450 10*3/mm3 133 (A)   Creatinine 0.70 - 1.30 mg/dL 1.20   eGFR African Am >60 mL/min/1.73 73   BUN 6 - 20 mg/dL 20   Sodium 134 - 145 mmol/L 142   Potassium 3.5 - 4.7 mmol/L 4.0   Glucose 74 - 124 mg/dL 100   Calcium 8.5 - 10.2 mg/dL 9.1   Albumin 3.50 - 5.20 g/dL 4.40   Total Protein 6.3 - 8.0 g/dL 6.1 (A)   AST (SGOT) 0 - 40 U/L 23   ALT (SGPT) 0 - 41 U/L 13   Alkaline Phosphatase 38 - 116 U/L 103   Total Bilirubin 0.2 - 1.2 mg/dL 0.9     MD dictation noted. Pharmacy will continue to follow.     Thanks,   Marianela Romeo, PharmD

## 2021-10-27 DIAGNOSIS — C91.10 CLL (CHRONIC LYMPHOCYTIC LEUKEMIA) (HCC): Primary | ICD-10-CM

## 2021-11-01 ENCOUNTER — SPECIALTY PHARMACY (OUTPATIENT)
Dept: PHARMACY | Facility: HOSPITAL | Age: 66
End: 2021-11-01

## 2021-11-02 ENCOUNTER — SPECIALTY PHARMACY (OUTPATIENT)
Dept: PHARMACY | Facility: HOSPITAL | Age: 66
End: 2021-11-02

## 2021-11-15 ENCOUNTER — SPECIALTY PHARMACY (OUTPATIENT)
Dept: PHARMACY | Facility: HOSPITAL | Age: 66
End: 2021-11-15

## 2021-11-15 NOTE — PROGRESS NOTES
[facility] Pt fills at an outside pharmacy due to insurance restrictions.    Haylee Montanez  Specialty Pharmacy Technician

## 2022-01-07 ENCOUNTER — SPECIALTY PHARMACY (OUTPATIENT)
Dept: PHARMACY | Facility: HOSPITAL | Age: 67
End: 2022-01-07

## 2022-01-22 ENCOUNTER — HOSPITAL ENCOUNTER (EMERGENCY)
Facility: HOSPITAL | Age: 67
Discharge: HOME | End: 2022-01-22
Attending: EMERGENCY MEDICINE | Admitting: EMERGENCY MEDICINE

## 2022-01-22 ENCOUNTER — APPOINTMENT (OUTPATIENT)
Dept: GENERAL RADIOLOGY | Facility: HOSPITAL | Age: 67
End: 2022-01-22

## 2022-01-22 VITALS
BODY MASS INDEX: 32.37 KG/M2 | SYSTOLIC BLOOD PRESSURE: 151 MMHG | HEART RATE: 81 BPM | TEMPERATURE: 97 F | HEIGHT: 72 IN | DIASTOLIC BLOOD PRESSURE: 67 MMHG | OXYGEN SATURATION: 98 % | WEIGHT: 239 LBS | RESPIRATION RATE: 18 BRPM

## 2022-01-22 DIAGNOSIS — R07.89 CHEST PAIN, MID STERNAL: Primary | ICD-10-CM

## 2022-01-22 LAB
ALBUMIN SERPL-MCNC: 4.3 G/DL (ref 3.5–5.2)
ALBUMIN/GLOB SERPL: 2 G/DL
ALP SERPL-CCNC: 122 U/L (ref 39–117)
ALT SERPL W P-5'-P-CCNC: 18 U/L (ref 1–41)
ANION GAP SERPL CALCULATED.3IONS-SCNC: 8 MMOL/L (ref 5–15)
AST SERPL-CCNC: 22 U/L (ref 1–40)
BASOPHILS # BLD AUTO: 0.01 10*3/MM3 (ref 0–0.2)
BASOPHILS NFR BLD AUTO: 0.2 % (ref 0–1.5)
BILIRUB SERPL-MCNC: 0.9 MG/DL (ref 0–1.2)
BUN SERPL-MCNC: 18 MG/DL (ref 8–23)
BUN/CREAT SERPL: 15.4 (ref 7–25)
CALCIUM SPEC-SCNC: 10 MG/DL (ref 8.6–10.5)
CHLORIDE SERPL-SCNC: 102 MMOL/L (ref 98–107)
CO2 SERPL-SCNC: 29 MMOL/L (ref 22–29)
CREAT SERPL-MCNC: 1.17 MG/DL (ref 0.76–1.27)
DEPRECATED RDW RBC AUTO: 46 FL (ref 37–54)
EOSINOPHIL # BLD AUTO: 0.02 10*3/MM3 (ref 0–0.4)
EOSINOPHIL NFR BLD AUTO: 0.4 % (ref 0.3–6.2)
ERYTHROCYTE [DISTWIDTH] IN BLOOD BY AUTOMATED COUNT: 12.6 % (ref 12.3–15.4)
GFR SERPL CREATININE-BSD FRML MDRD: 75 ML/MIN/1.73
GLOBULIN UR ELPH-MCNC: 2.1 GM/DL
GLUCOSE SERPL-MCNC: 173 MG/DL (ref 65–99)
HCT VFR BLD AUTO: 35.8 % (ref 37.5–51)
HGB BLD-MCNC: 11.7 G/DL (ref 13–17.7)
HOLD SPECIMEN: NORMAL
HOLD SPECIMEN: NORMAL
LYMPHOCYTES # BLD AUTO: 2.52 10*3/MM3 (ref 0.7–3.1)
LYMPHOCYTES NFR BLD AUTO: 50.6 % (ref 19.6–45.3)
MCH RBC QN AUTO: 32.5 PG (ref 26.6–33)
MCHC RBC AUTO-ENTMCNC: 32.7 G/DL (ref 31.5–35.7)
MCV RBC AUTO: 99.4 FL (ref 79–97)
MONOCYTES # BLD AUTO: 0.18 10*3/MM3 (ref 0.1–0.9)
MONOCYTES NFR BLD AUTO: 3.6 % (ref 5–12)
NEUTROPHILS NFR BLD AUTO: 2.23 10*3/MM3 (ref 1.7–7)
NEUTROPHILS NFR BLD AUTO: 44.8 % (ref 42.7–76)
NT-PROBNP SERPL-MCNC: 36.9 PG/ML (ref 0–900)
PLATELET # BLD AUTO: 142 10*3/MM3 (ref 140–450)
PMV BLD AUTO: 11.1 FL (ref 6–12)
POTASSIUM SERPL-SCNC: 3.6 MMOL/L (ref 3.5–5.2)
PROT SERPL-MCNC: 6.4 G/DL (ref 6–8.5)
QT INTERVAL: 391 MS
RBC # BLD AUTO: 3.6 10*6/MM3 (ref 4.14–5.8)
SODIUM SERPL-SCNC: 139 MMOL/L (ref 136–145)
TROPONIN T SERPL-MCNC: <0.01 NG/ML (ref 0–0.03)
WBC NRBC COR # BLD: 4.98 10*3/MM3 (ref 3.4–10.8)
WHOLE BLOOD HOLD SPECIMEN: NORMAL
WHOLE BLOOD HOLD SPECIMEN: NORMAL

## 2022-01-22 PROCEDURE — 80053 COMPREHEN METABOLIC PANEL: CPT | Performed by: NURSE PRACTITIONER

## 2022-01-22 PROCEDURE — 99283 EMERGENCY DEPT VISIT LOW MDM: CPT

## 2022-01-22 PROCEDURE — 71045 X-RAY EXAM CHEST 1 VIEW: CPT

## 2022-01-22 PROCEDURE — 93010 ELECTROCARDIOGRAM REPORT: CPT | Performed by: INTERNAL MEDICINE

## 2022-01-22 PROCEDURE — 84484 ASSAY OF TROPONIN QUANT: CPT | Performed by: NURSE PRACTITIONER

## 2022-01-22 PROCEDURE — 93005 ELECTROCARDIOGRAM TRACING: CPT | Performed by: EMERGENCY MEDICINE

## 2022-01-22 PROCEDURE — 85025 COMPLETE CBC W/AUTO DIFF WBC: CPT | Performed by: NURSE PRACTITIONER

## 2022-01-22 PROCEDURE — 93005 ELECTROCARDIOGRAM TRACING: CPT

## 2022-01-22 PROCEDURE — 83880 ASSAY OF NATRIURETIC PEPTIDE: CPT | Performed by: NURSE PRACTITIONER

## 2022-01-22 RX ORDER — PANTOPRAZOLE SODIUM 20 MG/1
40 TABLET, DELAYED RELEASE ORAL DAILY
Qty: 21 TABLET | Refills: 0 | Status: SHIPPED | OUTPATIENT
Start: 2022-01-22 | End: 2022-01-22 | Stop reason: SDUPTHER

## 2022-01-22 RX ORDER — OMEPRAZOLE 20 MG/1
20 CAPSULE, DELAYED RELEASE ORAL DAILY
Qty: 21 CAPSULE | Refills: 0 | Status: SHIPPED | OUTPATIENT
Start: 2022-01-22 | End: 2022-01-22 | Stop reason: SDUPTHER

## 2022-01-22 RX ORDER — PANTOPRAZOLE SODIUM 20 MG/1
40 TABLET, DELAYED RELEASE ORAL DAILY
Qty: 21 TABLET | Refills: 0 | Status: SHIPPED | OUTPATIENT
Start: 2022-01-22 | End: 2022-08-16 | Stop reason: DRUGHIGH

## 2022-01-22 NOTE — ED PROVIDER NOTES
EMERGENCY DEPARTMENT ENCOUNTER    Room Number:  03/03  Date of encounter:  1/22/2022  PCP: Roxane Marsh MD  Historian: patient   Full history not obtainable due to: none     HPI:  Chief Complaint: CP    Context: Aida Bal Jr. is a 66 y.o. male who presents to the ED c/o chest pain onset 3 days prior. Pain is mid sternal and intermittent. Yesterday it radiated to right axilla. Described as tight or heavy. Does not seem to be exertional. States he worked out yesterday and did not have any pain during exercise. No associated SOA, nausea or diaphoresis. Denies fever. States he has a chronic cough which is unchanged and due to his seasonal allergies.   Hx of HTN and HLD. No hx of DM. Non smoker.   No prior hx of MI. Last cardiac catheterization was in 2010.   Followed by Dr Cheng for CLL. Is managed on imbruvika which he has taken for 3 years.       MEDICAL RECORD REVIEW:noted RBBB on EKG in care everywhere dated 7/2020        PAST MEDICAL HISTORY    Active Ambulatory Problems     Diagnosis Date Noted   • Hyperuricemia 01/27/2016   • Dyslipidemia 01/27/2016   • Hypertension 01/27/2016   • Hypogonadism in male 01/27/2016   • Uncontrolled type 2 diabetes mellitus (HCC) 01/27/2016   • Vitamin D deficiency 01/27/2016   • CLL (chronic lymphocytic leukemia) (HCC) 03/16/2016   • Autoimmune hemolytic anemia (HCC) 03/16/2016   • Left groin pain 03/17/2016   • Leukopenia 04/28/2016   • Tubulovillous adenoma of colon 06/04/2018   • Macrocytic anemia 06/20/2018   • Thrombocytopenia (HCC) 10/04/2021     Resolved Ambulatory Problems     Diagnosis Date Noted   • No Resolved Ambulatory Problems     Past Medical History:   Diagnosis Date   • Carotid artery occlusion    • Coronary artery disease    • Diabetes mellitus (HCC)    • Gout    • High blood sugar    • High cholesterol    • Leukemia, chronic (HCC)    • Sleep apnea    • TIA (transient ischemic attack)          PAST SURGICAL HISTORY  Past Surgical History:   Procedure  Laterality Date   • CARDIAC CATHETERIZATION     • COLONOSCOPY N/A 7/24/2017    Procedure: COLONOSCOPY TO CECUM WITH COLD POLYPECTOMY, TATOO PLACEMENT;  Surgeon: Fannie Caro MD;  Location:  [facility];  Service:    • COLONOSCOPY N/A 6/28/2018    Procedure: COLONOSCOPY to Cecum with Hot Snare Polypectomies, injected with 6 ML NS;  Surgeon: Fannie Caro MD;  [facility];  Service: Gastroenterology   • COLONOSCOPY N/A 6/27/2019    Procedure: COLONOSCOPY TO CECUM WITH COLD SNARE POLYPECTOMY AND COLD BIOPSY POLYPECTOMIES;  Surgeon: Fannie Caro MD;  Location: [facility] ENDOSCOPY;  Service: General   • KNEE SURGERY           FAMILY HISTORY  Family History   Problem Relation Age of Onset   • Colon cancer Mother 50   • Throat cancer Father    • Colon cancer Father    • Hypertension Daughter          SOCIAL HISTORY  Social History     Socioeconomic History   • Marital status:      Spouse name: Meghana   Tobacco Use   • Smoking status: Never Smoker   • Smokeless tobacco: Never Used   Substance and Sexual Activity   • Alcohol use: No   • Drug use: No   • Sexual activity: Defer         ALLERGIES  Ace inhibitors, Candesartan, and Pravastatin        REVIEW OF SYSTEMS  Review of Systems   All systems reviewed and marked as negative except as listed in HPI       PHYSICAL EXAM    I have reviewed the triage vital signs and nursing notes.    ED Triage Vitals   Temp Heart Rate Resp BP SpO2   01/22/22 0949 01/22/22 0949 01/22/22 0949 01/22/22 1032 01/22/22 0949   97 °F (36.1 °C) 97 18 151/67 97 %      Temp src Heart Rate Source Patient Position BP Location FiO2 (%)   -- -- 01/22/22 1032 01/22/22 1032 --     Lying Right arm        GENERAL: alert well developed, well nourished in no distress  HENT: NCAT, neck supple, trachea midline  EYES: no scleral icterus, PERRL, normal conjunctivae  CV: regular rhythm, regular rate, no murmur  RESPIRATORY: unlabored effort, CTAB. CW non tender.   ABDOMEN: soft,  nontender, nondistended, bowel sounds present  MUSCULOSKELETAL: no gross deformity  NEURO: alert,  sensory and motor function of extremities grossly intact, speech clear, mental status normal/baseline  SKIN: warm, dry, no rash  PSYCH:  Appropriate mood and affect    Vital signs and nursing notes reviewed.          LAB RESULTS  Recent Results (from the past 24 hour(s))   ECG 12 Lead    Collection Time: 01/22/22  9:54 AM   Result Value Ref Range    QT Interval 391 ms   Comprehensive Metabolic Panel    Collection Time: 01/22/22 10:48 AM    Specimen: Blood   Result Value Ref Range    Glucose 173 (H) 65 - 99 mg/dL    BUN 18 8 - 23 mg/dL    Creatinine 1.17 0.76 - 1.27 mg/dL    Sodium 139 136 - 145 mmol/L    Potassium 3.6 3.5 - 5.2 mmol/L    Chloride 102 98 - 107 mmol/L    CO2 29.0 22.0 - 29.0 mmol/L    Calcium 10.0 8.6 - 10.5 mg/dL    Total Protein 6.4 6.0 - 8.5 g/dL    Albumin 4.30 3.50 - 5.20 g/dL    ALT (SGPT) 18 1 - 41 U/L    AST (SGOT) 22 1 - 40 U/L    Alkaline Phosphatase 122 (H) 39 - 117 U/L    Total Bilirubin 0.9 0.0 - 1.2 mg/dL    eGFR  African Amer 75 >60 mL/min/1.73    Globulin 2.1 gm/dL    A/G Ratio 2.0 g/dL    BUN/Creatinine Ratio 15.4 7.0 - 25.0    Anion Gap 8.0 5.0 - 15.0 mmol/L   Troponin    Collection Time: 01/22/22 10:48 AM    Specimen: Blood   Result Value Ref Range    Troponin T <0.010 0.000 - 0.030 ng/mL   BNP    Collection Time: 01/22/22 10:48 AM    Specimen: Blood   Result Value Ref Range    proBNP 36.9 0.0 - 900.0 pg/mL   CBC Auto Differential    Collection Time: 01/22/22 10:48 AM    Specimen: Blood   Result Value Ref Range    WBC 4.98 3.40 - 10.80 10*3/mm3    RBC 3.60 (L) 4.14 - 5.80 10*6/mm3    Hemoglobin 11.7 (L) 13.0 - 17.7 g/dL    Hematocrit 35.8 (L) 37.5 - 51.0 %    MCV 99.4 (H) 79.0 - 97.0 fL    MCH 32.5 26.6 - 33.0 pg    MCHC 32.7 31.5 - 35.7 g/dL    RDW 12.6 12.3 - 15.4 %    RDW-SD 46.0 37.0 - 54.0 fl    MPV 11.1 6.0 - 12.0 fL    Platelets 142 140 - 450 10*3/mm3    Neutrophil % 44.8 42.7 -  76.0 %    Lymphocyte % 50.6 (H) 19.6 - 45.3 %    Monocyte % 3.6 (L) 5.0 - 12.0 %    Eosinophil % 0.4 0.3 - 6.2 %    Basophil % 0.2 0.0 - 1.5 %    Neutrophils, Absolute 2.23 1.70 - 7.00 10*3/mm3    Lymphocytes, Absolute 2.52 0.70 - 3.10 10*3/mm3    Monocytes, Absolute 0.18 0.10 - 0.90 10*3/mm3    Eosinophils, Absolute 0.02 0.00 - 0.40 10*3/mm3    Basophils, Absolute 0.01 0.00 - 0.20 10*3/mm3   Green Top (Gel)    Collection Time: 01/22/22 10:48 AM   Result Value Ref Range    Extra Tube Hold for add-ons.    Lavender Top    Collection Time: 01/22/22 10:48 AM   Result Value Ref Range    Extra Tube hold for add-on    Gold Top - SST    Collection Time: 01/22/22 10:48 AM   Result Value Ref Range    Extra Tube Hold for add-ons.    Light Blue Top    Collection Time: 01/22/22 10:48 AM   Result Value Ref Range    Extra Tube hold for add-on        Ordered the above labs and independently reviewed the results.        RADIOLOGY  XR Chest 1 View    Result Date: 1/22/2022  ONE VIEW PORTABLE CHEST  HISTORY: Chest pain.  FINDINGS: The lungs are well-expanded and clear. There is mild cardiomegaly unchanged from 11/25/2015. No acute abnormality is seen.  This report was finalized on 1/22/2022 11:12 AM by Dr. Devin Stock M.D.        I ordered the above noted radiological studies. Independently reviewed by me and discussed with radiologist.  See dictation above for official radiology interpretation.      PROCEDURES    Procedures        MEDICATIONS GIVEN IN ER    Medications - No data to display      PROGRESS, DATA ANALYSIS, CONSULTS, AND MEDICAL DECISION MAKING    All labs have been independently reviewed by me.  All radiology studies have been reviewed by me.   EKG's independently reviewed by me.  Discussion below represents my analysis of pertinent findings related to patient's condition, differential diagnosis, treatment plan and final disposition.    DIFFERENTIAL DIAGNOSIS INCLUDE BUT NOT LIMITED TO: USA, ACS, aortic dissection,  costochondritis, pericarditis, endocarditis, pneumonia, acute bronchitis, pneumothorax, PE, viral syndrome, pancreatitis, biliary colic, cholecystitis, GERD, muscle spasm, anxiety       ED Course as of 01/22/22 1300   Sat Jan 22, 2022   1040 HEART SCORE:  History: (slight) 0 (moderate) 1 (high) 2::  0  ECG: (no abn) 0 (non spec) 1 (ST dev) 2:: 0  Age (<45) 0 (45-64) 1 (>65) 2:: 2  Risk factors: (0) 0 (1-2) 1 (>3) 2 ::1  Initial troponin: (<normal) 0 (1-3x high) 1 (>3x high) 2 0  HEART score: 3     [JS]   1202 WBC: 4.98 [JS]   1202 Hemoglobin(!): 11.7 [JS]   1202 BUN: 18 [JS]   1202 Sodium: 139 [JS]   1251 Rechecked pt. Discussed results. He is resting comfortably. Discussed need for stress test. The pt would really like to be discharged. Through shared decision making the pt decided to go ahead with discharge from the emergency department and he will call his cardiologist Dr Mckeon on Monday. He states he is not on anything for reflux but med list shows omeprazole. We will change his PPI. He will return for new or worsening symptoms.   [JS]      ED Course User Index  [JS] Kathleen Germain, ABDIEL       AS OF 13:00 EST VITALS:        BP - 151/67  HR - 81  TEMP - 97 °F (36.1 °C)  O2 SATS - 98%         Medication List      New Prescriptions    pantoprazole 20 MG EC tablet  Commonly known as: PROTONIX  Take 2 tablets by mouth Daily.        Stop    omeprazole 20 MG capsule  Commonly known as: priLOSEC           Where to Get Your Medications      These medications were sent to [id number] - [place], [place] - [address]  AT [facility] - [phone]  - [phone] FX  [address] , [facility] [address]    Phone: [phone]   · pantoprazole 20 MG EC tablet           DIAGNOSIS  Final diagnoses:   Chest pain, mid sternal         DISPOSITION  Discharge     Pt masked in first look. I wore appropriate PPE throughout my encounters with the pt. I performed hand hygiene on entry into  the pt room and upon exit.     Dictated utilizing Dragon dictation:  Much of this encounter note is an electronic transcription/translation of spoken language to printed text. The electronic translation of spoken language may permit erroneous, or at times, nonsensical words or phrases to be inadvertently transcribed; Although I have reviewed the note for such errors, some may still exist.     Kathleen Germain, APRN  01/22/22 5485

## 2022-01-22 NOTE — ED NOTES
Patient from home with c/o chest pain that started three days ago. Patient also started having pain under his right arm last night. Denies any shortness of breath. described as  Heaviness/tightness.      Kassidy Mcfarlane RN  01/22/22 0220

## 2022-01-22 NOTE — DISCHARGE INSTRUCTIONS
Home to erst  Drink plenty of fluids  Follow up with your doctor  Stop omeprazole. Start pantoprazole as directed   Return if worse or new concerns   Continue care with your primary care physician and have your blood pressure regularly checked and managed. Normal blood pressure is 120/80.

## 2022-01-22 NOTE — ED PROVIDER NOTES
MD ATTESTATION NOTE    The FABIO and I have discussed this patient's history, physical exam, and treatment plan.  I have reviewed the documentation and personally had a face to face interaction with the patient. I affirm the documentation and agree with the treatment and plan.  The attached note describes my personal findings.    I provided a substantive portion of the care of this patient. I personally performed the physical exam, in its entirety.    Aida Bal Jr. is a 66 y.o. male who presents to the ED c/o chest pain.  He reports for the last 3 days in the evenings he has had pain in his epigastrium.  He reports the pain is intermittent.  Nothing seems to make it occur other than laying down to go to bed.  He states last night it radiated into his right axilla.  He has no shortness of breath.  He denies nausea and vomiting.  He denies fever.  He states that he has seen a cardiologist yearly for the last 12 years because he had a cardiac cath 12 years ago that showed 30% blockage.  He has never had chest pain previous to the last 3 days.  He denies current pain.  He states when he was on the exercise bike yesterday his pain did not occur.      On exam:  GENERAL: Awake, alert, no acute distress  SKIN: Warm, dry  HENT: Normocephalic, atraumatic  EYES: no scleral icterus  CV: regular rhythm, regular rate  RESPIRATORY: normal effort, lungs clear  ABDOMEN: soft, nontender, nondistended  MUSCULOSKELETAL: no deformity  NEURO: alert, moves all extremities, follows commands    Labs  Recent Results (from the past 24 hour(s))   ECG 12 Lead    Collection Time: 01/22/22  9:54 AM   Result Value Ref Range    QT Interval 391 ms   Comprehensive Metabolic Panel    Collection Time: 01/22/22 10:48 AM    Specimen: Blood   Result Value Ref Range    Glucose 173 (H) 65 - 99 mg/dL    BUN 18 8 - 23 mg/dL    Creatinine 1.17 0.76 - 1.27 mg/dL    Sodium 139 136 - 145 mmol/L    Potassium 3.6 3.5 - 5.2 mmol/L    Chloride 102 98 - 107  mmol/L    CO2 29.0 22.0 - 29.0 mmol/L    Calcium 10.0 8.6 - 10.5 mg/dL    Total Protein 6.4 6.0 - 8.5 g/dL    Albumin 4.30 3.50 - 5.20 g/dL    ALT (SGPT) 18 1 - 41 U/L    AST (SGOT) 22 1 - 40 U/L    Alkaline Phosphatase 122 (H) 39 - 117 U/L    Total Bilirubin 0.9 0.0 - 1.2 mg/dL    eGFR  African Amer 75 >60 mL/min/1.73    Globulin 2.1 gm/dL    A/G Ratio 2.0 g/dL    BUN/Creatinine Ratio 15.4 7.0 - 25.0    Anion Gap 8.0 5.0 - 15.0 mmol/L   Troponin    Collection Time: 01/22/22 10:48 AM    Specimen: Blood   Result Value Ref Range    Troponin T <0.010 0.000 - 0.030 ng/mL   BNP    Collection Time: 01/22/22 10:48 AM    Specimen: Blood   Result Value Ref Range    proBNP 36.9 0.0 - 900.0 pg/mL   CBC Auto Differential    Collection Time: 01/22/22 10:48 AM    Specimen: Blood   Result Value Ref Range    WBC 4.98 3.40 - 10.80 10*3/mm3    RBC 3.60 (L) 4.14 - 5.80 10*6/mm3    Hemoglobin 11.7 (L) 13.0 - 17.7 g/dL    Hematocrit 35.8 (L) 37.5 - 51.0 %    MCV 99.4 (H) 79.0 - 97.0 fL    MCH 32.5 26.6 - 33.0 pg    MCHC 32.7 31.5 - 35.7 g/dL    RDW 12.6 12.3 - 15.4 %    RDW-SD 46.0 37.0 - 54.0 fl    MPV 11.1 6.0 - 12.0 fL    Platelets 142 140 - 450 10*3/mm3    Neutrophil % 44.8 42.7 - 76.0 %    Lymphocyte % 50.6 (H) 19.6 - 45.3 %    Monocyte % 3.6 (L) 5.0 - 12.0 %    Eosinophil % 0.4 0.3 - 6.2 %    Basophil % 0.2 0.0 - 1.5 %    Neutrophils, Absolute 2.23 1.70 - 7.00 10*3/mm3    Lymphocytes, Absolute 2.52 0.70 - 3.10 10*3/mm3    Monocytes, Absolute 0.18 0.10 - 0.90 10*3/mm3    Eosinophils, Absolute 0.02 0.00 - 0.40 10*3/mm3    Basophils, Absolute 0.01 0.00 - 0.20 10*3/mm3   Green Top (Gel)    Collection Time: 01/22/22 10:48 AM   Result Value Ref Range    Extra Tube Hold for add-ons.    Lavender Top    Collection Time: 01/22/22 10:48 AM   Result Value Ref Range    Extra Tube hold for add-on    Gold Top - SST    Collection Time: 01/22/22 10:48 AM   Result Value Ref Range    Extra Tube Hold for add-ons.    Light Blue Top    Collection Time:  01/22/22 10:48 AM   Result Value Ref Range    Extra Tube hold for add-on        Radiology  XR Chest 1 View    Result Date: 1/22/2022  ONE VIEW PORTABLE CHEST  HISTORY: Chest pain.  FINDINGS: The lungs are well-expanded and clear. There is mild cardiomegaly unchanged from 11/25/2015. No acute abnormality is seen.  This report was finalized on 1/22/2022 11:12 AM by Dr. Devin Stock M.D.         Medical Decision Making:  ED Course as of 01/22/22 1505   Sat Jan 22, 2022   1040 HEART SCORE:  History: (slight) 0 (moderate) 1 (high) 2::  0  ECG: (no abn) 0 (non spec) 1 (ST dev) 2:: 0  Age (<45) 0 (45-64) 1 (>65) 2:: 2  Risk factors: (0) 0 (1-2) 1 (>3) 2 ::1  Initial troponin: (<normal) 0 (1-3x high) 1 (>3x high) 2 0  HEART score: 3     [JS]   1202 WBC: 4.98 [JS]   1202 Hemoglobin(!): 11.7 [JS]   1202 BUN: 18 [JS]   1202 Sodium: 139 [JS]   1251 Rechecked pt. Discussed results. He is resting comfortably. Discussed need for stress test. The pt would really like to be discharged. Through shared decision making the pt decided to go ahead with discharge from the emergency department and he will call his cardiologist Dr Mckeon on Monday. He states he is not on anything for reflux but med list shows omeprazole. We will change his PPI. He will return for new or worsening symptoms.   [JS]      ED Course User Index  [JS] Kathleen Germain APRN       Plan cardiac work-up including EKG, troponin, chest x-ray.  He has no tachycardia or hypoxia to suggest PE.  He has risk factors of hypertension and hyperlipidemia.  Given that he has no increased pain with exertion, this seems less likely cardiac or more likely GERD.  Given his prior cardiac history I do believe he needs cardiac evaluation at sometime in the near future.  We could potentially admit him to the observation unit for stress test if he is willing.    PPE: The patient wore a mask and I wore an N95 mask throughout the entire patient encounter.      The patient has  started, but not completed, their COVID-19 vaccination series.    Diagnosis  Final diagnoses:   Chest pain, mid sternal        Javier Pak MD  01/22/22 1653

## 2022-04-11 ENCOUNTER — APPOINTMENT (OUTPATIENT)
Dept: ONCOLOGY | Facility: HOSPITAL | Age: 67
End: 2022-04-11

## 2022-04-11 ENCOUNTER — APPOINTMENT (OUTPATIENT)
Dept: LAB | Facility: HOSPITAL | Age: 67
End: 2022-04-11

## 2022-04-11 ENCOUNTER — TELEPHONE (OUTPATIENT)
Dept: ONCOLOGY | Facility: CLINIC | Age: 67
End: 2022-04-11

## 2022-04-11 NOTE — TELEPHONE ENCOUNTER
Caller: Aida Bal Jr.    Relationship to patient: Self    Best call back number: [phone]    Chief complaint: CANC.APPTS. AS PATIENT HAS COVID.    Type of visit: LAB/FOLLOW UP 1    Requested date: DID NOT WANT ME TO WT & STATED HE WOULD CALL BACK TO [facility].    If rescheduling, when is the original appointment: 4/11/2022

## 2022-04-14 ENCOUNTER — APPOINTMENT (OUTPATIENT)
Dept: GENERAL RADIOLOGY | Facility: HOSPITAL | Age: 67
End: 2022-04-14

## 2022-04-14 ENCOUNTER — HOSPITAL ENCOUNTER (INPATIENT)
Facility: HOSPITAL | Age: 67
LOS: 7 days | Discharge: HOME | End: 2022-04-22
Attending: EMERGENCY MEDICINE | Admitting: HOSPITALIST

## 2022-04-14 DIAGNOSIS — R06.03 RESPIRATORY DISTRESS, ACUTE: ICD-10-CM

## 2022-04-14 DIAGNOSIS — C91.10 CLL (CHRONIC LYMPHOCYTIC LEUKEMIA): ICD-10-CM

## 2022-04-14 DIAGNOSIS — R79.89 ELEVATED D-DIMER: ICD-10-CM

## 2022-04-14 DIAGNOSIS — U07.1 PNEUMONIA DUE TO COVID-19 VIRUS: Primary | ICD-10-CM

## 2022-04-14 DIAGNOSIS — N17.9 AKI (ACUTE KIDNEY INJURY): ICD-10-CM

## 2022-04-14 DIAGNOSIS — E86.0 DEHYDRATION: ICD-10-CM

## 2022-04-14 DIAGNOSIS — J12.82 PNEUMONIA DUE TO COVID-19 VIRUS: Primary | ICD-10-CM

## 2022-04-14 LAB
ALBUMIN SERPL-MCNC: 3.7 G/DL (ref 3.5–5.2)
ALBUMIN/GLOB SERPL: 1.1 G/DL
ALP SERPL-CCNC: 274 U/L (ref 39–117)
ALT SERPL W P-5'-P-CCNC: 86 U/L (ref 1–41)
ANION GAP SERPL CALCULATED.3IONS-SCNC: 14.8 MMOL/L (ref 5–15)
ANISOCYTOSIS BLD QL: NORMAL
AST SERPL-CCNC: 92 U/L (ref 1–40)
BILIRUB SERPL-MCNC: 1 MG/DL (ref 0–1.2)
BUN SERPL-MCNC: 43 MG/DL (ref 8–23)
BUN/CREAT SERPL: 25.7 (ref 7–25)
BURR CELLS BLD QL SMEAR: NORMAL
CALCIUM SPEC-SCNC: 9.2 MG/DL (ref 8.6–10.5)
CHLORIDE SERPL-SCNC: 99 MMOL/L (ref 98–107)
CO2 SERPL-SCNC: 21.2 MMOL/L (ref 22–29)
CREAT SERPL-MCNC: 1.67 MG/DL (ref 0.76–1.27)
D DIMER PPP FEU-MCNC: 1.83 MCGFEU/ML (ref 0–0.49)
D-LACTATE SERPL-SCNC: 1.5 MMOL/L (ref 0.5–2)
DEPRECATED RDW RBC AUTO: 44.2 FL (ref 37–54)
EGFRCR SERPLBLD CKD-EPI 2021: 44 ML/MIN/1.73
EOSINOPHIL # BLD MANUAL: 0.06 10*3/MM3 (ref 0–0.4)
EOSINOPHIL NFR BLD MANUAL: 1 % (ref 0.3–6.2)
ERYTHROCYTE [DISTWIDTH] IN BLOOD BY AUTOMATED COUNT: 13.4 % (ref 12.3–15.4)
GLOBULIN UR ELPH-MCNC: 3.3 GM/DL
GLUCOSE SERPL-MCNC: 145 MG/DL (ref 65–99)
HCT VFR BLD AUTO: 27.3 % (ref 37.5–51)
HGB BLD-MCNC: 9.3 G/DL (ref 13–17.7)
HYPOCHROMIA BLD QL: NORMAL
LYMPHOCYTES # BLD MANUAL: 2.51 10*3/MM3 (ref 0.7–3.1)
MACROCYTES BLD QL SMEAR: NORMAL
MCH RBC QN AUTO: 31.7 PG (ref 26.6–33)
MCHC RBC AUTO-ENTMCNC: 34.1 G/DL (ref 31.5–35.7)
MCV RBC AUTO: 93.2 FL (ref 79–97)
NEUTROPHILS # BLD AUTO: 3.77 10*3/MM3 (ref 1.7–7)
NEUTROPHILS NFR BLD MANUAL: 59.4 % (ref 42.7–76)
NRBC BLD AUTO-RTO: 0 /100 WBC (ref 0–0.2)
PLAT MORPH BLD: NORMAL
PLATELET # BLD AUTO: 307 10*3/MM3 (ref 140–450)
PMV BLD AUTO: 11.6 FL (ref 6–12)
POIKILOCYTOSIS BLD QL SMEAR: NORMAL
POLYCHROMASIA BLD QL SMEAR: NORMAL
POTASSIUM SERPL-SCNC: 3.7 MMOL/L (ref 3.5–5.2)
PROCALCITONIN SERPL-MCNC: 0.72 NG/ML (ref 0–0.25)
PROT SERPL-MCNC: 7 G/DL (ref 6–8.5)
RBC # BLD AUTO: 2.93 10*6/MM3 (ref 4.14–5.8)
SCHISTOCYTES BLD QL SMEAR: NORMAL
SODIUM SERPL-SCNC: 135 MMOL/L (ref 136–145)
TROPONIN T SERPL-MCNC: 0.04 NG/ML (ref 0–0.03)
VARIANT LYMPHS NFR BLD MANUAL: 39.6 % (ref 19.6–45.3)
WBC MORPH BLD: NORMAL
WBC NRBC COR # BLD: 6.35 10*3/MM3 (ref 3.4–10.8)

## 2022-04-14 PROCEDURE — 87040 BLOOD CULTURE FOR BACTERIA: CPT | Performed by: EMERGENCY MEDICINE

## 2022-04-14 PROCEDURE — 71045 X-RAY EXAM CHEST 1 VIEW: CPT

## 2022-04-14 PROCEDURE — 83605 ASSAY OF LACTIC ACID: CPT | Performed by: EMERGENCY MEDICINE

## 2022-04-14 PROCEDURE — 85379 FIBRIN DEGRADATION QUANT: CPT | Performed by: EMERGENCY MEDICINE

## 2022-04-14 PROCEDURE — 84484 ASSAY OF TROPONIN QUANT: CPT | Performed by: EMERGENCY MEDICINE

## 2022-04-14 PROCEDURE — 85025 COMPLETE CBC W/AUTO DIFF WBC: CPT | Performed by: PHYSICIAN ASSISTANT

## 2022-04-14 PROCEDURE — 85007 BL SMEAR W/DIFF WBC COUNT: CPT | Performed by: PHYSICIAN ASSISTANT

## 2022-04-14 PROCEDURE — 93005 ELECTROCARDIOGRAM TRACING: CPT | Performed by: PHYSICIAN ASSISTANT

## 2022-04-14 PROCEDURE — 93010 ELECTROCARDIOGRAM REPORT: CPT | Performed by: INTERNAL MEDICINE

## 2022-04-14 PROCEDURE — 80053 COMPREHEN METABOLIC PANEL: CPT | Performed by: PHYSICIAN ASSISTANT

## 2022-04-14 PROCEDURE — 84484 ASSAY OF TROPONIN QUANT: CPT | Performed by: PHYSICIAN ASSISTANT

## 2022-04-14 PROCEDURE — 99284 EMERGENCY DEPT VISIT MOD MDM: CPT

## 2022-04-14 PROCEDURE — 84145 PROCALCITONIN (PCT): CPT | Performed by: PHYSICIAN ASSISTANT

## 2022-04-14 RX ORDER — SODIUM CHLORIDE 0.9 % (FLUSH) 0.9 %
10 SYRINGE (ML) INJECTION AS NEEDED
Status: DISCONTINUED | OUTPATIENT
Start: 2022-04-14 | End: 2022-04-22 | Stop reason: HOSPADM

## 2022-04-14 RX ADMIN — SODIUM CHLORIDE, POTASSIUM CHLORIDE, SODIUM LACTATE AND CALCIUM CHLORIDE 1000 ML: 600; 310; 30; 20 INJECTION, SOLUTION INTRAVENOUS at 23:13

## 2022-04-14 RX ADMIN — SODIUM CHLORIDE, POTASSIUM CHLORIDE, SODIUM LACTATE AND CALCIUM CHLORIDE 1000 ML: 600; 310; 30; 20 INJECTION, SOLUTION INTRAVENOUS at 21:40

## 2022-04-15 ENCOUNTER — APPOINTMENT (OUTPATIENT)
Dept: CT IMAGING | Facility: HOSPITAL | Age: 67
End: 2022-04-15

## 2022-04-15 ENCOUNTER — APPOINTMENT (OUTPATIENT)
Dept: CARDIOLOGY | Facility: HOSPITAL | Age: 67
End: 2022-04-15

## 2022-04-15 PROBLEM — J12.82 PNEUMONIA DUE TO COVID-19 VIRUS: Status: ACTIVE | Noted: 2022-04-15

## 2022-04-15 PROBLEM — R79.89 ELEVATED D-DIMER: Status: ACTIVE | Noted: 2022-04-15

## 2022-04-15 PROBLEM — R77.8 ELEVATED TROPONIN: Status: ACTIVE | Noted: 2022-04-15

## 2022-04-15 PROBLEM — R79.89 ELEVATED TROPONIN: Status: ACTIVE | Noted: 2022-04-15

## 2022-04-15 PROBLEM — R74.01 TRANSAMINITIS: Status: ACTIVE | Noted: 2022-04-15

## 2022-04-15 PROBLEM — U07.1 PNEUMONIA DUE TO COVID-19 VIRUS: Status: ACTIVE | Noted: 2022-04-15

## 2022-04-15 PROBLEM — I82.402 ACUTE DEEP VEIN THROMBOSIS (DVT) OF LEFT LOWER EXTREMITY: Status: ACTIVE | Noted: 2022-04-15

## 2022-04-15 PROBLEM — N17.9 AKI (ACUTE KIDNEY INJURY): Status: ACTIVE | Noted: 2022-04-15

## 2022-04-15 LAB
ALBUMIN SERPL-MCNC: 3.3 G/DL (ref 3.5–5.2)
ALBUMIN/GLOB SERPL: 1.1 G/DL
ALP SERPL-CCNC: 249 U/L (ref 39–117)
ALT SERPL W P-5'-P-CCNC: 74 U/L (ref 1–41)
ANION GAP SERPL CALCULATED.3IONS-SCNC: 13.2 MMOL/L (ref 5–15)
AST SERPL-CCNC: 69 U/L (ref 1–40)
BH CV LOW VAS LEFT GASTRONEMIUS VESSEL: 1
BH CV LOW VAS RIGHT LESSER SAPH VESSEL: 1
BH CV LOWER VASCULAR LEFT COMMON FEMORAL AUGMENT: NORMAL
BH CV LOWER VASCULAR LEFT COMMON FEMORAL COMPETENT: NORMAL
BH CV LOWER VASCULAR LEFT COMMON FEMORAL COMPRESS: NORMAL
BH CV LOWER VASCULAR LEFT COMMON FEMORAL PHASIC: NORMAL
BH CV LOWER VASCULAR LEFT COMMON FEMORAL SPONT: NORMAL
BH CV LOWER VASCULAR LEFT DISTAL FEMORAL COMPRESS: NORMAL
BH CV LOWER VASCULAR LEFT GASTRONEMIUS COMPRESS: NORMAL
BH CV LOWER VASCULAR LEFT GASTRONEMIUS THROMBUS: NORMAL
BH CV LOWER VASCULAR LEFT GREATER SAPH AK COMPRESS: NORMAL
BH CV LOWER VASCULAR LEFT GREATER SAPH BK COMPRESS: NORMAL
BH CV LOWER VASCULAR LEFT LESSER SAPH COMPRESS: NORMAL
BH CV LOWER VASCULAR LEFT MID FEMORAL AUGMENT: NORMAL
BH CV LOWER VASCULAR LEFT MID FEMORAL COMPETENT: NORMAL
BH CV LOWER VASCULAR LEFT MID FEMORAL COMPRESS: NORMAL
BH CV LOWER VASCULAR LEFT MID FEMORAL PHASIC: NORMAL
BH CV LOWER VASCULAR LEFT MID FEMORAL SPONT: NORMAL
BH CV LOWER VASCULAR LEFT PERONEAL COMPRESS: NORMAL
BH CV LOWER VASCULAR LEFT POPLITEAL AUGMENT: NORMAL
BH CV LOWER VASCULAR LEFT POPLITEAL COMPETENT: NORMAL
BH CV LOWER VASCULAR LEFT POPLITEAL COMPRESS: NORMAL
BH CV LOWER VASCULAR LEFT POPLITEAL PHASIC: NORMAL
BH CV LOWER VASCULAR LEFT POPLITEAL SPONT: NORMAL
BH CV LOWER VASCULAR LEFT POSTERIOR TIBIAL COMPRESS: NORMAL
BH CV LOWER VASCULAR LEFT PROFUNDA FEMORAL COMPRESS: NORMAL
BH CV LOWER VASCULAR LEFT PROXIMAL FEMORAL COMPRESS: NORMAL
BH CV LOWER VASCULAR LEFT SAPHENOFEMORAL JUNCTION COMPRESS: NORMAL
BH CV LOWER VASCULAR RIGHT COMMON FEMORAL AUGMENT: NORMAL
BH CV LOWER VASCULAR RIGHT COMMON FEMORAL COMPETENT: NORMAL
BH CV LOWER VASCULAR RIGHT COMMON FEMORAL COMPRESS: NORMAL
BH CV LOWER VASCULAR RIGHT COMMON FEMORAL PHASIC: NORMAL
BH CV LOWER VASCULAR RIGHT COMMON FEMORAL SPONT: NORMAL
BH CV LOWER VASCULAR RIGHT DISTAL FEMORAL COMPRESS: NORMAL
BH CV LOWER VASCULAR RIGHT GASTRONEMIUS COMPRESS: NORMAL
BH CV LOWER VASCULAR RIGHT GREATER SAPH AK COMPRESS: NORMAL
BH CV LOWER VASCULAR RIGHT GREATER SAPH BK COMPRESS: NORMAL
BH CV LOWER VASCULAR RIGHT LESSER SAPH COMPRESS: NORMAL
BH CV LOWER VASCULAR RIGHT LESSER SAPH THROMBUS: NORMAL
BH CV LOWER VASCULAR RIGHT MID FEMORAL AUGMENT: NORMAL
BH CV LOWER VASCULAR RIGHT MID FEMORAL COMPETENT: NORMAL
BH CV LOWER VASCULAR RIGHT MID FEMORAL COMPRESS: NORMAL
BH CV LOWER VASCULAR RIGHT MID FEMORAL PHASIC: NORMAL
BH CV LOWER VASCULAR RIGHT MID FEMORAL SPONT: NORMAL
BH CV LOWER VASCULAR RIGHT PERONEAL COMPRESS: NORMAL
BH CV LOWER VASCULAR RIGHT POPLITEAL AUGMENT: NORMAL
BH CV LOWER VASCULAR RIGHT POPLITEAL COMPETENT: NORMAL
BH CV LOWER VASCULAR RIGHT POPLITEAL COMPRESS: NORMAL
BH CV LOWER VASCULAR RIGHT POPLITEAL PHASIC: NORMAL
BH CV LOWER VASCULAR RIGHT POPLITEAL SPONT: NORMAL
BH CV LOWER VASCULAR RIGHT POSTERIOR TIBIAL COMPRESS: NORMAL
BH CV LOWER VASCULAR RIGHT PROFUNDA FEMORAL COMPRESS: NORMAL
BH CV LOWER VASCULAR RIGHT PROXIMAL FEMORAL COMPRESS: NORMAL
BH CV LOWER VASCULAR RIGHT SAPHENOFEMORAL JUNCTION COMPRESS: NORMAL
BILIRUB SERPL-MCNC: 0.8 MG/DL (ref 0–1.2)
BUN SERPL-MCNC: 37 MG/DL (ref 8–23)
BUN/CREAT SERPL: 29.1 (ref 7–25)
CALCIUM SPEC-SCNC: 8.8 MG/DL (ref 8.6–10.5)
CHLORIDE SERPL-SCNC: 101 MMOL/L (ref 98–107)
CO2 SERPL-SCNC: 19.8 MMOL/L (ref 22–29)
CREAT SERPL-MCNC: 1.27 MG/DL (ref 0.76–1.27)
EGFRCR SERPLBLD CKD-EPI 2021: 61.2 ML/MIN/1.73
FERRITIN SERPL-MCNC: 3069 NG/ML (ref 30–400)
GLOBULIN UR ELPH-MCNC: 2.9 GM/DL
GLUCOSE BLDC GLUCOMTR-MCNC: 131 MG/DL (ref 70–130)
GLUCOSE BLDC GLUCOMTR-MCNC: 183 MG/DL (ref 70–130)
GLUCOSE BLDC GLUCOMTR-MCNC: 242 MG/DL (ref 70–130)
GLUCOSE BLDC GLUCOMTR-MCNC: 245 MG/DL (ref 70–130)
GLUCOSE SERPL-MCNC: 208 MG/DL (ref 65–99)
HBA1C MFR BLD: 5.5 % (ref 4.8–5.6)
L PNEUMO1 AG UR QL IA: NEGATIVE
MAXIMAL PREDICTED HEART RATE: 151 BPM
POTASSIUM SERPL-SCNC: 4.3 MMOL/L (ref 3.5–5.2)
PROT SERPL-MCNC: 6.2 G/DL (ref 6–8.5)
S PNEUM AG SPEC QL LA: NEGATIVE
SODIUM SERPL-SCNC: 134 MMOL/L (ref 136–145)
STRESS TARGET HR: 128 BPM
TROPONIN T SERPL-MCNC: 0.02 NG/ML (ref 0–0.03)
TROPONIN T SERPL-MCNC: 0.03 NG/ML (ref 0–0.03)

## 2022-04-15 PROCEDURE — 83036 HEMOGLOBIN GLYCOSYLATED A1C: CPT | Performed by: NURSE PRACTITIONER

## 2022-04-15 PROCEDURE — 25010000002 CEFTRIAXONE PER 250 MG: Performed by: NURSE PRACTITIONER

## 2022-04-15 PROCEDURE — 63710000001 INSULIN LISPRO (HUMAN) PER 5 UNITS: Performed by: NURSE PRACTITIONER

## 2022-04-15 PROCEDURE — 71275 CT ANGIOGRAPHY CHEST: CPT

## 2022-04-15 PROCEDURE — 93970 EXTREMITY STUDY: CPT

## 2022-04-15 PROCEDURE — 84484 ASSAY OF TROPONIN QUANT: CPT | Performed by: NURSE PRACTITIONER

## 2022-04-15 PROCEDURE — 99222 1ST HOSP IP/OBS MODERATE 55: CPT | Performed by: INTERNAL MEDICINE

## 2022-04-15 PROCEDURE — 82728 ASSAY OF FERRITIN: CPT | Performed by: HOSPITALIST

## 2022-04-15 PROCEDURE — 82962 GLUCOSE BLOOD TEST: CPT

## 2022-04-15 PROCEDURE — 87899 AGENT NOS ASSAY W/OPTIC: CPT | Performed by: HOSPITALIST

## 2022-04-15 PROCEDURE — 0 IOPAMIDOL PER 1 ML: Performed by: EMERGENCY MEDICINE

## 2022-04-15 PROCEDURE — 25010000002 ENOXAPARIN PER 10 MG: Performed by: NURSE PRACTITIONER

## 2022-04-15 PROCEDURE — 80053 COMPREHEN METABOLIC PANEL: CPT | Performed by: HOSPITALIST

## 2022-04-15 PROCEDURE — 63710000001 DEXAMETHASONE PER 0.25 MG: Performed by: NURSE PRACTITIONER

## 2022-04-15 PROCEDURE — 25010000002 DEXAMETHASONE PER 1 MG: Performed by: EMERGENCY MEDICINE

## 2022-04-15 RX ORDER — ALLOPURINOL 300 MG/1
300 TABLET ORAL DAILY
Status: DISCONTINUED | OUTPATIENT
Start: 2022-04-15 | End: 2022-04-22 | Stop reason: HOSPADM

## 2022-04-15 RX ORDER — DEXAMETHASONE SODIUM PHOSPHATE 10 MG/ML
6 INJECTION INTRAMUSCULAR; INTRAVENOUS ONCE
Status: COMPLETED | OUTPATIENT
Start: 2022-04-15 | End: 2022-04-15

## 2022-04-15 RX ORDER — NICOTINE POLACRILEX 4 MG
15 LOZENGE BUCCAL
Status: DISCONTINUED | OUTPATIENT
Start: 2022-04-15 | End: 2022-04-22 | Stop reason: HOSPADM

## 2022-04-15 RX ORDER — PANTOPRAZOLE SODIUM 40 MG/1
40 TABLET, DELAYED RELEASE ORAL DAILY
Status: DISCONTINUED | OUTPATIENT
Start: 2022-04-15 | End: 2022-04-22 | Stop reason: HOSPADM

## 2022-04-15 RX ORDER — TERAZOSIN 5 MG/1
5 CAPSULE ORAL NIGHTLY
Status: DISCONTINUED | OUTPATIENT
Start: 2022-04-15 | End: 2022-04-22 | Stop reason: HOSPADM

## 2022-04-15 RX ORDER — SODIUM CHLORIDE 9 MG/ML
75 INJECTION, SOLUTION INTRAVENOUS CONTINUOUS
Status: ACTIVE | OUTPATIENT
Start: 2022-04-15 | End: 2022-04-16

## 2022-04-15 RX ORDER — ATORVASTATIN CALCIUM 20 MG/1
40 TABLET, FILM COATED ORAL DAILY
Status: DISCONTINUED | OUTPATIENT
Start: 2022-04-15 | End: 2022-04-18

## 2022-04-15 RX ORDER — AMLODIPINE BESYLATE 10 MG/1
10 TABLET ORAL EVERY MORNING
Status: DISCONTINUED | OUTPATIENT
Start: 2022-04-15 | End: 2022-04-22 | Stop reason: HOSPADM

## 2022-04-15 RX ORDER — BENZONATATE 100 MG/1
200 CAPSULE ORAL 3 TIMES DAILY
Status: DISCONTINUED | OUTPATIENT
Start: 2022-04-15 | End: 2022-04-22 | Stop reason: HOSPADM

## 2022-04-15 RX ORDER — DEXAMETHASONE SODIUM PHOSPHATE 10 MG/ML
6 INJECTION INTRAMUSCULAR; INTRAVENOUS DAILY
Status: DISCONTINUED | OUTPATIENT
Start: 2022-04-15 | End: 2022-04-15

## 2022-04-15 RX ORDER — ASPIRIN 81 MG/1
81 TABLET ORAL DAILY
Status: DISCONTINUED | OUTPATIENT
Start: 2022-04-15 | End: 2022-04-22 | Stop reason: HOSPADM

## 2022-04-15 RX ORDER — DEXTROSE MONOHYDRATE 25 G/50ML
25 INJECTION, SOLUTION INTRAVENOUS
Status: DISCONTINUED | OUTPATIENT
Start: 2022-04-15 | End: 2022-04-22 | Stop reason: HOSPADM

## 2022-04-15 RX ORDER — MELATONIN
2000 DAILY
Status: DISCONTINUED | OUTPATIENT
Start: 2022-04-15 | End: 2022-04-22 | Stop reason: HOSPADM

## 2022-04-15 RX ORDER — INSULIN LISPRO 100 [IU]/ML
0-9 INJECTION, SOLUTION INTRAVENOUS; SUBCUTANEOUS
Status: DISCONTINUED | OUTPATIENT
Start: 2022-04-15 | End: 2022-04-22 | Stop reason: HOSPADM

## 2022-04-15 RX ADMIN — DEXAMETHASONE 6 MG: 4 TABLET ORAL at 08:21

## 2022-04-15 RX ADMIN — ENOXAPARIN SODIUM 40 MG: 100 INJECTION SUBCUTANEOUS at 08:22

## 2022-04-15 RX ADMIN — BENZONATATE 200 MG: 100 CAPSULE ORAL at 21:14

## 2022-04-15 RX ADMIN — DEXAMETHASONE SODIUM PHOSPHATE 6 MG: 10 INJECTION, SOLUTION INTRAMUSCULAR; INTRAVENOUS at 02:23

## 2022-04-15 RX ADMIN — INSULIN LISPRO 2 UNITS: 100 INJECTION, SOLUTION INTRAVENOUS; SUBCUTANEOUS at 17:49

## 2022-04-15 RX ADMIN — BENZONATATE 200 MG: 100 CAPSULE ORAL at 16:51

## 2022-04-15 RX ADMIN — IOPAMIDOL 95 ML: 755 INJECTION, SOLUTION INTRAVENOUS at 00:51

## 2022-04-15 RX ADMIN — INSULIN LISPRO 4 UNITS: 100 INJECTION, SOLUTION INTRAVENOUS; SUBCUTANEOUS at 11:32

## 2022-04-15 RX ADMIN — CEFTRIAXONE 1 G: 1 INJECTION, POWDER, FOR SOLUTION INTRAMUSCULAR; INTRAVENOUS at 12:03

## 2022-04-16 LAB
ALBUMIN SERPL-MCNC: 3.2 G/DL (ref 3.5–5.2)
ALBUMIN/GLOB SERPL: 1.1 G/DL
ALP SERPL-CCNC: 240 U/L (ref 39–117)
ALT SERPL W P-5'-P-CCNC: 71 U/L (ref 1–41)
ANION GAP SERPL CALCULATED.3IONS-SCNC: 13 MMOL/L (ref 5–15)
AST SERPL-CCNC: 53 U/L (ref 1–40)
BILIRUB SERPL-MCNC: 0.7 MG/DL (ref 0–1.2)
BUN SERPL-MCNC: 36 MG/DL (ref 8–23)
BUN/CREAT SERPL: 29.5 (ref 7–25)
CALCIUM SPEC-SCNC: 9.1 MG/DL (ref 8.6–10.5)
CHLORIDE SERPL-SCNC: 103 MMOL/L (ref 98–107)
CO2 SERPL-SCNC: 22 MMOL/L (ref 22–29)
CREAT SERPL-MCNC: 1.22 MG/DL (ref 0.76–1.27)
CRP SERPL-MCNC: 17.78 MG/DL (ref 0–0.5)
D DIMER PPP FEU-MCNC: 0.99 MCGFEU/ML (ref 0–0.49)
DEPRECATED RDW RBC AUTO: 44.2 FL (ref 37–54)
EGFRCR SERPLBLD CKD-EPI 2021: 64.2 ML/MIN/1.73
ERYTHROCYTE [DISTWIDTH] IN BLOOD BY AUTOMATED COUNT: 13.8 % (ref 12.3–15.4)
FERRITIN SERPL-MCNC: 3582 NG/ML (ref 30–400)
GLOBULIN UR ELPH-MCNC: 3 GM/DL
GLUCOSE BLDC GLUCOMTR-MCNC: 128 MG/DL (ref 70–130)
GLUCOSE BLDC GLUCOMTR-MCNC: 133 MG/DL (ref 70–130)
GLUCOSE BLDC GLUCOMTR-MCNC: 139 MG/DL (ref 70–130)
GLUCOSE BLDC GLUCOMTR-MCNC: 189 MG/DL (ref 70–130)
GLUCOSE SERPL-MCNC: 226 MG/DL (ref 65–99)
HCT VFR BLD AUTO: 25.7 % (ref 37.5–51)
HGB BLD-MCNC: 8.9 G/DL (ref 13–17.7)
LYMPHOCYTES # BLD MANUAL: 4.18 10*3/MM3 (ref 0.7–3.1)
LYMPHOCYTES NFR BLD MANUAL: 2 % (ref 5–12)
MCH RBC QN AUTO: 30.9 PG (ref 26.6–33)
MCHC RBC AUTO-ENTMCNC: 34.6 G/DL (ref 31.5–35.7)
MCV RBC AUTO: 89.2 FL (ref 79–97)
MONOCYTES # BLD: 0.19 10*3/MM3 (ref 0.1–0.9)
NEUTROPHILS # BLD AUTO: 5.12 10*3/MM3 (ref 1.7–7)
NEUTROPHILS NFR BLD MANUAL: 54 % (ref 42.7–76)
NRBC SPEC MANUAL: 2 /100 WBC (ref 0–0.2)
PLAT MORPH BLD: NORMAL
PLATELET # BLD AUTO: 348 10*3/MM3 (ref 140–450)
PMV BLD AUTO: 11.6 FL (ref 6–12)
POTASSIUM SERPL-SCNC: 4.3 MMOL/L (ref 3.5–5.2)
PROCALCITONIN SERPL-MCNC: 0.42 NG/ML (ref 0–0.25)
PROT SERPL-MCNC: 6.2 G/DL (ref 6–8.5)
RBC # BLD AUTO: 2.88 10*6/MM3 (ref 4.14–5.8)
RBC MORPH BLD: NORMAL
SODIUM SERPL-SCNC: 138 MMOL/L (ref 136–145)
VARIANT LYMPHS NFR BLD MANUAL: 44 % (ref 19.6–45.3)
WBC MORPH BLD: NORMAL
WBC NRBC COR # BLD: 9.49 10*3/MM3 (ref 3.4–10.8)

## 2022-04-16 PROCEDURE — 25010000002 CEFTRIAXONE PER 250 MG: Performed by: NURSE PRACTITIONER

## 2022-04-16 PROCEDURE — 85379 FIBRIN DEGRADATION QUANT: CPT | Performed by: NURSE PRACTITIONER

## 2022-04-16 PROCEDURE — 82962 GLUCOSE BLOOD TEST: CPT

## 2022-04-16 PROCEDURE — 86140 C-REACTIVE PROTEIN: CPT | Performed by: NURSE PRACTITIONER

## 2022-04-16 PROCEDURE — 25010000002 ENOXAPARIN PER 10 MG: Performed by: NURSE PRACTITIONER

## 2022-04-16 PROCEDURE — 82728 ASSAY OF FERRITIN: CPT | Performed by: HOSPITALIST

## 2022-04-16 PROCEDURE — 63710000001 INSULIN LISPRO (HUMAN) PER 5 UNITS: Performed by: NURSE PRACTITIONER

## 2022-04-16 PROCEDURE — 84145 PROCALCITONIN (PCT): CPT | Performed by: NURSE PRACTITIONER

## 2022-04-16 PROCEDURE — 85007 BL SMEAR W/DIFF WBC COUNT: CPT | Performed by: NURSE PRACTITIONER

## 2022-04-16 PROCEDURE — 85025 COMPLETE CBC W/AUTO DIFF WBC: CPT | Performed by: NURSE PRACTITIONER

## 2022-04-16 PROCEDURE — 80053 COMPREHEN METABOLIC PANEL: CPT | Performed by: HOSPITALIST

## 2022-04-16 PROCEDURE — 36415 COLL VENOUS BLD VENIPUNCTURE: CPT | Performed by: NURSE PRACTITIONER

## 2022-04-16 RX ADMIN — Medication 2000 UNITS: at 11:15

## 2022-04-16 RX ADMIN — ENOXAPARIN SODIUM 110 MG: 100 INJECTION SUBCUTANEOUS at 14:13

## 2022-04-16 RX ADMIN — ALLOPURINOL 300 MG: 300 TABLET ORAL at 11:15

## 2022-04-16 RX ADMIN — BENZONATATE 200 MG: 100 CAPSULE ORAL at 21:46

## 2022-04-16 RX ADMIN — ATORVASTATIN CALCIUM 40 MG: 20 TABLET, FILM COATED ORAL at 11:15

## 2022-04-16 RX ADMIN — CEFTRIAXONE 1 G: 1 INJECTION, POWDER, FOR SOLUTION INTRAMUSCULAR; INTRAVENOUS at 14:13

## 2022-04-16 RX ADMIN — BENZONATATE 200 MG: 100 CAPSULE ORAL at 11:15

## 2022-04-16 RX ADMIN — ENOXAPARIN SODIUM 110 MG: 100 INJECTION SUBCUTANEOUS at 00:50

## 2022-04-16 RX ADMIN — BENZONATATE 200 MG: 100 CAPSULE ORAL at 18:54

## 2022-04-16 RX ADMIN — AMLODIPINE BESYLATE 10 MG: 10 TABLET ORAL at 11:15

## 2022-04-16 RX ADMIN — ASPIRIN 81 MG: 81 TABLET, COATED ORAL at 11:15

## 2022-04-16 RX ADMIN — INSULIN LISPRO 4 UNITS: 100 INJECTION, SOLUTION INTRAVENOUS; SUBCUTANEOUS at 00:41

## 2022-04-16 RX ADMIN — TERAZOSIN HYDROCHLORIDE 5 MG: 5 CAPSULE ORAL at 21:46

## 2022-04-16 RX ADMIN — INSULIN LISPRO 2 UNITS: 100 INJECTION, SOLUTION INTRAVENOUS; SUBCUTANEOUS at 14:14

## 2022-04-16 RX ADMIN — PANTOPRAZOLE SODIUM 40 MG: 40 TABLET, DELAYED RELEASE ORAL at 11:15

## 2022-04-16 RX ADMIN — TERAZOSIN HYDROCHLORIDE 5 MG: 5 CAPSULE ORAL at 00:41

## 2022-04-17 ENCOUNTER — APPOINTMENT (OUTPATIENT)
Dept: CT IMAGING | Facility: HOSPITAL | Age: 67
End: 2022-04-17

## 2022-04-17 LAB
ALBUMIN SERPL-MCNC: 3.2 G/DL (ref 3.5–5.2)
ALBUMIN/GLOB SERPL: 1.1 G/DL
ALP SERPL-CCNC: 232 U/L (ref 39–117)
ALT SERPL W P-5'-P-CCNC: 88 U/L (ref 1–41)
ANION GAP SERPL CALCULATED.3IONS-SCNC: 13 MMOL/L (ref 5–15)
AST SERPL-CCNC: 70 U/L (ref 1–40)
BASOPHILS # BLD MANUAL: 0.09 10*3/MM3 (ref 0–0.2)
BASOPHILS NFR BLD MANUAL: 1 % (ref 0–1.5)
BILIRUB SERPL-MCNC: 0.8 MG/DL (ref 0–1.2)
BUN SERPL-MCNC: 33 MG/DL (ref 8–23)
BUN/CREAT SERPL: 26.8 (ref 7–25)
CALCIUM SPEC-SCNC: 9 MG/DL (ref 8.6–10.5)
CHLORIDE SERPL-SCNC: 104 MMOL/L (ref 98–107)
CO2 SERPL-SCNC: 22 MMOL/L (ref 22–29)
CREAT SERPL-MCNC: 1.23 MG/DL (ref 0.76–1.27)
CRP SERPL-MCNC: 12.65 MG/DL (ref 0–0.5)
DEPRECATED RDW RBC AUTO: 45.3 FL (ref 37–54)
EGFRCR SERPLBLD CKD-EPI 2021: 63.6 ML/MIN/1.73
ERYTHROCYTE [DISTWIDTH] IN BLOOD BY AUTOMATED COUNT: 14.1 % (ref 12.3–15.4)
FERRITIN SERPL-MCNC: 3246 NG/ML (ref 30–400)
GLOBULIN UR ELPH-MCNC: 2.8 GM/DL
GLUCOSE BLDC GLUCOMTR-MCNC: 108 MG/DL (ref 70–130)
GLUCOSE BLDC GLUCOMTR-MCNC: 124 MG/DL (ref 70–130)
GLUCOSE BLDC GLUCOMTR-MCNC: 140 MG/DL (ref 70–130)
GLUCOSE BLDC GLUCOMTR-MCNC: 168 MG/DL (ref 70–130)
GLUCOSE SERPL-MCNC: 103 MG/DL (ref 65–99)
HCT VFR BLD AUTO: 25.5 % (ref 37.5–51)
HGB BLD-MCNC: 8.9 G/DL (ref 13–17.7)
LYMPHOCYTES # BLD MANUAL: 4.13 10*3/MM3 (ref 0.7–3.1)
LYMPHOCYTES NFR BLD MANUAL: 5.1 % (ref 5–12)
MCH RBC QN AUTO: 31.2 PG (ref 26.6–33)
MCHC RBC AUTO-ENTMCNC: 34.9 G/DL (ref 31.5–35.7)
MCV RBC AUTO: 89.5 FL (ref 79–97)
METAMYELOCYTES NFR BLD MANUAL: 1 % (ref 0–0)
MONOCYTES # BLD: 0.47 10*3/MM3 (ref 0.1–0.9)
MRSA DNA SPEC QL NAA+PROBE: ABNORMAL
NEUTROPHILS # BLD AUTO: 4.42 10*3/MM3 (ref 1.7–7)
NEUTROPHILS NFR BLD MANUAL: 48 % (ref 42.7–76)
PLAT MORPH BLD: NORMAL
PLATELET # BLD AUTO: 360 10*3/MM3 (ref 140–450)
PMV BLD AUTO: 11.6 FL (ref 6–12)
POTASSIUM SERPL-SCNC: 4.3 MMOL/L (ref 3.5–5.2)
PROCALCITONIN SERPL-MCNC: 0.45 NG/ML (ref 0–0.25)
PROT SERPL-MCNC: 6 G/DL (ref 6–8.5)
QT INTERVAL: 370 MS
RBC # BLD AUTO: 2.85 10*6/MM3 (ref 4.14–5.8)
RBC MORPH BLD: NORMAL
SODIUM SERPL-SCNC: 139 MMOL/L (ref 136–145)
VARIANT LYMPHS NFR BLD MANUAL: 44.9 % (ref 19.6–45.3)
WBC MORPH BLD: NORMAL
WBC NRBC COR # BLD: 9.2 10*3/MM3 (ref 3.4–10.8)

## 2022-04-17 PROCEDURE — 82728 ASSAY OF FERRITIN: CPT | Performed by: HOSPITALIST

## 2022-04-17 PROCEDURE — 85025 COMPLETE CBC W/AUTO DIFF WBC: CPT | Performed by: NURSE PRACTITIONER

## 2022-04-17 PROCEDURE — 85007 BL SMEAR W/DIFF WBC COUNT: CPT | Performed by: NURSE PRACTITIONER

## 2022-04-17 PROCEDURE — 87205 SMEAR GRAM STAIN: CPT | Performed by: HOSPITALIST

## 2022-04-17 PROCEDURE — 87641 MR-STAPH DNA AMP PROBE: CPT | Performed by: HOSPITALIST

## 2022-04-17 PROCEDURE — 25010000002 ENOXAPARIN PER 10 MG: Performed by: NURSE PRACTITIONER

## 2022-04-17 PROCEDURE — 82962 GLUCOSE BLOOD TEST: CPT

## 2022-04-17 PROCEDURE — 84145 PROCALCITONIN (PCT): CPT | Performed by: HOSPITALIST

## 2022-04-17 PROCEDURE — 87070 CULTURE OTHR SPECIMN AEROBIC: CPT | Performed by: HOSPITALIST

## 2022-04-17 PROCEDURE — 25010000002 CEFTRIAXONE PER 250 MG: Performed by: NURSE PRACTITIONER

## 2022-04-17 PROCEDURE — 80053 COMPREHEN METABOLIC PANEL: CPT | Performed by: HOSPITALIST

## 2022-04-17 PROCEDURE — 71275 CT ANGIOGRAPHY CHEST: CPT

## 2022-04-17 PROCEDURE — 0 IOPAMIDOL PER 1 ML: Performed by: HOSPITALIST

## 2022-04-17 PROCEDURE — 25010000002 VANCOMYCIN 10 G RECONSTITUTED SOLUTION: Performed by: INTERNAL MEDICINE

## 2022-04-17 PROCEDURE — 86140 C-REACTIVE PROTEIN: CPT | Performed by: NURSE PRACTITIONER

## 2022-04-17 RX ORDER — CEFDINIR 300 MG/1
300 CAPSULE ORAL EVERY 12 HOURS SCHEDULED
Status: DISCONTINUED | OUTPATIENT
Start: 2022-04-17 | End: 2022-04-18

## 2022-04-17 RX ORDER — ACETAMINOPHEN 325 MG/1
650 TABLET ORAL EVERY 6 HOURS PRN
Status: DISCONTINUED | OUTPATIENT
Start: 2022-04-17 | End: 2022-04-22 | Stop reason: HOSPADM

## 2022-04-17 RX ADMIN — PANTOPRAZOLE SODIUM 40 MG: 40 TABLET, DELAYED RELEASE ORAL at 08:08

## 2022-04-17 RX ADMIN — Medication 2000 MG: at 17:27

## 2022-04-17 RX ADMIN — IOPAMIDOL 95 ML: 755 INJECTION, SOLUTION INTRAVENOUS at 08:42

## 2022-04-17 RX ADMIN — TERAZOSIN HYDROCHLORIDE 5 MG: 5 CAPSULE ORAL at 20:11

## 2022-04-17 RX ADMIN — CEFTRIAXONE 1 G: 1 INJECTION, POWDER, FOR SOLUTION INTRAMUSCULAR; INTRAVENOUS at 12:33

## 2022-04-17 RX ADMIN — ENOXAPARIN SODIUM 110 MG: 100 INJECTION SUBCUTANEOUS at 12:34

## 2022-04-17 RX ADMIN — BENZONATATE 200 MG: 100 CAPSULE ORAL at 16:40

## 2022-04-17 RX ADMIN — ACETAMINOPHEN 650 MG: 325 TABLET ORAL at 15:51

## 2022-04-17 RX ADMIN — ATORVASTATIN CALCIUM 40 MG: 20 TABLET, FILM COATED ORAL at 08:08

## 2022-04-17 RX ADMIN — ENOXAPARIN SODIUM 110 MG: 100 INJECTION SUBCUTANEOUS at 04:54

## 2022-04-17 RX ADMIN — ALLOPURINOL 300 MG: 300 TABLET ORAL at 08:08

## 2022-04-17 RX ADMIN — Medication 2000 UNITS: at 08:08

## 2022-04-17 RX ADMIN — ACETAMINOPHEN 650 MG: 325 TABLET ORAL at 00:37

## 2022-04-17 RX ADMIN — BENZONATATE 200 MG: 100 CAPSULE ORAL at 20:10

## 2022-04-17 RX ADMIN — BENZONATATE 200 MG: 100 CAPSULE ORAL at 08:12

## 2022-04-17 RX ADMIN — ACETAMINOPHEN 650 MG: 325 TABLET ORAL at 07:50

## 2022-04-17 RX ADMIN — CEFDINIR 300 MG: 300 CAPSULE ORAL at 20:10

## 2022-04-17 RX ADMIN — ASPIRIN 81 MG: 81 TABLET, COATED ORAL at 08:08

## 2022-04-17 RX ADMIN — AMLODIPINE BESYLATE 10 MG: 10 TABLET ORAL at 07:50

## 2022-04-18 DIAGNOSIS — C91.10 CLL (CHRONIC LYMPHOCYTIC LEUKEMIA): Primary | ICD-10-CM

## 2022-04-18 DIAGNOSIS — D69.6 THROMBOCYTOPENIA: ICD-10-CM

## 2022-04-18 LAB
ALBUMIN SERPL-MCNC: 2.9 G/DL (ref 3.5–5.2)
ALBUMIN/GLOB SERPL: 1 G/DL
ALP SERPL-CCNC: 207 U/L (ref 39–117)
ALT SERPL W P-5'-P-CCNC: 82 U/L (ref 1–41)
ANION GAP SERPL CALCULATED.3IONS-SCNC: 13.6 MMOL/L (ref 5–15)
AST SERPL-CCNC: 58 U/L (ref 1–40)
BASOPHILS # BLD AUTO: 0.01 10*3/MM3 (ref 0–0.2)
BASOPHILS NFR BLD AUTO: 0.1 % (ref 0–1.5)
BILIRUB SERPL-MCNC: 0.9 MG/DL (ref 0–1.2)
BUN SERPL-MCNC: 26 MG/DL (ref 8–23)
BUN/CREAT SERPL: 24.1 (ref 7–25)
CALCIUM SPEC-SCNC: 8.7 MG/DL (ref 8.6–10.5)
CHLORIDE SERPL-SCNC: 104 MMOL/L (ref 98–107)
CO2 SERPL-SCNC: 21.4 MMOL/L (ref 22–29)
CREAT SERPL-MCNC: 1.08 MG/DL (ref 0.76–1.27)
CRP SERPL-MCNC: 20.94 MG/DL (ref 0–0.5)
D DIMER PPP FEU-MCNC: 0.97 MCGFEU/ML (ref 0–0.49)
DEPRECATED RDW RBC AUTO: 44.4 FL (ref 37–54)
EGFRCR SERPLBLD CKD-EPI 2021: 74.3 ML/MIN/1.73
EOSINOPHIL # BLD AUTO: 0.01 10*3/MM3 (ref 0–0.4)
EOSINOPHIL NFR BLD AUTO: 0.1 % (ref 0.3–6.2)
ERYTHROCYTE [DISTWIDTH] IN BLOOD BY AUTOMATED COUNT: 13.7 % (ref 12.3–15.4)
FERRITIN SERPL-MCNC: 3026 NG/ML (ref 30–400)
GLOBULIN UR ELPH-MCNC: 2.8 GM/DL
GLUCOSE BLDC GLUCOMTR-MCNC: 119 MG/DL (ref 70–130)
GLUCOSE BLDC GLUCOMTR-MCNC: 141 MG/DL (ref 70–130)
GLUCOSE BLDC GLUCOMTR-MCNC: 166 MG/DL (ref 70–130)
GLUCOSE BLDC GLUCOMTR-MCNC: 169 MG/DL (ref 70–130)
GLUCOSE SERPL-MCNC: 108 MG/DL (ref 65–99)
HCT VFR BLD AUTO: 25.2 % (ref 37.5–51)
HGB BLD-MCNC: 8.5 G/DL (ref 13–17.7)
IMM GRANULOCYTES # BLD AUTO: 0.32 10*3/MM3 (ref 0–0.05)
IMM GRANULOCYTES NFR BLD AUTO: 3.3 % (ref 0–0.5)
LYMPHOCYTES # BLD AUTO: 4.82 10*3/MM3 (ref 0.7–3.1)
LYMPHOCYTES NFR BLD AUTO: 49.4 % (ref 19.6–45.3)
MCH RBC QN AUTO: 30.4 PG (ref 26.6–33)
MCHC RBC AUTO-ENTMCNC: 33.7 G/DL (ref 31.5–35.7)
MCV RBC AUTO: 90 FL (ref 79–97)
MONOCYTES # BLD AUTO: 0.15 10*3/MM3 (ref 0.1–0.9)
MONOCYTES NFR BLD AUTO: 1.5 % (ref 5–12)
NEUTROPHILS NFR BLD AUTO: 4.45 10*3/MM3 (ref 1.7–7)
NEUTROPHILS NFR BLD AUTO: 45.6 % (ref 42.7–76)
NRBC BLD AUTO-RTO: 0.2 /100 WBC (ref 0–0.2)
PLATELET # BLD AUTO: 326 10*3/MM3 (ref 140–450)
PMV BLD AUTO: 11.2 FL (ref 6–12)
POTASSIUM SERPL-SCNC: 4 MMOL/L (ref 3.5–5.2)
PROCALCITONIN SERPL-MCNC: 0.52 NG/ML (ref 0–0.25)
PROT SERPL-MCNC: 5.7 G/DL (ref 6–8.5)
RBC # BLD AUTO: 2.8 10*6/MM3 (ref 4.14–5.8)
SODIUM SERPL-SCNC: 139 MMOL/L (ref 136–145)
WBC NRBC COR # BLD: 9.76 10*3/MM3 (ref 3.4–10.8)

## 2022-04-18 PROCEDURE — 82962 GLUCOSE BLOOD TEST: CPT

## 2022-04-18 PROCEDURE — 36415 COLL VENOUS BLD VENIPUNCTURE: CPT | Performed by: NURSE PRACTITIONER

## 2022-04-18 PROCEDURE — 82728 ASSAY OF FERRITIN: CPT | Performed by: HOSPITALIST

## 2022-04-18 PROCEDURE — 85379 FIBRIN DEGRADATION QUANT: CPT | Performed by: NURSE PRACTITIONER

## 2022-04-18 PROCEDURE — 25010000002 ENOXAPARIN PER 10 MG: Performed by: NURSE PRACTITIONER

## 2022-04-18 PROCEDURE — 86140 C-REACTIVE PROTEIN: CPT | Performed by: NURSE PRACTITIONER

## 2022-04-18 PROCEDURE — 80053 COMPREHEN METABOLIC PANEL: CPT | Performed by: HOSPITALIST

## 2022-04-18 PROCEDURE — 25010000002 VANCOMYCIN 750 MG RECONSTITUTED SOLUTION: Performed by: INTERNAL MEDICINE

## 2022-04-18 PROCEDURE — 85025 COMPLETE CBC W/AUTO DIFF WBC: CPT | Performed by: NURSE PRACTITIONER

## 2022-04-18 PROCEDURE — 87040 BLOOD CULTURE FOR BACTERIA: CPT | Performed by: HOSPITALIST

## 2022-04-18 PROCEDURE — 84145 PROCALCITONIN (PCT): CPT | Performed by: NURSE PRACTITIONER

## 2022-04-18 RX ORDER — ATORVASTATIN CALCIUM 20 MG/1
40 TABLET, FILM COATED ORAL NIGHTLY
Status: DISCONTINUED | OUTPATIENT
Start: 2022-04-19 | End: 2022-04-22 | Stop reason: HOSPADM

## 2022-04-18 RX ADMIN — ASPIRIN 81 MG: 81 TABLET, COATED ORAL at 08:07

## 2022-04-18 RX ADMIN — ENOXAPARIN SODIUM 110 MG: 100 INJECTION SUBCUTANEOUS at 13:35

## 2022-04-18 RX ADMIN — TERAZOSIN HYDROCHLORIDE 5 MG: 5 CAPSULE ORAL at 21:11

## 2022-04-18 RX ADMIN — BENZONATATE 200 MG: 100 CAPSULE ORAL at 21:11

## 2022-04-18 RX ADMIN — APIXABAN 10 MG: 5 TABLET, FILM COATED ORAL at 21:11

## 2022-04-18 RX ADMIN — ACETAMINOPHEN 650 MG: 325 TABLET ORAL at 00:18

## 2022-04-18 RX ADMIN — ATORVASTATIN CALCIUM 40 MG: 20 TABLET, FILM COATED ORAL at 21:11

## 2022-04-18 RX ADMIN — ENOXAPARIN SODIUM 110 MG: 100 INJECTION SUBCUTANEOUS at 00:18

## 2022-04-18 RX ADMIN — BENZONATATE 200 MG: 100 CAPSULE ORAL at 17:26

## 2022-04-18 RX ADMIN — Medication 2000 UNITS: at 08:07

## 2022-04-18 RX ADMIN — ALLOPURINOL 300 MG: 300 TABLET ORAL at 08:07

## 2022-04-18 RX ADMIN — AMLODIPINE BESYLATE 10 MG: 10 TABLET ORAL at 08:07

## 2022-04-18 RX ADMIN — CEFDINIR 300 MG: 300 CAPSULE ORAL at 08:07

## 2022-04-18 RX ADMIN — ACETAMINOPHEN 650 MG: 325 TABLET ORAL at 14:35

## 2022-04-18 RX ADMIN — SODIUM CHLORIDE 750 MG: 900 INJECTION, SOLUTION INTRAVENOUS at 17:26

## 2022-04-18 RX ADMIN — PANTOPRAZOLE SODIUM 40 MG: 40 TABLET, DELAYED RELEASE ORAL at 08:07

## 2022-04-18 RX ADMIN — BENZONATATE 200 MG: 100 CAPSULE ORAL at 08:07

## 2022-04-18 RX ADMIN — SODIUM CHLORIDE 750 MG: 900 INJECTION, SOLUTION INTRAVENOUS at 06:03

## 2022-04-18 RX ADMIN — ACETAMINOPHEN 650 MG: 325 TABLET ORAL at 21:12

## 2022-04-19 LAB
ALBUMIN SERPL-MCNC: 2.8 G/DL (ref 3.5–5.2)
ALBUMIN/GLOB SERPL: 1 G/DL
ALP SERPL-CCNC: 187 U/L (ref 39–117)
ALT SERPL W P-5'-P-CCNC: 85 U/L (ref 1–41)
ANION GAP SERPL CALCULATED.3IONS-SCNC: 11 MMOL/L (ref 5–15)
AST SERPL-CCNC: 62 U/L (ref 1–40)
BACTERIA SPEC AEROBE CULT: NORMAL
BACTERIA SPEC RESP CULT: NORMAL
BASOPHILS # BLD AUTO: 0.01 10*3/MM3 (ref 0–0.2)
BASOPHILS NFR BLD AUTO: 0.1 % (ref 0–1.5)
BILIRUB SERPL-MCNC: 1.1 MG/DL (ref 0–1.2)
BUN SERPL-MCNC: 23 MG/DL (ref 8–23)
BUN/CREAT SERPL: 21.1 (ref 7–25)
CALCIUM SPEC-SCNC: 8.5 MG/DL (ref 8.6–10.5)
CHLORIDE SERPL-SCNC: 105 MMOL/L (ref 98–107)
CO2 SERPL-SCNC: 21 MMOL/L (ref 22–29)
CREAT SERPL-MCNC: 1.09 MG/DL (ref 0.76–1.27)
CRP SERPL-MCNC: 22.63 MG/DL (ref 0–0.5)
DEPRECATED RDW RBC AUTO: 45.5 FL (ref 37–54)
EGFRCR SERPLBLD CKD-EPI 2021: 73.5 ML/MIN/1.73
EOSINOPHIL # BLD AUTO: 0.01 10*3/MM3 (ref 0–0.4)
EOSINOPHIL NFR BLD AUTO: 0.1 % (ref 0.3–6.2)
ERYTHROCYTE [DISTWIDTH] IN BLOOD BY AUTOMATED COUNT: 13.6 % (ref 12.3–15.4)
FERRITIN SERPL-MCNC: 3114 NG/ML (ref 30–400)
GLOBULIN UR ELPH-MCNC: 2.9 GM/DL
GLUCOSE BLDC GLUCOMTR-MCNC: 119 MG/DL (ref 70–130)
GLUCOSE BLDC GLUCOMTR-MCNC: 130 MG/DL (ref 70–130)
GLUCOSE BLDC GLUCOMTR-MCNC: 135 MG/DL (ref 70–130)
GLUCOSE BLDC GLUCOMTR-MCNC: 199 MG/DL (ref 70–130)
GLUCOSE SERPL-MCNC: 105 MG/DL (ref 65–99)
GRAM STN SPEC: NORMAL
HCT VFR BLD AUTO: 24.3 % (ref 37.5–51)
HGB BLD-MCNC: 8 G/DL (ref 13–17.7)
IMM GRANULOCYTES # BLD AUTO: 0.34 10*3/MM3 (ref 0–0.05)
IMM GRANULOCYTES NFR BLD AUTO: 4.5 % (ref 0–0.5)
INR PPP: 1.56 (ref 0.9–1.1)
LYMPHOCYTES # BLD AUTO: 2.2 10*3/MM3 (ref 0.7–3.1)
LYMPHOCYTES NFR BLD AUTO: 29.3 % (ref 19.6–45.3)
MAGNESIUM SERPL-MCNC: 1.9 MG/DL (ref 1.6–2.4)
MCH RBC QN AUTO: 30.4 PG (ref 26.6–33)
MCHC RBC AUTO-ENTMCNC: 32.9 G/DL (ref 31.5–35.7)
MCV RBC AUTO: 92.4 FL (ref 79–97)
MONOCYTES # BLD AUTO: 0.11 10*3/MM3 (ref 0.1–0.9)
MONOCYTES NFR BLD AUTO: 1.5 % (ref 5–12)
NEUTROPHILS NFR BLD AUTO: 4.83 10*3/MM3 (ref 1.7–7)
NEUTROPHILS NFR BLD AUTO: 64.5 % (ref 42.7–76)
NRBC BLD AUTO-RTO: 0.3 /100 WBC (ref 0–0.2)
PHOSPHATE SERPL-MCNC: 3.3 MG/DL (ref 2.5–4.5)
PLATELET # BLD AUTO: 310 10*3/MM3 (ref 140–450)
PMV BLD AUTO: 11.2 FL (ref 6–12)
POTASSIUM SERPL-SCNC: 3.8 MMOL/L (ref 3.5–5.2)
PROT SERPL-MCNC: 5.7 G/DL (ref 6–8.5)
PROTHROMBIN TIME: 18.6 SECONDS (ref 11.7–14.2)
RBC # BLD AUTO: 2.63 10*6/MM3 (ref 4.14–5.8)
SARS-COV-2 AG RESP QL IA.RAPID: DETECTED
SODIUM SERPL-SCNC: 137 MMOL/L (ref 136–145)
VANCOMYCIN TROUGH SERPL-MCNC: 9.8 MCG/ML (ref 5–20)
WBC NRBC COR # BLD: 7.5 10*3/MM3 (ref 3.4–10.8)

## 2022-04-19 PROCEDURE — 82962 GLUCOSE BLOOD TEST: CPT

## 2022-04-19 PROCEDURE — 80202 ASSAY OF VANCOMYCIN: CPT | Performed by: INTERNAL MEDICINE

## 2022-04-19 PROCEDURE — 85025 COMPLETE CBC W/AUTO DIFF WBC: CPT | Performed by: NURSE PRACTITIONER

## 2022-04-19 PROCEDURE — 99223 1ST HOSP IP/OBS HIGH 75: CPT | Performed by: INTERNAL MEDICINE

## 2022-04-19 PROCEDURE — 63710000001 INSULIN LISPRO (HUMAN) PER 5 UNITS: Performed by: NURSE PRACTITIONER

## 2022-04-19 PROCEDURE — 86140 C-REACTIVE PROTEIN: CPT | Performed by: NURSE PRACTITIONER

## 2022-04-19 PROCEDURE — 87426 SARSCOV CORONAVIRUS AG IA: CPT | Performed by: INTERNAL MEDICINE

## 2022-04-19 PROCEDURE — 80053 COMPREHEN METABOLIC PANEL: CPT | Performed by: HOSPITALIST

## 2022-04-19 PROCEDURE — 63710000001 DEXAMETHASONE PER 0.25 MG: Performed by: INTERNAL MEDICINE

## 2022-04-19 PROCEDURE — 84100 ASSAY OF PHOSPHORUS: CPT | Performed by: INTERNAL MEDICINE

## 2022-04-19 PROCEDURE — 36415 COLL VENOUS BLD VENIPUNCTURE: CPT | Performed by: NURSE PRACTITIONER

## 2022-04-19 PROCEDURE — 25010000002 VANCOMYCIN 750 MG RECONSTITUTED SOLUTION: Performed by: INTERNAL MEDICINE

## 2022-04-19 PROCEDURE — 82728 ASSAY OF FERRITIN: CPT | Performed by: HOSPITALIST

## 2022-04-19 PROCEDURE — 83735 ASSAY OF MAGNESIUM: CPT | Performed by: INTERNAL MEDICINE

## 2022-04-19 PROCEDURE — 85610 PROTHROMBIN TIME: CPT | Performed by: INTERNAL MEDICINE

## 2022-04-19 RX ORDER — VANCOMYCIN HYDROCHLORIDE 1 G/200ML
1000 INJECTION, SOLUTION INTRAVENOUS EVERY 12 HOURS
Status: DISCONTINUED | OUTPATIENT
Start: 2022-04-20 | End: 2022-04-20

## 2022-04-19 RX ADMIN — DEXAMETHASONE 6 MG: 4 TABLET ORAL at 14:43

## 2022-04-19 RX ADMIN — ATORVASTATIN CALCIUM 40 MG: 20 TABLET, FILM COATED ORAL at 22:03

## 2022-04-19 RX ADMIN — SODIUM CHLORIDE 750 MG: 900 INJECTION, SOLUTION INTRAVENOUS at 17:58

## 2022-04-19 RX ADMIN — MUPIROCIN 1 APPLICATION: 20 OINTMENT TOPICAL at 14:43

## 2022-04-19 RX ADMIN — TERAZOSIN HYDROCHLORIDE 5 MG: 5 CAPSULE ORAL at 20:36

## 2022-04-19 RX ADMIN — BENZONATATE 200 MG: 100 CAPSULE ORAL at 20:34

## 2022-04-19 RX ADMIN — ACETAMINOPHEN 650 MG: 325 TABLET ORAL at 04:15

## 2022-04-19 RX ADMIN — MUPIROCIN 1 APPLICATION: 20 OINTMENT TOPICAL at 20:36

## 2022-04-19 RX ADMIN — ALLOPURINOL 300 MG: 300 TABLET ORAL at 08:16

## 2022-04-19 RX ADMIN — APIXABAN 10 MG: 5 TABLET, FILM COATED ORAL at 08:16

## 2022-04-19 RX ADMIN — AMLODIPINE BESYLATE 10 MG: 10 TABLET ORAL at 08:16

## 2022-04-19 RX ADMIN — BENZONATATE 200 MG: 100 CAPSULE ORAL at 08:16

## 2022-04-19 RX ADMIN — INSULIN LISPRO 2 UNITS: 100 INJECTION, SOLUTION INTRAVENOUS; SUBCUTANEOUS at 22:04

## 2022-04-19 RX ADMIN — PANTOPRAZOLE SODIUM 40 MG: 40 TABLET, DELAYED RELEASE ORAL at 08:16

## 2022-04-19 RX ADMIN — SODIUM CHLORIDE 750 MG: 900 INJECTION, SOLUTION INTRAVENOUS at 04:15

## 2022-04-19 RX ADMIN — Medication 2000 UNITS: at 08:16

## 2022-04-19 RX ADMIN — APIXABAN 10 MG: 5 TABLET, FILM COATED ORAL at 20:35

## 2022-04-19 RX ADMIN — BENZONATATE 200 MG: 100 CAPSULE ORAL at 16:55

## 2022-04-19 RX ADMIN — ASPIRIN 81 MG: 81 TABLET, COATED ORAL at 08:17

## 2022-04-19 RX ADMIN — ACETAMINOPHEN 650 MG: 325 TABLET ORAL at 14:48

## 2022-04-19 RX ADMIN — Medication 10 ML: at 08:17

## 2022-04-20 LAB
ALBUMIN SERPL-MCNC: 2.9 G/DL (ref 3.5–5.2)
ALBUMIN SERPL-MCNC: 3.3 G/DL (ref 3.5–5.2)
ALBUMIN/GLOB SERPL: 0.9 G/DL
ALP SERPL-CCNC: 184 U/L (ref 39–117)
ALP SERPL-CCNC: 198 U/L (ref 39–117)
ALT SERPL W P-5'-P-CCNC: 87 U/L (ref 1–41)
ALT SERPL W P-5'-P-CCNC: 95 U/L (ref 1–41)
ANION GAP SERPL CALCULATED.3IONS-SCNC: 11.1 MMOL/L (ref 5–15)
AST SERPL-CCNC: 67 U/L (ref 1–40)
AST SERPL-CCNC: 67 U/L (ref 1–40)
BACTERIA SPEC AEROBE CULT: NORMAL
BASOPHILS # BLD AUTO: 0 10*3/MM3 (ref 0–0.2)
BASOPHILS NFR BLD AUTO: 0 % (ref 0–1.5)
BILIRUB CONJ SERPL-MCNC: 0.3 MG/DL (ref 0–0.3)
BILIRUB INDIRECT SERPL-MCNC: 0.4 MG/DL
BILIRUB SERPL-MCNC: 0.7 MG/DL (ref 0–1.2)
BILIRUB SERPL-MCNC: 0.8 MG/DL (ref 0–1.2)
BUN SERPL-MCNC: 31 MG/DL (ref 8–23)
BUN/CREAT SERPL: 27.7 (ref 7–25)
CALCIUM SPEC-SCNC: 8.7 MG/DL (ref 8.6–10.5)
CHLORIDE SERPL-SCNC: 101 MMOL/L (ref 98–107)
CO2 SERPL-SCNC: 19.9 MMOL/L (ref 22–29)
CREAT SERPL-MCNC: 1.12 MG/DL (ref 0.76–1.27)
CREAT SERPL-MCNC: 1.21 MG/DL (ref 0.76–1.27)
CRP SERPL-MCNC: 20.76 MG/DL (ref 0–0.5)
D DIMER PPP FEU-MCNC: 0.9 MCGFEU/ML (ref 0–0.49)
DEPRECATED RDW RBC AUTO: 46.9 FL (ref 37–54)
EGFRCR SERPLBLD CKD-EPI 2021: 64.8 ML/MIN/1.73
EGFRCR SERPLBLD CKD-EPI 2021: 71.1 ML/MIN/1.73
EOSINOPHIL # BLD AUTO: 0 10*3/MM3 (ref 0–0.4)
EOSINOPHIL NFR BLD AUTO: 0 % (ref 0.3–6.2)
ERYTHROCYTE [DISTWIDTH] IN BLOOD BY AUTOMATED COUNT: 14.1 % (ref 12.3–15.4)
FERRITIN SERPL-MCNC: 3283 NG/ML (ref 30–400)
GLOBULIN UR ELPH-MCNC: 3.1 GM/DL
GLUCOSE BLDC GLUCOMTR-MCNC: 166 MG/DL (ref 70–130)
GLUCOSE BLDC GLUCOMTR-MCNC: 168 MG/DL (ref 70–130)
GLUCOSE BLDC GLUCOMTR-MCNC: 176 MG/DL (ref 70–130)
GLUCOSE BLDC GLUCOMTR-MCNC: 230 MG/DL (ref 70–130)
GLUCOSE SERPL-MCNC: 218 MG/DL (ref 65–99)
HCT VFR BLD AUTO: 22.6 % (ref 37.5–51)
HGB BLD-MCNC: 7.5 G/DL (ref 13–17.7)
IMM GRANULOCYTES # BLD AUTO: 0.25 10*3/MM3 (ref 0–0.05)
IMM GRANULOCYTES NFR BLD AUTO: 3.1 % (ref 0–0.5)
LYMPHOCYTES # BLD AUTO: 3.06 10*3/MM3 (ref 0.7–3.1)
LYMPHOCYTES NFR BLD AUTO: 38.5 % (ref 19.6–45.3)
MCH RBC QN AUTO: 31.1 PG (ref 26.6–33)
MCHC RBC AUTO-ENTMCNC: 33.2 G/DL (ref 31.5–35.7)
MCV RBC AUTO: 93.8 FL (ref 79–97)
MONOCYTES # BLD AUTO: 0.14 10*3/MM3 (ref 0.1–0.9)
MONOCYTES NFR BLD AUTO: 1.8 % (ref 5–12)
NEUTROPHILS NFR BLD AUTO: 4.49 10*3/MM3 (ref 1.7–7)
NEUTROPHILS NFR BLD AUTO: 56.6 % (ref 42.7–76)
NRBC BLD AUTO-RTO: 0.3 /100 WBC (ref 0–0.2)
PLATELET # BLD AUTO: 293 10*3/MM3 (ref 140–450)
PMV BLD AUTO: 11.5 FL (ref 6–12)
POTASSIUM SERPL-SCNC: 4.2 MMOL/L (ref 3.5–5.2)
PROCALCITONIN SERPL-MCNC: 0.76 NG/ML (ref 0–0.25)
PROT SERPL-MCNC: 6 G/DL (ref 6–8.5)
PROT SERPL-MCNC: 6.2 G/DL (ref 6–8.5)
RBC # BLD AUTO: 2.41 10*6/MM3 (ref 4.14–5.8)
SODIUM SERPL-SCNC: 132 MMOL/L (ref 136–145)
WBC NRBC COR # BLD: 7.94 10*3/MM3 (ref 3.4–10.8)

## 2022-04-20 PROCEDURE — XW033E5 INTRODUCTION OF REMDESIVIR ANTI-INFECTIVE INTO PERIPHERAL VEIN, PERCUTANEOUS APPROACH, NEW TECHNOLOGY GROUP 5: ICD-10-PCS | Performed by: INTERNAL MEDICINE

## 2022-04-20 PROCEDURE — 82248 BILIRUBIN DIRECT: CPT | Performed by: INTERNAL MEDICINE

## 2022-04-20 PROCEDURE — 80053 COMPREHEN METABOLIC PANEL: CPT | Performed by: INTERNAL MEDICINE

## 2022-04-20 PROCEDURE — 63710000001 INSULIN LISPRO (HUMAN) PER 5 UNITS: Performed by: NURSE PRACTITIONER

## 2022-04-20 PROCEDURE — 82962 GLUCOSE BLOOD TEST: CPT

## 2022-04-20 PROCEDURE — 84145 PROCALCITONIN (PCT): CPT | Performed by: NURSE PRACTITIONER

## 2022-04-20 PROCEDURE — 25010000002 REMDESIVIR 100 MG/20ML SOLUTION 1 EACH VIAL: Performed by: INTERNAL MEDICINE

## 2022-04-20 PROCEDURE — 82565 ASSAY OF CREATININE: CPT | Performed by: INTERNAL MEDICINE

## 2022-04-20 PROCEDURE — 86140 C-REACTIVE PROTEIN: CPT | Performed by: NURSE PRACTITIONER

## 2022-04-20 PROCEDURE — 85379 FIBRIN DEGRADATION QUANT: CPT | Performed by: NURSE PRACTITIONER

## 2022-04-20 PROCEDURE — 99233 SBSQ HOSP IP/OBS HIGH 50: CPT | Performed by: INTERNAL MEDICINE

## 2022-04-20 PROCEDURE — 25010000002 VANCOMYCIN PER 500 MG: Performed by: INTERNAL MEDICINE

## 2022-04-20 PROCEDURE — 85025 COMPLETE CBC W/AUTO DIFF WBC: CPT | Performed by: NURSE PRACTITIONER

## 2022-04-20 PROCEDURE — 82728 ASSAY OF FERRITIN: CPT | Performed by: HOSPITALIST

## 2022-04-20 PROCEDURE — 63710000001 DEXAMETHASONE PER 0.25 MG: Performed by: INTERNAL MEDICINE

## 2022-04-20 RX ADMIN — VANCOMYCIN HYDROCHLORIDE 1000 MG: 1 INJECTION, SOLUTION INTRAVENOUS at 06:30

## 2022-04-20 RX ADMIN — BENZONATATE 200 MG: 100 CAPSULE ORAL at 15:23

## 2022-04-20 RX ADMIN — DEXAMETHASONE 6 MG: 4 TABLET ORAL at 08:41

## 2022-04-20 RX ADMIN — INSULIN LISPRO 2 UNITS: 100 INJECTION, SOLUTION INTRAVENOUS; SUBCUTANEOUS at 08:40

## 2022-04-20 RX ADMIN — INSULIN LISPRO 2 UNITS: 100 INJECTION, SOLUTION INTRAVENOUS; SUBCUTANEOUS at 11:53

## 2022-04-20 RX ADMIN — INSULIN LISPRO 4 UNITS: 100 INJECTION, SOLUTION INTRAVENOUS; SUBCUTANEOUS at 21:05

## 2022-04-20 RX ADMIN — TERAZOSIN HYDROCHLORIDE 5 MG: 5 CAPSULE ORAL at 21:06

## 2022-04-20 RX ADMIN — MUPIROCIN 1 APPLICATION: 20 OINTMENT TOPICAL at 08:40

## 2022-04-20 RX ADMIN — APIXABAN 10 MG: 5 TABLET, FILM COATED ORAL at 08:41

## 2022-04-20 RX ADMIN — ALLOPURINOL 300 MG: 300 TABLET ORAL at 08:41

## 2022-04-20 RX ADMIN — ASPIRIN 81 MG: 81 TABLET, COATED ORAL at 08:41

## 2022-04-20 RX ADMIN — REMDESIVIR 200 MG: 100 INJECTION, POWDER, LYOPHILIZED, FOR SOLUTION INTRAVENOUS at 10:13

## 2022-04-20 RX ADMIN — INSULIN LISPRO 2 UNITS: 100 INJECTION, SOLUTION INTRAVENOUS; SUBCUTANEOUS at 17:32

## 2022-04-20 RX ADMIN — AMLODIPINE BESYLATE 10 MG: 10 TABLET ORAL at 06:26

## 2022-04-20 RX ADMIN — BENZONATATE 200 MG: 100 CAPSULE ORAL at 08:41

## 2022-04-20 RX ADMIN — PANTOPRAZOLE SODIUM 40 MG: 40 TABLET, DELAYED RELEASE ORAL at 08:41

## 2022-04-20 RX ADMIN — MUPIROCIN 1 APPLICATION: 20 OINTMENT TOPICAL at 21:06

## 2022-04-20 RX ADMIN — Medication 2000 UNITS: at 08:41

## 2022-04-20 RX ADMIN — BENZONATATE 200 MG: 100 CAPSULE ORAL at 21:06

## 2022-04-20 RX ADMIN — APIXABAN 10 MG: 5 TABLET, FILM COATED ORAL at 21:06

## 2022-04-20 RX ADMIN — ATORVASTATIN CALCIUM 40 MG: 20 TABLET, FILM COATED ORAL at 21:06

## 2022-04-21 ENCOUNTER — APPOINTMENT (OUTPATIENT)
Dept: ULTRASOUND IMAGING | Facility: HOSPITAL | Age: 67
End: 2022-04-21

## 2022-04-21 LAB
ALBUMIN SERPL-MCNC: 3.4 G/DL (ref 3.5–5.2)
ALP SERPL-CCNC: 201 U/L (ref 39–117)
ALT SERPL W P-5'-P-CCNC: 146 U/L (ref 1–41)
ANION GAP SERPL CALCULATED.3IONS-SCNC: 16 MMOL/L (ref 5–15)
AST SERPL-CCNC: 127 U/L (ref 1–40)
BILIRUB CONJ SERPL-MCNC: 0.3 MG/DL (ref 0–0.3)
BILIRUB INDIRECT SERPL-MCNC: 0.5 MG/DL
BILIRUB SERPL-MCNC: 0.8 MG/DL (ref 0–1.2)
BUN SERPL-MCNC: 32 MG/DL (ref 8–23)
BUN/CREAT SERPL: 32.7 (ref 7–25)
CALCIUM SPEC-SCNC: 9 MG/DL (ref 8.6–10.5)
CHLORIDE SERPL-SCNC: 98 MMOL/L (ref 98–107)
CO2 SERPL-SCNC: 20 MMOL/L (ref 22–29)
CREAT SERPL-MCNC: 0.98 MG/DL (ref 0.76–1.27)
DEPRECATED RDW RBC AUTO: 45.9 FL (ref 37–54)
EGFRCR SERPLBLD CKD-EPI 2021: 83.5 ML/MIN/1.73
ERYTHROCYTE [DISTWIDTH] IN BLOOD BY AUTOMATED COUNT: 14.1 % (ref 12.3–15.4)
FERRITIN SERPL-MCNC: 4391 NG/ML (ref 30–400)
GLUCOSE BLDC GLUCOMTR-MCNC: 137 MG/DL (ref 70–130)
GLUCOSE BLDC GLUCOMTR-MCNC: 146 MG/DL (ref 70–130)
GLUCOSE BLDC GLUCOMTR-MCNC: 152 MG/DL (ref 70–130)
GLUCOSE BLDC GLUCOMTR-MCNC: 156 MG/DL (ref 70–130)
GLUCOSE SERPL-MCNC: 118 MG/DL (ref 65–99)
HAV IGM SERPL QL IA: NORMAL
HBV CORE IGM SERPL QL IA: NORMAL
HBV SURFACE AG SERPL QL IA: NORMAL
HCT VFR BLD AUTO: 25.8 % (ref 37.5–51)
HCV AB SER DONR QL: NORMAL
HGB BLD-MCNC: 8.6 G/DL (ref 13–17.7)
MCH RBC QN AUTO: 30.4 PG (ref 26.6–33)
MCHC RBC AUTO-ENTMCNC: 33.3 G/DL (ref 31.5–35.7)
MCV RBC AUTO: 91.2 FL (ref 79–97)
PHOSPHATE SERPL-MCNC: 3.7 MG/DL (ref 2.5–4.5)
PLATELET # BLD AUTO: 354 10*3/MM3 (ref 140–450)
PMV BLD AUTO: 11.6 FL (ref 6–12)
POTASSIUM SERPL-SCNC: 4.2 MMOL/L (ref 3.5–5.2)
PROT SERPL-MCNC: 6.5 G/DL (ref 6–8.5)
RBC # BLD AUTO: 2.83 10*6/MM3 (ref 4.14–5.8)
SODIUM SERPL-SCNC: 134 MMOL/L (ref 136–145)
WBC NRBC COR # BLD: 14.46 10*3/MM3 (ref 3.4–10.8)

## 2022-04-21 PROCEDURE — 84100 ASSAY OF PHOSPHORUS: CPT | Performed by: INTERNAL MEDICINE

## 2022-04-21 PROCEDURE — 80074 ACUTE HEPATITIS PANEL: CPT | Performed by: INTERNAL MEDICINE

## 2022-04-21 PROCEDURE — 82962 GLUCOSE BLOOD TEST: CPT

## 2022-04-21 PROCEDURE — 76705 ECHO EXAM OF ABDOMEN: CPT

## 2022-04-21 PROCEDURE — 99232 SBSQ HOSP IP/OBS MODERATE 35: CPT | Performed by: INTERNAL MEDICINE

## 2022-04-21 PROCEDURE — 63710000001 DEXAMETHASONE PER 0.25 MG: Performed by: INTERNAL MEDICINE

## 2022-04-21 PROCEDURE — 63710000001 INSULIN LISPRO (HUMAN) PER 5 UNITS: Performed by: NURSE PRACTITIONER

## 2022-04-21 PROCEDURE — 82728 ASSAY OF FERRITIN: CPT | Performed by: HOSPITALIST

## 2022-04-21 PROCEDURE — 80048 BASIC METABOLIC PNL TOTAL CA: CPT | Performed by: INTERNAL MEDICINE

## 2022-04-21 PROCEDURE — 85027 COMPLETE CBC AUTOMATED: CPT | Performed by: INTERNAL MEDICINE

## 2022-04-21 PROCEDURE — 25010000002 REMDESIVIR 100 MG/20ML SOLUTION 1 EACH VIAL: Performed by: INTERNAL MEDICINE

## 2022-04-21 PROCEDURE — 36415 COLL VENOUS BLD VENIPUNCTURE: CPT | Performed by: INTERNAL MEDICINE

## 2022-04-21 PROCEDURE — 80076 HEPATIC FUNCTION PANEL: CPT | Performed by: INTERNAL MEDICINE

## 2022-04-21 RX ORDER — NITROGLYCERIN 0.4 MG/1
0.4 TABLET SUBLINGUAL
Status: DISCONTINUED | OUTPATIENT
Start: 2022-04-21 | End: 2022-04-22 | Stop reason: HOSPADM

## 2022-04-21 RX ADMIN — ATORVASTATIN CALCIUM 40 MG: 20 TABLET, FILM COATED ORAL at 21:10

## 2022-04-21 RX ADMIN — INSULIN LISPRO 2 UNITS: 100 INJECTION, SOLUTION INTRAVENOUS; SUBCUTANEOUS at 12:01

## 2022-04-21 RX ADMIN — BENZONATATE 200 MG: 100 CAPSULE ORAL at 08:51

## 2022-04-21 RX ADMIN — APIXABAN 10 MG: 5 TABLET, FILM COATED ORAL at 21:10

## 2022-04-21 RX ADMIN — REMDESIVIR 100 MG: 100 INJECTION, POWDER, LYOPHILIZED, FOR SOLUTION INTRAVENOUS at 10:18

## 2022-04-21 RX ADMIN — BENZONATATE 200 MG: 100 CAPSULE ORAL at 16:53

## 2022-04-21 RX ADMIN — MUPIROCIN 1 APPLICATION: 20 OINTMENT TOPICAL at 11:09

## 2022-04-21 RX ADMIN — PANTOPRAZOLE SODIUM 40 MG: 40 TABLET, DELAYED RELEASE ORAL at 08:50

## 2022-04-21 RX ADMIN — TERAZOSIN HYDROCHLORIDE 5 MG: 5 CAPSULE ORAL at 21:11

## 2022-04-21 RX ADMIN — DEXAMETHASONE 6 MG: 4 TABLET ORAL at 08:50

## 2022-04-21 RX ADMIN — ALLOPURINOL 300 MG: 300 TABLET ORAL at 08:51

## 2022-04-21 RX ADMIN — APIXABAN 10 MG: 5 TABLET, FILM COATED ORAL at 08:50

## 2022-04-21 RX ADMIN — BENZONATATE 200 MG: 100 CAPSULE ORAL at 21:11

## 2022-04-21 RX ADMIN — INSULIN LISPRO 2 UNITS: 100 INJECTION, SOLUTION INTRAVENOUS; SUBCUTANEOUS at 21:12

## 2022-04-21 RX ADMIN — MUPIROCIN 1 APPLICATION: 20 OINTMENT TOPICAL at 21:16

## 2022-04-21 RX ADMIN — AMLODIPINE BESYLATE 10 MG: 10 TABLET ORAL at 06:37

## 2022-04-21 RX ADMIN — Medication 2000 UNITS: at 08:50

## 2022-04-21 RX ADMIN — ASPIRIN 81 MG: 81 TABLET, COATED ORAL at 08:50

## 2022-04-21 RX ADMIN — ACETAMINOPHEN 650 MG: 325 TABLET ORAL at 18:10

## 2022-04-22 ENCOUNTER — READMISSION MANAGEMENT (OUTPATIENT)
Dept: CALL CENTER | Facility: HOSPITAL | Age: 67
End: 2022-04-22

## 2022-04-22 VITALS
BODY MASS INDEX: 30.92 KG/M2 | RESPIRATION RATE: 18 BRPM | SYSTOLIC BLOOD PRESSURE: 133 MMHG | WEIGHT: 228.3 LBS | HEART RATE: 83 BPM | OXYGEN SATURATION: 96 % | DIASTOLIC BLOOD PRESSURE: 77 MMHG | TEMPERATURE: 98.1 F | HEIGHT: 72 IN

## 2022-04-22 PROBLEM — D89.832 CYTOKINE RELEASE SYNDROME, GRADE 2: Status: ACTIVE | Noted: 2022-04-22

## 2022-04-22 LAB
ALBUMIN SERPL-MCNC: 3.1 G/DL (ref 3.5–5.2)
ALP SERPL-CCNC: 177 U/L (ref 39–117)
ALT SERPL W P-5'-P-CCNC: 134 U/L (ref 1–41)
AST SERPL-CCNC: 82 U/L (ref 1–40)
BILIRUB CONJ SERPL-MCNC: 0.2 MG/DL (ref 0–0.3)
BILIRUB INDIRECT SERPL-MCNC: 0.4 MG/DL
BILIRUB SERPL-MCNC: 0.6 MG/DL (ref 0–1.2)
CREAT SERPL-MCNC: 1.03 MG/DL (ref 0.76–1.27)
CRP SERPL-MCNC: 9.43 MG/DL (ref 0–0.5)
D DIMER PPP FEU-MCNC: 0.59 MCGFEU/ML (ref 0–0.49)
EGFRCR SERPLBLD CKD-EPI 2021: 78.6 ML/MIN/1.73
FERRITIN SERPL-MCNC: 3652 NG/ML (ref 30–400)
GLUCOSE BLDC GLUCOMTR-MCNC: 128 MG/DL (ref 70–130)
GLUCOSE BLDC GLUCOMTR-MCNC: 171 MG/DL (ref 70–130)
PROCALCITONIN SERPL-MCNC: 0.49 NG/ML (ref 0–0.25)
PROT SERPL-MCNC: 5.8 G/DL (ref 6–8.5)

## 2022-04-22 PROCEDURE — 86140 C-REACTIVE PROTEIN: CPT | Performed by: INTERNAL MEDICINE

## 2022-04-22 PROCEDURE — 82728 ASSAY OF FERRITIN: CPT | Performed by: HOSPITALIST

## 2022-04-22 PROCEDURE — 63710000001 INSULIN LISPRO (HUMAN) PER 5 UNITS: Performed by: NURSE PRACTITIONER

## 2022-04-22 PROCEDURE — 63710000001 DEXAMETHASONE PER 0.25 MG: Performed by: INTERNAL MEDICINE

## 2022-04-22 PROCEDURE — 80076 HEPATIC FUNCTION PANEL: CPT | Performed by: INTERNAL MEDICINE

## 2022-04-22 PROCEDURE — 36415 COLL VENOUS BLD VENIPUNCTURE: CPT | Performed by: INTERNAL MEDICINE

## 2022-04-22 PROCEDURE — 99232 SBSQ HOSP IP/OBS MODERATE 35: CPT | Performed by: INTERNAL MEDICINE

## 2022-04-22 PROCEDURE — 85379 FIBRIN DEGRADATION QUANT: CPT | Performed by: NURSE PRACTITIONER

## 2022-04-22 PROCEDURE — 82565 ASSAY OF CREATININE: CPT | Performed by: INTERNAL MEDICINE

## 2022-04-22 PROCEDURE — 82962 GLUCOSE BLOOD TEST: CPT

## 2022-04-22 PROCEDURE — 25010000002 REMDESIVIR 100 MG/20ML SOLUTION 1 EACH VIAL: Performed by: INTERNAL MEDICINE

## 2022-04-22 PROCEDURE — 84145 PROCALCITONIN (PCT): CPT | Performed by: NURSE PRACTITIONER

## 2022-04-22 RX ORDER — DEXAMETHASONE 6 MG/1
6 TABLET ORAL
Qty: 6 TABLET | Refills: 0 | Status: ON HOLD | OUTPATIENT
Start: 2022-04-23 | End: 2022-05-03

## 2022-04-22 RX ORDER — BENZONATATE 200 MG/1
200 CAPSULE ORAL 3 TIMES DAILY
Qty: 30 CAPSULE | Refills: 0 | Status: SHIPPED | OUTPATIENT
Start: 2022-04-22 | End: 2022-05-25

## 2022-04-22 RX ADMIN — APIXABAN 10 MG: 5 TABLET, FILM COATED ORAL at 08:00

## 2022-04-22 RX ADMIN — ASPIRIN 81 MG: 81 TABLET, COATED ORAL at 08:00

## 2022-04-22 RX ADMIN — DEXAMETHASONE 6 MG: 4 TABLET ORAL at 08:00

## 2022-04-22 RX ADMIN — AMLODIPINE BESYLATE 10 MG: 10 TABLET ORAL at 07:59

## 2022-04-22 RX ADMIN — Medication 2000 UNITS: at 08:00

## 2022-04-22 RX ADMIN — MUPIROCIN 1 APPLICATION: 20 OINTMENT TOPICAL at 08:00

## 2022-04-22 RX ADMIN — INSULIN LISPRO 2 UNITS: 100 INJECTION, SOLUTION INTRAVENOUS; SUBCUTANEOUS at 12:10

## 2022-04-22 RX ADMIN — ALLOPURINOL 300 MG: 300 TABLET ORAL at 08:00

## 2022-04-22 RX ADMIN — BENZONATATE 200 MG: 100 CAPSULE ORAL at 08:00

## 2022-04-22 RX ADMIN — PANTOPRAZOLE SODIUM 40 MG: 40 TABLET, DELAYED RELEASE ORAL at 08:00

## 2022-04-22 RX ADMIN — REMDESIVIR 100 MG: 100 INJECTION, POWDER, LYOPHILIZED, FOR SOLUTION INTRAVENOUS at 08:01

## 2022-04-23 ENCOUNTER — READMISSION MANAGEMENT (OUTPATIENT)
Dept: CALL CENTER | Facility: HOSPITAL | Age: 67
End: 2022-04-23

## 2022-04-23 LAB
BACTERIA SPEC AEROBE CULT: NORMAL
BACTERIA SPEC AEROBE CULT: NORMAL

## 2022-04-23 NOTE — OUTREACH NOTE
COVID-19 Week 1 Survey    Flowsheet Row Responses   [facility] facility patient discharged from? [facility]   Does the patient have one of the following disease processes/diagnoses(primary or secondary)? COVID-19   COVID-19 underlying condition? None   Call Number Call 1   Week 1 Call successful? No   Discharge diagnosis PNA due to Covid 19          VLADIMIR H - Registered Nurse

## 2022-04-23 NOTE — OUTREACH NOTE
Prep Survey    Flowsheet Row Responses   [facility] facility patient discharged from? [facility]   Is LACE score < 7 ? No   Emergency Room discharge w/ pulse ox? No   Eligibility Readm Mgmt   Discharge diagnosis PNA due to Covid 19   Does the patient have one of the following disease processes/diagnoses(primary or secondary)? COVID-19   Does the patient have Home health ordered? No   Is there a DME ordered? No   Prep survey completed? Yes          LEOPOLDO HAN - Registered Nurse

## 2022-04-24 ENCOUNTER — READMISSION MANAGEMENT (OUTPATIENT)
Dept: CALL CENTER | Facility: HOSPITAL | Age: 67
End: 2022-04-24

## 2022-04-24 NOTE — OUTREACH NOTE
COVID-19 Week 1 Survey    Flowsheet Row Responses   [facility] patient discharged from? [place]   Does the patient have one of the following disease processes/diagnoses(primary or secondary)? COVID-19   COVID-19 underlying condition? None   Call Number Call 2   Week 1 Call successful? Yes   Call start time 1012   Call end time 1018   Discharge diagnosis PNA due to Covid 19   Is patient permission given to speak with other caregiver? Yes   List who call center can speak with Spouse   Person spoke with today (if not patient) and relationship Spouse/patient   Meds reviewed with patient/caregiver? Yes   Is the patient having any side effects they believe may be caused by any medication additions or changes? No   Does the patient have all medications ordered at discharge? Yes   Is the patient taking all medications as directed (includes completed medication regime)? Yes   Does the patient have a primary care provider?  Yes   Comments regarding PCP PCP follow up 4/26/22   Does the patient have an appointment with their PCP or specialist within 7 days of discharge? Yes   Has the patient kept scheduled appointments due by today? N/A   Has home health visited the patient within 72 hours of discharge? N/A   Psychosocial issues? No   Did the patient receive a copy of their discharge instructions? Yes   Did the patient receive a copy of COVID-19 specific instructions? Yes   Nursing interventions Reviewed instructions with patient   What is the patient's perception of their health status since discharge? Improving   Does the patient have any of the following symptoms? Cough   Nursing Interventions Nurse provided patient education   Pulse Ox monitoring None  [Spouse will begin to check O2 sats]   Is the patient/caregiver able to teach back steps to recovery at home? Set small, achievable goals for return to baseline health, Rest and rebuild strength, gradually increase activity, Eat a well-balance diet   If the patient  is a current smoker, are they able to teach back resources for cessation? Not a smoker   Is the patient/caregiver able to teach back the hierarchy of who to call/visit for symptoms/problems? PCP, Specialist, Home health nurse, Urgent Care, ED, 911 Yes   COVID-19 call completed? Yes          THEO JARAMILLO - Registered Nurse

## 2022-04-25 ENCOUNTER — TELEPHONE (OUTPATIENT)
Dept: ONCOLOGY | Facility: CLINIC | Age: 67
End: 2022-04-25

## 2022-04-25 ENCOUNTER — READMISSION MANAGEMENT (OUTPATIENT)
Dept: CALL CENTER | Facility: HOSPITAL | Age: 67
End: 2022-04-25

## 2022-04-25 NOTE — TELEPHONE ENCOUNTER
Provider: DR BLANCO  Caller: HUI  Relationship to Patient: SELF    Reason for Call: HUI IS CALLING STATES THAT DR BLANCO TOOK HIM OFF THE IMBRUVICA WHILE HE WAS IN THE HOSPITAL,  HUI IS WANTING TO KNOW IF HE NEEDS TO STAY OFF THEM UNTIL HE HAS HIS FOLLOW UP ON 5-2.    PLEASE ADVISE

## 2022-04-25 NOTE — OUTREACH NOTE
COVID-19 Week 1 Survey    Flowsheet Row Responses   [facility] patient discharged from? [place]   Does the patient have one of the following disease processes/diagnoses(primary or secondary)? COVID-19   COVID-19 underlying condition? None   Call Number Call 3   Week 1 Call successful? Yes   Call start time [phone]   Call end time 0908   Discharge diagnosis PNA due to Covid 19   Person spoke with today (if not patient) and relationship Spouse/patient   Meds reviewed with patient/caregiver? Yes   Is the patient taking all medications as directed (includes completed medication regime)? Yes   Comments regarding PCP 4/26/22   Has the patient kept scheduled appointments due by today? N/A   DME comments Pt wears nocturnal oxygen    What is the patient's perception of their health status since discharge? Improving   Does the patient have any of the following symptoms? Cough  [Cough has greatly improved ]   Nursing Interventions Nurse provided patient education   Pulse Ox monitoring Intermittent   Pulse Ox device source Patient   O2 Sat comments 95% on RA    Is the patient/caregiver able to teach back steps to recovery at home? Rest and rebuild strength, gradually increase activity, Eat a well-balance diet   COVID-19 call completed? Yes          KENNY SILVA - Registered Nurse

## 2022-04-25 NOTE — TELEPHONE ENCOUNTER
After reviewing notes, returned call to patient to let him know that he is to hold the Imbruvica till he sees Dr. Cheng on 5/2/2022.  Message left for him and he can return my call if needs.

## 2022-04-27 ENCOUNTER — TELEPHONE (OUTPATIENT)
Dept: SURGERY | Facility: CLINIC | Age: 67
End: 2022-04-27

## 2022-04-27 NOTE — TELEPHONE ENCOUNTER
"04/28/22: LM x2 for pt to call our office to sched an OV with Dr Caro    04/27/22:LM to ask pt to call back to schedule an OV with Dr. Caro.    HUB: if pt calls back pls sched \"New Problem\" OV - dx: esophageal thickening - Dr Marsh referring - imaging? Where?  Pt last seen '19 with Dr Caro  "

## 2022-04-28 ENCOUNTER — READMISSION MANAGEMENT (OUTPATIENT)
Dept: CALL CENTER | Facility: HOSPITAL | Age: 67
End: 2022-04-28

## 2022-04-28 NOTE — OUTREACH NOTE
COVID-19 Week 1 Survey    Flowsheet Row Responses   [facility] patient discharged from? [place]   Does the patient have one of the following disease processes/diagnoses(primary or secondary)? COVID-19   COVID-19 underlying condition? None   Call Number Call 4   Week 1 Call successful? Yes  [Reached patient but the call kept cutting out. He states that he is at the [facility]. Informed patient that we will be calling back to follow up on his recovery. ]   Call start time 1339   Call end time 1341   Discharge diagnosis PNA due to Covid 19   Person spoke with today (if not patient) and relationship patient          ALVAREZ TURNER - Registered Nurse

## 2022-05-01 ENCOUNTER — READMISSION MANAGEMENT (OUTPATIENT)
Dept: CALL CENTER | Facility: HOSPITAL | Age: 67
End: 2022-05-01

## 2022-05-01 NOTE — OUTREACH NOTE
COVID-19 Week 2 Survey    Flowsheet Row Responses   [facility] patient discharged from? [place]   Does the patient have one of the following disease processes/diagnoses(primary or secondary)? COVID-19   COVID-19 underlying condition? None   Call Number Call 1   COVID-19 Week 2: Call 1 attempt successful? No   Discharge diagnosis PNA due to Covid 19          MYNOR ROBLES - Registered Nurse

## 2022-05-02 ENCOUNTER — APPOINTMENT (OUTPATIENT)
Dept: GENERAL RADIOLOGY | Facility: HOSPITAL | Age: 67
End: 2022-05-02

## 2022-05-02 ENCOUNTER — LAB (OUTPATIENT)
Dept: LAB | Facility: HOSPITAL | Age: 67
End: 2022-05-02

## 2022-05-02 ENCOUNTER — APPOINTMENT (OUTPATIENT)
Dept: CT IMAGING | Facility: HOSPITAL | Age: 67
End: 2022-05-02

## 2022-05-02 ENCOUNTER — OFFICE VISIT (OUTPATIENT)
Dept: ONCOLOGY | Facility: CLINIC | Age: 67
End: 2022-05-02

## 2022-05-02 ENCOUNTER — HOSPITAL ENCOUNTER (INPATIENT)
Facility: HOSPITAL | Age: 67
LOS: 3 days | Discharge: HOME | End: 2022-05-05
Attending: EMERGENCY MEDICINE | Admitting: INTERNAL MEDICINE

## 2022-05-02 VITALS
HEIGHT: 72 IN | RESPIRATION RATE: 16 BRPM | BODY MASS INDEX: 30.12 KG/M2 | TEMPERATURE: 96.6 F | WEIGHT: 222.4 LBS | OXYGEN SATURATION: 95 % | HEART RATE: 103 BPM

## 2022-05-02 DIAGNOSIS — C91.10 CLL (CHRONIC LYMPHOCYTIC LEUKEMIA): ICD-10-CM

## 2022-05-02 DIAGNOSIS — D84.9 IMMUNOCOMPROMISED: ICD-10-CM

## 2022-05-02 DIAGNOSIS — N28.9 ACUTE RENAL INSUFFICIENCY: ICD-10-CM

## 2022-05-02 DIAGNOSIS — D53.9 MACROCYTIC ANEMIA: ICD-10-CM

## 2022-05-02 DIAGNOSIS — D69.6 THROMBOCYTOPENIA: ICD-10-CM

## 2022-05-02 DIAGNOSIS — U07.1 COVID-19: ICD-10-CM

## 2022-05-02 DIAGNOSIS — D64.9 ANEMIA, UNSPECIFIED TYPE: Primary | ICD-10-CM

## 2022-05-02 DIAGNOSIS — C91.10 CLL (CHRONIC LYMPHOCYTIC LEUKEMIA): Primary | ICD-10-CM

## 2022-05-02 DIAGNOSIS — Z85.6 PERSONAL HISTORY OF CLL (CHRONIC LYMPHOCYTIC LEUKEMIA): ICD-10-CM

## 2022-05-02 DIAGNOSIS — I95.9 HYPOTENSION, UNSPECIFIED HYPOTENSION TYPE: ICD-10-CM

## 2022-05-02 LAB
ABO GROUP BLD: NORMAL
ALBUMIN SERPL-MCNC: 2.6 G/DL (ref 3.5–5.2)
ALBUMIN SERPL-MCNC: 2.7 G/DL (ref 3.5–5.2)
ALBUMIN/GLOB SERPL: 1 G/DL (ref 1.1–2.4)
ALBUMIN/GLOB SERPL: 1.1 G/DL
ALP SERPL-CCNC: 137 U/L (ref 39–117)
ALP SERPL-CCNC: 138 U/L (ref 38–116)
ALT SERPL W P-5'-P-CCNC: 73 U/L (ref 0–41)
ALT SERPL W P-5'-P-CCNC: 75 U/L (ref 1–41)
ANION GAP SERPL CALCULATED.3IONS-SCNC: 11.1 MMOL/L (ref 5–15)
ANION GAP SERPL CALCULATED.3IONS-SCNC: 8.7 MMOL/L (ref 5–15)
ANISOCYTOSIS BLD QL: ABNORMAL
AST SERPL-CCNC: 43 U/L (ref 1–40)
AST SERPL-CCNC: 45 U/L (ref 0–40)
BACTERIA UR QL AUTO: NORMAL /HPF
BASOPHILS # BLD AUTO: 0.01 10*3/MM3 (ref 0–0.2)
BASOPHILS NFR BLD AUTO: 0.1 % (ref 0–1.5)
BILIRUB SERPL-MCNC: 0.8 MG/DL (ref 0–1.2)
BILIRUB SERPL-MCNC: 0.9 MG/DL (ref 0.2–1.2)
BILIRUB UR QL STRIP: NEGATIVE
BLD GP AB SCN SERPL QL: NEGATIVE
BUN SERPL-MCNC: 27 MG/DL (ref 8–23)
BUN SERPL-MCNC: 28 MG/DL (ref 6–20)
BUN/CREAT SERPL: 16.8 (ref 7–25)
BUN/CREAT SERPL: 18.9 (ref 7.3–30)
CALCIUM SPEC-SCNC: 8.2 MG/DL (ref 8.6–10.5)
CALCIUM SPEC-SCNC: 8.4 MG/DL (ref 8.5–10.2)
CHLORIDE SERPL-SCNC: 100 MMOL/L (ref 98–107)
CHLORIDE SERPL-SCNC: 99 MMOL/L (ref 98–107)
CLARITY UR: CLEAR
CO2 SERPL-SCNC: 23.9 MMOL/L (ref 22–29)
CO2 SERPL-SCNC: 24.3 MMOL/L (ref 22–29)
COLOR UR: ABNORMAL
CREAT SERPL-MCNC: 1.48 MG/DL (ref 0.7–1.3)
CREAT SERPL-MCNC: 1.61 MG/DL (ref 0.76–1.27)
D-LACTATE SERPL-SCNC: 1.4 MMOL/L (ref 0.5–2)
DEPRECATED RDW RBC AUTO: 57.9 FL (ref 37–54)
DEPRECATED RDW RBC AUTO: 71 FL (ref 37–54)
EGFRCR SERPLBLD CKD-EPI 2021: 46 ML/MIN/1.73
EGFRCR SERPLBLD CKD-EPI 2021: 50.9 ML/MIN/1.73
EOSINOPHIL # BLD AUTO: 0.04 10*3/MM3 (ref 0–0.4)
EOSINOPHIL NFR BLD AUTO: 0.5 % (ref 0.3–6.2)
ERYTHROCYTE [DISTWIDTH] IN BLOOD BY AUTOMATED COUNT: 16.7 % (ref 12.3–15.4)
ERYTHROCYTE [DISTWIDTH] IN BLOOD BY AUTOMATED COUNT: 19.1 % (ref 12.3–15.4)
EXPIRATION DATE: NORMAL
FECAL OCCULT BLOOD SCREEN, POC: NEGATIVE
GLOBULIN UR ELPH-MCNC: 2.4 GM/DL
GLOBULIN UR ELPH-MCNC: 2.6 GM/DL (ref 1.8–3.5)
GLUCOSE BLDC GLUCOMTR-MCNC: 100 MG/DL (ref 70–130)
GLUCOSE SERPL-MCNC: 118 MG/DL (ref 65–99)
GLUCOSE SERPL-MCNC: 217 MG/DL (ref 74–124)
GLUCOSE UR STRIP-MCNC: NEGATIVE MG/DL
HCT VFR BLD AUTO: 23.3 % (ref 37.5–51)
HCT VFR BLD AUTO: 25.3 % (ref 37.5–51)
HGB BLD-MCNC: 7.6 G/DL (ref 13–17.7)
HGB BLD-MCNC: 7.9 G/DL (ref 13–17.7)
HGB UR QL STRIP.AUTO: NEGATIVE
HYALINE CASTS UR QL AUTO: NORMAL /LPF
IMM GRANULOCYTES # BLD AUTO: 0.06 10*3/MM3 (ref 0–0.05)
IMM GRANULOCYTES NFR BLD AUTO: 0.7 % (ref 0–0.5)
KETONES UR QL STRIP: NEGATIVE
LEUKOCYTE ESTERASE UR QL STRIP.AUTO: ABNORMAL
LYMPHOCYTES # BLD AUTO: 4.47 10*3/MM3 (ref 0.7–3.1)
LYMPHOCYTES # BLD MANUAL: 6.49 10*3/MM3 (ref 0.7–3.1)
LYMPHOCYTES NFR BLD AUTO: 50.5 % (ref 19.6–45.3)
LYMPHOCYTES NFR BLD MANUAL: 2 % (ref 5–12)
Lab: 190
MAGNESIUM SERPL-MCNC: 1.8 MG/DL (ref 1.6–2.4)
MCH RBC QN AUTO: 31.1 PG (ref 26.6–33)
MCH RBC QN AUTO: 31.5 PG (ref 26.6–33)
MCHC RBC AUTO-ENTMCNC: 30.8 G/DL (ref 31.5–35.7)
MCHC RBC AUTO-ENTMCNC: 32.6 G/DL (ref 31.5–35.7)
MCV RBC AUTO: 102 FL (ref 79–97)
MCV RBC AUTO: 95.5 FL (ref 79–97)
MONOCYTES # BLD AUTO: 1.16 10*3/MM3 (ref 0.1–0.9)
MONOCYTES # BLD: 0.19 10*3/MM3 (ref 0.1–0.9)
MONOCYTES NFR BLD AUTO: 13.1 % (ref 5–12)
NEGATIVE CONTROL: NEGATIVE
NEUTROPHILS # BLD AUTO: 2.87 10*3/MM3 (ref 1.7–7)
NEUTROPHILS NFR BLD AUTO: 3.12 10*3/MM3 (ref 1.7–7)
NEUTROPHILS NFR BLD AUTO: 35.1 % (ref 42.7–76)
NEUTROPHILS NFR BLD MANUAL: 30 % (ref 42.7–76)
NITRITE UR QL STRIP: NEGATIVE
NRBC BLD AUTO-RTO: 0 /100 WBC (ref 0–0.2)
PH UR STRIP.AUTO: 5.5 [PH] (ref 5–8)
PLAT MORPH BLD: NORMAL
PLATELET # BLD AUTO: 125 10*3/MM3 (ref 140–450)
PLATELET # BLD AUTO: 129 10*3/MM3 (ref 140–450)
PMV BLD AUTO: 10.6 FL (ref 6–12)
PMV BLD AUTO: 11.4 FL (ref 6–12)
POSITIVE CONTROL: POSITIVE
POTASSIUM SERPL-SCNC: 4.2 MMOL/L (ref 3.5–5.2)
POTASSIUM SERPL-SCNC: 4.3 MMOL/L (ref 3.5–4.7)
PROCALCITONIN SERPL-MCNC: 0.35 NG/ML (ref 0–0.25)
PROT SERPL-MCNC: 5.1 G/DL (ref 6–8.5)
PROT SERPL-MCNC: 5.2 G/DL (ref 6.3–8)
PROT UR QL STRIP: NEGATIVE
RBC # BLD AUTO: 2.44 10*6/MM3 (ref 4.14–5.8)
RBC # BLD AUTO: 2.48 10*6/MM3 (ref 4.14–5.8)
RBC # UR STRIP: NORMAL /HPF
REF LAB TEST METHOD: NORMAL
RH BLD: POSITIVE
SARS-COV-2 RNA RESP QL NAA+PROBE: DETECTED
SODIUM SERPL-SCNC: 133 MMOL/L (ref 136–145)
SODIUM SERPL-SCNC: 134 MMOL/L (ref 134–145)
SP GR UR STRIP: 1.02 (ref 1–1.03)
SQUAMOUS #/AREA URNS HPF: NORMAL /HPF
T&S EXPIRATION DATE: NORMAL
TROPONIN T SERPL-MCNC: 0.03 NG/ML (ref 0–0.03)
UROBILINOGEN UR QL STRIP: ABNORMAL
VARIANT LYMPHS NFR BLD MANUAL: 68 % (ref 19.6–45.3)
WBC # UR STRIP: NORMAL /HPF
WBC MORPH BLD: NORMAL
WBC NRBC COR # BLD: 8.86 10*3/MM3 (ref 3.4–10.8)
WBC NRBC COR # BLD: 9.55 10*3/MM3 (ref 3.4–10.8)

## 2022-05-02 PROCEDURE — 71045 X-RAY EXAM CHEST 1 VIEW: CPT

## 2022-05-02 PROCEDURE — 36415 COLL VENOUS BLD VENIPUNCTURE: CPT

## 2022-05-02 PROCEDURE — 25010000002 VANCOMYCIN 10 G RECONSTITUTED SOLUTION: Performed by: EMERGENCY MEDICINE

## 2022-05-02 PROCEDURE — 85007 BL SMEAR W/DIFF WBC COUNT: CPT | Performed by: EMERGENCY MEDICINE

## 2022-05-02 PROCEDURE — 81001 URINALYSIS AUTO W/SCOPE: CPT | Performed by: EMERGENCY MEDICINE

## 2022-05-02 PROCEDURE — 86900 BLOOD TYPING SEROLOGIC ABO: CPT | Performed by: EMERGENCY MEDICINE

## 2022-05-02 PROCEDURE — 86922 COMPATIBILITY TEST ANTIGLOB: CPT

## 2022-05-02 PROCEDURE — 83735 ASSAY OF MAGNESIUM: CPT | Performed by: EMERGENCY MEDICINE

## 2022-05-02 PROCEDURE — 87899 AGENT NOS ASSAY W/OPTIC: CPT | Performed by: INTERNAL MEDICINE

## 2022-05-02 PROCEDURE — U0003 INFECTIOUS AGENT DETECTION BY NUCLEIC ACID (DNA OR RNA); SEVERE ACUTE RESPIRATORY SYNDROME CORONAVIRUS 2 (SARS-COV-2) (CORONAVIRUS DISEASE [COVID-19]), AMPLIFIED PROBE TECHNIQUE, MAKING USE OF HIGH THROUGHPUT TECHNOLOGIES AS DESCRIBED BY CMS-2020-01-R: HCPCS | Performed by: EMERGENCY MEDICINE

## 2022-05-02 PROCEDURE — 84484 ASSAY OF TROPONIN QUANT: CPT | Performed by: EMERGENCY MEDICINE

## 2022-05-02 PROCEDURE — 99285 EMERGENCY DEPT VISIT HI MDM: CPT

## 2022-05-02 PROCEDURE — 87040 BLOOD CULTURE FOR BACTERIA: CPT | Performed by: EMERGENCY MEDICINE

## 2022-05-02 PROCEDURE — 83605 ASSAY OF LACTIC ACID: CPT | Performed by: EMERGENCY MEDICINE

## 2022-05-02 PROCEDURE — 99215 OFFICE O/P EST HI 40 MIN: CPT | Performed by: INTERNAL MEDICINE

## 2022-05-02 PROCEDURE — 84145 PROCALCITONIN (PCT): CPT | Performed by: EMERGENCY MEDICINE

## 2022-05-02 PROCEDURE — 86901 BLOOD TYPING SEROLOGIC RH(D): CPT | Performed by: EMERGENCY MEDICINE

## 2022-05-02 PROCEDURE — 25010000002 PIPERACILLIN SOD-TAZOBACTAM PER 1 G: Performed by: INTERNAL MEDICINE

## 2022-05-02 PROCEDURE — 25010000002 PIPERACILLIN SOD-TAZOBACTAM PER 1 G: Performed by: EMERGENCY MEDICINE

## 2022-05-02 PROCEDURE — 82270 OCCULT BLOOD FECES: CPT | Performed by: EMERGENCY MEDICINE

## 2022-05-02 PROCEDURE — 86850 RBC ANTIBODY SCREEN: CPT | Performed by: EMERGENCY MEDICINE

## 2022-05-02 PROCEDURE — 85025 COMPLETE CBC W/AUTO DIFF WBC: CPT | Performed by: EMERGENCY MEDICINE

## 2022-05-02 PROCEDURE — P9016 RBC LEUKOCYTES REDUCED: HCPCS

## 2022-05-02 PROCEDURE — 82962 GLUCOSE BLOOD TEST: CPT

## 2022-05-02 PROCEDURE — 85025 COMPLETE CBC W/AUTO DIFF WBC: CPT

## 2022-05-02 PROCEDURE — 80053 COMPREHEN METABOLIC PANEL: CPT | Performed by: EMERGENCY MEDICINE

## 2022-05-02 PROCEDURE — 86920 COMPATIBILITY TEST SPIN: CPT

## 2022-05-02 PROCEDURE — 86900 BLOOD TYPING SEROLOGIC ABO: CPT

## 2022-05-02 PROCEDURE — 80053 COMPREHEN METABOLIC PANEL: CPT

## 2022-05-02 PROCEDURE — 36430 TRANSFUSION BLD/BLD COMPNT: CPT

## 2022-05-02 PROCEDURE — 74176 CT ABD & PELVIS W/O CONTRAST: CPT

## 2022-05-02 RX ORDER — ATORVASTATIN CALCIUM 20 MG/1
40 TABLET, FILM COATED ORAL DAILY
Status: DISCONTINUED | OUTPATIENT
Start: 2022-05-03 | End: 2022-05-04

## 2022-05-02 RX ORDER — ALLOPURINOL 300 MG/1
300 TABLET ORAL DAILY
Status: DISCONTINUED | OUTPATIENT
Start: 2022-05-03 | End: 2022-05-05 | Stop reason: HOSPADM

## 2022-05-02 RX ORDER — MELATONIN
2000 DAILY
Status: DISCONTINUED | OUTPATIENT
Start: 2022-05-03 | End: 2022-05-05 | Stop reason: HOSPADM

## 2022-05-02 RX ORDER — TERAZOSIN 5 MG/1
5 CAPSULE ORAL NIGHTLY
Refills: 3 | Status: DISCONTINUED | OUTPATIENT
Start: 2022-05-02 | End: 2022-05-05 | Stop reason: HOSPADM

## 2022-05-02 RX ORDER — ASPIRIN 81 MG/1
81 TABLET ORAL DAILY
Status: DISCONTINUED | OUTPATIENT
Start: 2022-05-03 | End: 2022-05-05 | Stop reason: HOSPADM

## 2022-05-02 RX ORDER — BENZONATATE 100 MG/1
200 CAPSULE ORAL 3 TIMES DAILY
Status: DISCONTINUED | OUTPATIENT
Start: 2022-05-02 | End: 2022-05-05 | Stop reason: HOSPADM

## 2022-05-02 RX ORDER — EPINEPHRINE 0.3 MG/.3ML
0.3 INJECTION SUBCUTANEOUS
COMMUNITY
Start: 2022-04-08 | End: 2023-03-28

## 2022-05-02 RX ORDER — AZITHROMYCIN 250 MG/1
TABLET, FILM COATED ORAL SEE ADMIN INSTRUCTIONS
COMMUNITY
Start: 2022-03-30 | End: 2022-05-02

## 2022-05-02 RX ORDER — DEXAMETHASONE 6 MG/1
6 TABLET ORAL DAILY
COMMUNITY
Start: 2022-04-23 | End: 2022-05-02 | Stop reason: SDUPTHER

## 2022-05-02 RX ORDER — DIPHENHYDRAMINE HYDROCHLORIDE 50 MG/ML
25 INJECTION INTRAMUSCULAR; INTRAVENOUS
COMMUNITY
Start: 2022-04-08 | End: 2022-05-02 | Stop reason: SDDI

## 2022-05-02 RX ORDER — UREA 10 %
3 LOTION (ML) TOPICAL NIGHTLY PRN
Status: DISCONTINUED | OUTPATIENT
Start: 2022-05-02 | End: 2022-05-05 | Stop reason: HOSPADM

## 2022-05-02 RX ORDER — ONDANSETRON 4 MG/1
4 TABLET, FILM COATED ORAL EVERY 6 HOURS PRN
Status: DISCONTINUED | OUTPATIENT
Start: 2022-05-02 | End: 2022-05-05 | Stop reason: HOSPADM

## 2022-05-02 RX ORDER — FLUTICASONE PROPIONATE 50 MCG
1 SPRAY, SUSPENSION (ML) NASAL DAILY
COMMUNITY
Start: 2022-04-07 | End: 2023-04-07

## 2022-05-02 RX ORDER — PANTOPRAZOLE SODIUM 40 MG/1
40 TABLET, DELAYED RELEASE ORAL DAILY
Status: DISCONTINUED | OUTPATIENT
Start: 2022-05-03 | End: 2022-05-05 | Stop reason: HOSPADM

## 2022-05-02 RX ORDER — ACETAMINOPHEN 325 MG/1
650 TABLET ORAL EVERY 4 HOURS PRN
Status: DISCONTINUED | OUTPATIENT
Start: 2022-05-02 | End: 2022-05-05 | Stop reason: HOSPADM

## 2022-05-02 RX ORDER — NITROGLYCERIN 0.4 MG/1
0.4 TABLET SUBLINGUAL
Status: DISCONTINUED | OUTPATIENT
Start: 2022-05-02 | End: 2022-05-05 | Stop reason: HOSPADM

## 2022-05-02 RX ORDER — AMOXICILLIN AND CLAVULANATE POTASSIUM 875; 125 MG/1; MG/1
1 TABLET, FILM COATED ORAL 2 TIMES DAILY
COMMUNITY
Start: 2022-04-08 | End: 2022-05-02

## 2022-05-02 RX ORDER — ONDANSETRON 2 MG/ML
4 INJECTION INTRAMUSCULAR; INTRAVENOUS EVERY 6 HOURS PRN
Status: DISCONTINUED | OUTPATIENT
Start: 2022-05-02 | End: 2022-05-05 | Stop reason: HOSPADM

## 2022-05-02 RX ORDER — DEXTROMETHORPHAN HYDROBROMIDE AND PROMETHAZINE HYDROCHLORIDE 15; 6.25 MG/5ML; MG/5ML
SYRUP ORAL
COMMUNITY
Start: 2022-03-30 | End: 2022-05-25

## 2022-05-02 RX ORDER — PANTOPRAZOLE SODIUM 40 MG/1
TABLET, DELAYED RELEASE ORAL
COMMUNITY
Start: 2022-04-22 | End: 2022-05-02 | Stop reason: SDUPTHER

## 2022-05-02 RX ORDER — CANDESARTAN 32 MG/1
TABLET ORAL
Status: ON HOLD | COMMUNITY
End: 2022-05-03

## 2022-05-02 RX ORDER — FLUTICASONE PROPIONATE 50 MCG
1 SPRAY, SUSPENSION (ML) NASAL DAILY
Status: DISCONTINUED | OUTPATIENT
Start: 2022-05-03 | End: 2022-05-05 | Stop reason: HOSPADM

## 2022-05-02 RX ADMIN — TAZOBACTAM SODIUM AND PIPERACILLIN SODIUM 3.38 G: 375; 3 INJECTION, SOLUTION INTRAVENOUS at 14:36

## 2022-05-02 RX ADMIN — ACETAMINOPHEN 650 MG: 325 TABLET ORAL at 22:55

## 2022-05-02 RX ADMIN — SODIUM CHLORIDE 500 ML: 9 INJECTION, SOLUTION INTRAVENOUS at 12:49

## 2022-05-02 RX ADMIN — TAZOBACTAM SODIUM AND PIPERACILLIN SODIUM 3.38 G: 375; 3 INJECTION, SOLUTION INTRAVENOUS at 22:55

## 2022-05-02 RX ADMIN — VANCOMYCIN HYDROCHLORIDE 2000 MG: 10 INJECTION, POWDER, LYOPHILIZED, FOR SOLUTION INTRAVENOUS at 18:40

## 2022-05-02 RX ADMIN — TERAZOSIN HYDROCHLORIDE 5 MG: 5 CAPSULE ORAL at 22:55

## 2022-05-02 RX ADMIN — BENZONATATE 200 MG: 100 CAPSULE ORAL at 22:55

## 2022-05-02 NOTE — ED NOTES
Nursing report ED to floor  Aida Bal Jr.  67 y.o.  male    HPI :   Chief Complaint   Patient presents with   • Hypotension       Admitting doctor:   Susana Cooley MD    Admitting diagnosis:   The primary encounter diagnosis was Anemia, unspecified type. Diagnoses of Hypotension, unspecified hypotension type, Acute renal insufficiency, Personal history of CLL (chronic lymphocytic leukemia), Immunocompromised (HCC), Thrombocytopenia (HCC), and COVID-19 were also pertinent to this visit.    Code status:   Current Code Status     Date Active Code Status Order ID Comments User Context       Prior    Advance Care Planning Activity          Allergies:   Ace inhibitors, Candesartan, and Pravastatin    Intake and Output  No intake or output data in the 24 hours ending 05/02/22 1831    Weight:       05/02/22  1227   Weight: 101 kg (222 lb 6.4 oz)       Most recent vitals:   Vitals:    05/02/22 1607 05/02/22 1736 05/02/22 1737 05/02/22 1808   BP: 122/82 137/80  152/61   BP Location:       Patient Position:       Pulse: 83  89 82   Resp: 16  17 16   Temp: 98.7 °F (37.1 °C)   99.6 °F (37.6 °C)   TempSrc: Oral   Oral   SpO2: 99%  98% 99%   Weight:       Height:           Active LDAs/IV Access:   Lines, Drains & Airways     Active LDAs     Name Placement date Placement time Site Days    Peripheral IV 05/02/22 1235 Right Antecubital 05/02/22  1235  Antecubital  less than 1                Labs (abnormal labs have a star):   Labs Reviewed   COVID-19, [facility] IN-HOUSE CEPHEID/ROBYN, NP SWAB IN TRANSPORT MEDIA 8-12 HR TAT - Abnormal; Notable for the following components:       Result Value    COVID19 Detected (*)     All other components within normal limits    Narrative:     Fact sheet for providers: https://www.fda.gov/media/984231/download     Fact sheet for patients: https://www.fda.gov/media/374248/download   COMPREHENSIVE METABOLIC PANEL - Abnormal; Notable for the following components:    Glucose 118 (*)     BUN 27  "(*)     Creatinine 1.61 (*)     Sodium 133 (*)     Calcium 8.2 (*)     Total Protein 5.1 (*)     Albumin 2.70 (*)     ALT (SGPT) 75 (*)     AST (SGOT) 43 (*)     Alkaline Phosphatase 137 (*)     eGFR 46.0 (*)     All other components within normal limits    Narrative:     GFR Normal >60  Chronic Kidney Disease <60  Kidney Failure <15     URINALYSIS W/ MICROSCOPIC IF INDICATED (NO CULTURE) - Abnormal; Notable for the following components:    Color, UA Dark Yellow (*)     Leuk Esterase, UA Trace (*)     All other components within normal limits   PROCALCITONIN - Abnormal; Notable for the following components:    Procalcitonin 0.35 (*)     All other components within normal limits    Narrative:     As a Marker for Sepsis (Non-Neonates):    1. <0.5 ng/mL represents a low risk of severe sepsis and/or septic shock.  2. >2 ng/mL represents a high risk of severe sepsis and/or septic shock.    As a Marker for Lower Respiratory Tract Infections that require antibiotic therapy:    PCT on Admission    Antibiotic Therapy       6-12 Hrs later    >0.5                Strongly Recommended  >0.25 - <0.5        Recommended   0.1 - 0.25          Discouraged              Remeasure/reassess PCT  <0.1                Strongly Discouraged     Remeasure/reassess PCT    As 28 day mortality risk marker: \"Change in Procalcitonin Result\" (>80% or <=80%) if Day 0 (or Day 1) and Day 4 values are available. Refer to http://www.[employer] [facility] AppsStr[facility]-pct-calculator.com    Change in PCT <=80%  A decrease of PCT levels below or equal to 80% defines a positive change in PCT test result representing a higher risk for 28-day all-cause mortality of patients diagnosed with severe sepsis for septic shock.    Change in PCT >80%  A decrease of PCT levels of more than 80% defines a negative change in PCT result representing a lower risk for 28-day all-cause mortality of patients diagnosed with severe sepsis or septic shock.      CBC WITH AUTO DIFFERENTIAL - Abnormal; Notable " for the following components:    RBC 2.44 (*)     Hemoglobin 7.6 (*)     Hematocrit 23.3 (*)     RDW 16.7 (*)     RDW-SD 57.9 (*)     Platelets 129 (*)     All other components within normal limits   MANUAL DIFFERENTIAL - Abnormal; Notable for the following components:    Neutrophil % 30.0 (*)     Lymphocyte % 68.0 (*)     Monocyte % 2.0 (*)     Lymphocytes Absolute 6.49 (*)     All other components within normal limits   TROPONIN (IN-HOUSE) - Normal    Narrative:     Troponin T Reference Range:  <= 0.03 ng/mL-   Negative for AMI  >0.03 ng/mL-     Abnormal for myocardial necrosis.  Clinicians would have to utilize clinical acumen, EKG, Troponin and serial changes to determine if it is an Acute Myocardial Infarction or myocardial injury due to an underlying chronic condition.       Results may be falsely decreased if patient taking Biotin.     LACTIC ACID, PLASMA - Normal   MAGNESIUM - Normal   POCT OCCULT BLOOD STOOL (ED ONLY) - Normal   BLOOD CULTURE   BLOOD CULTURE   URINALYSIS, MICROSCOPIC ONLY   PREPARE RBC   TYPE AND SCREEN   PREPARE RBC   CBC AND DIFFERENTIAL    Narrative:     The following orders were created for panel order CBC & Differential.  Procedure                               Abnormality         Status                     ---------                               -----------         ------                     CBC Auto Differential[[id number]]        Abnormal            Final result                 Please view results for these tests on the individual orders.       EKG:   No orders to display       Meds given in ED:   Medications   vancomycin 2000 mg/500 mL 0.9% NS IVPB ([facility]) (has no administration in time range)   sodium chloride 0.9 % bolus 500 mL (0 mL Intravenous Stopped 5/2/22 1423)   piperacillin-tazobactam (ZOSYN) 3.375 g in iso-osmotic dextrose 50 ml (premix) (0 g Intravenous Stopped 5/2/22 1527)       Imaging results:  CT Abdomen Pelvis Without Contrast    Result Date: 5/2/2022  1. Slight  improved aeration of the lung bases compatible with Covid-19. Please refer to the prior chest CT for further detail and follow-up recommendations. 2. No acute intra-abdominal or pelvic process on this limited noncontrast exam. 3. Prostatomegaly with diffuse thickening of the bladder which can be seen with bladder outlet obstruction or cystitis. 4. Indeterminate cystic lesion in the tail of the pancreas approximating 1.1 cm. Short-term surveillance with MRI could be considered not emergently for further characterization. 5. Smaller abdominal and pelvic adenopathy. No splenomegaly. 6. Please see above for additional findings/recommendations.  This report was finalized on 5/2/2022 4:29 PM by Dr. Arsenio Kessler M.D.      XR Chest 1 View    Result Date: 5/2/2022  Bilateral patchy pulmonary infiltrates, continued follow-up recommended.  This report was finalized on 5/2/2022 2:08 PM by Dr. Oscar Gonsales M.D.        Ambulatory status:   - up with assistance    Social issues:   Social History     Socioeconomic History   • Marital status:      Spouse name: Meghana   Tobacco Use   • Smoking status: Never Smoker   • Smokeless tobacco: Never Used   Substance and Sexual Activity   • Alcohol use: No   • Drug use: No   • Sexual activity: Defer       NIH Stroke Scale:        Nursing report ED to floor:

## 2022-05-02 NOTE — ED NOTES
Pt arrives from CBC with c/o low hgb, hypotensive, lightheaded and recent dx of COVID pneumonia. Pt a&ox4,abc's intact, NAD noted .    Patient wearing mask during triage. RN wearing appropriate PPE during triage. Hand hygiene performed.

## 2022-05-02 NOTE — ED PROVIDER NOTES
EMERGENCY DEPARTMENT ENCOUNTER    Room Number:  34/34  Date of encounter:  5/2/2022  PCP: Roxane Marsh MD  Historian: Patient      HPI:  Chief Complaint: Fatigue, anemia, hypotension  A complete HPI/ROS/PMH/PSH/SH/FH are unobtainable due to: None    Context: Aida Bal Jr. is a 67 y.o. male who presents to the ED via EMS from the cancer center after an office visit today noted low blood pressure, lightheadedness, and anemia.  History of CLL as well as anemia.  Recently diagnosed with COVID and had a DVT and is recently started on Eliquis.  Denies any current dizziness, lightheadedness, chest pain, short of breath, abdominal pain, nausea, vomiting, diarrhea.  Eating and drinking well.  Denies any black or bloody stools.  Had been doing well up until Saturday when he started developing low-grade temperatures to 101, has had improving shortness of breath and cough since being hospitalized for COVID a couple weeks ago.  Overall has been feeling better but then developed the low-grade fevers over the weekend.      MEDICAL RECORD REVIEW    Oncology visit note reviewed from earlier today, sent to the ER for evaluation of fever and hypotension.  They recommend anemia evaluation and Red cell transfusion.    PAST MEDICAL HISTORY  Active Ambulatory Problems     Diagnosis Date Noted   • Hyperuricemia 01/27/2016   • Dyslipidemia 01/27/2016   • Hypertension 01/27/2016   • Hypogonadism in male 01/27/2016   • Uncontrolled type 2 diabetes mellitus 01/27/2016   • Vitamin D deficiency 01/27/2016   • CLL (chronic lymphocytic leukemia) ([facility]) 03/16/2016   • Autoimmune hemolytic anemia ([facility]) 03/16/2016   • Left groin pain 03/17/2016   • Leukopenia 04/28/2016   • Tubulovillous adenoma of colon 06/04/2018   • Macrocytic anemia 06/20/2018   • Thrombocytopenia ([facility]) 10/04/2021   • Pneumonia due to COVID-19 virus 04/15/2022   • GARTH (acute kidney injury) ([facility]) 04/15/2022   • Transaminitis 04/15/2022   • Elevated troponin  04/15/2022   • Elevated d-dimer 04/15/2022   • Acute deep vein thrombosis (DVT) of left lower extremity (HCC) 04/15/2022   • Cytokine release syndrome, grade 2 04/22/2022     Resolved Ambulatory Problems     Diagnosis Date Noted   • No Resolved Ambulatory Problems     Past Medical History:   Diagnosis Date   • Carotid artery occlusion    • Coronary artery disease    • Diabetes mellitus (HCC)    • Gout    • High blood sugar    • High cholesterol    • Leukemia, chronic (HCC)    • Sleep apnea    • TIA (transient ischemic attack)          PAST SURGICAL HISTORY  Past Surgical History:   Procedure Laterality Date   • CARDIAC CATHETERIZATION     • COLONOSCOPY N/A 7/24/2017    Procedure: COLONOSCOPY TO CECUM WITH COLD POLYPECTOMY, TATOO PLACEMENT;  Surgeon: Fannie Caro MD;  Location: [facility] ENDOSCOPY;  Service:    • COLONOSCOPY N/A 6/28/2018    Procedure: COLONOSCOPY to Cecum with Hot Snare Polypectomies, injected with 6 ML NS;  Surgeon: Fannie Caro MD;  Location: [facility] ENDOSCOPY;  Service: Gastroenterology   • COLONOSCOPY N/A 6/27/2019    Procedure: COLONOSCOPY TO CECUM WITH COLD SNARE POLYPECTOMY AND COLD BIOPSY POLYPECTOMIES;  Surgeon: Fannie Caro MD;  Location: [facility] ENDOSCOPY;  Service: General   • KNEE SURGERY           FAMILY HISTORY  Family History   Problem Relation Age of Onset   • Colon cancer Mother 50   • Throat cancer Father    • Colon cancer Father    • Hypertension Daughter          SOCIAL HISTORY  Social History     Socioeconomic History   • Marital status:      Spouse name: Meghana   Tobacco Use   • Smoking status: Never Smoker   • Smokeless tobacco: Never Used   Substance and Sexual Activity   • Alcohol use: No   • Drug use: No   • Sexual activity: Defer         ALLERGIES  Ace inhibitors, Candesartan, and Pravastatin        REVIEW OF SYSTEMS  Review of Systems     All systems reviewed and negative except for those discussed in HPI.       PHYSICAL EXAM    I have reviewed the  triage vital signs and nursing notes.    ED Triage Vitals   Temp Heart Rate Resp BP SpO2   05/02/22 1154 05/02/22 1154 05/02/22 1154 05/02/22 1156 05/02/22 1154   99.5 °F (37.5 °C) 99 20 (!) 72/47 97 %      Temp src Heart Rate Source Patient Position BP Location FiO2 (%)   05/02/22 1154 05/02/22 1154 05/02/22 1156 05/02/22 1156 --   Tympanic Monitor Sitting Left arm        Physical Exam  General: No acute distress, nontoxic, nondiaphoretic  HEENT: Mucous membranes moist, atraumatic, EOMI, mild bilateral conjunctival pallor  Neck: Full ROM  Pulm: Symmetric chest rise, nonlabored, lungs CTAB  Cardiovascular: Regular rate and rhythm, intact distal pulses  GI: Soft, nontender, nondistended, no rebound, no guarding, bowel sounds present  Rectal: Light brown stool without melena or gross red blood, no palpable masses or hemorrhoids  MSK: Full ROM, no deformity  Skin: Warm, dry  Neuro: Awake, alert, oriented x 4, GCS 15, moving all extremities, no focal deficits  Psych: Calm, cooperative      N95, protective eye goggles, and gloves used during this encounter. Patient in surgical mask.      LAB RESULTS  Recent Results (from the past 24 hour(s))   Comprehensive Metabolic Panel    Collection Time: 05/02/22 10:21 AM    Specimen: Blood   Result Value Ref Range    Glucose 217 (H) 74 - 124 mg/dL    BUN 28 (H) 6 - 20 mg/dL    Creatinine 1.48 (H) 0.70 - 1.30 mg/dL    Sodium 134 134 - 145 mmol/L    Potassium 4.3 3.5 - 4.7 mmol/L    Chloride 99 98 - 107 mmol/L    CO2 23.9 22.0 - 29.0 mmol/L    Calcium 8.4 (L) 8.5 - 10.2 mg/dL    Total Protein 5.2 (L) 6.3 - 8.0 g/dL    Albumin 2.60 (L) 3.50 - 5.20 g/dL    ALT (SGPT) 73 (H) 0 - 41 U/L    AST (SGOT) 45 (H) 0 - 40 U/L    Alkaline Phosphatase 138 (H) 38 - 116 U/L    Total Bilirubin 0.9 0.2 - 1.2 mg/dL    Globulin 2.6 1.8 - 3.5 gm/dL    A/G Ratio 1.0 (L) 1.1 - 2.4 g/dL    BUN/Creatinine Ratio 18.9 7.3 - 30.0    Anion Gap 11.1 5.0 - 15.0 mmol/L    eGFR 50.9 (L) >60.0 mL/min/1.73   CBC Auto  Differential    Collection Time: 05/02/22 10:21 AM    Specimen: Blood   Result Value Ref Range    WBC 8.86 3.40 - 10.80 10*3/mm3    RBC 2.48 (L) 4.14 - 5.80 10*6/mm3    Hemoglobin 7.9 (C) 13.0 - 17.7 g/dL    Hematocrit 25.3 (L) 37.5 - 51.0 %    .0 (H) 79.0 - 97.0 fL    MCH 31.5 26.6 - 33.0 pg    MCHC 30.8 (L) 31.5 - 35.7 g/dL    RDW 19.1 (H) 12.3 - 15.4 %    RDW-SD 71.0 (H) 37.0 - 54.0 fl    MPV 11.4 6.0 - 12.0 fL    Platelets 125 (L) 140 - 450 10*3/mm3    Neutrophil % 35.1 (L) 42.7 - 76.0 %    Lymphocyte % 50.5 (H) 19.6 - 45.3 %    Monocyte % 13.1 (H) 5.0 - 12.0 %    Eosinophil % 0.5 0.3 - 6.2 %    Basophil % 0.1 0.0 - 1.5 %    Immature Grans % 0.7 (H) 0.0 - 0.5 %    Neutrophils, Absolute 3.12 1.70 - 7.00 10*3/mm3    Lymphocytes, Absolute 4.47 (H) 0.70 - 3.10 10*3/mm3    Monocytes, Absolute 1.16 (H) 0.10 - 0.90 10*3/mm3    Eosinophils, Absolute 0.04 0.00 - 0.40 10*3/mm3    Basophils, Absolute 0.01 0.00 - 0.20 10*3/mm3    Immature Grans, Absolute 0.06 (H) 0.00 - 0.05 10*3/mm3    nRBC 0.0 0.0 - 0.2 /100 WBC   Comprehensive Metabolic Panel    Collection Time: 05/02/22 12:38 PM    Specimen: Blood   Result Value Ref Range    Glucose 118 (H) 65 - 99 mg/dL    BUN 27 (H) 8 - 23 mg/dL    Creatinine 1.61 (H) 0.76 - 1.27 mg/dL    Sodium 133 (L) 136 - 145 mmol/L    Potassium 4.2 3.5 - 5.2 mmol/L    Chloride 100 98 - 107 mmol/L    CO2 24.3 22.0 - 29.0 mmol/L    Calcium 8.2 (L) 8.6 - 10.5 mg/dL    Total Protein 5.1 (L) 6.0 - 8.5 g/dL    Albumin 2.70 (L) 3.50 - 5.20 g/dL    ALT (SGPT) 75 (H) 1 - 41 U/L    AST (SGOT) 43 (H) 1 - 40 U/L    Alkaline Phosphatase 137 (H) 39 - 117 U/L    Total Bilirubin 0.8 0.0 - 1.2 mg/dL    Globulin 2.4 gm/dL    A/G Ratio 1.1 g/dL    BUN/Creatinine Ratio 16.8 7.0 - 25.0    Anion Gap 8.7 5.0 - 15.0 mmol/L    eGFR 46.0 (L) >60.0 mL/min/1.73   Troponin    Collection Time: 05/02/22 12:38 PM    Specimen: Blood   Result Value Ref Range    Troponin T 0.028 0.000 - 0.030 ng/mL   Lactic Acid, Plasma     Collection Time: 05/02/22 12:38 PM    Specimen: Blood   Result Value Ref Range    Lactate 1.4 0.5 - 2.0 mmol/L   Procalcitonin    Collection Time: 05/02/22 12:38 PM    Specimen: Blood   Result Value Ref Range    Procalcitonin 0.35 (H) 0.00 - 0.25 ng/mL   Magnesium    Collection Time: 05/02/22 12:38 PM    Specimen: Blood   Result Value Ref Range    Magnesium 1.8 1.6 - 2.4 mg/dL   Type & Screen    Collection Time: 05/02/22 12:38 PM    Specimen: Blood   Result Value Ref Range    ABO Type O     RH type Positive     Antibody Screen Negative     T&S Expiration Date 5/5/2022 11:59:59 PM    CBC Auto Differential    Collection Time: 05/02/22 12:38 PM    Specimen: Blood   Result Value Ref Range    WBC 9.55 3.40 - 10.80 10*3/mm3    RBC 2.44 (L) 4.14 - 5.80 10*6/mm3    Hemoglobin 7.6 (L) 13.0 - 17.7 g/dL    Hematocrit 23.3 (L) 37.5 - 51.0 %    MCV 95.5 79.0 - 97.0 fL    MCH 31.1 26.6 - 33.0 pg    MCHC 32.6 31.5 - 35.7 g/dL    RDW 16.7 (H) 12.3 - 15.4 %    RDW-SD 57.9 (H) 37.0 - 54.0 fl    MPV 10.6 6.0 - 12.0 fL    Platelets 129 (L) 140 - 450 10*3/mm3   Manual Differential    Collection Time: 05/02/22 12:38 PM    Specimen: Blood   Result Value Ref Range    Neutrophil % 30.0 (L) 42.7 - 76.0 %    Lymphocyte % 68.0 (H) 19.6 - 45.3 %    Monocyte % 2.0 (L) 5.0 - 12.0 %    Neutrophils Absolute 2.87 1.70 - 7.00 10*3/mm3    Lymphocytes Absolute 6.49 (H) 0.70 - 3.10 10*3/mm3    Monocytes Absolute 0.19 0.10 - 0.90 10*3/mm3    Anisocytosis Mod/2+ None Seen    WBC Morphology Normal Normal    Platelet Morphology Normal Normal   POCT Occult Blood Stool    Collection Time: 05/02/22 12:49 PM    Specimen: Per Rectum; Stool   Result Value Ref Range    Fecal Occult Blood Negative Negative    Lot Number [id number]     Expiration Date 12/31/2022     Positive Control Positive Positive    Negative Control Negative Negative   COVID-19,[facility] IN-HOUSE CEPHEID/ROYBN NP SWAB IN TRANSPORT MEDIA 8-12 HR TAT - Swab, Nasopharynx    Collection Time: 05/02/22 12:57 PM     Specimen: Nasopharynx; Swab   Result Value Ref Range    COVID19 Detected (C) Not Detected - Ref. Range   Prepare RBC, 1 Units    Collection Time: 05/02/22  2:16 PM   Result Value Ref Range    Product Code S0240J81     Unit Number [id number]     UNIT  ABO O     UNIT  RH POS     Crossmatch Interpretation Compatible     Dispense Status XM     Blood Expiration Date 202205252359     Blood Type Barcode 5100    Urinalysis With Microscopic If Indicated (No Culture) - Urine, Clean Catch    Collection Time: 05/02/22  3:27 PM    Specimen: Urine, Clean Catch   Result Value Ref Range    Color, UA Dark Yellow (A) Yellow, Straw    Appearance, UA Clear Clear    pH, UA 5.5 5.0 - 8.0    Specific Gravity, UA 1.018 1.005 - 1.030    Glucose, UA Negative Negative    Ketones, UA Negative Negative    Bilirubin, UA Negative Negative    Blood, UA Negative Negative    Protein, UA Negative Negative    Leuk Esterase, UA Trace (A) Negative    Nitrite, UA Negative Negative    Urobilinogen, UA 1.0 E.U./dL 0.2 - 1.0 E.U./dL   Urinalysis, Microscopic Only - Urine, Clean Catch    Collection Time: 05/02/22  3:27 PM    Specimen: Urine, Clean Catch   Result Value Ref Range    RBC, UA None Seen None Seen, 0-2 /HPF    WBC, UA 0-2 None Seen, 0-2 /HPF    Bacteria, UA None Seen None Seen /HPF    Squamous Epithelial Cells, UA None Seen None Seen, 0-2 /HPF    Hyaline Casts, UA 0-2 None Seen /LPF    Methodology Manual Light Microscopy    Prepare RBC, 1 Units    Collection Time: 05/02/22  3:51 PM   Result Value Ref Range    Product Code I2996L55     Unit Number [id number]     UNIT  ABO O     UNIT  RH POS     Crossmatch Interpretation Compatible     Dispense Status IS     Blood Expiration Date 202205252359     Blood Type Barcode 5100        Ordered the above labs and independently reviewed the results.        RADIOLOGY  CT Abdomen Pelvis Without Contrast    Result Date: 5/2/2022  CT ABDOMEN AND PELVIS WITHOUT IV CONTRAST  HISTORY: Fever, hypotension,  renal insufficiency, immunocompromised. CLL. Covid.  TECHNIQUE: Radiation dose reduction techniques were utilized, including automated exposure control and exposure modulation based on body size. 3 mm images were obtained through the abdomen and pelvis without the administration of IV contrast. Lack of IV contrast limits evaluation of solid, visceral, and vascular structures. Sensitivity for underlying lesions and infection decreased.  COMPARISON: Ultrasound 04/21/2022, CT chest 04/17/2022, 04/25/2018  FINDINGS:  LOWER CHEST: Patchy groundglass pneumonia with slight improved aeration. The heart size is stable..  ABDOMEN: Artifact from motion and overlying leads.   Liver/Biliary Tract: Lobular contour of the liver with heterogeneous attenuation. Evaluation for underlying lesion is limited. The gallbladder is within normal limits.  Spleen: Within normal limits.  Pancreas: Indeterminate 1.1 cm hypodensity in the tail of the pancreas similar to the most recent exam but conspicuous from prior imaging. No ductal dilatation or surrounding inflammation. No atrophy.  Adrenals: Within normal limits.  Kidneys:  Renal parenchymal scarring with hypodense renal lesions previously described as cysts, please refer to the ultrasound for further detail. Mild perinephric stranding.  Bowel:  Circumferential thickening of the distal esophagus which is incompletely distended, similar to the prior. The stomach is incompletely distended. No obstruction. Normal appendix. Moderate colonic stool burden. Scattered colonic diverticulosis without acute diverticulitis. Please ensure up-to-date with colonoscopy to exclude an underlying process. No obvious pneumatosis.  Peritoneum: No free fluid or free intraperitoneal air.  Vasculature:    Scattered calcific atherosclerosis. Vascular evaluation is limited without IV contrast.  Lymph Nodes:  Increased attenuation in the small bowel mesentery. Mildly prominent abdominoperitoneal nodes smaller from  the prior exam. Index nodes are as follows: 1 cm aortocaval node (previously 2 cm), 5 mm left para-aortic node (previously 1 cm), 5 mm left common iliac (previously 1 cm) with small nodes extending along the iliac chain to the groin. A left inguinal node incompletely included in the field-of-view approximates 2.4 cm in greatest dimension similar in size to the prior.  PELVIS:                                 Pelvic organs: Heterogeneous enlarged prostate protruding into the bladder which is thickened. Fat-containing bilateral inguinal hernias..   Abdominal/Pelvic Wall: Small fat-containing umbilical hernia. Rectus diastases. Subcutaneous soft tissue edema and skin thickening. Subcutaneous inflammatory changes in the ventral abdominal wall likely the sequela of prior injection sites. No focal fluid collections..  BONES: Multilevel degenerative thoracolumbar spine and pelvis with sclerosis about the sacroiliac joint. There are degrees of canal stenosis as conspicuous at L4-5.      1. Slight improved aeration of the lung bases compatible with Covid-19. Please refer to the prior chest CT for further detail and follow-up recommendations. 2. No acute intra-abdominal or pelvic process on this limited noncontrast exam. 3. Prostatomegaly with diffuse thickening of the bladder which can be seen with bladder outlet obstruction or cystitis. 4. Indeterminate cystic lesion in the tail of the pancreas approximating 1.1 cm. Short-term surveillance with MRI could be considered not emergently for further characterization. 5. Smaller abdominal and pelvic adenopathy. No splenomegaly. 6. Please see above for additional findings/recommendations.  This report was finalized on 5/2/2022 4:29 PM by Dr. Arsenio Kessler M.D.      XR Chest 1 View    Result Date: 5/2/2022  XR CHEST 1 VW-  HISTORY: Male who is 67 years-old,  fever  TECHNIQUE: Frontal view of the chest  COMPARISON: 04/14/2022  FINDINGS: Heart, mediastinum and pulmonary vasculature are  unremarkable. Patchy infiltrates are present in both lungs, grossly similar to the prior exam. No pleural effusion, or pneumothorax. No acute osseous process.      Bilateral patchy pulmonary infiltrates, continued follow-up recommended.  This report was finalized on 5/2/2022 2:08 PM by Dr. Oscar Gonsales M.D.        I ordered the above noted radiological studies. Reviewed by me.  See dictation for official radiology interpretation.      PROCEDURES    Procedures      MEDICATIONS GIVEN IN ER    Medications   vancomycin 2000 mg/500 mL 0.9% NS IVPB ([facility]) (has no administration in time range)   sodium chloride 0.9 % bolus 500 mL (0 mL Intravenous Stopped 5/2/22 1423)   piperacillin-tazobactam (ZOSYN) 3.375 g in iso-osmotic dextrose 50 ml (premix) (0 g Intravenous Stopped 5/2/22 1527)         PROGRESS, DATA ANALYSIS, CONSULTS, AND MEDICAL DECISION MAKING    All labs have been independently reviewed by me.  All radiology studies have been reviewed by me and discussed with radiologist dictating the report.   EKG's independently viewed and interpreted by me.  Discussion below represents my analysis of pertinent findings related to patient's condition, differential diagnosis, treatment plan and final disposition.    Initial concern for hypotension secondary to GI bleed, 1 unit packed red blood cells ordered immediately.  Other concern for dehydration, renal failure, electrolyte abnormalities, infectious process including sepsis, among others.    ED Course as of 05/02/22 1633   Mon May 02, 2022   1347 Glucose(!): 118 [DC]   1348 BUN(!): 27 [DC]   1348 Creatinine(!): 1.61  1.48 three hours ago [DC]   1348 Sodium(!): 133 [DC]   1348 Potassium: 4.2 [DC]   1348 ALT (SGPT)(!): 75 [DC]   1348 AST (SGOT)(!): 43 [DC]   1348 Alkaline Phosphatase(!): 137 [DC]   1348 Total Bilirubin: 0.8 [DC]   1348 Lactate: 1.4 [DC]   1348 Troponin T: 0.028 [DC]   1348 Magnesium: 1.8 [DC]   1348 Procalcitonin(!): 0.35 [DC]   1348 WBC: 9.55 [DC]    1348 Hemoglobin(!): 7.6 [DC]   1419 Discussed with Dr. Cheng, Heme/Onc, updated him on patient's clinical course and findings today, he will consult. [DC]   1454 XR Chest 1 View  Grossly similar to prior [DC]   1504 Discussed with Dr. Cooley, [facility], discussed patient's clinical course and findings today, treatment modalities, oncology consult, and need for hospitalization. [DC]      ED Course User Index  [DC] Matthew Quiroga MD     Hemoccult negative on exam    AS OF 16:33 EDT VITALS:    BP - 122/82  HR - 83  TEMP - 98.7 °F (37.1 °C) (Oral)  02 SATS - 99%        DIAGNOSIS  Final diagnoses:   Anemia, unspecified type   Hypotension, unspecified hypotension type   Acute renal insufficiency   Personal history of CLL (chronic lymphocytic leukemia)   Immunocompromised (HCC)   Thrombocytopenia (HCC)   COVID-19         DISPOSITION  HOSPITALIZATION    Discussed treatment plan and reason for hospitalization with pt/family and hospitalizing physician.  Pt/family voiced understanding of the plan for hospitalization for further testing/treatment as needed.                  Matthew Quiroga MD  05/02/22 8156

## 2022-05-03 ENCOUNTER — READMISSION MANAGEMENT (OUTPATIENT)
Dept: CALL CENTER | Facility: HOSPITAL | Age: 67
End: 2022-05-03

## 2022-05-03 ENCOUNTER — APPOINTMENT (OUTPATIENT)
Dept: CT IMAGING | Facility: HOSPITAL | Age: 67
End: 2022-05-03

## 2022-05-03 PROBLEM — R50.9 FEVER: Status: ACTIVE | Noted: 2022-05-03

## 2022-05-03 LAB
ANION GAP SERPL CALCULATED.3IONS-SCNC: 9 MMOL/L (ref 5–15)
B PARAPERT DNA SPEC QL NAA+PROBE: NOT DETECTED
B PERT DNA SPEC QL NAA+PROBE: NOT DETECTED
BH BB BLOOD EXPIRATION DATE: NORMAL
BH BB BLOOD TYPE BARCODE: 5100
BH BB DISPENSE STATUS: NORMAL
BH BB PRODUCT CODE: NORMAL
BH BB UNIT NUMBER: NORMAL
BUN SERPL-MCNC: 20 MG/DL (ref 8–23)
BUN/CREAT SERPL: 18.5 (ref 7–25)
C PNEUM DNA NPH QL NAA+NON-PROBE: NOT DETECTED
CALCIUM SPEC-SCNC: 7.6 MG/DL (ref 8.6–10.5)
CHLORIDE SERPL-SCNC: 104 MMOL/L (ref 98–107)
CO2 SERPL-SCNC: 23 MMOL/L (ref 22–29)
CREAT SERPL-MCNC: 1.08 MG/DL (ref 0.76–1.27)
CROSSMATCH INTERPRETATION: NORMAL
CRP SERPL-MCNC: 17.14 MG/DL (ref 0–0.5)
D DIMER PPP FEU-MCNC: 0.44 MCGFEU/ML (ref 0–0.49)
DEPRECATED RDW RBC AUTO: 63.9 FL (ref 37–54)
EGFRCR SERPLBLD CKD-EPI 2021: 74.3 ML/MIN/1.73
ERYTHROCYTE [DISTWIDTH] IN BLOOD BY AUTOMATED COUNT: 19.3 % (ref 12.3–15.4)
FERRITIN SERPL-MCNC: 1654 NG/ML (ref 30–400)
FIBRINOGEN PPP-MCNC: 814 MG/DL (ref 219–464)
FLUAV SUBTYP SPEC NAA+PROBE: NOT DETECTED
FLUBV RNA ISLT QL NAA+PROBE: NOT DETECTED
FOLATE SERPL-MCNC: 7.36 NG/ML (ref 4.78–24.2)
GLUCOSE SERPL-MCNC: 109 MG/DL (ref 65–99)
HADV DNA SPEC NAA+PROBE: NOT DETECTED
HAPTOGLOB SERPL-MCNC: 262 MG/DL (ref 30–200)
HCOV 229E RNA SPEC QL NAA+PROBE: NOT DETECTED
HCOV HKU1 RNA SPEC QL NAA+PROBE: NOT DETECTED
HCOV NL63 RNA SPEC QL NAA+PROBE: NOT DETECTED
HCOV OC43 RNA SPEC QL NAA+PROBE: NOT DETECTED
HCT VFR BLD AUTO: 23.8 % (ref 37.5–51)
HGB BLD-MCNC: 7.8 G/DL (ref 13–17.7)
HMPV RNA NPH QL NAA+NON-PROBE: NOT DETECTED
HPIV1 RNA ISLT QL NAA+PROBE: NOT DETECTED
HPIV2 RNA SPEC QL NAA+PROBE: NOT DETECTED
HPIV3 RNA NPH QL NAA+PROBE: NOT DETECTED
HPIV4 P GENE NPH QL NAA+PROBE: NOT DETECTED
IGA1 MFR SER: 76 MG/DL (ref 70–400)
IGG1 SER-MCNC: 342 MG/DL (ref 700–1600)
IGM SERPL-MCNC: 6 MG/DL (ref 40–230)
L PNEUMO1 AG UR QL IA: NEGATIVE
LDH SERPL-CCNC: 245 U/L (ref 135–225)
M PNEUMO IGG SER IA-ACNC: NOT DETECTED
MCH RBC QN AUTO: 30 PG (ref 26.6–33)
MCHC RBC AUTO-ENTMCNC: 32.8 G/DL (ref 31.5–35.7)
MCV RBC AUTO: 91.5 FL (ref 79–97)
MRSA DNA SPEC QL NAA+PROBE: NORMAL
PLATELET # BLD AUTO: 111 10*3/MM3 (ref 140–450)
PMV BLD AUTO: 11.1 FL (ref 6–12)
POTASSIUM SERPL-SCNC: 3.9 MMOL/L (ref 3.5–5.2)
PROCALCITONIN SERPL-MCNC: 0.36 NG/ML (ref 0–0.25)
QT INTERVAL: 353 MS
RBC # BLD AUTO: 2.6 10*6/MM3 (ref 4.14–5.8)
RETICS # AUTO: 0.07 10*6/MM3 (ref 0.02–0.13)
RETICS/RBC NFR AUTO: 2.79 % (ref 0.7–1.9)
RHINOVIRUS RNA SPEC NAA+PROBE: NOT DETECTED
RSV RNA NPH QL NAA+NON-PROBE: NOT DETECTED
S PNEUM AG SPEC QL LA: NEGATIVE
SARS-COV-2 AG RESP QL IA.RAPID: DETECTED
SARS-COV-2 RNA NPH QL NAA+NON-PROBE: DETECTED
SODIUM SERPL-SCNC: 136 MMOL/L (ref 136–145)
UNIT  ABO: NORMAL
UNIT  RH: NORMAL
VIT B12 BLD-MCNC: 1462 PG/ML (ref 211–946)
WBC NRBC COR # BLD: 10.58 10*3/MM3 (ref 3.4–10.8)

## 2022-05-03 PROCEDURE — 82746 ASSAY OF FOLIC ACID SERUM: CPT | Performed by: INTERNAL MEDICINE

## 2022-05-03 PROCEDURE — 80048 BASIC METABOLIC PNL TOTAL CA: CPT | Performed by: INTERNAL MEDICINE

## 2022-05-03 PROCEDURE — 85384 FIBRINOGEN ACTIVITY: CPT | Performed by: INTERNAL MEDICINE

## 2022-05-03 PROCEDURE — 85379 FIBRIN DEGRADATION QUANT: CPT | Performed by: INTERNAL MEDICINE

## 2022-05-03 PROCEDURE — 93005 ELECTROCARDIOGRAM TRACING: CPT | Performed by: INTERNAL MEDICINE

## 2022-05-03 PROCEDURE — 63710000001 DEXAMETHASONE PER 0.25 MG: Performed by: INTERNAL MEDICINE

## 2022-05-03 PROCEDURE — 82784 ASSAY IGA/IGD/IGG/IGM EACH: CPT | Performed by: INTERNAL MEDICINE

## 2022-05-03 PROCEDURE — 85045 AUTOMATED RETICULOCYTE COUNT: CPT | Performed by: INTERNAL MEDICINE

## 2022-05-03 PROCEDURE — 25010000002 PIPERACILLIN SOD-TAZOBACTAM PER 1 G: Performed by: INTERNAL MEDICINE

## 2022-05-03 PROCEDURE — 84145 PROCALCITONIN (PCT): CPT | Performed by: INTERNAL MEDICINE

## 2022-05-03 PROCEDURE — 93010 ELECTROCARDIOGRAM REPORT: CPT | Performed by: INTERNAL MEDICINE

## 2022-05-03 PROCEDURE — 99223 1ST HOSP IP/OBS HIGH 75: CPT | Performed by: INTERNAL MEDICINE

## 2022-05-03 PROCEDURE — 83010 ASSAY OF HAPTOGLOBIN QUANT: CPT | Performed by: INTERNAL MEDICINE

## 2022-05-03 PROCEDURE — 71250 CT THORAX DX C-: CPT

## 2022-05-03 PROCEDURE — 25010000002 VANCOMYCIN 10 G RECONSTITUTED SOLUTION: Performed by: INTERNAL MEDICINE

## 2022-05-03 PROCEDURE — 82728 ASSAY OF FERRITIN: CPT | Performed by: INTERNAL MEDICINE

## 2022-05-03 PROCEDURE — 82607 VITAMIN B-12: CPT | Performed by: INTERNAL MEDICINE

## 2022-05-03 PROCEDURE — 0202U NFCT DS 22 TRGT SARS-COV-2: CPT | Performed by: INTERNAL MEDICINE

## 2022-05-03 PROCEDURE — 83615 LACTATE (LD) (LDH) ENZYME: CPT | Performed by: INTERNAL MEDICINE

## 2022-05-03 PROCEDURE — 86140 C-REACTIVE PROTEIN: CPT | Performed by: INTERNAL MEDICINE

## 2022-05-03 PROCEDURE — 85027 COMPLETE CBC AUTOMATED: CPT | Performed by: INTERNAL MEDICINE

## 2022-05-03 PROCEDURE — 87641 MR-STAPH DNA AMP PROBE: CPT | Performed by: INTERNAL MEDICINE

## 2022-05-03 PROCEDURE — 87426 SARSCOV CORONAVIRUS AG IA: CPT | Performed by: INTERNAL MEDICINE

## 2022-05-03 RX ORDER — PREDNISONE 20 MG/1
40 TABLET ORAL
Status: DISCONTINUED | OUTPATIENT
Start: 2022-05-04 | End: 2022-05-05 | Stop reason: HOSPADM

## 2022-05-03 RX ORDER — PREDNISONE 20 MG/1
20 TABLET ORAL
Status: DISCONTINUED | OUTPATIENT
Start: 2022-05-16 | End: 2022-05-05 | Stop reason: HOSPADM

## 2022-05-03 RX ORDER — PREDNISONE 10 MG/1
5 TABLET ORAL
Status: DISCONTINUED | OUTPATIENT
Start: 2022-05-28 | End: 2022-05-05 | Stop reason: HOSPADM

## 2022-05-03 RX ORDER — PREDNISONE 10 MG/1
10 TABLET ORAL
Status: DISCONTINUED | OUTPATIENT
Start: 2022-05-22 | End: 2022-05-05 | Stop reason: HOSPADM

## 2022-05-03 RX ADMIN — ACETAMINOPHEN 650 MG: 325 TABLET ORAL at 15:13

## 2022-05-03 RX ADMIN — TERAZOSIN HYDROCHLORIDE 5 MG: 5 CAPSULE ORAL at 21:42

## 2022-05-03 RX ADMIN — ACETAMINOPHEN 650 MG: 325 TABLET ORAL at 18:35

## 2022-05-03 RX ADMIN — APIXABAN 5 MG: 5 TABLET, FILM COATED ORAL at 10:08

## 2022-05-03 RX ADMIN — BENZONATATE 200 MG: 100 CAPSULE ORAL at 21:41

## 2022-05-03 RX ADMIN — VANCOMYCIN HYDROCHLORIDE 1250 MG: 10 INJECTION, POWDER, LYOPHILIZED, FOR SOLUTION INTRAVENOUS at 10:05

## 2022-05-03 RX ADMIN — BENZONATATE 200 MG: 100 CAPSULE ORAL at 10:08

## 2022-05-03 RX ADMIN — ALLOPURINOL 300 MG: 300 TABLET ORAL at 10:08

## 2022-05-03 RX ADMIN — TAZOBACTAM SODIUM AND PIPERACILLIN SODIUM 3.38 G: 375; 3 INJECTION, SOLUTION INTRAVENOUS at 05:16

## 2022-05-03 RX ADMIN — TAZOBACTAM SODIUM AND PIPERACILLIN SODIUM 3.38 G: 375; 3 INJECTION, SOLUTION INTRAVENOUS at 21:46

## 2022-05-03 RX ADMIN — TAZOBACTAM SODIUM AND PIPERACILLIN SODIUM 3.38 G: 375; 3 INJECTION, SOLUTION INTRAVENOUS at 15:13

## 2022-05-03 RX ADMIN — DEXAMETHASONE 6 MG: 4 TABLET ORAL at 15:13

## 2022-05-03 RX ADMIN — FLUTICASONE PROPIONATE 1 SPRAY: 50 SPRAY, METERED NASAL at 10:07

## 2022-05-03 RX ADMIN — ASPIRIN 81 MG: 81 TABLET, COATED ORAL at 10:08

## 2022-05-03 RX ADMIN — Medication 2000 UNITS: at 10:07

## 2022-05-03 RX ADMIN — PANTOPRAZOLE SODIUM 40 MG: 40 TABLET, DELAYED RELEASE ORAL at 10:08

## 2022-05-03 RX ADMIN — ATORVASTATIN CALCIUM 40 MG: 20 TABLET, FILM COATED ORAL at 10:07

## 2022-05-03 RX ADMIN — ACETAMINOPHEN 650 MG: 325 TABLET ORAL at 05:26

## 2022-05-03 RX ADMIN — APIXABAN 5 MG: 5 TABLET, FILM COATED ORAL at 21:42

## 2022-05-03 RX ADMIN — BENZONATATE 200 MG: 100 CAPSULE ORAL at 15:13

## 2022-05-03 NOTE — CASE MANAGEMENT/SOCIAL WORK
Discharge Planning Assessment  [facility]     Patient Name: Aida Bal Jr.  MRN: [id number]  Today's Date: 5/3/2022    Admit Date: 5/2/2022     Discharge Needs Assessment     Row Name 05/03/22 0831       Living Environment    People in Home spouse    Name(s) of People in Home wife: Meghana Bal    Current Living Arrangements home    Primary Care Provided by self    Provides Primary Care For no one    Family Caregiver if Needed spouse    Family Caregiver Names wife: Meghana Bal    Quality of Family Relationships supportive    Able to Return to Prior Arrangements yes       Resource/Environmental Concerns    Resource/Environmental Concerns none    Transportation Concerns other (see comments)       Transition Planning    Patient/Family Anticipates Transition to home with family    Patient/Family Anticipated Services at Transition none    Transportation Anticipated car, drives self;family or friend will provide       Discharge Needs Assessment    Readmission Within the Last 30 Days previous discharge plan unsuccessful    Equipment Currently Used at Home cpap    Concerns to be Addressed denies needs/concerns at this time;no discharge needs identified    Anticipated Changes Related to Illness none    Equipment Needed After Discharge none               Discharge Plan     Row Name 05/03/22 0822       Plan    Plan Home w/o needs    Provided Post Acute Provider List? Refused  Pt said he is not bad enough yet to need home health or SNF, he declined list of rehab and HH providers    Provided Post Acute Provider Quality & Resource List? Refused    Plan Comments Pt confirmed the address, PCP and [facility] are correct, pt said he lives in a multi level home with his wife / POA Meghana Bal (pt said he will have his wife bring in a copy of his Medical POA).  Pt said their home has 3 steps at front door and 2 steps at side door.  Pt said he is IADL, can afford his Rx, only DME he uses is a CPAP which he said is 5-6  years old, he said he has never used home health or been to a SNF and does not anticipate the need.  Pt provided coupon for 30 day free Eliquis as he said he has not used a coupon. Pt denies discharge concerns.      Shannon Taylor RN              Continued Care and Services - Admitted Since 5/2/2022    Coordination has not been started for this encounter.     Selected Continued Care - Episodes Includes selections from active Coordinated Care Management episodes    Oncology Episode start date: 11/1/2021   There are no active outsourced providers for this episode.                  Demographic Summary     Row Name 05/03/22 0829       General Information    Admission Type inpatient    Arrived From home;physician office - external;emergency department    Required Notices Provided Important Message from Medicare    Referral Source admission list    Reason for Consult discharge planning    Preferred Language English       Contact Information    Permission Granted to Share Info With family/designee    Contact Information Obtained for facility     Contact Information Comments wife: Meghana Bal & dtr: Alanna Bal               Functional Status     Row Name 05/03/22 0830       Functional Status    Usual Activity Tolerance good    Current Activity Tolerance good       Functional Status, IADL    Medications independent    Meal Preparation assistive person    Housekeeping assistive person    Laundry assistive person    Shopping assistive person    IADL Comments wife Meghana Bal assist               Shannon Taylor, ALIA

## 2022-05-03 NOTE — PROGRESS NOTES
"[facility] Clinical Pharmacy Services: Vancomycin Monitoring Note    Aida Bal Jr. is a 67 y.o. male who is on day 2/5 of pharmacy to dose vancomycin for sepsis.    Previous Vancomycin Dose:   1250 mg IV every  24  hours  Updated Cultures and Sensitivities: no updates      Vitals/Labs  Ht: 182.9 cm (72\"); Wt: 102 kg (224 lb 6.4 oz)   Temp Readings from Last 1 Encounters:   05/03/22 100.1 °F (37.8 °C) (Oral)     Estimated Creatinine Clearance: 53.5 mL/min (A) (by C-G formula based on SCr of 1.61 mg/dL (H)).     Results from last 7 days   Lab Units 05/02/22  1238 05/02/22  1021   CREATININE mg/dL 1.61* 1.48*   WBC 10*3/mm3 9.55 8.86     Assessment/Plan    Current Vancomycin Dose: 1250 mg IV every  24  hours; provides a predicted  mg/L.hr   Next Level Date and Time: Vanc Trough on 5/4 at 0630  We will continue to monitor patient changes and renal function     Thank you for involving pharmacy in this patient's care. Please contact pharmacy with any questions or concerns.       Bethany Babinski, PharmD  Clinical Pharmacist         "

## 2022-05-03 NOTE — PROGRESS NOTES
Name: Aida Bal Jr. ADMIT: 2022   : 1955  PCP: Roxane Marsh MD    MRN: [id number] LOS: 1 days   AGE/SEX: 67 y.o. male  ROOM: [facility]   Subjective   Chief Complaint   Patient presents with   • Hypotension      He was admitted because of low blood pressure at his oncologist office.  The patient reports he had not had issues with that before.  He denied shortness of breath and productive cough.  He is not having any dizziness or lightheadedness now and his pressure has improved.  No nausea vomiting diarrhea or abdominal pain reported.  He denied dysuria or difficulty urinating.  He has had a bowel movement today.    Objective   Vital Signs  Temp:  [98.6 °F (37 °C)-100.5 °F (38.1 °C)] 100.1 °F (37.8 °C)  Heart Rate:  [] 80  Resp:  [16-20] 18  BP: ()/(47-93) 122/54  SpO2:  [96 %-99 %] 96 %  on   ;   Device (Oxygen Therapy): room air  Body mass index is 30.43 kg/m².    Physical Exam  Vitals and nursing note reviewed.   Constitutional:       General: He is not in acute distress.     Appearance: He is not diaphoretic.   HENT:      Head: Normocephalic and atraumatic.   Eyes:      General:         Right eye: No discharge.         Left eye: No discharge.      Conjunctiva/sclera: Conjunctivae normal.   Cardiovascular:      Rate and Rhythm: Normal rate and regular rhythm.      Pulses: Normal pulses.   Pulmonary:      Effort: Pulmonary effort is normal.      Breath sounds: No wheezing.   Musculoskeletal:      Cervical back: Neck supple. No tenderness.   Neurological:      Mental Status: He is alert.         Results Review:       I reviewed the patient's new clinical results.     I reviewed imaging, agree with interpretation.     I reviewed telemetry/EKG results, sinus with potential prolonged NH on telemetry      Results from last 7 days   Lab Units 22  0745 22  1238 22  1021   WBC 10*3/mm3 10.58 9.55 8.86   HEMOGLOBIN g/dL 7.8* 7.6* 7.9*   PLATELETS 10*3/mm3 111* 129*  125*     Results from last 7 days   Lab Units 05/03/22  0745 05/02/22  1238 05/02/22  1021   SODIUM mmol/L 136 133* 134   POTASSIUM mmol/L 3.9 4.2 4.3   CHLORIDE mmol/L 104 100 99   CO2 mmol/L 23.0 24.3 23.9   BUN mg/dL 20 27* 28*   CREATININE mg/dL 1.08 1.61* 1.48*   GLUCOSE mg/dL 109* 118* 217*   Estimated Creatinine Clearance: 79.8 mL/min (by C-G formula based on SCr of 1.08 mg/dL).  Results from last 7 days   Lab Units 05/03/22  0745 05/02/22  1238 05/02/22  1021   CALCIUM mg/dL 7.6* 8.2* 8.4*   ALBUMIN g/dL  --  2.70* 2.60*   MAGNESIUM mg/dL  --  1.8  --           allopurinol, 300 mg, Oral, Daily  apixaban, 5 mg, Oral, Q12H  aspirin, 81 mg, Oral, Daily  atorvastatin, 40 mg, Oral, Daily  benzonatate, 200 mg, Oral, TID  cholecalciferol, 2,000 Units, Oral, Daily  fluticasone, 1 spray, Nasal, Daily  pantoprazole, 40 mg, Oral, Daily  piperacillin-tazobactam, 3.375 g, Intravenous, Q8H  terazosin, 5 mg, Oral, Nightly  vancomycin, 1,250 mg, Intravenous, Q24H      Pharmacy to dose vancomycin,     Diet Regular; Cardiac    Assessment/Plan      Active Hospital Problems    Diagnosis  POA   • **Fever [R50.9]  Yes   • Anemia [D64.9]  Yes   • Pneumonia due to COVID-19 virus [U07.1, J12.82]  Yes   • CLL (chronic lymphocytic leukemia) (HCC) [C91.10]  Yes   • Autoimmune hemolytic anemia (HCC) [D59.10]  Yes   • Hypertension [I10]  Yes      Resolved Hospital Problems   No resolved problems to display.       · Fever: Not neutropenic but is immunocompromised.  He had procalcitonin elevation.  Recent COVID pneumonia.  On Zosyn and infectious disease added vancomycin.  I sent for repeat swabs and urinary antigens.  Blood cultures are pending.  COVID is still positive.  He had questionable bladder issues on the CT but he denies urinary symptoms.  Will monitor postvoid . urinalysis does not appear to be infectious.  Infectious disease consulting.  · CLL/AIHA: Oncology consulted.  · Pancreatic tail lesion: Per oncology  · Hypertension:  Was hypotensive earlier.  Holding Norvasc and monitoring.  · DVT: Acute left gastrocnemius noted a month ago.  He is on Eliquis.  · Prophylaxis: On Eliquis.  Defer anticoagulation adjustment to oncology.  · Disposition: Home/TBD    Barak Cruz MD  [facility]  05/03/22  11:26 EDT    Dictated portions using Dragon dictation software.    During the entire encounter, I was wearing recommended PPE including face mask and eye protection. Hand sanitization was performed prior to entering room and upon exit.

## 2022-05-03 NOTE — CONSULTS
Group: [facility]         CONSULT NOTE    Patient Identification:  Aida Bal Jr.  67 y.o.  male  1955  [id number]            Requesting physician: Dr. Escalante    Reason for Consultation: Long-haul COVID 19    CC: Low blood pressure, fever    History of Present Illness:  67-year-old -American male who has CLL.  He was in this hospital a few weeks ago for COVID-19 infection and pneumonia.  He received some steroids at the time.  He had MRSA positive in his nares but no other findings suggesting true MRSA pneumonia.  He was seen by me as well as infectious diseases.  He now presents again to his hematologist/oncologist office with low blood pressure, fatigue and intermittent fever.  He has an occasional cough but he denies much sputum production.  No hemoptysis.  I reviewed imaging and his CT scan is showing persistent bilateral pulmonary infiltrates indicative of ongoing pneumonia.  He tested positive again for COVID-19 PCR.  Antigen test is pending at this time.  Old records reviewed      Review of Systems   Constitutional: Positive for fatigue and fever. Negative for diaphoresis.   HENT: Negative for ear pain and sore throat.    Eyes: Negative for pain and visual disturbance.   Respiratory: Positive for cough and shortness of breath.    Cardiovascular: Negative for chest pain and leg swelling.   Gastrointestinal: Negative for abdominal pain and diarrhea.   Endocrine: Negative for cold intolerance and polyuria.   Genitourinary: Negative for dysuria and hematuria.   Musculoskeletal: Negative for joint swelling and myalgias.   Skin: Negative for rash and wound.   Neurological: Negative for speech difficulty and numbness.   Hematological: Negative for adenopathy. Does not bruise/bleed easily.   Psychiatric/Behavioral: Negative for agitation and confusion.       Past Medical History:  Past Medical History:   Diagnosis Date   • Autoimmune hemolytic anemia (HCC)    • Carotid artery  occlusion     Left internal carotid artery   • Coronary artery disease    • Diabetes mellitus (HCC)     Steroid induced   • Gout    • High blood sugar    • High cholesterol    • Hypertension    • Leukemia, chronic (HCC)    • Sleep apnea    • TIA (transient ischemic attack)        Past Surgical History:  Past Surgical History:   Procedure Laterality Date   • CARDIAC CATHETERIZATION     • COLONOSCOPY N/A 7/24/2017    Procedure: COLONOSCOPY TO CECUM WITH COLD POLYPECTOMY, TATOO PLACEMENT;  Surgeon: Fannie Caro MD;  Location: [facility] ENDOSCOPY;  Service:    • COLONOSCOPY N/A 6/28/2018    Procedure: COLONOSCOPY to Cecum with Hot Snare Polypectomies, injected with 6 ML NS;  Surgeon: Fannie Caro MD;  Location: [facility] ENDOSCOPY;  Service: Gastroenterology   • COLONOSCOPY N/A 6/27/2019    Procedure: COLONOSCOPY TO CECUM WITH COLD SNARE POLYPECTOMY AND COLD BIOPSY POLYPECTOMIES;  Surgeon: Fannie Caro MD;  Location: [facility] ENDOSCOPY;  Service: General   • KNEE SURGERY          Home Meds:  Medications Prior to Admission   Medication Sig Dispense Refill Last Dose   • allopurinol (ZYLOPRIM) 300 MG tablet Take 300 mg by mouth Daily.   5/2/2022 at Unknown time   • amLODIPine (NORVASC) 10 MG tablet Take 10 mg by mouth Every Morning.   5/2/2022 at Unknown time   • apixaban (ELIQUIS) 5 MG tablet tablet Take 2 tablets by mouth Every 12 (Twelve) Hours for 6 doses, then 1 tablet every 12 hours thereafter 72 tablet 0 5/2/2022 at Unknown time   • Aspirin Low Dose 81 MG EC tablet TK 1 T PO QAM   5/2/2022 at Unknown time   • atorvastatin (LIPITOR) 40 MG tablet TK 1 T PO QD  3 5/1/2022 at Unknown time   • benzonatate (TESSALON) 200 MG capsule Take 1 capsule by mouth 3 (Three) Times a Day. 30 capsule 0 5/2/2022 at Unknown time   • Cholecalciferol (VITAMIN D3) 2000 UNITS tablet Take 2,000 Units by mouth Daily.   5/2/2022 at Unknown time   • doxazosin (CARDURA) 4 MG tablet TK 2 TS PO QAM AND 2 TS QHS  3 5/2/2022 at Unknown  "time   • EPINEPHrine (EPIPEN) 0.3 MG/0.3ML solution auto-injector injection Inject 0.3 mg into the appropriate muscle as directed by prescriber.      • fluticasone (FLONASE) 50 MCG/ACT nasal spray 1 spray into the nostril(s) as directed by provider Daily.   2022 at Unknown time   • [] mupirocin (BACTROBAN) 2 % ointment Apply as directed nasally 2 (Two) Times a Day for 3 doses. 15 g 0 2022 at Unknown time   • pantoprazole (PROTONIX) 20 MG EC tablet Take 2 tablets by mouth Daily. 21 tablet 0 2022 at Unknown time   • promethazine-dextromethorphan (PROMETHAZINE-DM) 6.25-15 MG/5ML syrup TAKE 5 ML BY MOUTH 4 TIMES DAILY IF NEEDED FOR COUGH   2022 at Unknown time   • azelastine (OPTIVAR) 0.05 % ophthalmic solution Apply 1 drop to eye(s) as directed by provider.          Allergies:  Allergies   Allergen Reactions   • Ace Inhibitors Unknown - Low Severity   • Candesartan Unknown - Low Severity   • Pravastatin Unknown - Low Severity     2017: medication caused side effects but he is able to other statins ie Crestor       Social History:   Social History     Socioeconomic History   • Marital status:      Spouse name: Meghana   Tobacco Use   • Smoking status: Never Smoker   • Smokeless tobacco: Never Used   Substance and Sexual Activity   • Alcohol use: No   • Drug use: No   • Sexual activity: Defer       Family History:  Family History   Problem Relation Age of Onset   • Colon cancer Mother 50   • Throat cancer Father    • Colon cancer Father    • Hypertension Daughter        Physical Exam:  /66 (BP Location: Left arm, Patient Position: Lying)   Pulse 90   Temp (!) 102.1 °F (38.9 °C) (Oral)   Resp 18   Ht 182.9 cm (72\")   Wt 102 kg (224 lb 6.4 oz)   SpO2 98%   BMI 30.43 kg/m²  Body mass index is 30.43 kg/m². 98% 102 kg (224 lb 6.4 oz)  Physical Exam  Constitutional:       General: He is not in acute distress.     Appearance: He is well-developed.   HENT:      Right Ear: " External ear normal.      Left Ear: External ear normal.      Nose: Nose normal.   Eyes:      General: No scleral icterus.     Conjunctiva/sclera: Conjunctivae normal.      Pupils: Pupils are equal, round, and reactive to light.   Neck:      Thyroid: No thyromegaly.      Vascular: No JVD.   Cardiovascular:      Rate and Rhythm: Normal rate and regular rhythm.      Heart sounds: No murmur heard.     Comments: No edema  Pulmonary:      Effort: Pulmonary effort is normal.      Breath sounds: No wheezing or rales.      Comments: Some coarse breath sounds bilaterally but no wheezing or crackles  Abdominal:      General: There is no distension.      Palpations: Abdomen is soft.      Comments: No liver or spleen enlargment palpable   Musculoskeletal:         General: No deformity. Normal range of motion.      Cervical back: Neck supple. No rigidity.      Comments: No deformity in all 4 extrem   Skin:     Findings: No erythema or rash.      Comments: No palpable nodules   Neurological:      General: No focal deficit present.      Mental Status: He is alert and oriented to person, place, and time.      Cranial Nerves: No cranial nerve deficit.      Motor: No weakness.   Psychiatric:         Mood and Affect: Mood normal.         Thought Content: Thought content normal.         LABS:  COVID19   Date Value Ref Range Status   05/03/2022 Detected (C) Presumptive Negative - Ref. Range Final       Lab Results   Component Value Date    CALCIUM 7.6 (L) 05/03/2022    PHOS 3.7 04/21/2022     Results from last 7 days   Lab Units 05/03/22  0745 05/02/22  1238 05/02/22  1021   MAGNESIUM mg/dL  --  1.8  --    SODIUM mmol/L 136 133* 134   POTASSIUM mmol/L 3.9 4.2 4.3   CHLORIDE mmol/L 104 100 99   CO2 mmol/L 23.0 24.3 23.9   BUN mg/dL 20 27* 28*   CREATININE mg/dL 1.08 1.61* 1.48*   GLUCOSE mg/dL 109* 118* 217*   CALCIUM mg/dL 7.6* 8.2* 8.4*   WBC 10*3/mm3 10.58 9.55 8.86   HEMOGLOBIN g/dL 7.8* 7.6* 7.9*   PLATELETS 10*3/mm3 111* 129* 125*    ALT (SGPT) U/L  --  75* 73*   AST (SGOT) U/L  --  43* 45*   PROCALCITONIN ng/mL 0.36* 0.35*  --      Lab Results   Component Value Date    TROPONINT 0.028 05/02/2022     Results from last 7 days   Lab Units 05/02/22  1238   TROPONIN T ng/mL 0.028     Results from last 7 days   Lab Units 05/02/22  1434 05/02/22  1238   BLOODCX  No growth at 24 hours No growth at 24 hours     Results from last 7 days   Lab Units 05/03/22  0745 05/02/22  1238   PROCALCITONIN ng/mL 0.36* 0.35*   LACTATE mmol/L  --  1.4         Results from last 7 days   Lab Units 05/03/22  1014   ADENOVIRUS DETECTION BY PCR  Not Detected   CORONAVIRUS 229E  Not Detected   CORONAVIRUS HKU1  Not Detected   CORONAVIRUS NL63  Not Detected   CORONAVIRUS OC43  Not Detected   HUMAN METAPNEUMOVIRUS  Not Detected   HUMAN RHINOVIRUS/ENTEROVIRUS  Not Detected   INFLUENZA B PCR  Not Detected   PARAINFLUENZA 1  Not Detected   PARAINFLUENZA VIRUS 2  Not Detected   PARAINFLUENZA VIRUS 3  Not Detected   PARAINFLUENZA VIRUS 4  Not Detected   BORDETELLA PERTUSSIS PCR  Not Detected   BORDETELLA PARAPERTUSSIS PCR  Not Detected   CHLAMYDOPHILA PNEUMONIAE PCR  Not Detected   MYCOPLAMA PNEUMO PCR  Not Detected   RSV, PCR  Not Detected         Results from last 7 days   Lab Units 05/02/22  1434 05/02/22  1238   BLOODCX  No growth at 24 hours No growth at 24 hours     Lab Results   Component Value Date    TSH 0.62 12/11/2015     Estimated Creatinine Clearance: 79.8 mL/min (by C-G formula based on SCr of 1.08 mg/dL).  Results from last 7 days   Lab Units 05/02/22  1527   NITRITE UA  Negative   WBC UA /HPF 0-2   BACTERIA UA /HPF None Seen   SQUAM EPITHEL UA /HPF None Seen        Imaging: I personally visualized the images of scans/x-rays performed within last 3 days.      Assessment:  Fever  Long-haul COVID  Immunosuppressed patient, CLL  Organizing pneumonia      Recommendations:  He evidently is having difficulty clearing the virus due to his underlying CLL.  He already  received remdesivir and steroids last hospital stay a few weeks ago but he still has not cleared the virus as per PCR.  We are waiting for COVID-19 antigen testing.  His CT does show persistent pneumonia.  This may be active viral replication or else left over bronchiolitis obliterans and organizing pneumonia.  He is not hypoxic.  He did respond to dexamethasone last time, however since he was not hypoxic I recommend prednisone taper over the next 4 weeks, slow taper, reducing 10 mg/week until he is completely off prednisone in 5 weeks.  He also has other significant complicated issues such as anemia and thrombocytopenia, hemolytic anemia.  He is being followed by hematology/oncology.  Continue to monitor temperature curve, white count and procalcitonin levels.  His renal function seems to be improving.  In an immunosuppressed host I would expect MRSA pneumonia to be much more aggressive.  Agree with stopping all anti-MRSA antibiotics at this time.      Jose Enrique Navas MD  5/3/2022  15:46 EDT      Much of this encounter note is an electronic transcription/translation of spoken language to printed text using Dragon Software.

## 2022-05-03 NOTE — OUTREACH NOTE
COVID-19 Week 2 Survey    Flowsheet Row Responses   [facility] patient discharged from? [facility]   Does the patient have one of the following disease processes/diagnoses(primary or secondary)? COVID-19   COVID-19 underlying condition? None   Call Number Call 2   COVID-19 Week 2: Call 1 attempt successful? No   Revoke Readmitted   Discharge diagnosis PNA due to Covid 19          KING NICHOLS - Registered Nurse

## 2022-05-03 NOTE — CONSULTS
Referring Provider: Susana Cooley MD    Subjective   History of present illness: Asked to see for fever.  Patient known to me from hospitalization in April 2022 for COVID.  He has a history of CLL on Imbruvica and was admitted for COVID despite having received monoclonal antibody infusion as an outpatient.  He was initially treated for an MRSA pneumonia but I suspected his condition was more consistent with COVID and gave him 3 days of Remdesivir as well as steroids.  He quickly improved with these interventions and was discharged.  Subsequent to discharge she has had an intermittent very low-grade fever as high as 100.5 without other focal symptoms except for some weakness and cough productive of clear sputum.  He says physically he is actually been feeling very well and has been driving.  Yesterday he went and saw Dr. Cheng in the oncology clinic and was found to be hypotensive so was sent to the ER.  She has continued to take his antihypertensive medications and denies any volume loss.  After IV fluids he feels markedly better and his creatinine went from 1.6 down to 1.1    Past Medical History:   Diagnosis Date   • Autoimmune hemolytic anemia (HCC)    • Carotid artery occlusion     Left internal carotid artery   • Coronary artery disease    • Diabetes mellitus (HCC)     Steroid induced   • Gout    • High blood sugar    • High cholesterol    • Hypertension    • Leukemia, chronic (HCC)    • Sleep apnea    • TIA (transient ischemic attack)        Past Surgical History:   Procedure Laterality Date   • CARDIAC CATHETERIZATION     • COLONOSCOPY N/A 7/24/2017    Procedure: COLONOSCOPY TO CECUM WITH COLD POLYPECTOMY, TATOO PLACEMENT;  Surgeon: Fannie Caro MD;  Location: [facility] ENDOSCOPY;  Service:    • COLONOSCOPY N/A 6/28/2018    Procedure: COLONOSCOPY to Cecum with Hot Snare Polypectomies, injected with 6 ML NS;  Surgeon: Fannie Caro MD;  Location: [facility] ENDOSCOPY;  Service: Gastroenterology   •  COLONOSCOPY N/A 6/27/2019    Procedure: COLONOSCOPY TO CECUM WITH COLD SNARE POLYPECTOMY AND COLD BIOPSY POLYPECTOMIES;  Surgeon: Fannie Caro MD;  Location: [facility] ENDOSCOPY;  Service: General   • KNEE SURGERY       No family history of COVID-19  Social history: He is  and lives here in [place], [place].  Is retired from transportation.  Non-smoker.    Review of Systems  Pertinent items are noted in HPI, all other systems reviewed and negative    Objective     Physical Exam:   Vital Signs   Temp:  [96.6 °F (35.9 °C)-100.5 °F (38.1 °C)] 100.1 °F (37.8 °C)  Heart Rate:  [] 80  Resp:  [16-20] 18  BP: ()/(47-93) 122/54    GENERAL: Awake and alert, in no acute distress.   HEENT: Oropharynx is clear. Hearing is grossly normal.   EYES: PERRL. No conjunctival injection. No lid lag.   LYMPHATICS: No lymphadenopathy of the neck or inguinal regions.   HEART: Regular rate and regular rhythm. No peripheral edema.   LUNGS: Clear to auscultation anteriorly with normal respiratory effort.   GI: Soft, nontender, nondistended. No appreciable organomegaly.   SKIN: Warm and dry without cutaneous eruptions   PSYCHIATRIC: Appropriate mood, affect, insight, and judgment.     Results Review:  WBC 10.58  PLT 11  HGb 7.8  Cr 1.08<--1.61  UA negative  Bcx pending  COVID 19+  CT abdomen pelvis shows prostatomegaly with thickening of the bladder, cystic pancreatic tail lesion  CXR, independently interpreted: no pna    Assessment/Plan   1.  Fever in Adult h/p COVID-19 pneumonia  2.  Immunosuppression from CLL  3.  MRSA PCR positive  4.  Acute left lower extremity DVT in the gastrocnemius, on apixaban  5.  Acute kidney injury    Very low temps; ? Long covid vs persistent covid viremia versus secondary infection.  Noninfectious etiology such as tumor fever from being off treatment for CLL, HL H or DVT also possible.    PCT minimally elevated but not helpful in setting of GARTH.  This is actually improved since last  admission.  Check CT chest, repeat procal, crp, ferritin and covid antigen test.   Continue vancomycin 3.375 g IV every 8 hours and vancomycin as dosed for an AUC of 400-600.  Plan to check vancomycin trough ahead of a.m. dose tomorrow for further refinement in dosing.  I would like to get off this combination as soon as possible given potential nephrotoxicity and may consider refining antibiotics after procalcitonin and CT chest result.    Thank you for this consult.  We will continue to follow along and tailor antibiotics as the patient's clinical course evolves.      ADDENDUM: CRP much higher off steroids; procal stable over past few weeks and neither imaging or history support diagnosis of bacterial pneumonia.  Will stop IV abx.  Trying to obtain covid antigen test.  CT looks consistent with COVID pneumonia and suspect he might have more of an organizing pneumonia at this point.  Responded to dexamethasone 6 mg po q24 last admission and we will resume now.  Probably needs prolonged tapered.  Will ask for pulmonary eval as well.    Chris Escalante MD  05/03/22  10:16 EDT

## 2022-05-03 NOTE — CONSULTS
Subjective     REASON FOR CONSULTATION:    Evaluation & management for CLL & pancreatic mass                             REQUESTING PHYSICIAN:  MD Georgia    RECORDS OBTAINED:  Records of the patients history including those obtained from the referring provider were reviewed and summarized in detail.    HISTORY OF PRESENT ILLNESS:  The patient is a 67 y.o. year old male with medical history significant for CLL, AIHA, CAD, DM2 & LLE DVT who was seen  at the [facility] on 5/2/22 & sent to the ER for fever of 101, hypotension (91/61), anemia (7.6) & thrombocytopenia (129).   A CT a/p noted improved lung findings consistent with recent covid19 infection. No acute intraabdominal or pelvic process. Indeterminate cystic lesion in the tail of the pancreas approximating 1.1 cm.   Hem-Onc has been consulted for further evaluation & management.    Patient is sitting comfortably in bed. Says he has felt much better since being in the hospital & receiving antibiotics. He denies any bleeding or bruises. No cough or sputum production. No GI or gu symptoms.     ONCOLOGIC HISTORY:  *CLL:   · Initially received Rituxan for 4 weeks at diagnosis for this and the autoimmune hemolytic anemia.  · Given the worsening anemia even though it responded to steroids we needed to treat his CLL particularly given the 70% involvement on his bone marrow biopsy.  We started treating with ibrutinib 420 mg daily, initiated at the end of July 2018.  · White blood cell count normal today at 8.8 with 50% lymphocytes  · He continues to hold Imbruvica following his admission     *Macrocytic anemia:   · He has a history of autoimmune hemolytic anemia when he presented with CLL back in November 2015.  His reticulocyte count was very elevated at that time.  His hemoglobin dropped to as low as about 5.  He responded to steroids initially intravenously and then with prednisone and he was treated with 4 weeks of Rituxan.  · He was noted to have  worsening anemia.  His reticulocyte count was low.  He initially did not respond very well to transfusions and he required 4 units of packed red cells  There was no evidence for hemolysis although his ADOLFO is positive for IgG.  This was persistently positive.  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  He responded to steroids.  He discontinued steroids as of 8/22/2018.    · 5/2/2022: Hemoglobin lower at 7.9 with a high MCV of 102 today      Past Medical History:   Diagnosis Date   • Autoimmune hemolytic anemia (HCC)    • Carotid artery occlusion     Left internal carotid artery   • Coronary artery disease    • Diabetes mellitus (HCC)     Steroid induced   • Gout    • High blood sugar    • High cholesterol    • Hypertension    • Leukemia, chronic (HCC)    • Sleep apnea    • TIA (transient ischemic attack)         Past Surgical History:   Procedure Laterality Date   • CARDIAC CATHETERIZATION     • COLONOSCOPY N/A 7/24/2017    Procedure: COLONOSCOPY TO CECUM WITH COLD POLYPECTOMY, TATOO PLACEMENT;  Surgeon: Fannie Caro MD;  Location: [facility] ENDOSCOPY;  Service:    • COLONOSCOPY N/A 6/28/2018    Procedure: COLONOSCOPY to Cecum with Hot Snare Polypectomies, injected with 6 ML NS;  Surgeon: Fannie Caro MD;  Location: [facility] ENDOSCOPY;  Service: Gastroenterology   • COLONOSCOPY N/A 6/27/2019    Procedure: COLONOSCOPY TO CECUM WITH COLD SNARE POLYPECTOMY AND COLD BIOPSY POLYPECTOMIES;  Surgeon: Fannie Caro MD;  Location: [facility] ENDOSCOPY;  Service: General   • KNEE SURGERY          No current facility-administered medications on file prior to encounter.     Current Outpatient Medications on File Prior to Encounter   Medication Sig Dispense Refill   • allopurinol (ZYLOPRIM) 300 MG tablet Take 300 mg by mouth Daily.     • amLODIPine (NORVASC) 10 MG tablet Take 10 mg by mouth Every Morning.     • apixaban (ELIQUIS) 5 MG tablet tablet Take 2 tablets by mouth Every 12 (Twelve) Hours for 6  doses, then 1 tablet every 12 hours thereafter 72 tablet 0   • Aspirin Low Dose 81 MG EC tablet TK 1 T PO QAM     • atorvastatin (LIPITOR) 40 MG tablet TK 1 T PO QD  3   • benzonatate (TESSALON) 200 MG capsule Take 1 capsule by mouth 3 (Three) Times a Day. 30 capsule 0   • Cholecalciferol (VITAMIN D3) 2000 UNITS tablet Take 2,000 Units by mouth Daily.     • doxazosin (CARDURA) 4 MG tablet TK 2 TS PO QAM AND 2 TS QHS  3   • EPINEPHrine (EPIPEN) 0.3 MG/0.3ML solution auto-injector injection Inject 0.3 mg into the appropriate muscle as directed by prescriber.     • fluticasone (FLONASE) 50 MCG/ACT nasal spray 1 spray into the nostril(s) as directed by provider Daily.     • [] mupirocin (BACTROBAN) 2 % ointment Apply as directed nasally 2 (Two) Times a Day for 3 doses. 15 g 0   • pantoprazole (PROTONIX) 20 MG EC tablet Take 2 tablets by mouth Daily. 21 tablet 0   • promethazine-dextromethorphan (PROMETHAZINE-DM) 6.25-15 MG/5ML syrup TAKE 5 ML BY MOUTH 4 TIMES DAILY IF NEEDED FOR COUGH     • [DISCONTINUED] dexamethasone (DECADRON) 6 MG tablet Take 1 tablet by mouth Daily With Breakfast. 6 tablet 0   • azelastine (OPTIVAR) 0.05 % ophthalmic solution Apply 1 drop to eye(s) as directed by provider.     • [DISCONTINUED] candesartan (ATACAND) 32 MG tablet Take  by mouth.          ALLERGIES:    Allergies   Allergen Reactions   • Ace Inhibitors Unknown - Low Severity   • Candesartan Unknown - Low Severity   • Pravastatin Unknown - Low Severity     Annotation - 2017: medication caused side effects but he is able to other statins ie Crestor        Social History     Socioeconomic History   • Marital status:      Spouse name: Meghana   Tobacco Use   • Smoking status: Never Smoker   • Smokeless tobacco: Never Used   Substance and Sexual Activity   • Alcohol use: No   • Drug use: No   • Sexual activity: Defer        Family History   Problem Relation Age of Onset   • Colon cancer Mother 50   • Throat cancer Father     • Colon cancer Father    • Hypertension Daughter         Review of Systems   GENERAL: No change in appetite or weight;   No fevers, chills, sweats.    SKIN: No nonhealing lesions.   No rashes.  HEME/LYMPH: No easy bruising, bleeding.   No swollen nodes.   EYES: No vision changes or diplopia.   ENT: No tinnitus, hearing loss, gum bleeding, epistaxis, hoarseness or dysphagia.   RESPIRATORY: No cough, shortness of breath, hemoptysis or wheezing.   CVS: No chest pain, palpitations, orthopnea, dyspnea on exertion or PND.   GI: No melena or hematochezia.   No abdominal pain.  No nausea, vomiting, constipation, diarrhea  : No lower tract obstructive symptoms, dysuria or hematuria.   MUSCULOSKELETAL: No bone pain.  No joint stiffness.   NEUROLOGICAL: No global weakness, loss of consciousness or seizures.   PSYCHIATRIC: No increased nervousness, mood changes or depression.     Objective     Vitals:    05/03/22 0502 05/03/22 0516 05/03/22 0518 05/03/22 0728   BP:   141/65 122/54   BP Location:   Left arm Left arm   Patient Position:   Lying Lying   Pulse: 81  99 80   Resp:   20 18   Temp:   100.4 °F (38 °C) 100.1 °F (37.8 °C)   TempSrc:   Oral Oral   SpO2: 97%  96% 96%   Weight:  102 kg (224 lb 6.4 oz)     Height:         Current Status 10/4/2021   ECOG score 1       Physical Exam   CONSTITUTIONAL:  Vital signs reviewed.  No distress, looks comfortable.  EYES:  Conjunctiva and lids unremarkable.    EARS,NOSE,MOUTH,THROAT:  Ears and nose appear unremarkable.  Lips, teeth, gums appear unremarkable.  RESPIRATORY:  Normal respiratory effort.  Lungs clear to auscultation bilaterally.  CARDIOVASCULAR:  Normal S1, S2.  No murmurs rubs or gallops.  No significant lower extremity edema.  GASTROINTESTINAL: Abdomen appears unremarkable.  Nondistended  LYMPHATIC:  No cervical, supraclavicular lymphadenopathy.  SKIN:  Warm.  No rashes.  PSYCHIATRIC:  Normal judgment and insight.  Normal mood and affect.  NEURO: AAO x 3, no gross focal  deficits    RECENT LABS:  Hematology WBC   Date Value Ref Range Status   05/03/2022 10.58 3.40 - 10.80 10*3/mm3 Final     RBC   Date Value Ref Range Status   05/03/2022 2.60 (L) 4.14 - 5.80 10*6/mm3 Final     Hemoglobin   Date Value Ref Range Status   05/03/2022 7.8 (L) 13.0 - 17.7 g/dL Final     Hematocrit   Date Value Ref Range Status   05/03/2022 23.8 (L) 37.5 - 51.0 % Final     Platelets   Date Value Ref Range Status   05/03/2022 111 (L) 140 - 450 10*3/mm3 Final          Assessment/Plan    Patient is a 67 y.o. year old male with medical history significant for CLL, AIHA, CAD, DM2 & LLE DVT who was seen  at the [facility] on 5/2/22 & sent to the ER for fever of 101, hypotension (91/61), anemia (7.6) & thrombocytopenia (129).     # Febrile illness:   · Had COVID19 infection in April 2022. Suspect long covid vs persistent covid viremia vs secondary infection.   · ID & pulm on board. Being evaluated for Dexamethasone vs antibiotics  · Given CLL diagnosis, will check immunoglobulins level and replace if low.    # Anemia: Baseline Hb in 10-11 gm/dl range. Hb 7.8 on admission.   Will check iron, B12, folate. Hemolysis labs.     # Thrombocytopenia: Baseline platelets normal range. Dropped to 111,000 this admission. Suspect COVID related. Suspected worsening CLL involvement of the bone marrow.  Check d-dimer, IPF & fibrinogen levels.     # CLL:   · Diagnosed November 2015.   · Due to autoimmune hemolytic anemia associated with CLL, initially treated with steroids and for doses of weekly Rituxan.  Then, observation.  · On ibrutinib since late July 2018. Held this admission.    # LLE DVT: Diagnosed April 2022. Continue eliquis 5 mg BID  # Hypotension: Improved with fluids. Monitor    PLAN:  - Febrile illness likely persistent covid19/long covid syndrome vs secondary infection. Antibiotics & steroids per ID & pulm.  - Check immunoglobulin level. Replace if low  - Anemia & thrombocytopenia. Will check nutritional  deficiency labs, hemolysis and inflammatory markers.   - Continue to hold ibrutinib  - Continue eliquis  - Will continue to follow.

## 2022-05-03 NOTE — PLAN OF CARE
Goal Outcome Evaluation:  Plan of Care Reviewed With: patient        Progress: no change  Outcome Evaluation: VSS. Telemetry monitor, SR. Alert and oriented x4, room air, up with standby assist. Tylenol given for temp of 102. ID consulted. Covid swabbed, including dry antigen swab sent. IV antibiotics. Will cont to monitor.

## 2022-05-03 NOTE — PLAN OF CARE
Goal Outcome Evaluation:  Plan of Care Reviewed With: patient           Outcome Evaluation: new admit from ED overnight. BP stable this shift. patient has had temp ranging from 99.9-100.4F overnight. tylenol prn x2 for generalized pain. IV zosyn infusing per order. home cpap in use while resting, o2 stable on room air. patient up with standby assist. am labs. resting quietly between care.

## 2022-05-03 NOTE — PROGRESS NOTES
"[facility] Clinical Pharmacy Services: Vancomycin Pharmacokinetic Initial Consult Note    Aida Bal Jr. is a 67 y.o. male who is on day 1 of pharmacy to dose vancomycin.    Indication: Sepsis  Consulting Provider: Dr. Cooley  Planned Duration of Therapy: 5 days  Loading Dose Ordered or Given: 2000 mg on 5/2 at 1840  MRSA PCR performed: No  Culture/Source: 5/2 BCx-pending  Target: -600 mg/L.hr   Other Antimicrobials: Zosyn 3.375g IV q8h    Vitals/Labs  Ht: 182.9 cm (72\"); Wt: 102 kg (225 lb 4.8 oz)    Temp Readings from Last 1 Encounters:   05/02/22 100.5 °F (38.1 °C) (Oral)      Estimated Creatinine Clearance: 53.5 mL/min (A) (by C-G formula based on SCr of 1.61 mg/dL (H)).    Results from last 7 days   Lab Units 05/02/22  1238 05/02/22  1021   CREATININE mg/dL 1.61* 1.48*   WBC 10*3/mm3 9.55 8.86     Assessment/Plan:    Vancomycin Dose: 1250 mg IV every 24 hours started 12 hours after loading dose  Predictive AUC level for the dose ordered is 468 mg/L.hr, which is within the target of 400-600 mg/L.hr  Vanc Trough has been ordered for Wednesday morning 5/4 at 0630    Pharmacy will follow patient's kidney function and will adjust doses and obtain levels as necessary. Thank you for involving pharmacy in this patient's care. Please contact pharmacy with any questions or concerns.                           Sidra Lockett, Pharm.D., [facility]   Clinical Pharmacist   "

## 2022-05-03 NOTE — H&P
HISTORY AND PHYSICAL   [facility]        Date of Admission: 2022  Patient Identification:  Name: Aida Bal Jr.  Age: 67 y.o.  Sex: male  :  1955  MRN: [id number]                     Primary Care Physician: Roxane Marsh MD    Chief Complaint:  67 year old gentleman who presented to the emergency room from oncology; while he was there he was hypotensive and dizzy; he was anemic; he is undergoing treatment for cll and autoimmune hemolytic anemia; he has had a fever to 101    History of Present Illness:   As above    Past Medical History:  Past Medical History:   Diagnosis Date   • Autoimmune hemolytic anemia (HCC)    • Carotid artery occlusion     Left internal carotid artery   • Coronary artery disease    • Diabetes mellitus (HCC)     Steroid induced   • Gout    • High blood sugar    • High cholesterol    • Hypertension    • Leukemia, chronic (HCC)    • Sleep apnea    • TIA (transient ischemic attack)      Past Surgical History:  Past Surgical History:   Procedure Laterality Date   • CARDIAC CATHETERIZATION     • COLONOSCOPY N/A 2017    Procedure: COLONOSCOPY TO CECUM WITH COLD POLYPECTOMY, TATOO PLACEMENT;  Surgeon: Fannie Caro MD;  Location: [facility] ENDOSCOPY;  Service:    • COLONOSCOPY N/A 2018    Procedure: COLONOSCOPY to Cecum with Hot Snare Polypectomies, injected with 6 ML NS;  Surgeon: Fannie Caro MD;  Location: [facility] ENDOSCOPY;  Service: Gastroenterology   • COLONOSCOPY N/A 2019    Procedure: COLONOSCOPY TO CECUM WITH COLD SNARE POLYPECTOMY AND COLD BIOPSY POLYPECTOMIES;  Surgeon: Fannie Caro MD;  Location: [facility] ENDOSCOPY;  Service: General   • KNEE SURGERY        Home Meds:  Medications Prior to Admission   Medication Sig Dispense Refill Last Dose   • allopurinol (ZYLOPRIM) 300 MG tablet Take 300 mg by mouth Daily.   2022 at Unknown time   • amLODIPine (NORVASC) 10 MG tablet Take 10 mg by mouth Every Morning.   2022 at  Unknown time   • apixaban (ELIQUIS) 5 MG tablet tablet Take 2 tablets by mouth Every 12 (Twelve) Hours for 6 doses, then 1 tablet every 12 hours thereafter 72 tablet 0 5/2/2022 at Unknown time   • Aspirin Low Dose 81 MG EC tablet TK 1 T PO QAM   5/2/2022 at Unknown time   • atorvastatin (LIPITOR) 40 MG tablet TK 1 T PO QD  3 5/1/2022 at Unknown time   • benzonatate (TESSALON) 200 MG capsule Take 1 capsule by mouth 3 (Three) Times a Day. 30 capsule 0 5/2/2022 at Unknown time   • Cholecalciferol (VITAMIN D3) 2000 UNITS tablet Take 2,000 Units by mouth Daily.   5/2/2022 at Unknown time   • dexamethasone (DECADRON) 6 MG tablet Take 1 tablet by mouth Daily With Breakfast. 6 tablet 0    • doxazosin (CARDURA) 4 MG tablet TK 2 TS PO QAM AND 2 TS QHS  3 5/2/2022 at Unknown time   • EPINEPHrine (EPIPEN) 0.3 MG/0.3ML solution auto-injector injection Inject 0.3 mg into the appropriate muscle as directed by prescriber.      • fluticasone (FLONASE) 50 MCG/ACT nasal spray 1 spray into the nostril(s) as directed by provider Daily.   5/2/2022 at Unknown time   • mupirocin (BACTROBAN) 2 % ointment Apply as directed nasally 2 (Two) Times a Day for 3 doses. 15 g 0 5/2/2022 at Unknown time   • pantoprazole (PROTONIX) 20 MG EC tablet Take 2 tablets by mouth Daily. 21 tablet 0 5/2/2022 at Unknown time   • promethazine-dextromethorphan (PROMETHAZINE-DM) 6.25-15 MG/5ML syrup TAKE 5 ML BY MOUTH 4 TIMES DAILY IF NEEDED FOR COUGH   5/2/2022 at Unknown time   • azelastine (OPTIVAR) 0.05 % ophthalmic solution Apply 1 drop to eye(s) as directed by provider.      • candesartan (ATACAND) 32 MG tablet Take  by mouth.      • irbesartan-hydrochlorothiazide (AVALIDE) 300-12.5 MG tablet TK 1 T PO  D          Allergies:  Allergies   Allergen Reactions   • Ace Inhibitors Unknown - Low Severity   • Candesartan Unknown - Low Severity   • Pravastatin Unknown - Low Severity     Annotation - 09Jan2017: medication caused side effects but he is able to other  statins ie Crestor     Immunizations:  Immunization History   Administered Date(s) Administered   • COVID-19 (PFIZER) PURPLE CAP 2021, 2021, 2021   • Flu Vaccine Quad PF >36MO 2016, 10/09/2017   • Fluad Quad 65+ 10/08/2020   • Fluzone High Dose =>65 Years ([employer] ONLY) 10/09/2018, 10/22/2019   • Fluzone High-Dose 65+yrs 10/07/2021   • Influenza TIV (IM) 10/31/2017   • Influenza, Unspecified 10/15/2020   • Pneumococcal Conjugate 13-Valent (PCV13) 2017   • Pneumococcal Polysaccharide (PPSV23) 2014, 12/15/2020     Social History:   Social History     Social History Narrative   • Not on file     Social History     Socioeconomic History   • Marital status:      Spouse name: Meghana   Tobacco Use   • Smoking status: Never Smoker   • Smokeless tobacco: Never Used   Substance and Sexual Activity   • Alcohol use: No   • Drug use: No   • Sexual activity: Defer       Family History:  Family History   Problem Relation Age of Onset   • Colon cancer Mother 50   • Throat cancer Father    • Colon cancer Father    • Hypertension Daughter         Review of Systems  See history of present illness and past medical history.  Patient denies headache, syncope, falls, trauma, change in vision, change in hearing, change in taste, changes in weight, changes in appetite, focal weakness, numbness, or paresthesia.  Patient denies chest pain, palpitations, dyspnea, orthopnea, PND, cough, sinus pressure, rhinorrhea, epistaxis, hemoptysis, nausea, vomiting,hematemesis, diarrhea, constipation or hematchezia.  Denies cold or heat intolerance, polydipsia, polyuria, polyphagia. Denies hematuria, pyuria, dysuria, hesitancy, frequency or urgency. Denies consumption of raw and under cooked meats foods or change in water source.  Denies fever, chills, sweats, night sweats.  Denies missing any routine medications. Remainder of ROS is negative.    Objective:  T Max 24 hrs: Temp (24hrs), Av.9 °F (37.2 °C),  "Min:96.6 °F (35.9 °C), Max:100.5 °F (38.1 °C)    Vitals Ranges:   Temp:  [96.6 °F (35.9 °C)-100.5 °F (38.1 °C)] 100.5 °F (38.1 °C)  Heart Rate:  [] 105  Resp:  [16-20] 20  BP: ()/(47-93) 162/93      Exam:  /93 (BP Location: Left arm, Patient Position: Sitting)   Pulse 105   Temp 100.5 °F (38.1 °C) (Oral)   Resp 20   Ht 182.9 cm (72\")   Wt 102 kg (225 lb 4.8 oz)   SpO2 98%   BMI 30.56 kg/m²     General Appearance:    Alert, cooperative, no distress, appears stated age   Head:    Normocephalic, without obvious abnormality, atraumatic   Eyes:    PERRL, conjunctivae/corneas clear, EOM's intact, both eyes   Ears:    Normal external ear canals, both ears   Nose:   Nares normal, septum midline, mucosa normal, no drainage    or sinus tenderness   Throat:   Lips, mucosa, and tongue normal   Neck:   Supple, symmetrical, trachea midline, no adenopathy;     thyroid:  no enlargement/tenderness/nodules; no carotid    bruit or JVD   Back:     Symmetric, no curvature, ROM normal, no CVA tenderness   Lungs:     Decreased breath sounds bilaterally, respirations unlabored   Chest Wall:    No tenderness or deformity    Heart:    Regular rate and rhythm, S1 and S2 normal, no murmur, rub   or gallop   Abdomen:     Soft, nontender, bowel sounds active all four quadrants,     no masses, no hepatomegaly, no splenomegaly   Extremities:   Extremities normal, atraumatic, no cyanosis or edema   Pulses:   2+ and symmetric all extremities   Skin:   Skin color, texture, turgor normal, no rashes or lesions   Lymph nodes:   Cervical, supraclavicular, and axillary nodes normal   Neurologic:   CNII-XII intact, normal strength, sensation intact throughout      .    Data Review:  Labs in chart were reviewed.  WBC   Date Value Ref Range Status   05/02/2022 9.55 3.40 - 10.80 10*3/mm3 Final     Hemoglobin   Date Value Ref Range Status   05/02/2022 7.6 (L) 13.0 - 17.7 g/dL Final     Hematocrit   Date Value Ref Range Status "   05/02/2022 23.3 (L) 37.5 - 51.0 % Final     Platelets   Date Value Ref Range Status   05/02/2022 129 (L) 140 - 450 10*3/mm3 Final     Sodium   Date Value Ref Range Status   05/02/2022 133 (L) 136 - 145 mmol/L Final     Potassium   Date Value Ref Range Status   05/02/2022 4.2 3.5 - 5.2 mmol/L Final     Chloride   Date Value Ref Range Status   05/02/2022 100 98 - 107 mmol/L Final     CO2   Date Value Ref Range Status   05/02/2022 24.3 22.0 - 29.0 mmol/L Final     BUN   Date Value Ref Range Status   05/02/2022 27 (H) 8 - 23 mg/dL Final     Creatinine   Date Value Ref Range Status   05/02/2022 1.61 (H) 0.76 - 1.27 mg/dL Final     Glucose   Date Value Ref Range Status   05/02/2022 118 (H) 65 - 99 mg/dL Final     Calcium   Date Value Ref Range Status   05/02/2022 8.2 (L) 8.6 - 10.5 mg/dL Final     Magnesium   Date Value Ref Range Status   05/02/2022 1.8 1.6 - 2.4 mg/dL Final     AST (SGOT)   Date Value Ref Range Status   05/02/2022 43 (H) 1 - 40 U/L Final     ALT (SGPT)   Date Value Ref Range Status   05/02/2022 75 (H) 1 - 41 U/L Final     Alkaline Phosphatase   Date Value Ref Range Status   05/02/2022 137 (H) 39 - 117 U/L Final                Imaging Results (All)     Procedure Component Value Units Date/Time    CT Abdomen Pelvis Without Contrast [[id number]] Collected: 05/02/22 1616     Updated: 05/02/22 1632    Narrative:      CT ABDOMEN AND PELVIS WITHOUT IV CONTRAST     HISTORY: Fever, hypotension, renal insufficiency, immunocompromised.  CLL. Covid.     TECHNIQUE: Radiation dose reduction techniques were utilized, including  automated exposure control and exposure modulation based on body size.   3 mm images were obtained through the abdomen and pelvis without the  administration of IV contrast. Lack of IV contrast limits evaluation of  solid, visceral, and vascular structures. Sensitivity for underlying  lesions and infection decreased.     COMPARISON: Ultrasound 04/21/2022, CT chest 04/17/2022, 04/25/2018      FINDINGS:      LOWER CHEST: Patchy groundglass pneumonia with slight improved aeration.  The heart size is stable..     ABDOMEN: Artifact from motion and overlying leads.        Liver/Biliary Tract: Lobular contour of the liver with heterogeneous  attenuation. Evaluation for underlying lesion is limited. The  gallbladder is within normal limits.     Spleen: Within normal limits.     Pancreas: Indeterminate 1.1 cm hypodensity in the tail of the pancreas  similar to the most recent exam but conspicuous from prior imaging. No  ductal dilatation or surrounding inflammation. No atrophy.     Adrenals: Within normal limits.     Kidneys:  Renal parenchymal scarring with hypodense renal lesions  previously described as cysts, please refer to the ultrasound for  further detail. Mild perinephric stranding.     Bowel:  Circumferential thickening of the distal esophagus which is  incompletely distended, similar to the prior. The stomach is  incompletely distended. No obstruction. Normal appendix. Moderate  colonic stool burden. Scattered colonic diverticulosis without acute  diverticulitis. Please ensure up-to-date with colonoscopy to exclude an  underlying process. No obvious pneumatosis.     Peritoneum: No free fluid or free intraperitoneal air.     Vasculature:    Scattered calcific atherosclerosis. Vascular evaluation  is limited without IV contrast.     Lymph Nodes:  Increased attenuation in the small bowel mesentery. Mildly  prominent abdominoperitoneal nodes smaller from the prior exam. Index  nodes are as follows: 1 cm aortocaval node (previously 2 cm), 5 mm left  para-aortic node (previously 1 cm), 5 mm left common iliac (previously 1  cm) with small nodes extending along the iliac chain to the groin. A  left inguinal node incompletely included in the field-of-view  approximates 2.4 cm in greatest dimension similar in size to the prior.     PELVIS:                                   Pelvic organs: Heterogeneous  enlarged prostate protruding into the  bladder which is thickened. Fat-containing bilateral inguinal hernias..        Abdominal/Pelvic Wall: Small fat-containing umbilical hernia. Rectus  diastases. Subcutaneous soft tissue edema and skin thickening.  Subcutaneous inflammatory changes in the ventral abdominal wall likely  the sequela of prior injection sites. No focal fluid collections..     BONES: Multilevel degenerative thoracolumbar spine and pelvis with  sclerosis about the sacroiliac joint. There are degrees of canal  stenosis as conspicuous at L4-5.       Impression:      1. Slight improved aeration of the lung bases compatible with Covid-19.  Please refer to the prior chest CT for further detail and follow-up  recommendations.  2. No acute intra-abdominal or pelvic process on this limited  noncontrast exam.  3. Prostatomegaly with diffuse thickening of the bladder which can be  seen with bladder outlet obstruction or cystitis.  4. Indeterminate cystic lesion in the tail of the pancreas approximating  1.1 cm. Short-term surveillance with MRI could be considered not  emergently for further characterization.  5. Smaller abdominal and pelvic adenopathy. No splenomegaly.  6. Please see above for additional findings/recommendations.     This report was finalized on 5/2/2022 4:29 PM by Dr. Arsenio Kessler M.D.       XR Chest 1 View [[id number]] Collected: 05/02/22 1406     Updated: 05/02/22 1411    Narrative:      XR CHEST 1 VW-     HISTORY: Male who is 67 years-old,  fever     TECHNIQUE: Frontal view of the chest     COMPARISON: 04/14/2022     FINDINGS: Heart, mediastinum and pulmonary vasculature are unremarkable.  Patchy infiltrates are present in both lungs, grossly similar to the  prior exam. No pleural effusion, or pneumothorax. No acute osseous  process.       Impression:      Bilateral patchy pulmonary infiltrates, continued follow-up  recommended.     This report was finalized on 5/2/2022 2:08 PM by Dr. Moore  SANTIAGO Gonsales M.D.               Assessment:  Active Hospital Problems    Diagnosis  POA   • Anemia [D64.9]  Yes      Resolved Hospital Problems   No resolved problems to display.   acute kidney injury  Diabetes  Cad  cll  Hypertension  Hyponatremia  anemia    Plan:  Will consult oncology  accu checks, insulin sliding scale  Trend sodium  Monitor on telemetry    Susana Cooley MD  5/2/2022  21:21 EDT

## 2022-05-04 LAB
ALBUMIN SERPL-MCNC: 2.8 G/DL (ref 3.5–5.2)
ALBUMIN/GLOB SERPL: 0.9 G/DL
ALP SERPL-CCNC: 173 U/L (ref 39–117)
ALT SERPL W P-5'-P-CCNC: 82 U/L (ref 1–41)
ANION GAP SERPL CALCULATED.3IONS-SCNC: 9.8 MMOL/L (ref 5–15)
ANISOCYTOSIS BLD QL: ABNORMAL
AST SERPL-CCNC: 47 U/L (ref 1–40)
BILIRUB SERPL-MCNC: 0.7 MG/DL (ref 0–1.2)
BUN SERPL-MCNC: 22 MG/DL (ref 8–23)
BUN/CREAT SERPL: 22.9 (ref 7–25)
CALCIUM SPEC-SCNC: 8.3 MG/DL (ref 8.6–10.5)
CHLORIDE SERPL-SCNC: 104 MMOL/L (ref 98–107)
CO2 SERPL-SCNC: 22.2 MMOL/L (ref 22–29)
CREAT SERPL-MCNC: 0.96 MG/DL (ref 0.76–1.27)
CRP SERPL-MCNC: 23.77 MG/DL (ref 0–0.5)
DAT POLY-SP REAG RBC QL: NEGATIVE
DEPRECATED RDW RBC AUTO: 62.7 FL (ref 37–54)
EGFRCR SERPLBLD CKD-EPI 2021: 85.6 ML/MIN/1.73
ERYTHROCYTE [DISTWIDTH] IN BLOOD BY AUTOMATED COUNT: 19.2 % (ref 12.3–15.4)
GLOBULIN UR ELPH-MCNC: 3.1 GM/DL
GLUCOSE SERPL-MCNC: 154 MG/DL (ref 65–99)
HCT VFR BLD AUTO: 27 % (ref 37.5–51)
HGB BLD-MCNC: 8.7 G/DL (ref 13–17.7)
LYMPHOCYTES # BLD MANUAL: 7.46 10*3/MM3 (ref 0.7–3.1)
LYMPHOCYTES NFR BLD MANUAL: 3 % (ref 5–12)
MCH RBC QN AUTO: 29.5 PG (ref 26.6–33)
MCHC RBC AUTO-ENTMCNC: 32.2 G/DL (ref 31.5–35.7)
MCV RBC AUTO: 91.5 FL (ref 79–97)
MONOCYTES # BLD: 0.37 10*3/MM3 (ref 0.1–0.9)
NEUTROPHILS # BLD AUTO: 4.48 10*3/MM3 (ref 1.7–7)
NEUTROPHILS NFR BLD MANUAL: 36.4 % (ref 42.7–76)
PLAT MORPH BLD: NORMAL
PLATELET # BLD AUTO: 113 10*3/MM3 (ref 140–450)
PMV BLD AUTO: 11.1 FL (ref 6–12)
POTASSIUM SERPL-SCNC: 4.3 MMOL/L (ref 3.5–5.2)
PROT SERPL-MCNC: 5.9 G/DL (ref 6–8.5)
RBC # BLD AUTO: 2.95 10*6/MM3 (ref 4.14–5.8)
SODIUM SERPL-SCNC: 136 MMOL/L (ref 136–145)
VANCOMYCIN TROUGH SERPL-MCNC: 8.1 MCG/ML (ref 5–20)
VARIANT LYMPHS NFR BLD MANUAL: 60.6 % (ref 19.6–45.3)
WBC MORPH BLD: NORMAL
WBC NRBC COR # BLD: 12.31 10*3/MM3 (ref 3.4–10.8)

## 2022-05-04 PROCEDURE — 99233 SBSQ HOSP IP/OBS HIGH 50: CPT | Performed by: INTERNAL MEDICINE

## 2022-05-04 PROCEDURE — 63710000001 PREDNISONE PER 1 MG: Performed by: INTERNAL MEDICINE

## 2022-05-04 PROCEDURE — 25010000002 PIPERACILLIN SOD-TAZOBACTAM PER 1 G: Performed by: INTERNAL MEDICINE

## 2022-05-04 PROCEDURE — 85025 COMPLETE CBC W/AUTO DIFF WBC: CPT | Performed by: INTERNAL MEDICINE

## 2022-05-04 PROCEDURE — 80053 COMPREHEN METABOLIC PANEL: CPT | Performed by: INTERNAL MEDICINE

## 2022-05-04 PROCEDURE — 86140 C-REACTIVE PROTEIN: CPT | Performed by: INTERNAL MEDICINE

## 2022-05-04 PROCEDURE — 80202 ASSAY OF VANCOMYCIN: CPT | Performed by: INTERNAL MEDICINE

## 2022-05-04 PROCEDURE — 85007 BL SMEAR W/DIFF WBC COUNT: CPT | Performed by: INTERNAL MEDICINE

## 2022-05-04 PROCEDURE — 25010000002 VANCOMYCIN 10 G RECONSTITUTED SOLUTION: Performed by: INTERNAL MEDICINE

## 2022-05-04 PROCEDURE — 86880 COOMBS TEST DIRECT: CPT | Performed by: INTERNAL MEDICINE

## 2022-05-04 RX ADMIN — ASPIRIN 81 MG: 81 TABLET, COATED ORAL at 10:00

## 2022-05-04 RX ADMIN — Medication 2000 UNITS: at 10:00

## 2022-05-04 RX ADMIN — TERAZOSIN HYDROCHLORIDE 5 MG: 5 CAPSULE ORAL at 21:05

## 2022-05-04 RX ADMIN — BENZONATATE 200 MG: 100 CAPSULE ORAL at 18:02

## 2022-05-04 RX ADMIN — BENZONATATE 200 MG: 100 CAPSULE ORAL at 10:00

## 2022-05-04 RX ADMIN — ALLOPURINOL 300 MG: 300 TABLET ORAL at 09:59

## 2022-05-04 RX ADMIN — APIXABAN 5 MG: 5 TABLET, FILM COATED ORAL at 10:00

## 2022-05-04 RX ADMIN — PREDNISONE 40 MG: 20 TABLET ORAL at 09:59

## 2022-05-04 RX ADMIN — APIXABAN 5 MG: 5 TABLET, FILM COATED ORAL at 21:05

## 2022-05-04 RX ADMIN — ATORVASTATIN CALCIUM 40 MG: 20 TABLET, FILM COATED ORAL at 10:00

## 2022-05-04 RX ADMIN — TAZOBACTAM SODIUM AND PIPERACILLIN SODIUM 3.38 G: 375; 3 INJECTION, SOLUTION INTRAVENOUS at 10:01

## 2022-05-04 RX ADMIN — BENZONATATE 200 MG: 100 CAPSULE ORAL at 21:05

## 2022-05-04 RX ADMIN — VANCOMYCIN HYDROCHLORIDE 1250 MG: 10 INJECTION, POWDER, LYOPHILIZED, FOR SOLUTION INTRAVENOUS at 07:03

## 2022-05-04 RX ADMIN — PANTOPRAZOLE SODIUM 40 MG: 40 TABLET, DELAYED RELEASE ORAL at 10:00

## 2022-05-04 NOTE — PROGRESS NOTES
Name: Aida Bal Jr. ADMIT: 2022   : 1955  PCP: Roxane Marsh MD    MRN: [id number] LOS: 2 days   AGE/SEX: 67 y.o. male  ROOM: [facility]   Subjective   Chief Complaint   Patient presents with   • Hypotension      No dyspnea or productive cough currently. BP improved and no dizziness. No NVD or abdominal pain. No CP or palpitations.    Objective   Vital Signs  Temp:  [97.6 °F (36.4 °C)-102.1 °F (38.9 °C)] 97.6 °F (36.4 °C)  Heart Rate:  [77-98] 98  Resp:  [18] 18  BP: (116-141)/(60-84) 141/84  SpO2:  [97 %-99 %] 98 %  on   ;   Device (Oxygen Therapy): room air  Body mass index is 30.43 kg/m².    Physical Exam  Vitals and nursing note reviewed.   Constitutional:       General: He is not in acute distress.     Appearance: He is not diaphoretic.   HENT:      Head: Normocephalic and atraumatic.   Eyes:      General:         Right eye: No discharge.         Left eye: No discharge.      Conjunctiva/sclera: Conjunctivae normal.   Cardiovascular:      Rate and Rhythm: Normal rate and regular rhythm.      Pulses: Normal pulses.   Pulmonary:      Effort: Pulmonary effort is normal.      Breath sounds: No wheezing.   Musculoskeletal:      Cervical back: Neck supple. No tenderness.   Neurological:      Mental Status: He is alert.         Results Review:       I reviewed the patient's new clinical results.     I reviewed telemetry/EKG results, sinus, rbbb, slightly prolonged MS/1st deg hb      Results from last 7 days   Lab Units 22  0622  0745 22  1238 22  1021   WBC 10*3/mm3 12.31* 10.58 9.55 8.86   HEMOGLOBIN g/dL 8.7* 7.8* 7.6* 7.9*   PLATELETS 10*3/mm3 113* 111* 129* 125*     Results from last 7 days   Lab Units 22  0622  0745 22  1238 22  1021   SODIUM mmol/L 136 136 133* 134   POTASSIUM mmol/L 4.3 3.9 4.2 4.3   CHLORIDE mmol/L 104 104 100 99   CO2 mmol/L 22.2 23.0 24.3 23.9   BUN mg/dL 22 20 27* 28*   CREATININE mg/dL 0.96 1.08 1.61* 1.48*    GLUCOSE mg/dL 154* 109* 118* 217*   Estimated Creatinine Clearance: 89.8 mL/min (by C-G formula based on SCr of 0.96 mg/dL).  Results from last 7 days   Lab Units 05/04/22  0627 05/03/22  0745 05/02/22  1238 05/02/22  1021   CALCIUM mg/dL 8.3* 7.6* 8.2* 8.4*   ALBUMIN g/dL 2.80*  --  2.70* 2.60*   MAGNESIUM mg/dL  --   --  1.8  --           allopurinol, 300 mg, Oral, Daily  apixaban, 5 mg, Oral, Q12H  aspirin, 81 mg, Oral, Daily  atorvastatin, 40 mg, Oral, Daily  benzonatate, 200 mg, Oral, TID  cholecalciferol, 2,000 Units, Oral, Daily  fluticasone, 1 spray, Nasal, Daily  pantoprazole, 40 mg, Oral, Daily  [START ON 5/22/2022] predniSONE, 10 mg, Oral, Daily With Breakfast  [START ON 5/16/2022] predniSONE, 20 mg, Oral, Daily With Breakfast  [START ON 5/10/2022] predniSONE, 30 mg, Oral, Daily With Breakfast  predniSONE, 40 mg, Oral, Daily With Breakfast  [START ON 5/28/2022] predniSONE, 5 mg, Oral, Daily With Breakfast  terazosin, 5 mg, Oral, Nightly       Diet Regular; Cardiac    Assessment/Plan      Active Hospital Problems    Diagnosis  POA   • **Fever [R50.9]  Yes   • Anemia [D64.9]  Yes   • Pneumonia due to COVID-19 virus [U07.1, J12.82]  Yes   • CLL (chronic lymphocytic leukemia) (HCC) [C91.10]  Yes   • Autoimmune hemolytic anemia (HCC) [D59.10]  Yes   • Hypertension [I10]  Yes      Resolved Hospital Problems   No resolved problems to display.       · COVID/Fever: Last fever was yesterday afternoon. Cx ngtd. Continued Covid activity. Possible antiviral at discharge. ID following  · PNA: Prolonged prednisone taper planned for organizing pna. Good effect today. Pulmonology following.  · CLL/AIHA: IgG replacement per oncology. Oncology following.  · Pancreatic tail lesion: Per oncology  · Hypertension: Hypotensive on admit. Not greatly elevated yet. Norvasc on hold. Possibly resume at lower dose tomorrow depending on trend.  · DVT: Acute left gastrocnemius noted a month ago.  He is on Eliquis.  · LFT Elevation:  Hold statin.  · Prophylaxis: On Eliquis.  · Disposition: Home/possibly tomorrow    Barak Cruz MD  [facility]  05/04/22  11:55 EDT    Dictated portions using Dragon dictation software.    During the entire encounter, I was wearing recommended PPE including face mask and eye protection. Hand sanitization was performed prior to entering room and upon exit.

## 2022-05-04 NOTE — PROGRESS NOTES
INFECTIOUS DISEASES PROGRESS NOTE    CC: f/u covid    S:   Fever broke and had NS  Cough improved  Not requiring any oxygen.    O:  Physical Exam:  Temp:  [97.6 °F (36.4 °C)-102.1 °F (38.9 °C)] 97.6 °F (36.4 °C)  Heart Rate:  [77-98] 98  Resp:  [18] 18  BP: (116-141)/(60-84) 141/84  Physical Exam  GENERAL: Awake and alert, in no acute distress.   HEENT: hearing is grossly normal.   EYES:  No conjunctival injection. No lid lag.   HEART: Regular rate and rhythm. No peripheral edema.   LUNGS: Clear to auscultation anteriorly with normal respiratory effort.   GI: Soft, nontender, nondistended. No appreciable organomegaly.   SKIN: Warm and dry without cutaneous eruptions   PSYCHIATRIC: Appropriate mood, affect, insight, and judgment.      Diagnostics:    Cr 0.96  WBC 12.3, hgb 8.7, plt 113  , ALT 82, AST 47  CT chest, personally reviewed, progressive infiltrates diffuse    Microbiology  5/2 Bcx NGTD  5/2 Legionella and strep neg  5/3 MRSA neg  5/3 RPP; COVID ag both positive    Assessment/Plan   1.  Fever in Adult h/p COVID-19 pneumonia  2.  Immunosuppression from CLL  3.  MRSA PCR positive  4.  Acute left lower extremity DVT in the gastrocnemius, on apixaban  5.  Acute kidney injury     Treating with steroids and has made large improvement  We will stop abx  Treating as organizing pneumonia with prolonged prednisone taper, appreciate Dr Eloise pittman  Covid testing positive both PCR and Ag--antivirals of unknown benefit in this case.  I think it might be reasonable to treat with antivirals as minimal downside if we can work through any drug drug interactions  Could give paxlovid at discharge (hopefully tomorrow) but cannot be given inpatient given EUA restrains  CRP today  Long d/w wife today. Hard to say if still contagious but certainly reasonable to assume he is    Chris Escalante MD  10:12 EDT  05/04/22

## 2022-05-04 NOTE — PROGRESS NOTES
"[facility] Clinical Pharmacy Services: Vancomycin Monitoring Note    Aida Bal Jr. is a 67 y.o. male who is on day 3/5 of pharmacy to dose vancomycin for sepsis.    Previous Vancomycin Dose:   1250 mg IV every  24  hours  Updated Cultures and Sensitivities: no updates  Results from last 7 days   Lab Units 05/04/22  0627   VANCOMYCIN TR mcg/mL 8.10     Vitals/Labs  Ht: 182.9 cm (72\"); Wt: 102 kg (224 lb 6.4 oz)   Temp Readings from Last 1 Encounters:   05/04/22 97.6 °F (36.4 °C) (Oral)     Estimated Creatinine Clearance: 89.8 mL/min (by C-G formula based on SCr of 0.96 mg/dL).     Results from last 7 days   Lab Units 05/04/22  0627 05/03/22  0745 05/02/22  1238   CREATININE mg/dL 0.96 1.08 1.61*   WBC 10*3/mm3 12.31* 10.58 9.55     Assessment/Plan  Current Vancomycin Dose: 1250 mg IV every  24  hours; provides a predicted  mg/L.hr . SCr has decreased significantly in the past 48 hours.   Increasing Vancomycin Dose to: 1250 mg IV every  12  hours; provides a predicted  mg/L.hr .   Next Level Date and Time: Vanc Trough on 5/4 at 0630  We will continue to monitor patient changes and renal function     Thank you for involving pharmacy in this patient's care. Please contact pharmacy with any questions or concerns.       Chris Lange, PharmD  Clinical Pharmacist           "

## 2022-05-04 NOTE — PROGRESS NOTES
Subjective     HISTORY OF PRESENT ILLNESS:   Was febrile again yesterday. But says he feels much better since being admitted. Denies excessive cough or sob. Denies headache or dizziness.     Past Medical History, Past Surgical History, Social History, Family History have been reviewed and are without significant changes except as mentioned.    Review of Systems   A comprehensive 14 point review of systems was performed and was negative except as mentioned.    Medications:  The current medication list was reviewed in the EMR    ALLERGIES:    Allergies   Allergen Reactions   • Ace Inhibitors Unknown - Low Severity   • Candesartan Unknown - Low Severity   • Pravastatin Unknown - Low Severity     Annotation - 09Jan2017: medication caused side effects but he is able to other statins ie Crestor       Objective      Vitals:    05/03/22 2142 05/04/22 0010 05/04/22 0417 05/04/22 0700   BP: 116/60  125/71 141/84   BP Location: Left arm  Left arm Left arm   Patient Position: Sitting  Lying Sitting   Pulse: 77  86 98   Resp: 18  18 18   Temp: 97.6 °F (36.4 °C) 97.9 °F (36.6 °C) 97.6 °F (36.4 °C) 97.6 °F (36.4 °C)   TempSrc: Oral Oral Oral Oral   SpO2: 97%  99% 98%   Weight:       Height:         Current Status 10/4/2021   ECOG score 1       Physical Exam   CONSTITUTIONAL:  Vital signs reviewed.  No distress, looks comfortable.  EYES:  Conjunctiva and lids unremarkable.    EARS,NOSE,MOUTH,THROAT:  Ears and nose appear unremarkable.  Lips, teeth, gums appear unremarkable.  RESPIRATORY:  Normal respiratory effort.  Equal chest movement  CARDIOVASCULAR:  Normal heart rate. No significant lower extremity edema.  GASTROINTESTINAL: Abdomen appears unremarkable.  Nondistended  LYMPHATIC:  No cervical, supraclavicular lymphadenopathy.  SKIN:  Warm.  No rashes.  PSYCHIATRIC:  Normal judgment and insight.  Normal mood and affect.  NEURO: AAO x 3, no gross focal deficits    RECENT LABS:  Hematology WBC   Date Value Ref Range Status    05/04/2022 12.31 (H) 3.40 - 10.80 10*3/mm3 Final     RBC   Date Value Ref Range Status   05/04/2022 2.95 (L) 4.14 - 5.80 10*6/mm3 Final     Hemoglobin   Date Value Ref Range Status   05/04/2022 8.7 (L) 13.0 - 17.7 g/dL Final     Hematocrit   Date Value Ref Range Status   05/04/2022 27.0 (L) 37.5 - 51.0 % Final     Platelets   Date Value Ref Range Status   05/04/2022 113 (L) 140 - 450 10*3/mm3 Final              Assessment/Plan   Patient is a 67 y.o. year old male with medical history significant for CLL, AIHA, CAD, DM2 & LLE DVT who was seen  at the [facility] on 5/2/22 & sent to the ER for fever of 101, hypotension (91/61), anemia (7.6) & thrombocytopenia (129).      # Febrile illness:   · Had COVID19 infection in April 2022. Tested positive again this admission.   · ID & pulm on board. Being treated with prednisone. Antibiotics d/c'ed.   · Given CLL diagnosis, checked immunoglobulins level which showed low IgG (342) & IgM (6).   · Given recurrent/persistent COVID19 infection, he may benefit from a dose IVIG (400 mg/kg) replacement, if no contraindications from ID's standpoint.      # Anemia: Baseline Hb in 10-11 gm/dl range. Hb 7.8 on admission.   Likely covid19 inflammation related. No iron, B12, or folate deficiencies. Transfuse to keep Hb > 7.      # Thrombocytopenia: Baseline platelets normal range. Dropped to 111,000 this admission. Suspect COVID related. Monitor     # CLL:   · Diagnosed November 2015.   · Due to autoimmune hemolytic anemia associated with CLL, initially treated with steroids and for doses of weekly Rituxan.  Then, observation.  · On ibrutinib since late July 2018. Held this admission.     # LLE DVT: Diagnosed April 2022. Continue eliquis 5 mg BID  # Hypotension: Improved with fluids. Monitor  # Hypogammaglobulinemia: CLL related. Consider IVIG replacement     PLAN:  - Febrile illness likely persistent covid19/long covid syndrome.On steroids per ID & pulm.  - CLL related  hypogammaglobulinemia ( low IgG & IgM).   - Given recurrent/persistent COVID19 infection, he may benefit from a dose IVIG (400 mg/kg) replacement, if no contraindications from ID's standpoint.   - Anemia & thrombocytopenia, likely covid19 related. Monitor.   - Continue to hold ibrutinib  - Continue eliquis  - Will continue to follow.         5/4/2022      CC:

## 2022-05-04 NOTE — PROGRESS NOTES
Dr. RAFY Navas    [address]    5/4/2022    Patient ID:  Name:  Aida Bal Jr.  MRN:  [id number]  1955  67 y.o.  male            CC/Reason for visit: COVID-19 pneumonia    Interval hx: Patient had some fever yesterday.  He has a dry cough  Not requiring oxygen    ROS: No chest pain, abdominal pain    Vitals:  Vitals:    05/04/22 0010 05/04/22 0417 05/04/22 0700 05/04/22 1354   BP:  125/71 141/84 128/82   BP Location:  Left arm Left arm Left arm   Patient Position:  Lying Sitting Lying   Pulse:  86 98 74   Resp:  18 18 18   Temp: 97.9 °F (36.6 °C) 97.6 °F (36.4 °C) 97.6 °F (36.4 °C) 98.4 °F (36.9 °C)   TempSrc: Oral Oral Oral Oral   SpO2:  99% 98% 99%   Weight:       Height:               Body mass index is 30.43 kg/m².    Intake/Output Summary (Last 24 hours) at 5/4/2022 1426  Last data filed at 5/4/2022 1300  Gross per 24 hour   Intake 1620 ml   Output 1100 ml   Net 520 ml       Exam:  GEN:  No distress  Alert, oriented x 3.   LUNGS: Clear breath sounds bilat, no use of accessory muscles  CV:  Normal S1S2, without murmur, no edema  ABD:  Non tender, no enlarged liver or masses      Scheduled meds:  allopurinol, 300 mg, Oral, Daily  apixaban, 5 mg, Oral, Q12H  aspirin, 81 mg, Oral, Daily  benzonatate, 200 mg, Oral, TID  cholecalciferol, 2,000 Units, Oral, Daily  fluticasone, 1 spray, Nasal, Daily  pantoprazole, 40 mg, Oral, Daily  [START ON 5/22/2022] predniSONE, 10 mg, Oral, Daily With Breakfast  [START ON 5/16/2022] predniSONE, 20 mg, Oral, Daily With Breakfast  [START ON 5/10/2022] predniSONE, 30 mg, Oral, Daily With Breakfast  predniSONE, 40 mg, Oral, Daily With Breakfast  [START ON 5/28/2022] predniSONE, 5 mg, Oral, Daily With Breakfast  terazosin, 5 mg, Oral, Nightly      IV meds:                           Data Review:   I reviewed the patient's medications and new clinical results.    COVID19   Date Value Ref Range Status   05/03/2022 Detected (C) Presumptive Negative - Ref.  Range Final         Lab Results   Component Value Date    CALCIUM 8.3 (L) 05/04/2022    PHOS 3.7 04/21/2022    MG 1.8 05/02/2022    MG 1.9 04/19/2022     Results from last 7 days   Lab Units 05/04/22  0627 05/03/22  0745 05/02/22  1238 05/02/22  1021   SODIUM mmol/L 136 136 133* 134   POTASSIUM mmol/L 4.3 3.9 4.2 4.3   CHLORIDE mmol/L 104 104 100 99   CO2 mmol/L 22.2 23.0 24.3 23.9   BUN mg/dL 22 20 27* 28*   CREATININE mg/dL 0.96 1.08 1.61* 1.48*   CALCIUM mg/dL 8.3* 7.6* 8.2* 8.4*   BILIRUBIN mg/dL 0.7  --  0.8 0.9   ALK PHOS U/L 173*  --  137* 138*   ALT (SGPT) U/L 82*  --  75* 73*   AST (SGOT) U/L 47*  --  43* 45*   GLUCOSE mg/dL 154* 109* 118* 217*   WBC 10*3/mm3 12.31* 10.58 9.55 8.86   HEMOGLOBIN g/dL 8.7* 7.8* 7.6* 7.9*   PLATELETS 10*3/mm3 113* 111* 129* 125*   PROCALCITONIN ng/mL  --  0.36* 0.35*  --      Results from last 7 days   Lab Units 05/02/22  1434 05/02/22  1238   BLOODCX  No growth at 24 hours No growth at 2 days         Results from last 7 days   Lab Units 05/02/22  1238   TROPONIN T ng/mL 0.028           Estimated Creatinine Clearance: 89.8 mL/min (by C-G formula based on SCr of 0.96 mg/dL).      ASSESSMENT:     Fever    Hypertension    CLL (chronic lymphocytic leukemia) (HCC)    Autoimmune hemolytic anemia (HCC)    Pneumonia due to COVID-19 virus    Anemia        PLAN:  COVID PCR and antigen are positive indicating ongoing activity.  Continue prednisone for a prolonged taper over the next 5 weeks.  Unclear whether the patient had would have any benefit from antivirals at this point.  Being followed by infectious diseases in this regard.  They are planning on Paxlovid trial at discharge.  Follow-up with me in the office within 4 to 6 weeks.        Jose Enrique Navas MD  5/4/2022

## 2022-05-05 ENCOUNTER — TELEPHONE (OUTPATIENT)
Dept: ONCOLOGY | Facility: CLINIC | Age: 67
End: 2022-05-05

## 2022-05-05 ENCOUNTER — READMISSION MANAGEMENT (OUTPATIENT)
Dept: CALL CENTER | Facility: HOSPITAL | Age: 67
End: 2022-05-05

## 2022-05-05 VITALS
HEART RATE: 82 BPM | SYSTOLIC BLOOD PRESSURE: 152 MMHG | RESPIRATION RATE: 18 BRPM | OXYGEN SATURATION: 100 % | HEIGHT: 72 IN | DIASTOLIC BLOOD PRESSURE: 76 MMHG | WEIGHT: 226.9 LBS | BODY MASS INDEX: 30.73 KG/M2 | TEMPERATURE: 97.5 F

## 2022-05-05 LAB
ALBUMIN SERPL-MCNC: 2.6 G/DL (ref 3.5–5.2)
ALBUMIN/GLOB SERPL: 0.9 G/DL
ALP SERPL-CCNC: 152 U/L (ref 39–117)
ALT SERPL W P-5'-P-CCNC: 74 U/L (ref 1–41)
ANION GAP SERPL CALCULATED.3IONS-SCNC: 12.3 MMOL/L (ref 5–15)
ANISOCYTOSIS BLD QL: ABNORMAL
AST SERPL-CCNC: 44 U/L (ref 1–40)
BILIRUB SERPL-MCNC: 0.4 MG/DL (ref 0–1.2)
BUN SERPL-MCNC: 22 MG/DL (ref 8–23)
BUN/CREAT SERPL: 23.7 (ref 7–25)
CALCIUM SPEC-SCNC: 8.4 MG/DL (ref 8.6–10.5)
CHLORIDE SERPL-SCNC: 103 MMOL/L (ref 98–107)
CO2 SERPL-SCNC: 20.7 MMOL/L (ref 22–29)
CREAT SERPL-MCNC: 0.93 MG/DL (ref 0.76–1.27)
CRP SERPL-MCNC: 13.45 MG/DL (ref 0–0.5)
DEPRECATED RDW RBC AUTO: 60.3 FL (ref 37–54)
EGFRCR SERPLBLD CKD-EPI 2021: 88.9 ML/MIN/1.73
ERYTHROCYTE [DISTWIDTH] IN BLOOD BY AUTOMATED COUNT: 18.9 % (ref 12.3–15.4)
GLOBULIN UR ELPH-MCNC: 2.8 GM/DL
GLUCOSE SERPL-MCNC: 143 MG/DL (ref 65–99)
HCT VFR BLD AUTO: 23.6 % (ref 37.5–51)
HGB BLD-MCNC: 8 G/DL (ref 13–17.7)
LYMPHOCYTES # BLD MANUAL: 7.62 10*3/MM3 (ref 0.7–3.1)
MAGNESIUM SERPL-MCNC: 2.1 MG/DL (ref 1.6–2.4)
MCH RBC QN AUTO: 30.1 PG (ref 26.6–33)
MCHC RBC AUTO-ENTMCNC: 33.9 G/DL (ref 31.5–35.7)
MCV RBC AUTO: 88.7 FL (ref 79–97)
MICROCYTES BLD QL: ABNORMAL
NEUTROPHILS # BLD AUTO: 8.8 10*3/MM3 (ref 1.7–7)
NEUTROPHILS NFR BLD MANUAL: 53.6 % (ref 42.7–76)
PLAT MORPH BLD: NORMAL
PLATELET # BLD AUTO: 114 10*3/MM3 (ref 140–450)
PMV BLD AUTO: 12.1 FL (ref 6–12)
POLYCHROMASIA BLD QL SMEAR: ABNORMAL
POTASSIUM SERPL-SCNC: 4.5 MMOL/L (ref 3.5–5.2)
PROT SERPL-MCNC: 5.4 G/DL (ref 6–8.5)
QT INTERVAL: 396 MS
RBC # BLD AUTO: 2.66 10*6/MM3 (ref 4.14–5.8)
SODIUM SERPL-SCNC: 136 MMOL/L (ref 136–145)
TROPONIN T SERPL-MCNC: <0.01 NG/ML (ref 0–0.03)
VARIANT LYMPHS NFR BLD MANUAL: 1 % (ref 0–5)
VARIANT LYMPHS NFR BLD MANUAL: 45.4 % (ref 19.6–45.3)
WBC MORPH BLD: NORMAL
WBC NRBC COR # BLD: 16.42 10*3/MM3 (ref 3.4–10.8)

## 2022-05-05 PROCEDURE — 83735 ASSAY OF MAGNESIUM: CPT | Performed by: INTERNAL MEDICINE

## 2022-05-05 PROCEDURE — 85025 COMPLETE CBC W/AUTO DIFF WBC: CPT | Performed by: INTERNAL MEDICINE

## 2022-05-05 PROCEDURE — 85007 BL SMEAR W/DIFF WBC COUNT: CPT | Performed by: INTERNAL MEDICINE

## 2022-05-05 PROCEDURE — 99232 SBSQ HOSP IP/OBS MODERATE 35: CPT | Performed by: INTERNAL MEDICINE

## 2022-05-05 PROCEDURE — 63710000001 DIPHENHYDRAMINE PER 50 MG: Performed by: INTERNAL MEDICINE

## 2022-05-05 PROCEDURE — 80053 COMPREHEN METABOLIC PANEL: CPT | Performed by: INTERNAL MEDICINE

## 2022-05-05 PROCEDURE — 25010000002 IMMUNE GLOBULIN (HUMAN) 20 GM/200ML SOLUTION: Performed by: INTERNAL MEDICINE

## 2022-05-05 PROCEDURE — 93010 ELECTROCARDIOGRAM REPORT: CPT | Performed by: INTERNAL MEDICINE

## 2022-05-05 PROCEDURE — 25010000002 IMMUNE GLOBULIN (HUMAN) 5 GM/50ML SOLUTION: Performed by: INTERNAL MEDICINE

## 2022-05-05 PROCEDURE — 86140 C-REACTIVE PROTEIN: CPT | Performed by: INTERNAL MEDICINE

## 2022-05-05 PROCEDURE — 93005 ELECTROCARDIOGRAM TRACING: CPT | Performed by: INTERNAL MEDICINE

## 2022-05-05 PROCEDURE — 25010000002 IMMUNE GLOBULIN (HUMAN) 10 GM/100ML SOLUTION: Performed by: INTERNAL MEDICINE

## 2022-05-05 PROCEDURE — 63710000001 PREDNISONE PER 1 MG: Performed by: INTERNAL MEDICINE

## 2022-05-05 PROCEDURE — 84484 ASSAY OF TROPONIN QUANT: CPT | Performed by: INTERNAL MEDICINE

## 2022-05-05 RX ORDER — AMLODIPINE BESYLATE 2.5 MG/1
2.5 TABLET ORAL
Status: DISCONTINUED | OUTPATIENT
Start: 2022-05-05 | End: 2022-05-05 | Stop reason: HOSPADM

## 2022-05-05 RX ORDER — FAMOTIDINE 10 MG/ML
20 INJECTION, SOLUTION INTRAVENOUS ONCE AS NEEDED
Status: DISCONTINUED | OUTPATIENT
Start: 2022-05-05 | End: 2022-05-05 | Stop reason: HOSPADM

## 2022-05-05 RX ORDER — DIPHENHYDRAMINE HCL 25 MG
25 CAPSULE ORAL ONCE
Status: COMPLETED | OUTPATIENT
Start: 2022-05-05 | End: 2022-05-05

## 2022-05-05 RX ORDER — ACETAMINOPHEN 325 MG/1
650 TABLET ORAL ONCE
Status: COMPLETED | OUTPATIENT
Start: 2022-05-05 | End: 2022-05-05

## 2022-05-05 RX ORDER — DIPHENHYDRAMINE HYDROCHLORIDE 50 MG/ML
50 INJECTION INTRAMUSCULAR; INTRAVENOUS ONCE AS NEEDED
Status: DISCONTINUED | OUTPATIENT
Start: 2022-05-05 | End: 2022-05-05 | Stop reason: HOSPADM

## 2022-05-05 RX ORDER — PREDNISONE 10 MG/1
TABLET ORAL
Qty: 74 TABLET | Refills: 0 | Status: SHIPPED | OUTPATIENT
Start: 2022-05-05 | End: 2022-06-09

## 2022-05-05 RX ORDER — ATORVASTATIN CALCIUM 40 MG/1
TABLET, FILM COATED ORAL
Refills: 3
Start: 2022-05-05

## 2022-05-05 RX ADMIN — IMMUNE GLOBULIN (HUMAN) 20 G: 10 INJECTION INTRAVENOUS; SUBCUTANEOUS at 14:31

## 2022-05-05 RX ADMIN — ASPIRIN 81 MG: 81 TABLET, COATED ORAL at 09:16

## 2022-05-05 RX ADMIN — PREDNISONE 40 MG: 20 TABLET ORAL at 09:16

## 2022-05-05 RX ADMIN — PANTOPRAZOLE SODIUM 40 MG: 40 TABLET, DELAYED RELEASE ORAL at 09:17

## 2022-05-05 RX ADMIN — DIPHENHYDRAMINE HYDROCHLORIDE 25 MG: 25 CAPSULE ORAL at 12:46

## 2022-05-05 RX ADMIN — AMLODIPINE BESYLATE 2.5 MG: 2.5 TABLET ORAL at 16:24

## 2022-05-05 RX ADMIN — ACETAMINOPHEN 650 MG: 325 TABLET ORAL at 12:46

## 2022-05-05 RX ADMIN — ALLOPURINOL 300 MG: 300 TABLET ORAL at 09:16

## 2022-05-05 RX ADMIN — BENZONATATE 200 MG: 100 CAPSULE ORAL at 16:24

## 2022-05-05 RX ADMIN — Medication 2000 UNITS: at 09:16

## 2022-05-05 RX ADMIN — APIXABAN 5 MG: 5 TABLET, FILM COATED ORAL at 09:16

## 2022-05-05 RX ADMIN — BENZONATATE 200 MG: 100 CAPSULE ORAL at 09:16

## 2022-05-05 RX ADMIN — IMMUNE GLOBULIN (HUMAN) 5 G: 10 INJECTION INTRAVENOUS; SUBCUTANEOUS at 12:55

## 2022-05-05 RX ADMIN — IMMUNE GLOBULIN (HUMAN) 10 G: 10 INJECTION INTRAVENOUS; SUBCUTANEOUS at 13:27

## 2022-05-05 NOTE — TELEPHONE ENCOUNTER
Pt wife feels the pt will be D/C from the hospital to soon. She states she is not visiting the pt in the hospital and is gathering all her information. From her  and MyChart. She is not comfortable with the Pt taking Paxlovid and coming home with an active covid infection. PT had many questions regarding the pts Covid status. I advised her to contact the nurse caring  For him in the hospital so they can reach out the the ID team. Pt V/U.

## 2022-05-05 NOTE — PLAN OF CARE
Goal Outcome Evaluation:  Plan of Care Reviewed With: patient      Patient discharge teaching complete, side effects of new meds reviewed, all questions answered. Monitor and iv removed. Patient waiting for wife, discharge transport put in

## 2022-05-05 NOTE — PROGRESS NOTES
INFECTIOUS DISEASES PROGRESS NOTE    CC: f/u covid    S:   No f/c/ns  Cough improved  Not requiring any oxygen.    O:  Physical Exam:  Temp:  [97.5 °F (36.4 °C)-98.7 °F (37.1 °C)] 97.5 °F (36.4 °C)  Heart Rate:  [70-80] 75  Resp:  [18] 18  BP: (126-130)/(72-82) 126/80  Physical Exam  GENERAL: Awake and alert, in no acute distress.   HEENT: hearing is grossly normal.    normal respiratory effort.   GI: Soft, nontender, nondistended. No appreciable organomegaly.   SKIN: Warm and dry without cutaneous eruptions   PSYCHIATRIC: Appropriate mood, affect, insight, and judgment.      Diagnostics:    Crp 24-->13.45  Cr 0.93  ALT 74, AST 44    CT chest, personally reviewed, progressive infiltrates diffuse    Microbiology  5/2 Bcx NGTD  5/2 Legionella and strep neg  5/3 MRSA neg  5/3 RPP; COVID ag both positive    Assessment/Plan   1.  Fever in Adult h/p COVID-19 pneumonia  2.  Immunosuppression from CLL  3.  MRSA PCR positive  4.  Acute left lower extremity DVT in the gastrocnemius, on apixaban  5.  Acute kidney injury     Clinically much improved.  WBC high due to steroids.  Treating as organizing pneumonia with prolonged prednisone taper, appreciate Dr Eloise Morales testing positive both PCR and Ag; pt agreeable to trying outpatient paxlovid; rx sent to [facility] retail pharmacy  Will cut apixaban tab in half while on Paxlovid  CRP better  Long d/w wife yesterday. Hard to say if still contagious but certainly reasonable to assume he is; he is going to isolate at home until ag test turns negative    I have discussed with the patient the following regarding Paxlovid    1. FDA has authorized emergency use of Paxlovidwhich is not FDA approved    2. The patient or caregiver has the option to accept or refuse administration of Paxlovid    3. The significant known and potential risks and benefits of Paxlovid and the extent to which such risks and benefits are unknown     4. Information on available alternative treatments and the  risks and benefits of those alternatives.     No objection to discharge when okay with others    Chris Escalante MD  05/05/22

## 2022-05-05 NOTE — CASE MANAGEMENT/SOCIAL WORK
Continued Stay Note  [facility]     Patient Name: Aida Bal Jr.  MRN: [id number]  Today's Date: 5/5/2022    Admit Date: 5/2/2022     Discharge Plan     Row Name 05/05/22 1253       Plan    Plan Home, family to transport    Plan Comments Plan remains home, family to transport. Patient was given coupon for eliquis on 5/3. CCP to follow for any dc planning needs that arise. LOVE, KRISW               Discharge Codes    No documentation.               Expected Discharge Date and Time     Expected Discharge Date Expected Discharge Time    May 5, 2022             MICHELLE REES

## 2022-05-05 NOTE — DISCHARGE SUMMARY
Date of Admission: 5/2/2022  Date of Discharge:  5/5/2022  Primary Care Physician: Roxane Marsh MD     Discharge Diagnosis:  Active Hospital Problems    Diagnosis  POA   • **Fever [R50.9]  Yes   • Anemia [D64.9]  Yes   • Pneumonia due to COVID-19 virus [U07.1, J12.82]  Yes   • CLL (chronic lymphocytic leukemia) (HCC) [C91.10]  Yes   • Autoimmune hemolytic anemia (HCC) [D59.10]  Yes   • Hypertension [I10]  Yes      Resolved Hospital Problems   No resolved problems to display.       Presenting Problem/History of Present Illness:  Thrombocytopenia (HCC) [D69.6]  Acute renal insufficiency [N28.9]  Personal history of CLL (chronic lymphocytic leukemia) [Z85.6]  Immunocompromised (HCC) [D84.9]  Hypotension, unspecified hypotension type [I95.9]  Anemia, unspecified type [D64.9]  COVID-19 [U07.1]     67 year old gentleman who presented to the emergency room from oncology; while he was there he was hypotensive and dizzy; he was anemic; he is undergoing treatment for cll and autoimmune hemolytic anemia; he has had a fever to 101    Hospital Course:  The patient is a 67 y.o. male who presented with hypotension at his oncologist office.  He was febrile as well.  He was admitted and underwent oncology infectious disease and pulmonology consultations.  He was found to be COVID-positive.  He had had a recent episode of COVID but is still considered infectious likely decreased clearance due to his CLL because he was both positive on the PCR and antigen testing.  His CT scan showed organizing pneumonia so he was started on steroids.  He had good response to that and prolonged steroid taper is planned.  He also had a low IgG level from autoimmune hemolytic anemia/CLL and was given IVIG here.  He is going to take Paxil if it at discharge and he needs to take half of his home Eliquis dose for the 5 days he is on the Paxil than . he is on Eliquis for history of DVT about 1 month ago.  His blood pressure improved and fever  resolved.  He is going to follow-up with pulmonology and oncology in clinics.  Of note there was a mention of pancreatic tail lesion on CT and oncology will be following this.    He also had elevated LFTs likely from the viral infection.  They were improving but not back to baseline.  I held his statin and have instructed him to hold it until he is told to resume this by primary care.    Exam Today:  See progress note from same day    Results:  CXR  Bilateral patchy pulmonary infiltrates, continued follow-up  Recommended.    CT Abdomen/Pelvis  1. Slight improved aeration of the lung bases compatible with Covid-19.  Please refer to the prior chest CT for further detail and follow-up  recommendations.  2. No acute intra-abdominal or pelvic process on this limited  noncontrast exam.  3. Prostatomegaly with diffuse thickening of the bladder which can be  seen with bladder outlet obstruction or cystitis.  4. Indeterminate cystic lesion in the tail of the pancreas approximating  1.1 cm. Short-term surveillance with MRI could be considered not  emergently for further characterization.  5. Smaller abdominal and pelvic adenopathy. No splenomegaly.  6. Please see above for additional findings/recommendations.    CT Chest  Progressive pneumonia, likely COVID pneumonia since  04/17/2022 as described above. Mediastinal lymph nodes enlarging within  the interim.    Procedures Performed:         Consults:   Consults     Date and Time Order Name Status Description    5/3/2022  2:12 PM Inpatient Pulmonology Consult Completed     5/3/2022 10:06 AM Inpatient Infectious Diseases Consult Completed     5/2/2022  7:14 PM Inpatient Hematology & Oncology Consult      5/2/2022  2:53 PM [facility] (on-call MD unless specified) Details      5/2/2022  1:50 PM Hematology and Oncology (on-call MD unless specified)      4/19/2022 11:53 AM Inpatient Infectious Diseases Consult Completed     4/15/2022 11:32 AM Hematology & Oncology Inpatient Consult  Completed     4/15/2022  7:50 AM Inpatient Pulmonology Consult Completed            Discharge Disposition:  Home or Self Care    Discharge Medications:     Discharge Medications      New Medications      Instructions Start Date   Paxlovid 20 x 150 MG & 10 x 100MG tablet therapy pack tablet  Generic drug: Nirmatrelvir & Ritonavir   3 each, Oral, 2 Times Daily      predniSONE 10 MG tablet  Commonly known as: DELTASONE   Take po daily: 40mg x1week, then 30mg x1week, then 20mg x1week, then 10mg x1 week, then 5mg x1 week         Changes to Medications      Instructions Start Date   apixaban 5 MG tablet tablet  Commonly known as: ELIQUIS  What changed:   · how much to take  · how to take this  · when to take this  · additional instructions   Take 2.5mg PO BID for the 5 days while taking paxlovid. Then resume 5mg PO BID.      atorvastatin 40 MG tablet  Commonly known as: LIPITOR  What changed:   · how to take this  · when to take this  · additional instructions   Hold until told to resume by primary care provider         Continue These Medications      Instructions Start Date   allopurinol 300 MG tablet  Commonly known as: ZYLOPRIM   300 mg, Oral, Daily      amLODIPine 10 MG tablet  Commonly known as: NORVASC   10 mg, Oral, Every Morning      Aspirin Low Dose 81 MG EC tablet  Generic drug: aspirin   TK 1 T PO QAM      azelastine 0.05 % ophthalmic solution  Commonly known as: OPTIVAR   1 drop, Ophthalmic      benzonatate 200 MG capsule  Commonly known as: TESSALON   200 mg, Oral, 3 Times Daily      doxazosin 4 MG tablet  Commonly known as: CARDURA   TK 2 TS PO QAM AND 2 TS QHS      EPINEPHrine 0.3 MG/0.3ML solution auto-injector injection  Commonly known as: EPIPEN   0.3 mg, Intramuscular      fluticasone 50 MCG/ACT nasal spray  Commonly known as: FLONASE   1 spray, Nasal, Daily      pantoprazole 20 MG EC tablet  Commonly known as: PROTONIX   40 mg, Oral, Daily      promethazine-dextromethorphan 6.25-15 MG/5ML  syrup  Commonly known as: PROMETHAZINE-DM   TAKE 5 ML BY MOUTH 4 TIMES DAILY IF NEEDED FOR COUGH      Vitamin D3 50 MCG (2000 UT) tablet   2,000 Units, Oral, Daily         Stop These Medications    mupirocin 2 % ointment  Commonly known as: BACTROBAN            Discharge Diet:   Diet Instructions     Advance Diet As Tolerated            Activity at Discharge:   Activity Instructions     Activity as Tolerated      Other Activity Instructions      Activity Instructions: follow cdc guidelines for isolation          Follow-up Appointments:   Follow-up Information     Jose Enrique Navas MD. Schedule an appointment as soon as possible for a visit in 5 week(s).    Specialty: Pulmonary Disease  Why: Follow-up with me in the office within 5 to 6 weeks.  Needs chest x-ray that day  Contact information:  [address] [facility] [address]  [place] [id number] [phone]             Roxane Marsh MD Follow up.    Specialty: Internal Medicine  Contact information:  [address] [facility] [address]  [place] [id number] [phone]             Toñito Cheng MD Follow up.    Specialties: Hematology and Oncology, Oncology, Hematology  Contact information:  [address] [facility] [address]  [place] [address]  [phone]                         Test Results Pending at Discharge:  Pending Labs     Order Current Status    Blood Culture - Blood, Arm, Left Preliminary result    Blood Culture - Blood, Arm, Right Preliminary result           Barak Cruz MD  05/05/22  17:16 EDT    Time Spent on Discharge Activities: >30 minutes    Dictated portions using Dragon dictation software.  During the entire encounter, I was wearing recommended PPE including face mask and eye protection. Hand sanitization was performed prior to entering room and upon exit.

## 2022-05-05 NOTE — PROGRESS NOTES
Subjective     HISTORY OF PRESENT ILLNESS:   No acute issues overnight. Denies excessive cough or sob. Denies headache or dizziness.     Past Medical History, Past Surgical History, Social History, Family History have been reviewed and are without significant changes except as mentioned.    Review of Systems   A comprehensive 14 point review of systems was performed and was negative except as mentioned.    Medications:  The current medication list was reviewed in the EMR    ALLERGIES:    Allergies   Allergen Reactions   • Ace Inhibitors Unknown - Low Severity   • Candesartan Unknown - Low Severity   • Pravastatin Unknown - Low Severity     Annotation - 09Jan2017: medication caused side effects but he is able to other statins ie Crestor       Objective      Vitals:    05/04/22 1918 05/04/22 2305 05/05/22 0347 05/05/22 0718   BP: 130/81 128/81 126/72 126/80   BP Location: Left arm Left arm Left arm Left arm   Patient Position: Lying Lying Lying Lying   Pulse: 80 70 77 75   Resp: 18 18 18 18   Temp: 98.4 °F (36.9 °C) 98.6 °F (37 °C) 98.7 °F (37.1 °C) 97.5 °F (36.4 °C)   TempSrc: Oral Oral Oral Oral   SpO2: 98% 98% 97% 100%   Weight:   103 kg (226 lb 14.4 oz)    Height:         Current Status 10/4/2021   ECOG score 1       Physical Exam   CONSTITUTIONAL:  Vital signs reviewed.  No distress, looks comfortable.  EYES:  Conjunctiva and lids unremarkable.    EARS,NOSE,MOUTH,THROAT:  Ears and nose appear unremarkable.  Lips, teeth, gums appear unremarkable.  RESPIRATORY:  Normal respiratory effort.  Equal chest movement  CARDIOVASCULAR:  Normal heart rate. No significant lower extremity edema.  GASTROINTESTINAL: Abdomen appears unremarkable.  Nondistended  LYMPHATIC:  No cervical, supraclavicular lymphadenopathy.  SKIN:  Warm.  No rashes.  PSYCHIATRIC:  Normal judgment and insight.  Normal mood and affect.  NEURO: AAO x 3, no gross focal deficits    RECENT LABS:  Hematology WBC   Date Value Ref Range Status   05/05/2022  16.42 (H) 3.40 - 10.80 10*3/mm3 Final     RBC   Date Value Ref Range Status   05/05/2022 2.66 (L) 4.14 - 5.80 10*6/mm3 Final     Hemoglobin   Date Value Ref Range Status   05/05/2022 8.0 (L) 13.0 - 17.7 g/dL Final     Hematocrit   Date Value Ref Range Status   05/05/2022 23.6 (L) 37.5 - 51.0 % Final     Platelets   Date Value Ref Range Status   05/05/2022 114 (L) 140 - 450 10*3/mm3 Final              Assessment/Plan   Patient is a 67 y.o. year old male with medical history significant for CLL, AIHA, CAD, DM2 & LLE DVT who was seen  at the [facility] clinic on 5/2/22 & sent to the ER for fever of 101, hypotension (91/61), anemia (7.6) & thrombocytopenia (129).      # Febrile illness:   · Had COVID19 infection in April 2022. Tested positive again this admission.   · ID & pulm on board. Being treated with prednisone. Antibiotics d/c'ed.   · Given CLL diagnosis, checked immunoglobulins level which showed low IgG (342) & IgM (6).   · Given recurrent/persistent COVID19 infection, will administer a dose IVIG (400 mg/kg) replacement today.     # Anemia: Baseline Hb in 10-11 gm/dl range. Hb 7.8 on admission.   · Likely covid19 inflammation related. No iron, B12, or folate deficiencies. Transfuse to keep Hb > 7.      # Thrombocytopenia: Baseline platelets normal range. Dropped to 111,000 this admission. Suspect COVID related. Monitor     # CLL:   · Diagnosed November 2015.   · Due to autoimmune hemolytic anemia associated with CLL, initially treated with steroids and for doses of weekly Rituxan.  Then, observation.  · On ibrutinib since late July 2018. Held this admission.     # LLE DVT: Diagnosed April 2022. Continue eliquis 5 mg BID  # Hypotension: Improved with fluids. Monitor  # Hypogammaglobulinemia: CLL related. Consider IVIG replacement  # Persistent vs recurrent COVID19 infection: Plan to start Paxlovid x 5 days and reduce eliquis dose by half noted.      PLAN:  - Febrile illness likely persistent covid19/long covid  syndrome.On steroids per ID & pulm. Plan to start Paxlovid x 5 days and reduce eliquis dose by half noted.     - CLL related hypogammaglobulinemia ( low IgG & IgM).   - Given recurrent/persistent COVID19 infection, will administer a dose IVIG (400 mg/kg) today.   - Anemia & thrombocytopenia, likely covid19 related. Monitor.   - Continue to hold ibrutinib  - Continue eliquis (will dose reduce during paxlovid treatment)  - Will continue to follow.         5/5/2022      CC:

## 2022-05-05 NOTE — NURSING NOTE
Monitor tech has informed me of a 20 beat run of vtach recorded; stat ekg, potassium draw, mag draw, & troponin draw ordered; will wait for results and then call MD as patient is asleep and asymptomatic.    Pt states he see Dr Shavon galvan/[facility]    Troponin neg  Magnesium 2.1   Potassium 4.5  EKG SR SVT cherrie  I have paged [facility].

## 2022-05-05 NOTE — PROGRESS NOTES
[facility]  [phone]  Dr. Hernan Narayan    Subjective:  LOS: 3    Chief Complaint: Pulm infiltrates    Remains on room air and has a slight cough.  Otherwise no complaints.  Plans to give additional chemo today.  ID saw him and plans to give Paxlovid on discharge.    Objective   Vital Signs past 24hrs    Temp range: Temp (24hrs), Av.3 °F (36.8 °C), Min:97.5 °F (36.4 °C), Max:98.7 °F (37.1 °C)    BP range: BP: (126-152)/(72-82) 152/76  Pulse range: Heart Rate:  [70-82] 82  Resp rate range: Resp:  [18] 18    Device (Oxygen Therapy): room air   Oxygen range:SpO2:  [97 %-100 %] 100 %      103 kg (226 lb 14.4 oz); Body mass index is 30.77 kg/m².    Intake/Output Summary (Last 24 hours) at 2022 1312  Last data filed at 2022 0900  Gross per 24 hour   Intake 540 ml   Output --   Net 540 ml       Physical Exam  Constitutional:       Appearance: He is obese.   Eyes:      Pupils: Pupils are equal, round, and reactive to light.   Cardiovascular:      Rate and Rhythm: Normal rate and regular rhythm.      Heart sounds: No murmur heard.  Pulmonary:      Effort: Pulmonary effort is normal.      Breath sounds: Normal breath sounds.   Abdominal:      General: Bowel sounds are normal.      Palpations: Abdomen is soft. There is no mass.      Tenderness: There is no abdominal tenderness.   Musculoskeletal:         General: No swelling.   Neurological:      Mental Status: He is alert.       Results Review:    I have reviewed the laboratory and imaging data since the last note by [facility] physician.  My annotations are noted in assessment and plan.    Medication Review:  I have reviewed the current MAR.  My annotations are noted in assessment and plan.       Plan   PCCM Problems  Bilateral pulm infiltrates  SARS-CoV-2 infection with persistent positive test  CLL  Autoimmune hemolytic anemia  Obesity      THESE ARE NEW MEDICAL PROBLEMS TO ME.    Plan of Treatment    Remains on room air.  Note plans per ID.  Agree  with Dr. Navas for slow prednisone taper.    CLL and will receive IVIG today.  Hopefully this will help as well.    Recommend outpatient follow-up chest imaging in 8 weeks to see if infiltrates are clearing.    No objections to discharge.    Hernan Narayan MD  05/05/22  13:12 EDT    While in the room and during my examination of the patient I wore gloves, gown, mask, eye protection and followed enhanced droplet/contact isolation protocol and precautions.  I washed my hands before and after this patient encounter.    Part of this note may be an electronic transcription/translation of spoken language to printed text using the Dragon Dictation System.

## 2022-05-05 NOTE — PROGRESS NOTES
Name: Aida Bal Jr. ADMIT: 2022   : 1955  PCP: Roxane Marsh MD    MRN: [id number] LOS: 3 days   AGE/SEX: 67 y.o. male  ROOM: [facility]   Subjective   Chief Complaint   Patient presents with   • Hypotension      No dyspnea. Occasional cough nonproductive. No CP. No NVD or abdominal pain. He is going to have infusion today per oncology.    Objective   Vital Signs  Temp:  [97.5 °F (36.4 °C)-98.7 °F (37.1 °C)] 97.5 °F (36.4 °C)  Heart Rate:  [70-82] 82  Resp:  [18] 18  BP: (126-152)/(72-81) 152/76  SpO2:  [97 %-100 %] 100 %  on   ;   Device (Oxygen Therapy): room air  Body mass index is 30.77 kg/m².    Physical Exam  Vitals and nursing note reviewed.   Constitutional:       General: He is not in acute distress.     Appearance: He is not diaphoretic.   HENT:      Head: Normocephalic and atraumatic.   Eyes:      General:         Right eye: No discharge.         Left eye: No discharge.      Conjunctiva/sclera: Conjunctivae normal.   Cardiovascular:      Rate and Rhythm: Normal rate and regular rhythm.      Pulses: Normal pulses.   Pulmonary:      Effort: Pulmonary effort is normal.      Breath sounds: No wheezing.   Musculoskeletal:      Cervical back: Neck supple. No tenderness.   Neurological:      Mental Status: He is alert.         Results Review:       I reviewed the patient's new clinical results.    Results from last 7 days   Lab Units 22  03422  0745 22  1238   WBC 10*3/mm3 16.42* 12.31* 10.58 9.55   HEMOGLOBIN g/dL 8.0* 8.7* 7.8* 7.6*   PLATELETS 10*3/mm3 114* 113* 111* 129*     Results from last 7 days   Lab Units 22  03422  0622  0745 22  1238   SODIUM mmol/L 136 136 136 133*   POTASSIUM mmol/L 4.5 4.3 3.9 4.2   CHLORIDE mmol/L 103 104 104 100   CO2 mmol/L 20.7* 22.2 23.0 24.3   BUN mg/dL 22 22 20 27*   CREATININE mg/dL 0.93 0.96 1.08 1.61*   GLUCOSE mg/dL 143* 154* 109* 118*   Estimated Creatinine Clearance: 93.1 mL/min  (by C-G formula based on SCr of 0.93 mg/dL).  Results from last 7 days   Lab Units 05/05/22  0344 05/04/22  0627 05/03/22  0745 05/02/22  1238 05/02/22  1021   CALCIUM mg/dL 8.4* 8.3* 7.6* 8.2* 8.4*   ALBUMIN g/dL 2.60* 2.80*  --  2.70* 2.60*   MAGNESIUM mg/dL 2.1  --   --  1.8  --           allopurinol, 300 mg, Oral, Daily  apixaban, 5 mg, Oral, Q12H  aspirin, 81 mg, Oral, Daily  benzonatate, 200 mg, Oral, TID  cholecalciferol, 2,000 Units, Oral, Daily  fluticasone, 1 spray, Nasal, Daily  pantoprazole, 40 mg, Oral, Daily  [START ON 5/22/2022] predniSONE, 10 mg, Oral, Daily With Breakfast  [START ON 5/16/2022] predniSONE, 20 mg, Oral, Daily With Breakfast  [START ON 5/10/2022] predniSONE, 30 mg, Oral, Daily With Breakfast  predniSONE, 40 mg, Oral, Daily With Breakfast  [START ON 5/28/2022] predniSONE, 5 mg, Oral, Daily With Breakfast  terazosin, 5 mg, Oral, Nightly       Diet Regular; Cardiac    Assessment/Plan      Active Hospital Problems    Diagnosis  POA   • **Fever [R50.9]  Yes   • Anemia [D64.9]  Yes   • Pneumonia due to COVID-19 virus [U07.1, J12.82]  Yes   • CLL (chronic lymphocytic leukemia) (HCC) [C91.10]  Yes   • Autoimmune hemolytic anemia (HCC) [D59.10]  Yes   • Hypertension [I10]  Yes      Resolved Hospital Problems   No resolved problems to display.       · COVID/Fever: Cx ngtd. Continued Covid activity. Paxlovid at dc planned. ID following  · PNA: Prolonged prednisone taper planned for organizing pna. Good response so far. Pulmonology following.  · CLL/AIHA: IVIG planned today for low IgG. Oncology following.  · Pancreatic tail lesion: Per oncology  · Hypertension: Hypotensive on admit. BP elevating some now. Will resume a low dose norvasc and titratate as needed.  · DVT: Acute left gastrocnemius noted a month ago.  He is on Eliquis (planned dose decrease to 2.5mg while getting paxlovid at dc).  · LFT Elevation: Hold statin.  · Prophylaxis: On Eliquis.  · Disposition: Home/possibly  tomorrow    Discussed with Dr Sebastián Cruz MD  [facility]  05/05/22  14:33 EDT    Dictated portions using Dragon dictation software.    During the entire encounter, I was wearing recommended PPE including face mask and eye protection. Hand sanitization was performed prior to entering room and upon exit.

## 2022-05-05 NOTE — TELEPHONE ENCOUNTER
She wants to talk to Dr. Cheng about 's discharge and new drug they are talking about using.  She doesn't want him to be d/c to soon and have to be re-admitted.

## 2022-05-06 LAB
BH BB BLOOD EXPIRATION DATE: NORMAL
BH BB BLOOD TYPE BARCODE: 5100
BH BB DISPENSE STATUS: NORMAL
BH BB PRODUCT CODE: NORMAL
BH BB UNIT NUMBER: NORMAL
CROSSMATCH INTERPRETATION: NORMAL
UNIT  ABO: NORMAL
UNIT  RH: NORMAL

## 2022-05-06 NOTE — CASE MANAGEMENT/SOCIAL WORK
Case Management Discharge Note      Final Note: home no needs    Provided Post Acute Provider List?: Refused (Pt said he is not bad enough yet to need home health or SNF, he declined list of rehab and HH providers)  Provided Post Acute Provider Quality & Resource List?: Refused    Selected Continued Care - Discharged on 5/5/2022 Admission date: 5/2/2022 - Discharge disposition: Home or Self Care    Destination    No services have been selected for the patient.              Durable Medical Equipment    No services have been selected for the patient.              Dialysis/Infusion    No services have been selected for the patient.              Home Medical Care    No services have been selected for the patient.              Therapy    No services have been selected for the patient.              Community Resources    No services have been selected for the patient.              Community & DME    No services have been selected for the patient.                Selected Continued Care - Episodes Includes selections from active Coordinated Care Management episodes    Oncology Episode start date: 11/1/2021   There are no active outsourced providers for this episode.               Transportation Services  Private: Car    Final Discharge Disposition Code: 01 - home or self-care

## 2022-05-06 NOTE — OUTREACH NOTE
Prep Survey    Flowsheet Row Responses   [facility] facility patient discharged from? [place]   Is LACE score < 7 ? No   Emergency Room discharge w/ pulse ox? No   Eligibility Readm Mgmt   Discharge diagnosis immunosuppressed - CLL, long-haul COVID, organizing PNA, fever   Does the patient have one of the following disease processes/diagnoses(primary or secondary)? COPD/Pneumonia   Does the patient have Home health ordered? No   Is there a DME ordered? No   Prep survey completed? Yes          CECILE CERON - Registered Nurse

## 2022-05-07 LAB
BACTERIA SPEC AEROBE CULT: NORMAL
BACTERIA SPEC AEROBE CULT: NORMAL

## 2022-05-10 ENCOUNTER — READMISSION MANAGEMENT (OUTPATIENT)
Dept: CALL CENTER | Facility: HOSPITAL | Age: 67
End: 2022-05-10

## 2022-05-10 NOTE — OUTREACH NOTE
COPD/PN Week 1 Survey    Flowsheet Row Responses   [facility] patient discharged from? [place]   Does the patient have one of the following disease processes/diagnoses(primary or secondary)? COPD/Pneumonia   Was the primary reason for admission: Pneumonia   Week 1 attempt successful? Yes   Call start time 0934   Call end time 0955   Discharge diagnosis immunosuppressed - CLL, long-haul COVID, organizing PNA, fever   Person spoke with today (if not patient) and relationship Meghana-spouse    Meds reviewed with patient/caregiver? Yes   Does the patient have all medications ordered at discharge? Yes   Is the patient taking all medications as directed (includes completed medication regime)? Yes   Comments regarding appointments Appt with Dr. Cheng is on 5/25/22,  Pt will need an appt with pulm   Does the patient have a primary care provider?  Yes   Does the patient have an appointment with their PCP or specialist within 7 days of discharge? No   What is preventing the patient from scheduling follow up appointments within 7 days of discharge? Haven't had time   Nursing Interventions Advised patient to make appointment   Has the patient kept scheduled appointments due by today? N/A   DME comments Pt wears nocturnal oxygen    Pulse Ox monitoring Intermittent   O2 Sat comments 99% on RA    Psychosocial issues? No   What is the patient's perception of their health status since discharge? Improving  [BP this AM was 144/77. Cough is improving. ]   Are the patient's immunizations up to date?  No   Nursing interventions Advised patient to discuss with provider at next visit   Is the patient/caregiver able to teach back signs and symptoms of worsening condition: Fever/chills, Shortness of breath, Chest pain   Is the patient/caregiver able to teach back importance of completing antibiotic course of treatment? --  [Not sent home with ABX]   Week 1 call completed? Yes          KENNY Regan Registered Nurse

## 2022-05-11 ENCOUNTER — PREP FOR SURGERY (OUTPATIENT)
Dept: OTHER | Facility: HOSPITAL | Age: 67
End: 2022-05-11

## 2022-05-11 DIAGNOSIS — Z12.11 ENCOUNTER FOR COLONOSCOPY DUE TO HISTORY OF ADENOMATOUS COLONIC POLYPS: ICD-10-CM

## 2022-05-11 DIAGNOSIS — K22.89 ESOPHAGEAL THICKENING: ICD-10-CM

## 2022-05-11 DIAGNOSIS — Z86.010 ENCOUNTER FOR COLONOSCOPY DUE TO HISTORY OF ADENOMATOUS COLONIC POLYPS: ICD-10-CM

## 2022-05-11 DIAGNOSIS — Z80.0 FAMILY HISTORY OF COLON CANCER IN MOTHER: ICD-10-CM

## 2022-05-11 DIAGNOSIS — Z12.11 SCREENING FOR COLON CANCER: ICD-10-CM

## 2022-05-11 DIAGNOSIS — D64.9 ANEMIA, UNSPECIFIED TYPE: Primary | ICD-10-CM

## 2022-05-12 PROBLEM — Z12.11 ENCOUNTER FOR COLONOSCOPY DUE TO HISTORY OF ADENOMATOUS COLONIC POLYPS: Status: ACTIVE | Noted: 2022-05-12

## 2022-05-12 PROBLEM — Z86.0101 ENCOUNTER FOR COLONOSCOPY DUE TO HISTORY OF ADENOMATOUS COLONIC POLYPS: Status: ACTIVE | Noted: 2022-05-12

## 2022-05-12 PROBLEM — Z86.010 ENCOUNTER FOR COLONOSCOPY DUE TO HISTORY OF ADENOMATOUS COLONIC POLYPS: Status: ACTIVE | Noted: 2022-05-12

## 2022-05-12 PROBLEM — Z12.11 SCREENING FOR COLON CANCER: Status: ACTIVE | Noted: 2022-05-12

## 2022-05-12 PROBLEM — K22.89 ESOPHAGEAL THICKENING: Status: ACTIVE | Noted: 2022-05-12

## 2022-05-12 PROBLEM — Z80.0 FAMILY HISTORY OF COLON CANCER IN MOTHER: Status: ACTIVE | Noted: 2022-05-12

## 2022-05-23 NOTE — PROGRESS NOTES
[facility] GROUP OUTPATIENT FOLLOW UP CLINIC VISIT    REASON FOR FOLLOW-UP:    1.  Chronic lymphocytic leukemia diagnosed in November 2015.  CLL FISH panel negative at that time.    2.  Autoimmune hemolytic anemia associated with CLL.  He was treated with steroids and he received 4 weeks of weekly Rituxan.  Otherwise no treatment has been performed  3.  CT imaging of the chest abdomen and pelvis from 4/25/2018 showed lymphadenopathy with mildly enlarged retroperitoneal and mesenteric lymph nodes with the largest measuring about 3.2 cm.  The spleen measured 13.6 cm.  Slight increase in lymphadenopathy compared with 11/27/2015.  4.  Acute anemia with a hemoglobin of 6.9 as of 6/20/2018.  Low reticulocyte count.  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  Prednisone initiated.  Erythropoietin 73.9.  Parvovirus B19 PCR negative.  Steroids complete on 8/22/2018.  5.  Bone marrow biopsy on 7/3/2018 with 70% CLL (87% by flow cytometry) with a t(14;18) translocation by cytogenetics, 3 copies of IGH in 87.5% of cells but negative for CCND1/IGH rearrangement.  Negative for all other rearrangements/deletions.  IgVH somatic hypermutation was not detected.            6.  Therapy with ibrutinib initiated at the end of July 2018      HISTORY OF PRESENT ILLNESS:  Aida Bal Jr. is a 67 y.o. male who returns today for follow up of the above issue.      Recent admission with COVID-19 pneumonia, then re-admission with hypotension and persistent symptoms and positivity for COVID-19 infection.    He was treated with Paxlovid following discharge and received IVIG while hospitalized. Eliquis dose reduced while on Paxlovid.     At this point he is doing much better.  Energy level has improved significantly.  No residual fevers.  No respiratory symptoms.  He continues Eliquis which he tolerates well without bleeding.  No leg pain or swelling.  He is finishing a steroid taper.    REVIEW OF SYSTEMS:  As per the HPI    Vitals:     "05/25/22 1040   BP: 153/78   Pulse: 81   Resp: 16   Temp: 97.6 °F (36.4 °C)   TempSrc: Temporal   SpO2: 100%   Weight: 105 kg (231 lb 14.4 oz)   Height: 182.9 cm (72.01\")   PainSc: 0-No pain  Comment: CLL       PHYSICAL EXAMINATION:  GENERAL:  Well-developed well-nourished male; awake, alert and oriented, in no acute distress.  Wearing a facemask.  Appears much stronger today.  SKIN:  Warm and dry, without rashes, purpura, or petechiae.  HEAD:  Normocephalic, atraumatic.  EARS:  Hearing intact.  LYMPHATICS:  No cervical, supraclavicular, or axillary lymphadenopathy.  CHEST:  Lungs are clear to auscultation bilaterally.  No wheezes, rales, or rhonchi.  HEART: Regularly irregular.  Grade 2/6 systolic murmur  EXTREMITIES: No clubbing cyanosis or edema.  NEUROLOGICAL:  No focal neurologic deficits.      DIAGNOSTIC DATA:  Retic With IRF & RET-He (05/25/2022 10:46)  CBC & Differential (05/25/2022 10:46)      IMAGING:  None reviewed      ASSESSMENT:  This is a 67 y.o. male with a history of chronic lymphocytic leukemia and related autoimmune hemolytic anemia.    *CLL:   · Initially received Rituxan for 4 weeks at diagnosis for this and the autoimmune hemolytic anemia.  · Given the worsening anemia even though it responded to steroids we needed to treat his CLL particularly given the 70% involvement on his bone marrow biopsy.  We started treating with ibrutinib 420 mg daily, initiated at the end of July 2018.  · White blood cell count normal today at 8.8 with 50% lymphocytes  · He continues to hold Imbruvica following his admission.  We will reassess after I see his liver labs today.    *Macrocytic anemia:   · He has a history of autoimmune hemolytic anemia when he presented with CLL back in November 2015.  His reticulocyte count was very elevated at that time.  His hemoglobin dropped to as low as about 5.  He responded to steroids initially intravenously and then with prednisone and he was treated with 4 weeks of " Rituxan.  · He was noted to have worsening anemia.  His reticulocyte count was low.  He initially did not respond very well to transfusions and he required 4 units of packed red cells  There was no evidence for hemolysis although his ADOLFO is positive for IgG.  This was persistently positive.  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  He responded to steroids.  He discontinued steroids as of 8/22/2018.    · 5/2/2022: Hemoglobin lower at 7.9 with a high MCV of 102   · He required red cell transfusion.  · Hemoglobin is improving today    **History of systolic hypertension: Blood pressure high at 153/78 today.    *History of thrombocytopenia: Platelets low today at 114,000    *COVID-19 pandemic: He has received a total of 3 vaccinations.  Subsequent infection in April 2020.  He received a monoclonal antibody as an outpatient and remdesivir as an inpatient. He was readmitted on 5/2 with hypotension, persistent fevers, persistent COVID-19 positivity on testing. He received IVIG and steroids. He improved and was treated with outpatient Paxlovid.  Symptoms resolved at this point    *Left lower extremity DVT and gastrocnemius vein and right lower extremity superficial thrombophlebitis in the small saphenous vein on bilateral lower extremity venous duplex on 4/15/2022  · Associated with COVID-19 infection    PLAN:   1. Continue Eliquis 5 mg twice daily for the next 2 months.  2. Repeat left lower extremity venous duplex at that point and I will see him back then likely to discontinue the Eliquis at that time  3. For colonoscopy he only needs to hold Eliquis 2 days prior to the procedure.  He will hold aspirin 5 days prior to the procedure.  4. Hold Imbruvica for now but if his liver labs have normalized we will consider resuming this in the next couple of weeks.  Depending on his CMP we also may need to repeat a CMP in the near future as well.  5. Otherwise we will repeat a left lower extremity venous duplex  in about 2 months and I will see him back after that with labs    I spent 45 minutes in this visit today reviewing his record, communicating with him and his wife, placing orders, examining him, documenting the encounter.

## 2022-05-25 ENCOUNTER — OFFICE VISIT (OUTPATIENT)
Dept: ONCOLOGY | Facility: CLINIC | Age: 67
End: 2022-05-25

## 2022-05-25 ENCOUNTER — LAB (OUTPATIENT)
Dept: LAB | Facility: HOSPITAL | Age: 67
End: 2022-05-25

## 2022-05-25 VITALS
HEART RATE: 81 BPM | SYSTOLIC BLOOD PRESSURE: 153 MMHG | TEMPERATURE: 97.6 F | RESPIRATION RATE: 16 BRPM | OXYGEN SATURATION: 100 % | HEIGHT: 72 IN | DIASTOLIC BLOOD PRESSURE: 78 MMHG | BODY MASS INDEX: 31.41 KG/M2 | WEIGHT: 231.9 LBS

## 2022-05-25 DIAGNOSIS — C91.10 CLL (CHRONIC LYMPHOCYTIC LEUKEMIA): ICD-10-CM

## 2022-05-25 DIAGNOSIS — I82.4Z2 DEEP VEIN THROMBOSIS (DVT) OF DISTAL VEIN OF LEFT LOWER EXTREMITY, UNSPECIFIED CHRONICITY: Primary | ICD-10-CM

## 2022-05-25 DIAGNOSIS — C91.10 CLL (CHRONIC LYMPHOCYTIC LEUKEMIA): Primary | ICD-10-CM

## 2022-05-25 DIAGNOSIS — D69.6 THROMBOCYTOPENIA: ICD-10-CM

## 2022-05-25 DIAGNOSIS — D53.9 MACROCYTIC ANEMIA: ICD-10-CM

## 2022-05-25 DIAGNOSIS — I95.9 HYPOTENSION, UNSPECIFIED HYPOTENSION TYPE: ICD-10-CM

## 2022-05-25 LAB
ALBUMIN SERPL-MCNC: 3.5 G/DL (ref 3.5–5.2)
ALBUMIN/GLOB SERPL: 1.5 G/DL (ref 1.1–2.4)
ALP SERPL-CCNC: 115 U/L (ref 38–116)
ALT SERPL W P-5'-P-CCNC: 47 U/L (ref 0–41)
ANION GAP SERPL CALCULATED.3IONS-SCNC: 11.5 MMOL/L (ref 5–15)
AST SERPL-CCNC: 26 U/L (ref 0–40)
BASOPHILS # BLD AUTO: 0.01 10*3/MM3 (ref 0–0.2)
BASOPHILS NFR BLD AUTO: 0.1 % (ref 0–1.5)
BILIRUB SERPL-MCNC: 0.3 MG/DL (ref 0.2–1.2)
BUN SERPL-MCNC: 19 MG/DL (ref 6–20)
BUN/CREAT SERPL: 21.1 (ref 7.3–30)
CALCIUM SPEC-SCNC: 9.2 MG/DL (ref 8.5–10.2)
CHLORIDE SERPL-SCNC: 105 MMOL/L (ref 98–107)
CO2 SERPL-SCNC: 27.5 MMOL/L (ref 22–29)
CREAT SERPL-MCNC: 0.9 MG/DL (ref 0.7–1.3)
DEPRECATED RDW RBC AUTO: 79.6 FL (ref 37–54)
EGFRCR SERPLBLD CKD-EPI 2021: 92.5 ML/MIN/1.73
EOSINOPHIL # BLD AUTO: 0.03 10*3/MM3 (ref 0–0.4)
EOSINOPHIL NFR BLD AUTO: 0.4 % (ref 0.3–6.2)
ERYTHROCYTE [DISTWIDTH] IN BLOOD BY AUTOMATED COUNT: 21.1 % (ref 12.3–15.4)
FERRITIN SERPL-MCNC: 970.2 NG/ML (ref 30–400)
GLOBULIN UR ELPH-MCNC: 2.4 GM/DL (ref 1.8–3.5)
GLUCOSE SERPL-MCNC: 139 MG/DL (ref 74–124)
HCT VFR BLD AUTO: 31.6 % (ref 37.5–51)
HGB BLD-MCNC: 9.5 G/DL (ref 13–17.7)
HGB RETIC QN AUTO: 32 PG (ref 29.8–36.1)
IMM GRANULOCYTES # BLD AUTO: 0.07 10*3/MM3 (ref 0–0.05)
IMM GRANULOCYTES NFR BLD AUTO: 0.9 % (ref 0–0.5)
IMM RETICS NFR: 19.3 % (ref 3–15.8)
IRON 24H UR-MRATE: 58 MCG/DL (ref 59–158)
IRON SATN MFR SERPL: 25 % (ref 14–48)
LYMPHOCYTES # BLD AUTO: 3.08 10*3/MM3 (ref 0.7–3.1)
LYMPHOCYTES NFR BLD AUTO: 38.1 % (ref 19.6–45.3)
MCH RBC QN AUTO: 30.9 PG (ref 26.6–33)
MCHC RBC AUTO-ENTMCNC: 30.1 G/DL (ref 31.5–35.7)
MCV RBC AUTO: 102.9 FL (ref 79–97)
MONOCYTES # BLD AUTO: 1.31 10*3/MM3 (ref 0.1–0.9)
MONOCYTES NFR BLD AUTO: 16.2 % (ref 5–12)
NEUTROPHILS NFR BLD AUTO: 3.58 10*3/MM3 (ref 1.7–7)
NEUTROPHILS NFR BLD AUTO: 44.3 % (ref 42.7–76)
NRBC BLD AUTO-RTO: 0 /100 WBC (ref 0–0.2)
PLATELET # BLD AUTO: 114 10*3/MM3 (ref 140–450)
PMV BLD AUTO: 10.4 FL (ref 6–12)
POTASSIUM SERPL-SCNC: 3.6 MMOL/L (ref 3.5–4.7)
PROT SERPL-MCNC: 5.9 G/DL (ref 6.3–8)
RBC # BLD AUTO: 3.07 10*6/MM3 (ref 4.14–5.8)
RETICS # AUTO: 0.09 10*6/MM3 (ref 0.02–0.13)
RETICS/RBC NFR AUTO: 2.78 % (ref 0.7–1.9)
SODIUM SERPL-SCNC: 144 MMOL/L (ref 134–145)
TIBC SERPL-MCNC: 232 MCG/DL (ref 249–505)
TRANSFERRIN SERPL-MCNC: 166 MG/DL (ref 200–360)
WBC NRBC COR # BLD: 8.08 10*3/MM3 (ref 3.4–10.8)

## 2022-05-25 PROCEDURE — 36415 COLL VENOUS BLD VENIPUNCTURE: CPT

## 2022-05-25 PROCEDURE — 82728 ASSAY OF FERRITIN: CPT

## 2022-05-25 PROCEDURE — 83540 ASSAY OF IRON: CPT

## 2022-05-25 PROCEDURE — 99215 OFFICE O/P EST HI 40 MIN: CPT | Performed by: INTERNAL MEDICINE

## 2022-05-25 PROCEDURE — 85025 COMPLETE CBC W/AUTO DIFF WBC: CPT

## 2022-05-25 PROCEDURE — 85046 RETICYTE/HGB CONCENTRATE: CPT

## 2022-05-25 PROCEDURE — 80053 COMPREHEN METABOLIC PANEL: CPT

## 2022-05-25 PROCEDURE — 84466 ASSAY OF TRANSFERRIN: CPT

## 2022-05-26 ENCOUNTER — TELEPHONE (OUTPATIENT)
Dept: ONCOLOGY | Facility: HOSPITAL | Age: 67
End: 2022-05-26

## 2022-05-26 NOTE — TELEPHONE ENCOUNTER
----- Message from Toñito Cheng MD sent at 5/26/2022 12:19 PM EDT -----  Regarding: FW: Ibrutinib  Can you let him know to restart the ibrutinib when he's done with steroids, in the next week or so? Thanks, SANDRA    ----- Message -----  From: Marianela Romeo [facility]  Sent: 5/25/2022   1:39 PM EDT  To: Toñito Cheng MD  Subject: RE: Ibrutinib                                    Dr. Cheng,     Yes, I think that would be okay. Warfarin is the only anticoagulant that is recommended to avoid with Imrbuvica.     Marianela Cheema  ----- Message -----  From: Toñito Cheng MD  Sent: 5/25/2022   1:34 PM EDT  To: [facility] Oral Onc Pool  Subject: Ibrutinib                                        His liver labs are nearly back to normal.  He looks and feels great at this point.  He is finishing a steroid taper.  He is on Eliquis for his DVT for the next couple of months.  Do you think it would be okay if he resumes his Imbruvica in the next week or 2?  Thanks! SANDRA

## 2022-05-31 ENCOUNTER — OFFICE VISIT (OUTPATIENT)
Dept: SURGERY | Facility: CLINIC | Age: 67
End: 2022-05-31

## 2022-05-31 VITALS — HEIGHT: 72 IN | WEIGHT: 236 LBS | BODY MASS INDEX: 31.97 KG/M2

## 2022-05-31 DIAGNOSIS — K22.89 ESOPHAGEAL THICKENING: ICD-10-CM

## 2022-05-31 DIAGNOSIS — Z86.010 HISTORY OF ADENOMATOUS POLYP OF COLON: Primary | ICD-10-CM

## 2022-05-31 PROCEDURE — 99214 OFFICE O/P EST MOD 30 MIN: CPT | Performed by: SURGERY

## 2022-06-13 NOTE — PROGRESS NOTES
General Surgery  Initial Office Visit    CC: Esophageal thickening found on CT, history of adenomatous colon polyps    HPI: The patient is a pleasant 67 y.o. year-old gentleman who presents today to discuss a few issues.  The first is the issue of scheduling a repeat screening colonoscopy.  He is about a year overdue for repeat scope to recheck for any regrowth of adenomatous colon polyps he has a history of a large tubulovillous adenoma of the cecum that was sessile and removed piecemeal and required a few follow-up colonoscopies where the polyp had always regrown but in a smaller fashion.  During his last colonoscopy 2 years ago, there was only a tiny regrowth of the cecal tubulovillous adenoma but it was removed with a hot snare in its entirety.  He has not developed any change in his bowel habits, melena, or hematochezia since I saw him last.  He was evaluated in the emergency room in January of this year for a lot of upper respiratory secretions that he describes as phlegm.  He had a CT of the chest which showed some distal esophageal wall thickening.  He then had a follow-up CT of the chest afterwards which showed no significant thickening of the esophagus.  Regardless, he was sent to see me to discuss possible he having an EGD to rule out Bianchi's esophagus.  He does not report any significant reflux, but was given Protonix after his ER visit in January and still takes it once a day.  His father has a history of laryngeal cancer and his mother has a history of colon cancer.    Past Medical History:   Autoimmune hemolytic anemia  Left carotid artery occlusion  Coronary artery disease  Diabetes mellitus  Gout  Hypergonadism  Hyperlipidemia  Hypertension  Chronic lymphocytic leukemia  Obstructive sleep apnea  DVT (left ankle) secondary to COVID  History of adenomatous colon polyps     Past Surgical History:   Knee meniscus repair (2010, Dr. Castro)  Colonoscopy x4 (2017, 2018, and 2019 by me - all showed  tubulovillous adenoma of the cecum)  Cardiac catheterization    Medications:   Allopurinol 300 mg daily  Amlodipine 10 mg daily  Eliquis 5 mg twice daily for DVT  Aspirin 81 mg daily  Atorvastatin 40 mg daily  Azelastine eyedrops as needed  Vitamin D3 2000 units daily  Doxazosin 8 mg twice daily  Fluticasone nasal spray as needed  Imbruvica 420 mg daily  Protonix 40 mg daily    Allergies: ACE inhibitors, Candesartan, Pravastatin    Family History: Mother with history of colon cancer, father with history of throat cancer     Social History: , no alcohol use, nonsmoker    ROS:   Constitutional: Negative for fevers or chills  HENT: Positive for postnasal drip; negative for hearing loss or runny nose  Eyes: Negative for vision changes or scleral icterus  Respiratory: Positive for sleep apnea; negative for cough or shortness of breath  Cardiovascular: Negative for chest pain or heart palpitations  Gastrointestinal: Negative for abdominal distension, nausea, vomiting, constipation, melena, or hematochezia  Genitourinary: Negative for hematuria or dysuria  Musculoskeletal: Negative for joint swelling or gait instability  Neurologic: Negative for tremors or seizures  Psychiatric: Negative for suicidal ideations or agitation  All other systems reviewed and negative    Physical Exam:  Height: 182 cm  Weight: 107 kg  BMI: 32  General: No acute distress, well-nourished & well-developed  HEAD: normocephalic, atraumatic  EYES: normal conjunctiva, sclera anicteric  EARS: grossly normal hearing  NECK: supple, no thyromegaly  CARDIOVASCULAR: regular rate and rhythm  RESPIRATORY: clear to auscultation bilaterally  GASTROINTESTINAL: soft, nontender, non-distended  MUSCULOSKELETAL: normal gait and station. No gross extremity abnormalities  PSYCHIATRIC: oriented x3, normal mood and affect    IMAGING:  CT CHEST (April 2022):  IMPRESSION:       Bilateral pneumonia, follow-up to radiographic resolution recommended.  Prominent  esophageal walls, finding can be further evaluated with endoscopy.    ASSESSMENT & PLAN  Mr. Bal is a 67-year-old gentleman with a history of adenomatous colon polyps in need of a repeat screening colonoscopy, and also with recent CT findings of prominent lower esophageal walls.  I reviewed the CT from mid April and reviewed all of the imaging that was done since that time, noting no real abnormalities in my opinion.  I do think it would be prudent given the radiologist report from the CT on April 15 that we go ahead and do an upper endoscopy to rule out Bianchi's esophagus, esophageal cancer, etc. especially in light of his family history of laryngeal or esophageal cancer in his father.  He understands the risks of both upper and lower endoscopy to include bleeding and possible gastrointestinal perforation.  Despite these risks he has consented to proceed.  He should hold his aspirin for 5 days and hold his Eliquis for 3 days before the procedures.    Fannie Caro MD  General, Robotic, and Endoscopic Surgery  [facility]    [address], [address]  [place] [address]  P: [phone]  F: [phone]

## 2022-06-15 ENCOUNTER — DOCUMENTATION (OUTPATIENT)
Dept: ONCOLOGY | Facility: CLINIC | Age: 67
End: 2022-06-15

## 2022-06-15 NOTE — PROGRESS NOTES
Imbruvica supply from [employer] [facility] delivered to pt on 6/7/22.     Haylee Montanez  Specialty Pharmacy Technician

## 2022-06-24 ENCOUNTER — LAB (OUTPATIENT)
Dept: LAB | Facility: HOSPITAL | Age: 67
End: 2022-06-24

## 2022-06-24 DIAGNOSIS — Z80.0 FAMILY HISTORY OF COLON CANCER IN MOTHER: ICD-10-CM

## 2022-06-24 DIAGNOSIS — Z12.11 SCREENING FOR COLON CANCER: ICD-10-CM

## 2022-06-24 DIAGNOSIS — K22.89 ESOPHAGEAL THICKENING: ICD-10-CM

## 2022-06-24 DIAGNOSIS — D64.9 ANEMIA, UNSPECIFIED TYPE: ICD-10-CM

## 2022-06-24 DIAGNOSIS — Z86.010 ENCOUNTER FOR COLONOSCOPY DUE TO HISTORY OF ADENOMATOUS COLONIC POLYPS: ICD-10-CM

## 2022-06-24 DIAGNOSIS — Z12.11 ENCOUNTER FOR COLONOSCOPY DUE TO HISTORY OF ADENOMATOUS COLONIC POLYPS: ICD-10-CM

## 2022-06-24 LAB — SARS-COV-2 ORF1AB RESP QL NAA+PROBE: NOT DETECTED

## 2022-06-24 PROCEDURE — C9803 HOPD COVID-19 SPEC COLLECT: HCPCS

## 2022-06-24 PROCEDURE — U0004 COV-19 TEST NON-CDC HGH THRU: HCPCS

## 2022-06-27 ENCOUNTER — HOSPITAL ENCOUNTER (OUTPATIENT)
Facility: HOSPITAL | Age: 67
Setting detail: HOSPITAL OUTPATIENT SURGERY
Discharge: HOME | End: 2022-06-27
Attending: SURGERY | Admitting: SURGERY

## 2022-06-27 ENCOUNTER — ANESTHESIA EVENT (OUTPATIENT)
Dept: GASTROENTEROLOGY | Facility: HOSPITAL | Age: 67
End: 2022-06-27

## 2022-06-27 ENCOUNTER — ANESTHESIA (OUTPATIENT)
Dept: GASTROENTEROLOGY | Facility: HOSPITAL | Age: 67
End: 2022-06-27

## 2022-06-27 VITALS
OXYGEN SATURATION: 100 % | HEIGHT: 72 IN | RESPIRATION RATE: 16 BRPM | BODY MASS INDEX: 31.56 KG/M2 | HEART RATE: 60 BPM | DIASTOLIC BLOOD PRESSURE: 85 MMHG | SYSTOLIC BLOOD PRESSURE: 147 MMHG | WEIGHT: 233 LBS

## 2022-06-27 DIAGNOSIS — Z80.0 FAMILY HISTORY OF COLON CANCER IN MOTHER: ICD-10-CM

## 2022-06-27 DIAGNOSIS — K22.89 ESOPHAGEAL THICKENING: ICD-10-CM

## 2022-06-27 DIAGNOSIS — D64.9 ANEMIA, UNSPECIFIED TYPE: ICD-10-CM

## 2022-06-27 DIAGNOSIS — Z86.010 ENCOUNTER FOR COLONOSCOPY DUE TO HISTORY OF ADENOMATOUS COLONIC POLYPS: ICD-10-CM

## 2022-06-27 DIAGNOSIS — Z12.11 ENCOUNTER FOR COLONOSCOPY DUE TO HISTORY OF ADENOMATOUS COLONIC POLYPS: ICD-10-CM

## 2022-06-27 DIAGNOSIS — Z12.11 SCREENING FOR COLON CANCER: ICD-10-CM

## 2022-06-27 PROBLEM — K63.5 COLON POLYPS: Status: ACTIVE | Noted: 2022-06-27

## 2022-06-27 LAB — GLUCOSE BLDC GLUCOMTR-MCNC: 94 MG/DL (ref 70–130)

## 2022-06-27 PROCEDURE — 25010000002 PROPOFOL 10 MG/ML EMULSION: Performed by: ANESTHESIOLOGY

## 2022-06-27 PROCEDURE — 43239 EGD BIOPSY SINGLE/MULTIPLE: CPT | Performed by: SURGERY

## 2022-06-27 PROCEDURE — 45380 COLONOSCOPY AND BIOPSY: CPT | Performed by: SURGERY

## 2022-06-27 PROCEDURE — 45385 COLONOSCOPY W/LESION REMOVAL: CPT | Performed by: SURGERY

## 2022-06-27 PROCEDURE — 82962 GLUCOSE BLOOD TEST: CPT

## 2022-06-27 PROCEDURE — 88305 TISSUE EXAM BY PATHOLOGIST: CPT | Performed by: SURGERY

## 2022-06-27 RX ORDER — PROPOFOL 10 MG/ML
VIAL (ML) INTRAVENOUS CONTINUOUS PRN
Status: DISCONTINUED | OUTPATIENT
Start: 2022-06-27 | End: 2022-06-27 | Stop reason: SURG

## 2022-06-27 RX ORDER — SODIUM CHLORIDE 0.9 % (FLUSH) 0.9 %
10 SYRINGE (ML) INJECTION EVERY 12 HOURS SCHEDULED
Status: DISCONTINUED | OUTPATIENT
Start: 2022-06-27 | End: 2022-06-27 | Stop reason: HOSPADM

## 2022-06-27 RX ORDER — PROPOFOL 10 MG/ML
VIAL (ML) INTRAVENOUS AS NEEDED
Status: DISCONTINUED | OUTPATIENT
Start: 2022-06-27 | End: 2022-06-27 | Stop reason: SURG

## 2022-06-27 RX ORDER — SODIUM CHLORIDE, SODIUM LACTATE, POTASSIUM CHLORIDE, CALCIUM CHLORIDE 600; 310; 30; 20 MG/100ML; MG/100ML; MG/100ML; MG/100ML
INJECTION, SOLUTION INTRAVENOUS CONTINUOUS PRN
Status: DISCONTINUED | OUTPATIENT
Start: 2022-06-27 | End: 2022-06-27 | Stop reason: SURG

## 2022-06-27 RX ORDER — LIDOCAINE HYDROCHLORIDE 20 MG/ML
INJECTION, SOLUTION INFILTRATION; PERINEURAL AS NEEDED
Status: DISCONTINUED | OUTPATIENT
Start: 2022-06-27 | End: 2022-06-27 | Stop reason: SURG

## 2022-06-27 RX ORDER — SODIUM CHLORIDE, SODIUM LACTATE, POTASSIUM CHLORIDE, CALCIUM CHLORIDE 600; 310; 30; 20 MG/100ML; MG/100ML; MG/100ML; MG/100ML
30 INJECTION, SOLUTION INTRAVENOUS CONTINUOUS PRN
Status: DISCONTINUED | OUTPATIENT
Start: 2022-06-27 | End: 2022-06-27 | Stop reason: HOSPADM

## 2022-06-27 RX ORDER — SODIUM CHLORIDE 0.9 % (FLUSH) 0.9 %
10 SYRINGE (ML) INJECTION AS NEEDED
Status: DISCONTINUED | OUTPATIENT
Start: 2022-06-27 | End: 2022-06-27 | Stop reason: HOSPADM

## 2022-06-27 RX ADMIN — SODIUM CHLORIDE, POTASSIUM CHLORIDE, SODIUM LACTATE AND CALCIUM CHLORIDE 30 ML/HR: 600; 310; 30; 20 INJECTION, SOLUTION INTRAVENOUS at 11:24

## 2022-06-27 RX ADMIN — PROPOFOL 100 MG: 10 INJECTION, EMULSION INTRAVENOUS at 12:14

## 2022-06-27 RX ADMIN — Medication 200 MCG/KG/MIN: at 12:14

## 2022-06-27 RX ADMIN — SODIUM CHLORIDE, POTASSIUM CHLORIDE, SODIUM LACTATE AND CALCIUM CHLORIDE: 600; 310; 30; 20 INJECTION, SOLUTION INTRAVENOUS at 12:14

## 2022-06-27 RX ADMIN — LIDOCAINE HYDROCHLORIDE 60 MG: 20 INJECTION, SOLUTION INFILTRATION; PERINEURAL at 12:14

## 2022-06-27 NOTE — ANESTHESIA POSTPROCEDURE EVALUATION
"Patient: Aida Bal Jr.    Procedure Summary     Date: 06/27/22 Room / Location: [facility] ENDOSCOPY 5 / [facility] ENDOSCOPY    Anesthesia Start: 1209 Anesthesia Stop: 1258    Procedures:       COLONOSCOPY into cecum with cold biopsy and hot snare polypectomies (N/A )      ESOPHAGOGASTRODUODENOSCOPY with biopsies (N/A Esophagus) Diagnosis:       Anemia, unspecified type      Esophageal thickening      Screening for colon cancer      Encounter for colonoscopy due to history of adenomatous colonic polyps      Family history of colon cancer in mother      (Anemia, unspecified type [D64.9])      (Esophageal thickening [K22.89])      (Screening for colon cancer [Z12.11])      (Encounter for colonoscopy due to history of adenomatous colonic polyps [Z12.11, Z86.010])      (Family history of colon cancer in mother [Z80.0])    Surgeons: Fannie Caro MD Provider: Ant Monroe MD    Anesthesia Type: MAC ASA Status: 3          Anesthesia Type: MAC    Vitals  Vitals Value Taken Time   /85 06/27/22 1318   Temp     Pulse 60 06/27/22 1318   Resp 16 06/27/22 1318   SpO2 100 % 06/27/22 1318           Post Anesthesia Care and Evaluation    Patient location during evaluation: PACU  Patient participation: complete - patient participated  Level of consciousness: awake  Pain score: 0  Pain management: adequate    Airway patency: patent  Anesthetic complications: No anesthetic complications  PONV Status: none  Cardiovascular status: acceptable  Respiratory status: acceptable  Hydration status: acceptable    Comments: /85 (BP Location: Left arm, Patient Position: Sitting)   Pulse 60   Resp 16   Ht 182.9 cm (72\")   Wt 106 kg (233 lb)   SpO2 100%   BMI 31.60 kg/m²       "

## 2022-06-27 NOTE — ANESTHESIA PREPROCEDURE EVALUATION
Anesthesia Evaluation     Patient summary reviewed and Nursing notes reviewed                Airway   Mallampati: I  TM distance: >3 FB  Neck ROM: full  No difficulty expected  Dental - normal exam     Pulmonary - normal exam   (+) sleep apnea,   Cardiovascular - normal exam    (+) hypertension, CAD, PVD, hyperlipidemia,  carotid artery disease      Neuro/Psych  (+) TIA,    GI/Hepatic/Renal/Endo    (-) diabetes    Musculoskeletal (-) negative ROS    Abdominal  - normal exam    Bowel sounds: normal.   Substance History - negative use     OB/GYN negative ob/gyn ROS         Other   blood dyscrasia,   history of cancer                    Anesthesia Plan    ASA 3     MAC       Anesthetic plan, risks, benefits, and alternatives have been provided, discussed and informed consent has been obtained with: patient.

## 2022-06-28 LAB
LAB AP CASE REPORT: NORMAL
PATH REPORT.FINAL DX SPEC: NORMAL
PATH REPORT.GROSS SPEC: NORMAL

## 2022-07-01 ENCOUNTER — TELEPHONE (OUTPATIENT)
Dept: SURGERY | Facility: CLINIC | Age: 67
End: 2022-07-01

## 2022-07-01 NOTE — TELEPHONE ENCOUNTER
I called Mr. Bal and discussed the pathology findings of a recurrent tubulovillous adenoma within the cecum.  It was completely excised, but it has now regrown after a multitude of previous colonoscopies and I would recommend for that reason he consider undergoing a laparoscopic ileocecectomy to definitively remove any residual adenomatous tissue to prevent regrowth.  He understands the alternative would be to continue on with colonoscopies about every 2 years knowing full well that eventually this polyp could turn malignant.  He would like to go the with laparoscopic ileocecectomy.  He is scheduled to have a repeat bilateral lower extremity duplex next month with follow-up shortly thereafter with Dr. Toñito Cheng of hematology service and might be able to come off of Eliquis.  He would like to delay surgery until after this is all been worked out and will call my office after his appointment with Dr. Cheng to schedule an office appointment with me and schedule surgery.  I also reassured him that all of the EGD biopsies came back benign with no evidence of celiac disease, H. pylori, or Bianchi's esophagus.

## 2022-07-20 RX ORDER — APIXABAN 5 MG/1
TABLET, FILM COATED ORAL
Qty: 60 TABLET | Refills: 0 | Status: SHIPPED | OUTPATIENT
Start: 2022-07-20 | End: 2022-08-16

## 2022-08-01 ENCOUNTER — HOSPITAL ENCOUNTER (OUTPATIENT)
Dept: CARDIOLOGY | Facility: HOSPITAL | Age: 67
Discharge: HOME | End: 2022-08-01
Admitting: INTERNAL MEDICINE

## 2022-08-01 DIAGNOSIS — I82.4Z2 DEEP VEIN THROMBOSIS (DVT) OF DISTAL VEIN OF LEFT LOWER EXTREMITY, UNSPECIFIED CHRONICITY: ICD-10-CM

## 2022-08-01 LAB
BH CV LOW VAS LEFT GREATER SAPH BK VESSEL: 1
BH CV LOWER VASCULAR LEFT COMMON FEMORAL AUGMENT: NORMAL
BH CV LOWER VASCULAR LEFT COMMON FEMORAL COMPETENT: NORMAL
BH CV LOWER VASCULAR LEFT COMMON FEMORAL COMPRESS: NORMAL
BH CV LOWER VASCULAR LEFT COMMON FEMORAL PHASIC: NORMAL
BH CV LOWER VASCULAR LEFT COMMON FEMORAL SPONT: NORMAL
BH CV LOWER VASCULAR LEFT DISTAL FEMORAL COMPRESS: NORMAL
BH CV LOWER VASCULAR LEFT GASTRONEMIUS COMPRESS: NORMAL
BH CV LOWER VASCULAR LEFT GREATER SAPH AK COMPRESS: NORMAL
BH CV LOWER VASCULAR LEFT GREATER SAPH BK COMPRESS: NORMAL
BH CV LOWER VASCULAR LEFT GREATER SAPH BK THROMBUS: NORMAL
BH CV LOWER VASCULAR LEFT LESSER SAPH COMPRESS: NORMAL
BH CV LOWER VASCULAR LEFT MID FEMORAL AUGMENT: NORMAL
BH CV LOWER VASCULAR LEFT MID FEMORAL COMPETENT: NORMAL
BH CV LOWER VASCULAR LEFT MID FEMORAL COMPRESS: NORMAL
BH CV LOWER VASCULAR LEFT MID FEMORAL PHASIC: NORMAL
BH CV LOWER VASCULAR LEFT MID FEMORAL SPONT: NORMAL
BH CV LOWER VASCULAR LEFT PERONEAL COMPRESS: NORMAL
BH CV LOWER VASCULAR LEFT POPLITEAL AUGMENT: NORMAL
BH CV LOWER VASCULAR LEFT POPLITEAL COMPETENT: NORMAL
BH CV LOWER VASCULAR LEFT POPLITEAL COMPRESS: NORMAL
BH CV LOWER VASCULAR LEFT POPLITEAL PHASIC: NORMAL
BH CV LOWER VASCULAR LEFT POPLITEAL SPONT: NORMAL
BH CV LOWER VASCULAR LEFT POSTERIOR TIBIAL COMPRESS: NORMAL
BH CV LOWER VASCULAR LEFT PROFUNDA FEMORAL COMPRESS: NORMAL
BH CV LOWER VASCULAR LEFT PROXIMAL FEMORAL COMPRESS: NORMAL
BH CV LOWER VASCULAR LEFT SAPHENOFEMORAL JUNCTION COMPRESS: NORMAL
BH CV LOWER VASCULAR RIGHT COMMON FEMORAL AUGMENT: NORMAL
BH CV LOWER VASCULAR RIGHT COMMON FEMORAL COMPETENT: NORMAL
BH CV LOWER VASCULAR RIGHT COMMON FEMORAL COMPRESS: NORMAL
BH CV LOWER VASCULAR RIGHT COMMON FEMORAL PHASIC: NORMAL
BH CV LOWER VASCULAR RIGHT COMMON FEMORAL SPONT: NORMAL
MAXIMAL PREDICTED HEART RATE: 151 BPM
STRESS TARGET HR: 128 BPM

## 2022-08-01 PROCEDURE — 93971 EXTREMITY STUDY: CPT

## 2022-08-02 NOTE — PROGRESS NOTES
[facility] GROUP OUTPATIENT FOLLOW UP CLINIC VISIT    REASON FOR FOLLOW-UP:    1.  Chronic lymphocytic leukemia diagnosed in November 2015.  CLL FISH panel negative at that time.    2.  Autoimmune hemolytic anemia associated with CLL.  He was treated with steroids and he received 4 weeks of weekly Rituxan.  Otherwise no treatment has been performed  3.  CT imaging of the chest abdomen and pelvis from 4/25/2018 showed lymphadenopathy with mildly enlarged retroperitoneal and mesenteric lymph nodes with the largest measuring about 3.2 cm.  The spleen measured 13.6 cm.  Slight increase in lymphadenopathy compared with 11/27/2015.  4.  Acute anemia with a hemoglobin of 6.9 as of 6/20/2018.  Low reticulocyte count.  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  Prednisone initiated.  Erythropoietin 73.9.  Parvovirus B19 PCR negative.  Steroids complete on 8/22/2018.  5.  Bone marrow biopsy on 7/3/2018 with 70% CLL (87% by flow cytometry) with a t(14;18) translocation by cytogenetics, 3 copies of IGH in 87.5% of cells but negative for CCND1/IGH rearrangement.  Negative for all other rearrangements/deletions.  IgVH somatic hypermutation was not detected.            6.  Therapy with ibrutinib initiated at the end of July 2018      HISTORY OF PRESENT ILLNESS:  Aida Bal Jr. is a 67 y.o. male who returns today for follow up of the above issue.     Recent EGD and colonoscopy on 6/27. Biopsies and polyp removal without malignancy.  However, recurrent polyp in the colon which has been evaluated by Dr. Caro and she believes partial colectomy is indicated.    He has recovered from COVID.  His legs are doing well.  No bleeding on the anticoagulation.  He is having worsening left knee pain and may need a left knee replacement.    REVIEW OF SYSTEMS:  As per the HPI    Vitals:    08/03/22 1524   BP: 158/78   Pulse: 64   Resp: 16   Temp: 97.3 °F (36.3 °C)   TempSrc: Temporal   SpO2: 98%   Weight: 112 kg (248 lb)   Height: 182 cm  "(71.65\")   PainSc: 0-No pain       PHYSICAL EXAMINATION:  GENERAL:  Well-developed well-nourished male; awake, alert and oriented, in no acute distress.  Wearing a facemask.  Well appearing today.  SKIN:  Warm and dry, without rashes, purpura, or petechiae.  HEAD:  Normocephalic, atraumatic.  EARS:  Hearing intact.  LYMPHATICS:  No cervical, supraclavicular, or axillary lymphadenopathy.  CHEST:  Lungs are clear to auscultation bilaterally.  No wheezes, rales, or rhonchi.  HEART: Regularly irregular.  Grade 2/6 systolic murmur  EXTREMITIES: No clubbing cyanosis or edema.  NEUROLOGICAL:  No focal neurologic deficits.      DIAGNOSTIC DATA:  Retic With IRF & RET-He (08/03/2022 14:33)  CBC & Differential (08/03/2022 14:33)      IMAGING: Duplex Venous Lower Extremity - Left CAR (08/01/2022 12:16)    Duplex with chronic LLE superficial thrombophlebitis in the great saphenous below the knee. No DVT.       ASSESSMENT:  This is a 67 y.o. male with a history of chronic lymphocytic leukemia and related autoimmune hemolytic anemia.    *CLL:   · Initially received Rituxan for 4 weeks at diagnosis for this and the autoimmune hemolytic anemia.  · Given the worsening anemia even though it responded to steroids we needed to treat his CLL particularly given the 70% involvement on his bone marrow biopsy.  We started treating with ibrutinib 420 mg daily, initiated at the end of Held around the time of COVID infection; resumed when he completed steroids.  · White blood cell count normal.  Other blood counts improving.    *Macrocytic anemia:   · He has a history of autoimmune hemolytic anemia when he presented with CLL back in November 2015.  His reticulocyte count was very elevated at that time.  His hemoglobin dropped to as low as about 5.  He responded to steroids initially intravenously and then with prednisone and he was treated with 4 weeks of Rituxan.  · He was noted to have worsening anemia.  His reticulocyte count was low.  He " initially did not respond very well to transfusions and he required 4 units of packed red cells  There was no evidence for hemolysis although his ADOLFO is positive for IgG.  This was persistently positive.  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  He responded to steroids.  He discontinued steroids as of 8/22/2018.    · 5/2/2022: Hemoglobin lower at 7.9 with a high MCV of 102   · He required red cell transfusion.  · Hemoglobin is improving again today    *History of systolic hypertension: Blood pressure high at 158/78 today.    *History of thrombocytopenia: Platelets normal at 142,000 today    *COVID-19 pandemic: He has received a total of 3 vaccinations.  Subsequent infection in April 2020.  He received a monoclonal antibody as an outpatient and remdesivir as an inpatient. He was readmitted on 5/2 with hypotension, persistent fevers, persistent COVID-19 positivity on testing. He received IVIG and steroids. He improved and was treated with outpatient Paxlovid.  Symptoms resolved at this point.     *Left lower extremity DVT and gastrocnemius vein and right lower extremity superficial thrombophlebitis in the small saphenous vein on bilateral lower extremity venous duplex on 4/15/2022  · Associated with COVID-19 infection  · Repeat venous duplex 8/1/22 without DVT, only superficial thrombophlebitis    PLAN:   1. Stop Eliquis  2. Continue Imbruvica; hold around the time of surgeries as recommended per pharmacy  3. Advised 4th COVID vaccine  4. Will discuss the possibility of Evusheld with pharmacy  5. OK for ileocecectomy from my standpoint which he wants to have done in October or November  6. Follow up in December with labs  7. Wants to have left knee replacement next year which is OK with me    High risk medication requiring intensive monitoring    I spent 51 minutes in this encounter today reviewing his records, communicating with him and his wife, examining him, placing orders, communicating with  staff, documenting the encounter.

## 2022-08-03 ENCOUNTER — SPECIALTY PHARMACY (OUTPATIENT)
Dept: ONCOLOGY | Facility: HOSPITAL | Age: 67
End: 2022-08-03

## 2022-08-03 ENCOUNTER — LAB (OUTPATIENT)
Dept: LAB | Facility: HOSPITAL | Age: 67
End: 2022-08-03

## 2022-08-03 ENCOUNTER — OFFICE VISIT (OUTPATIENT)
Dept: ONCOLOGY | Facility: CLINIC | Age: 67
End: 2022-08-03

## 2022-08-03 VITALS
SYSTOLIC BLOOD PRESSURE: 158 MMHG | DIASTOLIC BLOOD PRESSURE: 78 MMHG | BODY MASS INDEX: 33.59 KG/M2 | TEMPERATURE: 97.3 F | HEART RATE: 64 BPM | HEIGHT: 72 IN | OXYGEN SATURATION: 98 % | WEIGHT: 248 LBS | RESPIRATION RATE: 16 BRPM

## 2022-08-03 DIAGNOSIS — C91.10 CLL (CHRONIC LYMPHOCYTIC LEUKEMIA): Primary | ICD-10-CM

## 2022-08-03 DIAGNOSIS — C91.10 CLL (CHRONIC LYMPHOCYTIC LEUKEMIA): ICD-10-CM

## 2022-08-03 LAB
ALBUMIN SERPL-MCNC: 4.2 G/DL (ref 3.5–5.2)
ALBUMIN/GLOB SERPL: 3 G/DL
ALP SERPL-CCNC: 81 U/L (ref 39–117)
ALT SERPL W P-5'-P-CCNC: 16 U/L (ref 1–41)
ANION GAP SERPL CALCULATED.3IONS-SCNC: 11.4 MMOL/L (ref 5–15)
AST SERPL-CCNC: 20 U/L (ref 1–40)
BASOPHILS # BLD AUTO: 0.02 10*3/MM3 (ref 0–0.2)
BASOPHILS NFR BLD AUTO: 0.3 % (ref 0–1.5)
BILIRUB SERPL-MCNC: 0.6 MG/DL (ref 0–1.2)
BUN SERPL-MCNC: 17 MG/DL (ref 8–23)
BUN/CREAT SERPL: 14.8 (ref 7–25)
CALCIUM SPEC-SCNC: 9.1 MG/DL (ref 8.6–10.5)
CHLORIDE SERPL-SCNC: 109 MMOL/L (ref 98–107)
CO2 SERPL-SCNC: 25.6 MMOL/L (ref 22–29)
CREAT SERPL-MCNC: 1.15 MG/DL (ref 0.76–1.27)
DEPRECATED RDW RBC AUTO: 54.3 FL (ref 37–54)
EGFRCR SERPLBLD CKD-EPI 2021: 68.9 ML/MIN/1.73
EOSINOPHIL # BLD AUTO: 0.06 10*3/MM3 (ref 0–0.4)
EOSINOPHIL NFR BLD AUTO: 0.9 % (ref 0.3–6.2)
ERYTHROCYTE [DISTWIDTH] IN BLOOD BY AUTOMATED COUNT: 14.5 % (ref 12.3–15.4)
FERRITIN SERPL-MCNC: 93.9 NG/ML (ref 30–400)
GLOBULIN UR ELPH-MCNC: 1.4 GM/DL
GLUCOSE SERPL-MCNC: 97 MG/DL (ref 65–99)
HCT VFR BLD AUTO: 34.5 % (ref 37.5–51)
HGB BLD-MCNC: 10.7 G/DL (ref 13–17.7)
HGB RETIC QN AUTO: 34.9 PG (ref 29.8–36.1)
IMM GRANULOCYTES # BLD AUTO: 0.01 10*3/MM3 (ref 0–0.05)
IMM GRANULOCYTES NFR BLD AUTO: 0.1 % (ref 0–0.5)
IMM RETICS NFR: 14 % (ref 3–15.8)
IRON 24H UR-MRATE: 55 MCG/DL (ref 59–158)
IRON SATN MFR SERPL: 16 % (ref 20–50)
LYMPHOCYTES # BLD AUTO: 5.32 10*3/MM3 (ref 0.7–3.1)
LYMPHOCYTES NFR BLD AUTO: 77 % (ref 19.6–45.3)
MCH RBC QN AUTO: 31.7 PG (ref 26.6–33)
MCHC RBC AUTO-ENTMCNC: 31 G/DL (ref 31.5–35.7)
MCV RBC AUTO: 102.1 FL (ref 79–97)
MONOCYTES # BLD AUTO: 0.19 10*3/MM3 (ref 0.1–0.9)
MONOCYTES NFR BLD AUTO: 2.7 % (ref 5–12)
NEUTROPHILS NFR BLD AUTO: 1.31 10*3/MM3 (ref 1.7–7)
NEUTROPHILS NFR BLD AUTO: 19 % (ref 42.7–76)
NRBC BLD AUTO-RTO: 0 /100 WBC (ref 0–0.2)
PLATELET # BLD AUTO: 142 10*3/MM3 (ref 140–450)
PMV BLD AUTO: 10.8 FL (ref 6–12)
POTASSIUM SERPL-SCNC: 3.5 MMOL/L (ref 3.5–5.2)
PROT SERPL-MCNC: 5.6 G/DL (ref 6–8.5)
RBC # BLD AUTO: 3.38 10*6/MM3 (ref 4.14–5.8)
RETICS # AUTO: 0.05 10*6/MM3 (ref 0.02–0.13)
RETICS/RBC NFR AUTO: 1.51 % (ref 0.7–1.9)
SODIUM SERPL-SCNC: 146 MMOL/L (ref 136–145)
TIBC SERPL-MCNC: 338 MCG/DL (ref 298–536)
TRANSFERRIN SERPL-MCNC: 227 MG/DL (ref 200–360)
WBC NRBC COR # BLD: 6.91 10*3/MM3 (ref 3.4–10.8)

## 2022-08-03 PROCEDURE — 99215 OFFICE O/P EST HI 40 MIN: CPT | Performed by: INTERNAL MEDICINE

## 2022-08-03 PROCEDURE — 36415 COLL VENOUS BLD VENIPUNCTURE: CPT

## 2022-08-03 PROCEDURE — 80053 COMPREHEN METABOLIC PANEL: CPT | Performed by: INTERNAL MEDICINE

## 2022-08-03 PROCEDURE — 82728 ASSAY OF FERRITIN: CPT | Performed by: INTERNAL MEDICINE

## 2022-08-03 PROCEDURE — 85025 COMPLETE CBC W/AUTO DIFF WBC: CPT

## 2022-08-03 PROCEDURE — 83540 ASSAY OF IRON: CPT | Performed by: INTERNAL MEDICINE

## 2022-08-03 PROCEDURE — 85046 RETICYTE/HGB CONCENTRATE: CPT

## 2022-08-03 PROCEDURE — 84466 ASSAY OF TRANSFERRIN: CPT | Performed by: INTERNAL MEDICINE

## 2022-08-03 RX ORDER — IRBESARTAN AND HYDROCHLOROTHIAZIDE 300; 12.5 MG/1; MG/1
1 TABLET, FILM COATED ORAL DAILY
COMMUNITY
Start: 2022-06-29 | End: 2022-12-07

## 2022-08-03 RX ORDER — BIMATOPROST 0.01 %
DROPS OPHTHALMIC (EYE)
COMMUNITY
Start: 2022-07-12

## 2022-08-03 NOTE — PROGRESS NOTES
MTM Encounter-Re: Adherence and side effects (Imbruvica)    Today's encounter was conducted in person, face-to-face.     Medication:  Imbruvica 420 mg po daily  - Reason for outreach: Routine medication check-in .  - Administration: Patient is taking every day at the same time .  - Missed doses: Patient reports missing 0 doses in the last 30 days.  - Self-administration: Patient demonstrates ability to self-administer medication. No barriers to adherence identified.   - Diagnosis/Indication: CLL. Progress toward achieving therapeutic goals reviewed.   - Patient denies side effects.    - Medication availability/affordability: Patient has had no issues obtaining medication from pharmacy.   - Questions/concerns about medications: none       Completed medication reconciliation today to assess for drug interactions.   Reviewed allergies, medical history, labs, quality of life, and medication history with patient.   Patient denies starting or stopping any medications.  Assessed medication list for interactions, no significant drug interactions noted.   Advised pt to call the clinic if any new medications are started so we can assess for drug-drug interactions.     Imbruvica was on hold, per MD for quite some time in May due to hospitalization and COVID PNA. Otherwise, Mr. Bal does not typically miss doses or forget to take his medication.     All questions addressed. Patient had no additional concerns for MTM office.     Marianela Romeo RPH  8/3/2022  15:41 EDT

## 2022-08-11 PROBLEM — Z92.25 STATUS POST RECENT IMMUNOSUPPRESSIVE THERAPY: Status: ACTIVE | Noted: 2022-08-11

## 2022-08-11 RX ORDER — EPINEPHRINE 1 MG/ML
0.3 INJECTION, SOLUTION, CONCENTRATE INTRAVENOUS AS NEEDED
Status: CANCELLED | OUTPATIENT
Start: 2022-08-15

## 2022-08-11 RX ORDER — DIPHENHYDRAMINE HCL 25 MG
50 TABLET ORAL ONCE AS NEEDED
Status: CANCELLED | OUTPATIENT
Start: 2022-08-15

## 2022-08-12 ENCOUNTER — TELEPHONE (OUTPATIENT)
Dept: ONCOLOGY | Facility: CLINIC | Age: 67
End: 2022-08-12

## 2022-08-12 NOTE — TELEPHONE ENCOUNTER
----- Message from Connie Ramirez RN sent at 8/11/2022  4:14 PM EDT -----  Please schedule pt for Evusheld  ----- Message -----  From: Toñito Cheng MD  Sent: 8/11/2022   3:56 PM EDT  To: Connie Ramirez RN    Sure, as long as he meets criteria. Thanks!    ----- Message -----  From: Connie Ramirez RN  Sent: 8/11/2022   2:44 PM EDT  To: Toñito Cheng MD    Do you want to proceed with Evusheld?  ----- Message -----  From: Eliezer Watson [facility]  Sent: 8/11/2022   2:19 PM EDT  To: Connie Ramirez RN    That would be up to Dr. Cheng, but I don't see anything wrong with giving Evushield as long as it has been 2 weeks if he got his 4th vaccine already.   ----- Message -----  From: Connie Ramirez RN  Sent: 8/11/2022   1:18 PM EDT  To: Eliezer Watson [facility]    Did you want to move forward with him receiving Evusheld?  ----- Message -----  From: Eliezer Watson [facility]  Sent: 8/5/2022  12:10 PM EDT  To: Toñito Cheng MD, Sharron Patel, [facility], #    Sharron is on vacation through next week but I am set up to see her staff messages. I talked with our [facility] and pretty much if you want to give Evushield there is nothing wrong with giving it, you will just have to wait 2 weeks from when he got his 4th vaccine to give the evushield. But I do think there would be nothing wrong with giving Evushield still.   Thanks  Eliezer Watson, PharmD        ----- Message -----  From: Connie Ramirez RN  Sent: 8/5/2022   8:26 AM EDT  To: Sharron Patel [facility]    Please advise if he would qualify for evusheld.   ----- Message -----  From: Toñito Cheng MD  Sent: 8/3/2022   7:18 PM EDT  To: Connie Ramirez RN    He's had three covid vaccines then got covid a few months ago. Got a monoclonal prior to admission then paxlovid. I advised a 4th vaccine now. Didn't think of him earlier this year for evusheld but would it be reasonable? Hard to know from my standpoint. meghan

## 2022-08-16 ENCOUNTER — OFFICE VISIT (OUTPATIENT)
Dept: SURGERY | Facility: CLINIC | Age: 67
End: 2022-08-16

## 2022-08-16 VITALS — BODY MASS INDEX: 33.59 KG/M2 | HEIGHT: 72 IN | WEIGHT: 248 LBS

## 2022-08-16 DIAGNOSIS — D12.6 TUBULOVILLOUS ADENOMA OF COLON: Primary | ICD-10-CM

## 2022-08-16 PROCEDURE — 99214 OFFICE O/P EST MOD 30 MIN: CPT | Performed by: SURGERY

## 2022-08-16 RX ORDER — CEFAZOLIN SODIUM 2 G/100ML
2 INJECTION, SOLUTION INTRAVENOUS ONCE
Status: CANCELLED | OUTPATIENT
Start: 2022-10-25 | End: 2022-08-16

## 2022-08-16 RX ORDER — PANTOPRAZOLE SODIUM 40 MG/1
1 TABLET, DELAYED RELEASE ORAL DAILY
COMMUNITY
Start: 2022-08-07

## 2022-08-16 RX ORDER — METRONIDAZOLE 500 MG/100ML
500 INJECTION, SOLUTION INTRAVENOUS ONCE
Status: CANCELLED | OUTPATIENT
Start: 2022-10-25 | End: 2022-08-16

## 2022-08-16 RX ORDER — ALVIMOPAN 12 MG/1
12 CAPSULE ORAL ONCE
Status: CANCELLED | OUTPATIENT
Start: 2022-10-25 | End: 2022-08-23

## 2022-08-18 NOTE — PROGRESS NOTES
General Surgery  Established Patient Office Visit    CC: Recurrent adenomatous colon polyp of the cecum    HPI: The patient is a pleasant 67 y.o. year-old gentleman who returns to see me today for discussion of possible partial colon resection.  I have performed a multitude of colonoscopies (4 total) on him in the last 5 years, all of which have shown a tubulovillous adenoma polyp of the cecum.  Each time I have been able to completely resected endoscopically but it regrows quickly.  After his most recent colonoscopy in June of this year showing regrowth yet again, I have recommended proceeding with laparoscopic right colon resection.  He at the time of his last scope was taking Eliquis twice daily for DVT but just met with his hematologist, Dr. Cheng, who stopped his anticoagulation as he has had no further evidence for DVT in the left ankle.  He has a family history of colon cancer in his mother.    Past Medical History:   Autoimmune hemolytic anemia  Left carotid artery occlusion  Coronary artery disease  Diabetes mellitus  Gout  Hypogonadism  Hyperlipidemia  Hypertension  Chronic lymphocytic leukemia  Obstructive sleep apnea  DVT (left ankle) secondary to COVID  History of adenomatous colon polyps     Past Surgical History:   Knee meniscus repair (2010, Dr. Castro)  Colonoscopy x5 (2017, 2018, 2019, and June 2022 all by me - all showed tubulovillous adenoma of the cecum)  EGD (June 2022 by me-normal)  Cardiac catheterization    Medications:   Allopurinol 300 mg daily  Amlodipine 10 mg daily  Aspirin 81 mg daily  Atorvastatin 40 mg daily  Vitamin D3 2000 units daily  Doxazosin 8 mg twice daily  Fluticasone nasal spray as needed  Imbruvica 420 mg daily  Irbesartan-hydrochlorothiazide 300-12.5 mg daily  Lumigan eyedrops nightly  Protonix 40 mg daily    Allergies: ACE inhibitors, Candesartan, Pravastatin    Family History: Mother with history of colon cancer, father with history of throat cancer     Social History:  , no alcohol use, nonsmoker    ROS:   Constitutional: Negative for fevers or chills  HENT: Positive for postnasal drip; negative for hearing loss or runny nose  Eyes: Negative for vision changes or scleral icterus  Respiratory: Positive for sleep apnea; negative for cough or shortness of breath  Cardiovascular: Negative for chest pain or heart palpitations  Gastrointestinal: Negative for abdominal distension, nausea, vomiting, constipation, melena, or hematochezia  Genitourinary: Negative for hematuria or dysuria  Musculoskeletal: Negative for joint swelling or gait instability  Neurologic: Negative for tremors or seizures  Psychiatric: Negative for suicidal ideations or agitation  All other systems reviewed and negative    Physical Exam:  Height: 182 cm  Weight: 112 kg  BMI: 34  General: No acute distress, well-nourished & well-developed  HEAD: normocephalic, atraumatic  EYES: normal conjunctiva, sclera anicteric  EARS: grossly normal hearing  NECK: supple, no thyromegaly  CARDIOVASCULAR: regular rate and rhythm  RESPIRATORY: clear to auscultation bilaterally  GASTROINTESTINAL: soft, nontender, non-distended  MUSCULOSKELETAL: normal gait and station. No gross extremity abnormalities  PSYCHIATRIC: oriented x3, normal mood and affect    ASSESSMENT & PLAN  Mr. Bal is a 67-year-old gentleman with a recurrent tubulovillous adenoma of the cecum refractory to endoscopic removal.  I have recommended we schedule him for laparoscopic right colon resection in the operating room at his earliest convenience.  He will need to hold his aspirin for 5 days and perform a MiraLAX split bowel prep beginning the evening prior.  We discussed the risks of the procedure which include bleeding, anastomotic leak, wound infection, and postoperative change in his bowel habits tending more towards diarrhea.  Despite these risks, he has consented to proceed and we have scheduled his surgery towards the end of October.    Fannie  MD [place]  General, Robotic, and Endoscopic Surgery  [facility]    [address], [address]  [place] [address]  P: [phone]  F: [phone]

## 2022-08-19 ENCOUNTER — INFUSION (OUTPATIENT)
Dept: ONCOLOGY | Facility: HOSPITAL | Age: 67
End: 2022-08-19

## 2022-08-19 VITALS
RESPIRATION RATE: 16 BRPM | HEART RATE: 73 BPM | WEIGHT: 242 LBS | BODY MASS INDEX: 33.14 KG/M2 | TEMPERATURE: 97.4 F | OXYGEN SATURATION: 98 % | SYSTOLIC BLOOD PRESSURE: 142 MMHG | DIASTOLIC BLOOD PRESSURE: 71 MMHG

## 2022-08-19 DIAGNOSIS — Z92.25 STATUS POST RECENT IMMUNOSUPPRESSIVE THERAPY: Primary | ICD-10-CM

## 2022-08-19 PROCEDURE — 25010000002 INJECTION, TIXAGEVIMAB AND CILGAVIMAB,: Performed by: INTERNAL MEDICINE

## 2022-08-19 PROCEDURE — 96372 THER/PROPH/DIAG INJ SC/IM: CPT

## 2022-08-19 PROCEDURE — M0220 HC INJECTION, TIXAGEVIMAB AND CILGAVIMAB: HCPCS | Performed by: INTERNAL MEDICINE

## 2022-08-19 RX ORDER — EPINEPHRINE 1 MG/ML
0.3 INJECTION, SOLUTION, CONCENTRATE INTRAVENOUS AS NEEDED
OUTPATIENT
Start: 2022-08-19

## 2022-08-19 RX ORDER — DIPHENHYDRAMINE HCL 25 MG
50 CAPSULE ORAL ONCE AS NEEDED
OUTPATIENT
Start: 2022-08-19

## 2022-08-19 RX ADMIN — AZD7442 300 MG: KIT at 15:00

## 2022-10-03 ENCOUNTER — TELEPHONE (OUTPATIENT)
Dept: SURGERY | Facility: CLINIC | Age: 67
End: 2022-10-03

## 2022-10-03 DIAGNOSIS — Z01.810 PREOPERATIVE CARDIOVASCULAR EXAMINATION: Primary | ICD-10-CM

## 2022-10-03 NOTE — TELEPHONE ENCOUNTER
Patient was discharged for [facility] on 09/29 after defibrillator placement.  He would like to know if this will delay his surgery with Dr. Caro currently scheduled for 11/01/2022.

## 2022-10-03 NOTE — TELEPHONE ENCOUNTER
Caller: HUI FABIAN   Relationship: SELF     Best call back number: [phone]    What is the best time to reach you: ANYTIME     Who are you requesting to speak with (clinical staff, provider,  specific staff member): PROVIDER OR MA    What was the call regarding: PT IS CALLING TO SPEAK TO EITHER PROVIDER OR MA IN REGARDS TO HIS SCHEDULED PROCEDURE ON 11/01/22.     Do you require a callback: YES

## 2022-10-03 NOTE — TELEPHONE ENCOUNTER
Please let him know this should not change our plans for surgery, but I have ordered an EKG that will be done at his preadmission testing appointment to assess his defibrillator.

## 2022-10-13 ENCOUNTER — TELEPHONE (OUTPATIENT)
Dept: SURGERY | Facility: CLINIC | Age: 67
End: 2022-10-13

## 2022-10-14 NOTE — TELEPHONE ENCOUNTER
Imbruvica refill request rec electronically from [facility]. Per last office note from Dr Cheng-pt is to continue 420 mg daily. Request approved.    Marianela had notified me that pt mentioned to her that [facility] was needing a referral from the MD office. When I contacted [facility] on 8/21-they did not need anything from us at that time and stated pt had 1 refill left.   
Yes

## 2022-10-28 ENCOUNTER — APPOINTMENT (OUTPATIENT)
Dept: PREADMISSION TESTING | Facility: HOSPITAL | Age: 67
End: 2022-10-28

## 2022-12-06 NOTE — PROGRESS NOTES
[facility] GROUP OUTPATIENT FOLLOW UP CLINIC VISIT    REASON FOR FOLLOW-UP:    1.  Chronic lymphocytic leukemia diagnosed in November 2015.  CLL FISH panel negative at that time.    2.  Autoimmune hemolytic anemia associated with CLL.  He was treated with steroids and he received 4 weeks of weekly Rituxan.  Otherwise no treatment has been performed  3.  CT imaging of the chest abdomen and pelvis from 4/25/2018 showed lymphadenopathy with mildly enlarged retroperitoneal and mesenteric lymph nodes with the largest measuring about 3.2 cm.  The spleen measured 13.6 cm.  Slight increase in lymphadenopathy compared with 11/27/2015.  4.  Acute anemia with a hemoglobin of 6.9 as of 6/20/2018.  Low reticulocyte count.  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  Prednisone initiated.  Erythropoietin 73.9.  Parvovirus B19 PCR negative.  Steroids complete on 8/22/2018.  5.  Bone marrow biopsy on 7/3/2018 with 70% CLL (87% by flow cytometry) with a t(14;18) translocation by cytogenetics, 3 copies of IGH in 87.5% of cells but negative for CCND1/IGH rearrangement.  Negative for all other rearrangements/deletions.  IgVH somatic hypermutation was not detected.            6.  Therapy with ibrutinib initiated at the end of July 2018      HISTORY OF PRESENT ILLNESS:  Aida Bal Jr. is a 67 y.o. male who returns today for follow up of the above issue.     On 9/24/2022 he had an out of hospital cardiac arrest while walking to the [facility] football stadium for the football game.  CPR was started immediately and he was taken to the [facility] where a complete evaluation was unremarkable and he did receive an implanted defibrillator.  Several days prior to that he had received the latest COVID-19 vaccine and he had felt poorly for couple of days but on Saturday he had been feeling well prior to the event.    He was off Imbruvica for a few weeks but is now back on.    REVIEW OF SYSTEMS:  As per  "the [facility]    Vitals:    12/07/22 1346   BP: 158/79   Pulse: 70   Resp: 18   Temp: 97.1 °F (36.2 °C)   TempSrc: Temporal   SpO2: 99%   Weight: 109 kg (241 lb 4.8 oz)   Height: 182 cm (71.65\")   PainSc: 0-No pain       PHYSICAL EXAMINATION:  GENERAL:  Well-developed well-nourished male; awake, alert and oriented, in no acute distress.  Wearing a facemask.  Well appearing today.  SKIN:  Warm and dry, without rashes, purpura, or petechiae.  HEAD:  Normocephalic, atraumatic.  EARS:  Hearing intact.  LYMPHATICS:  No cervical, supraclavicular, or axillary lymphadenopathy.  CHEST:  Lungs are clear to auscultation bilaterally.  No wheezes, rales, or rhonchi.  An implanted defibrillator is present in the left subclavian area.  HEART: Regularly irregular.  Grade 2/6 systolic murmur  EXTREMITIES: No clubbing cyanosis or edema.  NEUROLOGICAL:  No focal neurologic deficits.      DIAGNOSTIC DATA:  CBC & Differential (12/07/2022 13:32)      IMAGING:  None reviewed      ASSESSMENT:  This is a 67 y.o. male with a history of chronic lymphocytic leukemia and related autoimmune hemolytic anemia.    *CLL:   · Initially received Rituxan for 4 weeks at diagnosis for this and the autoimmune hemolytic anemia.  · Given the worsening anemia even though it responded to steroids we needed to treat his CLL particularly given the 70% involvement on his bone marrow biopsy.  We started treating with ibrutinib 420 mg daily, initiated at the end of Held around the time of COVID infection; resumed when he completed steroids.  · 12/7/2022: The white blood cell count remains normal with some excess lymphocytes.  Hemoglobin improving at 10.8.  Platelets normal.    *Macrocytic anemia:   · He has a history of autoimmune hemolytic anemia when he presented with CLL back in November 2015.  His reticulocyte count was very elevated at that time.  His hemoglobin dropped to as low as about 5.  He responded to steroids initially intravenously and then with prednisone " and he was treated with 4 weeks of Rituxan.  · He was noted to have worsening anemia.  His reticulocyte count was low.  He initially did not respond very well to transfusions and he required 4 units of packed red cells  There was no evidence for hemolysis although his ADOLFO is positive for IgG.  This was persistently positive.  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  He responded to steroids.  He discontinued steroids as of 8/22/2018.    · 5/2/2022: Hemoglobin lower at 7.9 with a high MCV of 102   · He required red cell transfusion.  · 12/7/2022: Hemoglobin improving some at 10.8    *History of systolic hypertension: Blood pressure high at 158/79 today.    *History of thrombocytopenia:   · Platelets normal at 170,000    *COVID-19 pandemic: He has received a total of 3 vaccinations.  Subsequent infection in April 2020.  He received a monoclonal antibody as an outpatient and remdesivir as an inpatient. He was readmitted on 5/2 with hypotension, persistent fevers, persistent COVID-19 positivity on testing. He received IVIG and steroids. He improved and was treated with outpatient Paxlovid.  Symptoms resolved at this point.  Last vaccine 9/21/2022, bivalent.     *Left lower extremity DVT and gastrocnemius vein and right lower extremity superficial thrombophlebitis in the small saphenous vein on bilateral lower extremity venous duplex on 4/15/2022  · Associated with COVID-19 infection  · Repeat venous duplex 8/1/22 without DVT, only superficial thrombophlebitis    *Out of hospital cardiac arrest on 9/24/2022  · Quickly achieved return of spontaneous circulation with CPR and medication  · He had an ICD placed at the [facility]    PLAN:   1. Continue Imbruvica; hold around the time of surgeries as recommended per pharmacy.  I will discuss his recent cardiac arrest with our pharmacy staff.  Since he has a defibrillator in place it is reasonable to continue with the Imbruvica at this  point I think.  2. He will continue to follow-up with cardiology at the [facility]  3. Ileocecectomy is being delayed due to recent cardiac issues  4. I will see him back in 3 months for follow-up with labs    High risk medication requiring intensive monitoring    I spent 50 minutes in this visit today reviewing his record communicating with him and his wife examining him communicating with staff placing orders and documenting the encounter.

## 2022-12-07 ENCOUNTER — SPECIALTY PHARMACY (OUTPATIENT)
Dept: ONCOLOGY | Facility: HOSPITAL | Age: 67
End: 2022-12-07

## 2022-12-07 ENCOUNTER — OFFICE VISIT (OUTPATIENT)
Dept: ONCOLOGY | Facility: CLINIC | Age: 67
End: 2022-12-07

## 2022-12-07 ENCOUNTER — LAB (OUTPATIENT)
Dept: LAB | Facility: HOSPITAL | Age: 67
End: 2022-12-07

## 2022-12-07 VITALS
WEIGHT: 241.3 LBS | OXYGEN SATURATION: 99 % | TEMPERATURE: 97.1 F | RESPIRATION RATE: 18 BRPM | BODY MASS INDEX: 32.68 KG/M2 | DIASTOLIC BLOOD PRESSURE: 79 MMHG | SYSTOLIC BLOOD PRESSURE: 158 MMHG | HEART RATE: 70 BPM | HEIGHT: 72 IN

## 2022-12-07 VITALS — BODY MASS INDEX: 33.04 KG/M2 | WEIGHT: 241.3 LBS

## 2022-12-07 DIAGNOSIS — D64.9 NORMOCYTIC ANEMIA: ICD-10-CM

## 2022-12-07 DIAGNOSIS — C91.10 CLL (CHRONIC LYMPHOCYTIC LEUKEMIA): ICD-10-CM

## 2022-12-07 DIAGNOSIS — C91.10 CLL (CHRONIC LYMPHOCYTIC LEUKEMIA): Primary | ICD-10-CM

## 2022-12-07 LAB
ALBUMIN SERPL-MCNC: 4.2 G/DL (ref 3.5–5.2)
ALBUMIN/GLOB SERPL: 1.9 G/DL (ref 1.1–2.4)
ALP SERPL-CCNC: 122 U/L (ref 38–116)
ALT SERPL W P-5'-P-CCNC: 13 U/L (ref 0–41)
ANION GAP SERPL CALCULATED.3IONS-SCNC: 11.3 MMOL/L (ref 5–15)
AST SERPL-CCNC: 20 U/L (ref 0–40)
BASOPHILS # BLD AUTO: 0.02 10*3/MM3 (ref 0–0.2)
BASOPHILS NFR BLD AUTO: 0.3 % (ref 0–1.5)
BILIRUB SERPL-MCNC: 0.5 MG/DL (ref 0.2–1.2)
BUN SERPL-MCNC: 12 MG/DL (ref 6–20)
BUN/CREAT SERPL: 11.4 (ref 7.3–30)
CALCIUM SPEC-SCNC: 9.4 MG/DL (ref 8.5–10.2)
CHLORIDE SERPL-SCNC: 105 MMOL/L (ref 98–107)
CO2 SERPL-SCNC: 26.7 MMOL/L (ref 22–29)
CREAT SERPL-MCNC: 1.05 MG/DL (ref 0.7–1.3)
DEPRECATED RDW RBC AUTO: 57.6 FL (ref 37–54)
EGFRCR SERPLBLD CKD-EPI 2021: 76.8 ML/MIN/1.73
EOSINOPHIL # BLD AUTO: 0.07 10*3/MM3 (ref 0–0.4)
EOSINOPHIL NFR BLD AUTO: 1.1 % (ref 0.3–6.2)
ERYTHROCYTE [DISTWIDTH] IN BLOOD BY AUTOMATED COUNT: 15.3 % (ref 12.3–15.4)
GLOBULIN UR ELPH-MCNC: 2.2 GM/DL (ref 1.8–3.5)
GLUCOSE SERPL-MCNC: 95 MG/DL (ref 74–124)
HCT VFR BLD AUTO: 34.5 % (ref 37.5–51)
HGB BLD-MCNC: 10.8 G/DL (ref 13–17.7)
IMM GRANULOCYTES # BLD AUTO: 0.02 10*3/MM3 (ref 0–0.05)
IMM GRANULOCYTES NFR BLD AUTO: 0.3 % (ref 0–0.5)
LYMPHOCYTES # BLD AUTO: 3.79 10*3/MM3 (ref 0.7–3.1)
LYMPHOCYTES NFR BLD AUTO: 61.7 % (ref 19.6–45.3)
MCH RBC QN AUTO: 32 PG (ref 26.6–33)
MCHC RBC AUTO-ENTMCNC: 31.3 G/DL (ref 31.5–35.7)
MCV RBC AUTO: 102.1 FL (ref 79–97)
MONOCYTES # BLD AUTO: 0.21 10*3/MM3 (ref 0.1–0.9)
MONOCYTES NFR BLD AUTO: 3.4 % (ref 5–12)
NEUTROPHILS NFR BLD AUTO: 2.03 10*3/MM3 (ref 1.7–7)
NEUTROPHILS NFR BLD AUTO: 33.2 % (ref 42.7–76)
NRBC BLD AUTO-RTO: 0 /100 WBC (ref 0–0.2)
PLATELET # BLD AUTO: 170 10*3/MM3 (ref 140–450)
PMV BLD AUTO: 11 FL (ref 6–12)
POTASSIUM SERPL-SCNC: 3.8 MMOL/L (ref 3.5–4.7)
PROT SERPL-MCNC: 6.4 G/DL (ref 6.3–8)
RBC # BLD AUTO: 3.38 10*6/MM3 (ref 4.14–5.8)
SODIUM SERPL-SCNC: 143 MMOL/L (ref 134–145)
WBC NRBC COR # BLD: 6.14 10*3/MM3 (ref 3.4–10.8)

## 2022-12-07 PROCEDURE — 80053 COMPREHEN METABOLIC PANEL: CPT

## 2022-12-07 PROCEDURE — 85025 COMPLETE CBC W/AUTO DIFF WBC: CPT

## 2022-12-07 PROCEDURE — 36415 COLL VENOUS BLD VENIPUNCTURE: CPT

## 2022-12-07 PROCEDURE — 99215 OFFICE O/P EST HI 40 MIN: CPT | Performed by: INTERNAL MEDICINE

## 2022-12-07 RX ORDER — HYDROCHLOROTHIAZIDE 12.5 MG/1
TABLET ORAL
COMMUNITY
Start: 2022-12-06 | End: 2023-03-28 | Stop reason: ALTCHOICE

## 2022-12-07 NOTE — PROGRESS NOTES
MTM Encounter-Re: Adherence and side effects (Imbruvica)    Today's encounter was conducted in person, face-to-face.     Medication:  Imbruvica 420 mg po daily  - Reason for outreach: Routine medication check-in .  - Administration: Patient is taking every day at the same time .  - Missed doses: patient was off of Imbruvica for several weeks- had defibrillator placed  - Self-administration: Patient demonstrates ability to self-administer medication. No barriers to adherence identified.   - Diagnosis/Indication: CLL. Progress toward achieving therapeutic goals reviewed.   - Patient denies side effects.    - Medication availability/affordability: Patient has had no issues obtaining medication from pharmacy.   - Questions/concerns about medications: none       Completed medication reconciliation today to assess for drug interactions.   Reviewed allergies, medical history, labs, quality of life, and medication history with patient.   Patient has had the following changes to medication list: medications updated by MA; patient's chart has been updated to reflect changes. Assessed medication list for interactions, no significant drug interactions noted.   Advised pt to call the clinic if any new medications are started so we can assess for drug-drug interactions.     In-basket message from MD regarding Imbruvica use after defibrillator placement. Will make any medication changes if recommended by MD    All questions addressed. Patient had no additional concerns for MTM office.     Marianela Romeo RPH  12/7/2022  15:53 EST

## 2022-12-08 ENCOUNTER — TELEPHONE (OUTPATIENT)
Dept: ONCOLOGY | Facility: CLINIC | Age: 67
End: 2022-12-08

## 2022-12-08 ENCOUNTER — DOCUMENTATION (OUTPATIENT)
Dept: ONCOLOGY | Facility: CLINIC | Age: 67
End: 2022-12-08

## 2022-12-08 ENCOUNTER — SPECIALTY PHARMACY (OUTPATIENT)
Dept: PHARMACY | Facility: HOSPITAL | Age: 67
End: 2022-12-08

## 2022-12-08 DIAGNOSIS — C91.10 CLL (CHRONIC LYMPHOCYTIC LEUKEMIA): Primary | ICD-10-CM

## 2022-12-08 DIAGNOSIS — D64.9 NORMOCYTIC ANEMIA: ICD-10-CM

## 2022-12-08 NOTE — PROGRESS NOTES
[facility] telephone encounter    Spoke to Mr. Bal today that Dr. Cheng recommends he stop taking Imbruvica at this time. Mr. Bal verbalized understanding. I let him know that scheduling will be reaching out to him as Dr. Cheng would like to see him in January.     Thanks,   Marianela Romeo, PharmD

## 2022-12-08 NOTE — PROGRESS NOTES
I have been asked to notify pts pharmacy that the Imbruvica will be discontinued.     I have notified Britanyjosh at [facility] of the discontinuation.    Marianela Romeo [facility] sent to Haylee Montanez.  Could you let pharmacy know Imbruvica discontinued?           Previous Messages     ----- Message -----   From: Toñito Cheng MD   Sent: 12/8/2022  12:20 PM EST   To: Connie Ramirez RN, Marianela Romeo [facility]   Subject: RE: imbruvica                                     Cardiac arrest would be a grade 4 event.     It's hard to know if the COVID vaccine contributed. He had Evusheld a couple of months prior so I don't think that contributed.     I think stopping Imbruvica is appropriate.     Marianela, can you please let him know t stop the imbruvica?     Connie, please order labs and have him see me in January, a little sooner than I had planned. I can still see him in March as previously scheduled.     Thanks! [facility]         ----- Message -----   From: Marianela Romeo [facility]   Sent: 12/7/2022   3:48 PM EST   To: Toñito Cheng MD   Subject: RE: imbruvica                                     Dr. Cheng,     He told me he had a cardiac procedure and that he was off Imbruvica for a bit. I am not 100% sure about using Imbruvica for him. UpToDate recommends (consistent with package insert) dose reduction for grade 3 cardiac arrhythmias and for grade 4 it is recommended to discontinue to medication. I am not sure what grade he would be. It does make me nervous though.     His wife was asking me about Evusheld potentially having an effect as well- it does have a warning for MI and cardiac failure.     The vaccine could potentially produced myocarditis, but I agree...difficult to say.     Marianela           ----- Message -----   From: Toñito Cheng MD   Sent: 12/7/2022   2:51 PM EST   To: [facility] Oral Onc Pool   Subject: imbruvica                                         Did he tell you the story about his cardiac arrest in September? He was off  the imbruvica for about a month but is now back on. Do you think that's ok? He now has a defibrillator so he's protected if he has an event. He had a covid vaccine a few days prior to the arrest. Hard to say. Interested in your opinion. SANDRA Montanez  Specialty Pharmacy Technician

## 2022-12-08 NOTE — TELEPHONE ENCOUNTER
----- Message from Connie Ramirez RN sent at 12/8/2022 12:38 PM EST -----  Regarding: FRANCIS  Please schedule pt for labs and Dr. Cheng in one month. Thanks.

## 2022-12-08 NOTE — TELEPHONE ENCOUNTER
CALLED PT WITH HIS APPT DATE AND TIME AT THE [facility] OFFICE PT HAS CONFIRMED

## 2023-01-04 ENCOUNTER — LAB (OUTPATIENT)
Dept: LAB | Facility: HOSPITAL | Age: 68
End: 2023-01-04
Payer: MEDICARE

## 2023-01-04 ENCOUNTER — OFFICE VISIT (OUTPATIENT)
Dept: ONCOLOGY | Facility: CLINIC | Age: 68
End: 2023-01-04
Payer: MEDICARE

## 2023-01-04 VITALS
SYSTOLIC BLOOD PRESSURE: 150 MMHG | RESPIRATION RATE: 18 BRPM | OXYGEN SATURATION: 97 % | DIASTOLIC BLOOD PRESSURE: 74 MMHG | HEART RATE: 83 BPM | TEMPERATURE: 98 F | HEIGHT: 72 IN | BODY MASS INDEX: 32.95 KG/M2 | WEIGHT: 243.3 LBS

## 2023-01-04 DIAGNOSIS — D64.9 NORMOCYTIC ANEMIA: ICD-10-CM

## 2023-01-04 DIAGNOSIS — C91.10 CLL (CHRONIC LYMPHOCYTIC LEUKEMIA): ICD-10-CM

## 2023-01-04 DIAGNOSIS — C91.10 CLL (CHRONIC LYMPHOCYTIC LEUKEMIA): Primary | ICD-10-CM

## 2023-01-04 LAB
ALBUMIN SERPL-MCNC: 4.2 G/DL (ref 3.5–5.2)
ALBUMIN/GLOB SERPL: 2.1 G/DL (ref 1.1–2.4)
ALP SERPL-CCNC: 115 U/L (ref 38–116)
ALT SERPL W P-5'-P-CCNC: 20 U/L (ref 0–41)
ANION GAP SERPL CALCULATED.3IONS-SCNC: 12.8 MMOL/L (ref 5–15)
AST SERPL-CCNC: 24 U/L (ref 0–40)
BASOPHILS # BLD AUTO: 0.03 10*3/MM3 (ref 0–0.2)
BASOPHILS NFR BLD AUTO: 0.5 % (ref 0–1.5)
BILIRUB SERPL-MCNC: 0.5 MG/DL (ref 0.2–1.2)
BUN SERPL-MCNC: 20 MG/DL (ref 6–20)
BUN/CREAT SERPL: 16 (ref 7.3–30)
CALCIUM SPEC-SCNC: 9.7 MG/DL (ref 8.5–10.2)
CHLORIDE SERPL-SCNC: 106 MMOL/L (ref 98–107)
CO2 SERPL-SCNC: 27.2 MMOL/L (ref 22–29)
CREAT SERPL-MCNC: 1.25 MG/DL (ref 0.7–1.3)
DEPRECATED RDW RBC AUTO: 58.9 FL (ref 37–54)
EGFRCR SERPLBLD CKD-EPI 2021: 62.3 ML/MIN/1.73
EOSINOPHIL # BLD AUTO: 0.05 10*3/MM3 (ref 0–0.4)
EOSINOPHIL NFR BLD AUTO: 0.9 % (ref 0.3–6.2)
ERYTHROCYTE [DISTWIDTH] IN BLOOD BY AUTOMATED COUNT: 15.4 % (ref 12.3–15.4)
GLOBULIN UR ELPH-MCNC: 2 GM/DL (ref 1.8–3.5)
GLUCOSE SERPL-MCNC: 140 MG/DL (ref 74–124)
HCT VFR BLD AUTO: 37.4 % (ref 37.5–51)
HGB BLD-MCNC: 11.5 G/DL (ref 13–17.7)
HGB RETIC QN AUTO: 34.1 PG (ref 29.8–36.1)
IMM GRANULOCYTES # BLD AUTO: 0.01 10*3/MM3 (ref 0–0.05)
IMM GRANULOCYTES NFR BLD AUTO: 0.2 % (ref 0–0.5)
IMM RETICS NFR: 13.7 % (ref 3–15.8)
LYMPHOCYTES # BLD AUTO: 3.67 10*3/MM3 (ref 0.7–3.1)
LYMPHOCYTES NFR BLD AUTO: 65.9 % (ref 19.6–45.3)
MCH RBC QN AUTO: 31.8 PG (ref 26.6–33)
MCHC RBC AUTO-ENTMCNC: 30.7 G/DL (ref 31.5–35.7)
MCV RBC AUTO: 103.3 FL (ref 79–97)
MONOCYTES # BLD AUTO: 0.78 10*3/MM3 (ref 0.1–0.9)
MONOCYTES NFR BLD AUTO: 14 % (ref 5–12)
NEUTROPHILS NFR BLD AUTO: 1.03 10*3/MM3 (ref 1.7–7)
NEUTROPHILS NFR BLD AUTO: 18.5 % (ref 42.7–76)
NRBC BLD AUTO-RTO: 0 /100 WBC (ref 0–0.2)
PLATELET # BLD AUTO: 160 10*3/MM3 (ref 140–450)
PMV BLD AUTO: 10 FL (ref 6–12)
POTASSIUM SERPL-SCNC: 4.4 MMOL/L (ref 3.5–4.7)
PROT SERPL-MCNC: 6.2 G/DL (ref 6.3–8)
RBC # BLD AUTO: 3.62 10*6/MM3 (ref 4.14–5.8)
RETICS # AUTO: 0.05 10*6/MM3 (ref 0.02–0.13)
RETICS/RBC NFR AUTO: 1.44 % (ref 0.7–1.9)
SODIUM SERPL-SCNC: 146 MMOL/L (ref 134–145)
WBC NRBC COR # BLD: 5.57 10*3/MM3 (ref 3.4–10.8)

## 2023-01-04 PROCEDURE — 1126F AMNT PAIN NOTED NONE PRSNT: CPT | Performed by: INTERNAL MEDICINE

## 2023-01-04 PROCEDURE — 85046 RETICYTE/HGB CONCENTRATE: CPT

## 2023-01-04 PROCEDURE — 36415 COLL VENOUS BLD VENIPUNCTURE: CPT

## 2023-01-04 PROCEDURE — 80053 COMPREHEN METABOLIC PANEL: CPT

## 2023-01-04 PROCEDURE — 1159F MED LIST DOCD IN RCRD: CPT | Performed by: INTERNAL MEDICINE

## 2023-01-04 PROCEDURE — 85025 COMPLETE CBC W/AUTO DIFF WBC: CPT

## 2023-01-04 PROCEDURE — 99214 OFFICE O/P EST MOD 30 MIN: CPT | Performed by: INTERNAL MEDICINE

## 2023-01-04 PROCEDURE — 1160F RVW MEDS BY RX/DR IN RCRD: CPT | Performed by: INTERNAL MEDICINE

## 2023-01-04 RX ORDER — IRBESARTAN AND HYDROCHLOROTHIAZIDE 300; 12.5 MG/1; MG/1
1 TABLET, FILM COATED ORAL DAILY
COMMUNITY
Start: 2022-09-24

## 2023-01-04 NOTE — PROGRESS NOTES
[facility] GROUP OUTPATIENT FOLLOW UP CLINIC VISIT    REASON FOR FOLLOW-UP:    1.  Chronic lymphocytic leukemia diagnosed in November 2015.  CLL FISH panel negative at that time.    2.  Autoimmune hemolytic anemia associated with CLL.  He was treated with steroids and he received 4 weeks of weekly Rituxan.  Otherwise no treatment has been performed  3.  CT imaging of the chest abdomen and pelvis from 4/25/2018 showed lymphadenopathy with mildly enlarged retroperitoneal and mesenteric lymph nodes with the largest measuring about 3.2 cm.  The spleen measured 13.6 cm.  Slight increase in lymphadenopathy compared with 11/27/2015.  4.  Acute anemia with a hemoglobin of 6.9 as of 6/20/2018.  Low reticulocyte count.  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  Prednisone initiated.  Erythropoietin 73.9.  Parvovirus B19 PCR negative.  Steroids complete on 8/22/2018.  5.  Bone marrow biopsy on 7/3/2018 with 70% CLL (87% by flow cytometry) with a t(14;18) translocation by cytogenetics, 3 copies of IGH in 87.5% of cells but negative for CCND1/IGH rearrangement.  Negative for all other rearrangements/deletions.  IgVH somatic hypermutation was not detected.            6.  Therapy with ibrutinib initiated at the end of July 2018.  Discontinued in late 2022 due to cardiac arrest.      HISTORY OF PRESENT ILLNESS:  Aida Bal Jr. is a 67 y.o. male who returns today for follow up of the above issue.     After his last visit we held the Imbruvica due to his cardiac arrest in September.    He complains of left knee pain.  Otherwise he is doing well.    REVIEW OF SYSTEMS:  As per the HPI    There were no vitals filed for this visit.    PHYSICAL EXAMINATION:  GENERAL:  Well-developed well-nourished male; awake, alert and oriented, in no acute distress.  Wearing a facemask.  Well appearing today.  SKIN:  Warm and dry, without rashes, purpura, or petechiae.  HEAD:  Normocephalic, atraumatic.  EARS:  Hearing intact.  LYMPHATICS:  No  cervical, supraclavicular, or axillary lymphadenopathy.  CHEST:  Lungs are clear to auscultation bilaterally.  No wheezes, rales, or rhonchi.  An implanted defibrillator is present in the left subclavian area.  HEART: Regular rate and rhythm.  Grade 2/6 systolic murmur  EXTREMITIES: No clubbing cyanosis or edema.  NEUROLOGICAL:  No focal neurologic deficits.      DIAGNOSTIC DATA:  Retic With IRF & RET-He (01/04/2023 11:15)  CBC & Differential (01/04/2023 11:15)  Comprehensive Metabolic Panel (01/04/2023 11:15)      IMAGING:  None reviewed      ASSESSMENT:  This is a 67 y.o. male with a history of chronic lymphocytic leukemia and related autoimmune hemolytic anemia.    *CLL:   · Initially received Rituxan for 4 weeks at diagnosis for this and the autoimmune hemolytic anemia.  · Given the worsening anemia even though it responded to steroids we needed to treat his CLL particularly given the 70% involvement on his bone marrow biopsy.  We started treating with ibrutinib 420 mg daily, initiated at the end of Held around the time of COVID infection; resumed when he completed steroids.  · Ibrutinib now discontinued due to cardiac arrest.  · 1/4/2023: White blood cell count remains normal at 5.57 with 66% lymphocytes    *Macrocytic anemia:   · He has a history of autoimmune hemolytic anemia when he presented with CLL back in November 2015.  His reticulocyte count was very elevated at that time.  His hemoglobin dropped to as low as about 5.  He responded to steroids initially intravenously and then with prednisone and he was treated with 4 weeks of Rituxan.  · He was noted to have worsening anemia.  His reticulocyte count was low.  He initially did not respond very well to transfusions and he required 4 units of packed red cells  There was no evidence for hemolysis although his ADOLFO is positive for IgG.  This was persistently positive.  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  He responded to steroids.  He  discontinued steroids as of 8/22/2018.    · 5/2/2022: Hemoglobin lower at 7.9 with a high MCV of 102   · He required red cell transfusion.  · 1/4/2023: Hemoglobin improving at 11.5    *History of systolic hypertension: Blood pressure high at 150/74 today.    *History of thrombocytopenia:   · Platelets normal at 160,000    *COVID-19 pandemic: He has received a total of 3 vaccinations.  Subsequent infection in April 2020.  He received a monoclonal antibody as an outpatient and remdesivir as an inpatient. He was readmitted on 5/2 with hypotension, persistent fevers, persistent COVID-19 positivity on testing. He received IVIG and steroids. He improved and was treated with outpatient Paxlovid.  Symptoms resolved at this point.  Last vaccine 9/21/2022, bivalent.  Cardiac arrest several days after receiving this on 9/24/2022.    *Left lower extremity DVT and gastrocnemius vein and right lower extremity superficial thrombophlebitis in the small saphenous vein on bilateral lower extremity venous duplex on 4/15/2022  · Associated with COVID-19 infection  · Repeat venous duplex 8/1/22 without DVT, only superficial thrombophlebitis    *Out of hospital cardiac arrest on 9/24/2022  · Quickly achieved return of spontaneous circulation with CPR and medication  · He had an ICD placed at the [facility]    PLAN:   1. No further Imbruvica given cardiac arrest while on the medication  2. He will continue to follow-up with cardiology at the [facility]  3. Ileocecectomy is being delayed due to recent cardiac issues.  He anticipates this in May or June.  4. I will see him back in the middle of March with labs  5. At progression, consideration of venetoclax and ritixumab.

## 2023-01-09 ENCOUNTER — HOSPITAL ENCOUNTER (OUTPATIENT)
Dept: GENERAL RADIOLOGY | Facility: HOSPITAL | Age: 68
Discharge: HOME | End: 2023-01-09
Admitting: INTERNAL MEDICINE
Payer: MEDICARE

## 2023-01-09 DIAGNOSIS — R07.9 CHEST PAIN, UNSPECIFIED TYPE: ICD-10-CM

## 2023-01-09 PROCEDURE — 71046 X-RAY EXAM CHEST 2 VIEWS: CPT

## 2023-03-21 NOTE — PROGRESS NOTES
"[facility] GROUP OUTPATIENT FOLLOW UP CLINIC VISIT    REASON FOR FOLLOW-UP:    1.  Chronic lymphocytic leukemia diagnosed in November 2015.  CLL FISH panel negative at that time.    2.  Autoimmune hemolytic anemia associated with CLL.  He was treated with steroids and he received 4 weeks of weekly Rituxan.  Otherwise no treatment has been performed  3.  CT imaging of the chest abdomen and pelvis from 4/25/2018 showed lymphadenopathy with mildly enlarged retroperitoneal and mesenteric lymph nodes with the largest measuring about 3.2 cm.  The spleen measured 13.6 cm.  Slight increase in lymphadenopathy compared with 11/27/2015.  4.  Acute anemia with a hemoglobin of 6.9 as of 6/20/2018.  Low reticulocyte count.  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  Prednisone initiated.  Erythropoietin 73.9.  Parvovirus B19 PCR negative.  Steroids complete on 8/22/2018.  5.  Bone marrow biopsy on 7/3/2018 with 70% CLL (87% by flow cytometry) with a t(14;18) translocation by cytogenetics, 3 copies of IGH in 87.5% of cells but negative for CCND1/IGH rearrangement.  Negative for all other rearrangements/deletions.  IgVH somatic hypermutation was not detected.            6.  Therapy with ibrutinib initiated at the end of July 2018.  Discontinued in late 2022 due to cardiac arrest.      HISTORY OF PRESENT ILLNESS:  Aida Bal Jr. is a 68 y.o. male who returns today for follow up of the above issue.     He feels well.  No chest pain.  No lymphadenopathy.  No fevers chills night sweats or unintended weight loss.  He is exercising.  He did not complain of any orthopedic pain today.    REVIEW OF SYSTEMS:  As per the HPI    Vitals:    03/22/23 1432   BP: 136/69   Pulse: 71   Resp: 18   Temp: 98 °F (36.7 °C)   SpO2: 99%   Weight: 111 kg (244 lb)   Height: 182 cm (71.65\")   PainSc: 0-No pain       PHYSICAL EXAMINATION:  GENERAL:  Well-developed well-nourished male; awake, alert and oriented, in no acute distress.  Wearing a facemask.  " Well appearing again today.  SKIN:  Warm and dry, without rashes, purpura, or petechiae.  HEAD:  Normocephalic, atraumatic.  EARS:  Hearing intact.  LYMPHATICS:  No cervical, supraclavicular, or axillary lymphadenopathy.  CHEST:  Lungs are clear to auscultation bilaterally.  No wheezes, rales, or rhonchi.  An implanted defibrillator is present in the left subclavian area.  HEART: Regular rate and rhythm.  Grade 2/6 early systolic murmur  EXTREMITIES: No clubbing cyanosis or edema.  NEUROLOGICAL:  No focal neurologic deficits.      DIAGNOSTIC DATA:  CBC & Differential (03/22/2023 14:12)  Retic With IRF & RET-He (03/22/2023 14:12)  Comprehensive Metabolic Panel (03/22/2023 14:12)      IMAGING:  None reviewed    ASSESSMENT:  This is a 68 y.o. male with a history of chronic lymphocytic leukemia and related autoimmune hemolytic anemia.    *CLL:   · Diagnosed 11/26/2015.    · Normal FISH panel  · Initially received Rituxan for 4 weeks at diagnosis for this and the autoimmune hemolytic anemia.  · Given the worsening anemia even though it responded to steroids we needed to treat his CLL particularly given the 70% involvement on his bone marrow biopsy.  We started treating with ibrutinib 420 mg daily, initiated at the end of Held around the time of COVID infection; resumed when he completed steroids.  · Ibrutinib now discontinued due to cardiac arrest.  · 1/4/2023: White blood cell count remains normal at 5.57 with 66% lymphocytes  · 3/22/2023: White blood cell count 12,000 with 80% lymphocytes    *Macrocytic anemia:   · He has a history of autoimmune hemolytic anemia when he presented with CLL back in November 2015.  His reticulocyte count was very elevated at that time.  His hemoglobin dropped to as low as about 5.  He responded to steroids initially intravenously and then with prednisone and he was treated with 4 weeks of Rituxan.  · He was noted to have worsening anemia.  His reticulocyte count was low.  He initially did  not respond very well to transfusions and he required 4 units of packed red cells  There was no evidence for hemolysis although his ADOLFO is positive for IgG.  This was persistently positive.  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  He responded to steroids.  He discontinued steroids as of 8/22/2018.    · 5/2/2022: Hemoglobin lower at 7.9 with a high MCV of 102   · He required red cell transfusion.  · 1/4/2023: Hemoglobin improving at 11.5  · 3/22/2023: Hemoglobin 12.3 with a high MCV at 102.7    *History of systolic hypertension: Blood pressure acceptable at 136/69 today.    *History of thrombocytopenia:   · Platelets normal at 153,000    *COVID-19 pandemic: He has received a total of 3 vaccinations.  Subsequent infection in April 2020.  He received a monoclonal antibody as an outpatient and remdesivir as an inpatient. He was readmitted on 5/2 with hypotension, persistent fevers, persistent COVID-19 positivity on testing. He received IVIG and steroids. He improved and was treated with outpatient Paxlovid.  Symptoms resolved at this point.  Last vaccine 9/21/2022, bivalent.  Cardiac arrest several days after receiving this on 9/24/2022.    *Left lower extremity DVT and gastrocnemius vein and right lower extremity superficial thrombophlebitis in the small saphenous vein on bilateral lower extremity venous duplex on 4/15/2022  · Associated with COVID-19 infection  · Repeat venous duplex 8/1/22 without DVT, only superficial thrombophlebitis    *Out of hospital cardiac arrest on 9/24/2022  · Quickly achieved return of spontaneous circulation with CPR and medication  · He had an ICD placed at the [facility]  · Ibrutinib discontinued as a result of this    PLAN:   1. No further Imbruvica given cardiac arrest while on the medication  2. He will continue to follow-up with cardiology at the [facility]  3. Ileocecectomy has been delayed due to recent cardiac issues.  He  is going to follow-up with Dr. Caro next week to discuss surgery.  If surgery is planned in the near future I think it is reasonable to proceed with this now and then start therapy for CLL after he recovers.  4. Baseline CT imaging of the chest abdomen and pelvis at some point prior to starting any therapy.  Currently scheduled in about 4 weeks.  This can be altered if necessary.  5. I have currently planned to see him back in about a month to review his CT imaging.  This can certainly be changed depending on timing of surgery.    I spent 40 minutes in this visit today reviewing his record communicating with him and his wife examining him placing orders documenting the encounter and communicating with Dr. Caro.

## 2023-03-22 ENCOUNTER — OFFICE VISIT (OUTPATIENT)
Dept: ONCOLOGY | Facility: CLINIC | Age: 68
End: 2023-03-22
Payer: MEDICARE

## 2023-03-22 ENCOUNTER — LAB (OUTPATIENT)
Dept: LAB | Facility: HOSPITAL | Age: 68
End: 2023-03-22
Payer: MEDICARE

## 2023-03-22 VITALS
OXYGEN SATURATION: 99 % | HEIGHT: 72 IN | RESPIRATION RATE: 18 BRPM | DIASTOLIC BLOOD PRESSURE: 69 MMHG | TEMPERATURE: 98 F | BODY MASS INDEX: 33.05 KG/M2 | HEART RATE: 71 BPM | WEIGHT: 244 LBS | SYSTOLIC BLOOD PRESSURE: 136 MMHG

## 2023-03-22 DIAGNOSIS — C91.10 CLL (CHRONIC LYMPHOCYTIC LEUKEMIA): Primary | ICD-10-CM

## 2023-03-22 DIAGNOSIS — C91.10 CLL (CHRONIC LYMPHOCYTIC LEUKEMIA): ICD-10-CM

## 2023-03-22 DIAGNOSIS — D64.9 NORMOCYTIC ANEMIA: ICD-10-CM

## 2023-03-22 LAB
ALBUMIN SERPL-MCNC: 4.5 G/DL (ref 3.5–5.2)
ALBUMIN/GLOB SERPL: 2.5 G/DL (ref 1.1–2.4)
ALP SERPL-CCNC: 123 U/L (ref 38–116)
ALT SERPL W P-5'-P-CCNC: 29 U/L (ref 0–41)
ANION GAP SERPL CALCULATED.3IONS-SCNC: 10.8 MMOL/L (ref 5–15)
AST SERPL-CCNC: 28 U/L (ref 0–40)
BASOPHILS # BLD AUTO: 0.03 10*3/MM3 (ref 0–0.2)
BASOPHILS NFR BLD AUTO: 0.2 % (ref 0–1.5)
BILIRUB SERPL-MCNC: 0.5 MG/DL (ref 0.2–1.2)
BUN SERPL-MCNC: 17 MG/DL (ref 6–20)
BUN/CREAT SERPL: 13.6 (ref 7.3–30)
CALCIUM SPEC-SCNC: 9.8 MG/DL (ref 8.5–10.2)
CHLORIDE SERPL-SCNC: 103 MMOL/L (ref 98–107)
CO2 SERPL-SCNC: 28.2 MMOL/L (ref 22–29)
CREAT SERPL-MCNC: 1.25 MG/DL (ref 0.7–1.3)
DEPRECATED RDW RBC AUTO: 52.4 FL (ref 37–54)
EGFRCR SERPLBLD CKD-EPI 2021: 61.9 ML/MIN/1.73
EOSINOPHIL # BLD AUTO: 0.07 10*3/MM3 (ref 0–0.4)
EOSINOPHIL NFR BLD AUTO: 0.6 % (ref 0.3–6.2)
ERYTHROCYTE [DISTWIDTH] IN BLOOD BY AUTOMATED COUNT: 14 % (ref 12.3–15.4)
GLOBULIN UR ELPH-MCNC: 1.8 GM/DL (ref 1.8–3.5)
GLUCOSE SERPL-MCNC: 136 MG/DL (ref 74–124)
HCT VFR BLD AUTO: 38.2 % (ref 37.5–51)
HGB BLD-MCNC: 12.3 G/DL (ref 13–17.7)
HGB RETIC QN AUTO: 36.7 PG (ref 29.8–36.1)
IMM GRANULOCYTES # BLD AUTO: 0.02 10*3/MM3 (ref 0–0.05)
IMM GRANULOCYTES NFR BLD AUTO: 0.2 % (ref 0–0.5)
IMM RETICS NFR: 11.5 % (ref 3–15.8)
LDH SERPL-CCNC: 255 U/L (ref 99–259)
LYMPHOCYTES # BLD AUTO: 9.82 10*3/MM3 (ref 0.7–3.1)
LYMPHOCYTES NFR BLD AUTO: 80.4 % (ref 19.6–45.3)
MCH RBC QN AUTO: 33.1 PG (ref 26.6–33)
MCHC RBC AUTO-ENTMCNC: 32.2 G/DL (ref 31.5–35.7)
MCV RBC AUTO: 102.7 FL (ref 79–97)
MONOCYTES # BLD AUTO: 0.76 10*3/MM3 (ref 0.1–0.9)
MONOCYTES NFR BLD AUTO: 6.2 % (ref 5–12)
NEUTROPHILS NFR BLD AUTO: 1.52 10*3/MM3 (ref 1.7–7)
NEUTROPHILS NFR BLD AUTO: 12.4 % (ref 42.7–76)
NRBC BLD AUTO-RTO: 0 /100 WBC (ref 0–0.2)
PLATELET # BLD AUTO: 153 10*3/MM3 (ref 140–450)
PMV BLD AUTO: 9.8 FL (ref 6–12)
POTASSIUM SERPL-SCNC: 5 MMOL/L (ref 3.5–4.7)
PROT SERPL-MCNC: 6.3 G/DL (ref 6.3–8)
RBC # BLD AUTO: 3.72 10*6/MM3 (ref 4.14–5.8)
RETICS # AUTO: 0.05 10*6/MM3 (ref 0.02–0.13)
RETICS/RBC NFR AUTO: 1.38 % (ref 0.7–1.9)
SODIUM SERPL-SCNC: 142 MMOL/L (ref 134–145)
WBC NRBC COR # BLD: 12.22 10*3/MM3 (ref 3.4–10.8)

## 2023-03-22 PROCEDURE — 3078F DIAST BP <80 MM HG: CPT | Performed by: INTERNAL MEDICINE

## 2023-03-22 PROCEDURE — 3075F SYST BP GE 130 - 139MM HG: CPT | Performed by: INTERNAL MEDICINE

## 2023-03-22 PROCEDURE — 80053 COMPREHEN METABOLIC PANEL: CPT

## 2023-03-22 PROCEDURE — 99215 OFFICE O/P EST HI 40 MIN: CPT | Performed by: INTERNAL MEDICINE

## 2023-03-22 PROCEDURE — 1126F AMNT PAIN NOTED NONE PRSNT: CPT | Performed by: INTERNAL MEDICINE

## 2023-03-22 PROCEDURE — 85046 RETICYTE/HGB CONCENTRATE: CPT

## 2023-03-22 PROCEDURE — 83615 LACTATE (LD) (LDH) ENZYME: CPT | Performed by: INTERNAL MEDICINE

## 2023-03-22 PROCEDURE — 1159F MED LIST DOCD IN RCRD: CPT | Performed by: INTERNAL MEDICINE

## 2023-03-22 PROCEDURE — 85025 COMPLETE CBC W/AUTO DIFF WBC: CPT

## 2023-03-22 PROCEDURE — 1160F RVW MEDS BY RX/DR IN RCRD: CPT | Performed by: INTERNAL MEDICINE

## 2023-03-22 PROCEDURE — 36415 COLL VENOUS BLD VENIPUNCTURE: CPT

## 2023-03-22 RX ORDER — METOPROLOL TARTRATE 50 MG/1
25 TABLET, FILM COATED ORAL
COMMUNITY
Start: 2022-09-29

## 2023-03-28 ENCOUNTER — OFFICE VISIT (OUTPATIENT)
Dept: SURGERY | Facility: CLINIC | Age: 68
End: 2023-03-28
Payer: MEDICARE

## 2023-03-28 ENCOUNTER — TELEPHONE (OUTPATIENT)
Dept: ONCOLOGY | Facility: CLINIC | Age: 68
End: 2023-03-28
Payer: MEDICARE

## 2023-03-28 VITALS — WEIGHT: 241.2 LBS | BODY MASS INDEX: 33.77 KG/M2 | HEIGHT: 71 IN

## 2023-03-28 DIAGNOSIS — D12.6 TUBULOVILLOUS ADENOMA OF COLON: Primary | ICD-10-CM

## 2023-03-28 DIAGNOSIS — Z01.810 PREOPERATIVE CARDIOVASCULAR EXAMINATION: ICD-10-CM

## 2023-03-28 DIAGNOSIS — Z86.010 HISTORY OF ADENOMATOUS POLYP OF COLON: ICD-10-CM

## 2023-03-28 PROCEDURE — 1159F MED LIST DOCD IN RCRD: CPT | Performed by: SURGERY

## 2023-03-28 PROCEDURE — 1160F RVW MEDS BY RX/DR IN RCRD: CPT | Performed by: SURGERY

## 2023-03-28 PROCEDURE — 99214 OFFICE O/P EST MOD 30 MIN: CPT | Performed by: SURGERY

## 2023-03-28 RX ORDER — METRONIDAZOLE 500 MG/100ML
500 INJECTION, SOLUTION INTRAVENOUS ONCE
OUTPATIENT
Start: 2023-04-18 | End: 2023-03-28

## 2023-03-28 RX ORDER — CEFAZOLIN SODIUM 2 G/100ML
2 INJECTION, SOLUTION INTRAVENOUS ONCE
OUTPATIENT
Start: 2023-04-18 | End: 2023-03-28

## 2023-03-28 NOTE — TELEPHONE ENCOUNTER
Caller: Aida Bal Jr.    Relationship: Self    Best call back number: [phone]    What is the best time to reach you: ANYTIME    Who are you requesting to speak with (clinical staff, provider, specific staff member): SCHEDULING    What was the call regarding: PT WOULD LIKE TO R/S HIS 4/25 APPTS - HIS CT HAS BEEN MOVED TO 5/17 NOW SO PLEASE MOVE F/U APPT TO AFTER THIS SCAN.     Do you require a callback: YES

## 2023-03-29 NOTE — H&P (VIEW-ONLY)
General Surgery  Established Patient Office Visit    CC: Follow-up recurrent adenomatous colon polyp of the cecum    HPI: The patient is a pleasant 68 y.o. year-old gentleman who returns to see me today for discussion of possible partial colon resection.  I have performed a multitude of colonoscopies (4 total) on him in the last 5 years, all of which have shown a tubulovillous adenoma polyp of the cecum.  Each time I have been able to completely resect the polyp endoscopically but it regrows quickly.  After his most recent colonoscopy in June 2022 showing regrowth yet again, I recommended proceeding with laparoscopic right colon resection.  Shortly after I saw him in the office he experienced a cardiac arrest and required urgent defibrillator placement at [facility].  It was recommended he undergo no elective surgeries for 6 months following defibrillator placement to be sure he would be stable from a cardiac standpoint.  Since I saw him last, he has been doing very well with no further cardiac issues and just saw his cardiologist last week and was cleared for potential elective surgery.  He has developed a recurrence of his CLL, unfortunately, after coming off his Imbruvica during all of his cardiac issues last year.  Dr. Cheng plans to start him on rituximab once he has recovered from his colon resection.  He has a family history of colon cancer in his mother.  He is currently taking a baby aspirin, which she has been taking for many years per his PCP simply due to his age as stroke/MI prophylaxis.    Past Medical History:   Autoimmune hemolytic anemia  Left carotid artery occlusion  Coronary artery disease  Diabetes mellitus  Gout  Hypogonadism  Hyperlipidemia  Hypertension  Chronic lymphocytic leukemia  Obstructive sleep apnea  DVT (left ankle) secondary to COVID  History of adenomatous colon polyps     Past Surgical History:   Knee meniscus repair (2010, Dr. Castro)  Colonoscopy x5 (2017, 2018, 2019, and  June 2022 all by me - all showed tubulovillous adenoma of the cecum)  EGD (June 2022 by me-normal)  Cardiac catheterization (September 2022)  Defibrillator placement (September 2022)    Medications:   Allopurinol 300 mg daily  Amlodipine 10 mg daily  Aspirin 81 mg daily  Atorvastatin 40 mg daily  Vitamin D3 2000 units daily  Doxazosin 8 mg twice daily  Fluticasone nasal spray as needed  Irbesartan-hydrochlorothiazide 300-12.5 mg daily  Lumigan eyedrops nightly  Metoprolol 25 mg daily  Protonix 40 mg daily    Allergies: ACE inhibitors, Candesartan, Pravastatin    Family History: Mother with history of colon cancer, father with history of throat cancer     Social History: , no alcohol use, nonsmoker    ROS:   Constitutional: Negative for fevers or chills  HENT: Positive for postnasal drip; negative for hearing loss or runny nose  Eyes: Negative for vision changes or scleral icterus  Respiratory: Positive for sleep apnea; negative for cough or shortness of breath  Cardiovascular: Negative for chest pain or heart palpitations  Gastrointestinal: Negative for abdominal distension, nausea, vomiting, constipation, melena, or hematochezia  Genitourinary: Negative for hematuria or dysuria  Musculoskeletal: Negative for joint swelling or gait instability  Neurologic: Negative for tremors or seizures  Psychiatric: Negative for suicidal ideations or agitation  All other systems reviewed and negative    Physical Exam:  Height: 180 cm  Weight: 109 kg  BMI: 33  General: No acute distress, well-nourished & well-developed  HEAD: normocephalic, atraumatic  EYES: normal conjunctiva, sclera anicteric  EARS: grossly normal hearing  NECK: supple, no thyromegaly  CARDIOVASCULAR: regular rate and rhythm  RESPIRATORY: clear to auscultation bilaterally  GASTROINTESTINAL: soft, nontender, non-distended  MUSCULOSKELETAL: normal gait and station. No gross extremity abnormalities  PSYCHIATRIC: oriented x3, normal mood and  affect    ASSESSMENT & PLAN  Mr. Bal is a 68 year-old gentleman with a recurrent tubulovillous adenoma of the cecum refractory to endoscopic removal.  I have recommended we schedule him for laparoscopic right colon resection in the operating room at his earliest convenience.  He will need to hold his aspirin for 5 days minimum and perform a MiraLAX bowel prep beginning the evening prior.  We discussed the risks of the procedure which include bleeding, anastomotic leak, wound infection, and postoperative change in his bowel habits tending more towards diarrhea.  Additionally because of his recent cardiac event requiring defibrillator placement, he is at risk for cardiac complications.  We will check a preoperative EKG on him at his preadmission testing appointment.  Despite these risks, he has consented to proceed.  I also discussed this plan with Dr. Tñoito Cheng, his hematologist/oncologist.  I think he would be safe to begin rituximab about 4 weeks postop.    Fannie Caro MD  General, Robotic, and Endoscopic Surgery  [facility]    [address], [address]  [place] [address]  P: [phone]  F: [phone]

## 2023-03-29 NOTE — PROGRESS NOTES
General Surgery  Established Patient Office Visit    CC: Follow-up recurrent adenomatous colon polyp of the cecum    HPI: The patient is a pleasant 68 y.o. year-old gentleman who returns to see me today for discussion of possible partial colon resection.  I have performed a multitude of colonoscopies (4 total) on him in the last 5 years, all of which have shown a tubulovillous adenoma polyp of the cecum.  Each time I have been able to completely resect the polyp endoscopically but it regrows quickly.  After his most recent colonoscopy in June 2022 showing regrowth yet again, I recommended proceeding with laparoscopic right colon resection.  Shortly after I saw him in the office he experienced a cardiac arrest and required urgent defibrillator placement at [facility].  It was recommended he undergo no elective surgeries for 6 months following defibrillator placement to be sure he would be stable from a cardiac standpoint.  Since I saw him last, he has been doing very well with no further cardiac issues and just saw his cardiologist last week and was cleared for potential elective surgery.  He has developed a recurrence of his CLL, unfortunately, after coming off his Imbruvica during all of his cardiac issues last year.  Dr. Cheng plans to start him on rituximab once he has recovered from his colon resection.  He has a family history of colon cancer in his mother.  He is currently taking a baby aspirin, which she has been taking for many years per his PCP simply due to his age as stroke/MI prophylaxis.    Past Medical History:   Autoimmune hemolytic anemia  Left carotid artery occlusion  Coronary artery disease  Diabetes mellitus  Gout  Hypogonadism  Hyperlipidemia  Hypertension  Chronic lymphocytic leukemia  Obstructive sleep apnea  DVT (left ankle) secondary to COVID  History of adenomatous colon polyps     Past Surgical History:   Knee meniscus repair (2010, Dr. Castro)  Colonoscopy x5 (2017, 2018, 2019, and  June 2022 all by me - all showed tubulovillous adenoma of the cecum)  EGD (June 2022 by me-normal)  Cardiac catheterization (September 2022)  Defibrillator placement (September 2022)    Medications:   Allopurinol 300 mg daily  Amlodipine 10 mg daily  Aspirin 81 mg daily  Atorvastatin 40 mg daily  Vitamin D3 2000 units daily  Doxazosin 8 mg twice daily  Fluticasone nasal spray as needed  Irbesartan-hydrochlorothiazide 300-12.5 mg daily  Lumigan eyedrops nightly  Metoprolol 25 mg daily  Protonix 40 mg daily    Allergies: ACE inhibitors, Candesartan, Pravastatin    Family History: Mother with history of colon cancer, father with history of throat cancer     Social History: , no alcohol use, nonsmoker    ROS:   Constitutional: Negative for fevers or chills  HENT: Positive for postnasal drip; negative for hearing loss or runny nose  Eyes: Negative for vision changes or scleral icterus  Respiratory: Positive for sleep apnea; negative for cough or shortness of breath  Cardiovascular: Negative for chest pain or heart palpitations  Gastrointestinal: Negative for abdominal distension, nausea, vomiting, constipation, melena, or hematochezia  Genitourinary: Negative for hematuria or dysuria  Musculoskeletal: Negative for joint swelling or gait instability  Neurologic: Negative for tremors or seizures  Psychiatric: Negative for suicidal ideations or agitation  All other systems reviewed and negative    Physical Exam:  Height: 180 cm  Weight: 109 kg  BMI: 33  General: No acute distress, well-nourished & well-developed  HEAD: normocephalic, atraumatic  EYES: normal conjunctiva, sclera anicteric  EARS: grossly normal hearing  NECK: supple, no thyromegaly  CARDIOVASCULAR: regular rate and rhythm  RESPIRATORY: clear to auscultation bilaterally  GASTROINTESTINAL: soft, nontender, non-distended  MUSCULOSKELETAL: normal gait and station. No gross extremity abnormalities  PSYCHIATRIC: oriented x3, normal mood and  affect    ASSESSMENT & PLAN  Mr. Bal is a 68 year-old gentleman with a recurrent tubulovillous adenoma of the cecum refractory to endoscopic removal.  I have recommended we schedule him for laparoscopic right colon resection in the operating room at his earliest convenience.  He will need to hold his aspirin for 5 days minimum and perform a MiraLAX bowel prep beginning the evening prior.  We discussed the risks of the procedure which include bleeding, anastomotic leak, wound infection, and postoperative change in his bowel habits tending more towards diarrhea.  Additionally because of his recent cardiac event requiring defibrillator placement, he is at risk for cardiac complications.  We will check a preoperative EKG on him at his preadmission testing appointment.  Despite these risks, he has consented to proceed.  I also discussed this plan with Dr. Toñito Cheng, his hematologist/oncologist.  I think he would be safe to begin rituximab about 4 weeks postop.    Fannie Caro MD  General, Robotic, and Endoscopic Surgery  [facility]    [address], [address]  [place] [address]  P: [phone]  F: [phone]

## 2023-04-11 ENCOUNTER — PRE-ADMISSION TESTING (OUTPATIENT)
Dept: PREADMISSION TESTING | Facility: HOSPITAL | Age: 68
End: 2023-04-11
Payer: MEDICARE

## 2023-04-11 VITALS
HEART RATE: 79 BPM | WEIGHT: 239.2 LBS | HEIGHT: 72 IN | DIASTOLIC BLOOD PRESSURE: 69 MMHG | BODY MASS INDEX: 32.4 KG/M2 | RESPIRATION RATE: 16 BRPM | SYSTOLIC BLOOD PRESSURE: 129 MMHG | OXYGEN SATURATION: 98 % | TEMPERATURE: 97.5 F

## 2023-04-11 DIAGNOSIS — D12.6 TUBULOVILLOUS ADENOMA OF COLON: ICD-10-CM

## 2023-04-11 DIAGNOSIS — Z01.810 PREOPERATIVE CARDIOVASCULAR EXAMINATION: ICD-10-CM

## 2023-04-11 DIAGNOSIS — Z86.010 HISTORY OF ADENOMATOUS POLYP OF COLON: ICD-10-CM

## 2023-04-11 LAB
ABO GROUP BLD: NORMAL
ANION GAP SERPL CALCULATED.3IONS-SCNC: 10.7 MMOL/L (ref 5–15)
BLD GP AB SCN SERPL QL: NEGATIVE
BUN SERPL-MCNC: 23 MG/DL (ref 8–23)
BUN/CREAT SERPL: 17.2 (ref 7–25)
CALCIUM SPEC-SCNC: 9.7 MG/DL (ref 8.6–10.5)
CHLORIDE SERPL-SCNC: 106 MMOL/L (ref 98–107)
CO2 SERPL-SCNC: 27.3 MMOL/L (ref 22–29)
CREAT SERPL-MCNC: 1.34 MG/DL (ref 0.76–1.27)
DEPRECATED RDW RBC AUTO: 47.6 FL (ref 37–54)
EGFRCR SERPLBLD CKD-EPI 2021: 57 ML/MIN/1.73
ERYTHROCYTE [DISTWIDTH] IN BLOOD BY AUTOMATED COUNT: 13.1 % (ref 12.3–15.4)
GLUCOSE SERPL-MCNC: 128 MG/DL (ref 65–99)
HCT VFR BLD AUTO: 34.5 % (ref 37.5–51)
HGB BLD-MCNC: 11.4 G/DL (ref 13–17.7)
MCH RBC QN AUTO: 32.9 PG (ref 26.6–33)
MCHC RBC AUTO-ENTMCNC: 33 G/DL (ref 31.5–35.7)
MCV RBC AUTO: 99.4 FL (ref 79–97)
PLATELET # BLD AUTO: 152 10*3/MM3 (ref 140–450)
PMV BLD AUTO: 10.2 FL (ref 6–12)
POTASSIUM SERPL-SCNC: 3.8 MMOL/L (ref 3.5–5.2)
QT INTERVAL: 426 MS
RBC # BLD AUTO: 3.47 10*6/MM3 (ref 4.14–5.8)
RH BLD: POSITIVE
SODIUM SERPL-SCNC: 144 MMOL/L (ref 136–145)
T&S EXPIRATION DATE: NORMAL
WBC NRBC COR # BLD: 12.4 10*3/MM3 (ref 3.4–10.8)

## 2023-04-11 PROCEDURE — 93010 ELECTROCARDIOGRAM REPORT: CPT | Performed by: INTERNAL MEDICINE

## 2023-04-11 PROCEDURE — 86900 BLOOD TYPING SEROLOGIC ABO: CPT

## 2023-04-11 PROCEDURE — 93005 ELECTROCARDIOGRAM TRACING: CPT

## 2023-04-11 PROCEDURE — 85027 COMPLETE CBC AUTOMATED: CPT

## 2023-04-11 PROCEDURE — 80048 BASIC METABOLIC PNL TOTAL CA: CPT

## 2023-04-11 PROCEDURE — 86920 COMPATIBILITY TEST SPIN: CPT

## 2023-04-11 PROCEDURE — 36415 COLL VENOUS BLD VENIPUNCTURE: CPT

## 2023-04-11 PROCEDURE — 86901 BLOOD TYPING SEROLOGIC RH(D): CPT

## 2023-04-11 PROCEDURE — 86850 RBC ANTIBODY SCREEN: CPT

## 2023-04-11 PROCEDURE — 86922 COMPATIBILITY TEST ANTIGLOB: CPT

## 2023-04-11 RX ORDER — CHLORHEXIDINE GLUCONATE 500 MG/1
CLOTH TOPICAL
Status: ON HOLD | COMMUNITY
End: 2023-04-18

## 2023-04-11 NOTE — DISCHARGE INSTRUCTIONS
Take the following medications the morning of surgery:    ALLOPURINOL, AMLODIPINE, CARDURA, PANTOPRAZOLE    If you are on prescription narcotic pain medication to control your pain you may also take that medication the morning of surgery.    General Instructions:  Do not eat solid food after midnight the night before surgery.  You may drink clear liquids day of surgery but must stop at least one hour before your hospital arrival time.  It is beneficial for you to have a clear drink that contains carbohydrates the day of surgery.  We suggest a 12 to 20 ounce bottle of Gatorade or Powerade for non-diabetic patients     Clear liquids are liquids you can see through.  Nothing red in color.     Plain water                               Sports drinks  Sodas                                   Gelatin (Jell-O)  Fruit juices without pulp such as white grape juice and apple juice  Popsicles that contain no fruit or yogurt  Tea or coffee (no cream or milk added)  Gatorade / Powerade  G2 / Powerade Zero      If applicable bring your C-PAP   machine in with you to preop day of surgery.  Bring any papers given to you in the doctor’s office.  Wear clean comfortable clothes.  Do not wear contact lenses, false eyelashes or make-up.  Bring a case for your glasses.   Remove all piercings.  Leave jewelry and any other valuables at home.  The Pre-Admission Testing nurse will instruct you to bring medications if unable to obtain an accurate list in Pre-Admission Testing.   REPORT TO SURGERY ENTRANCE ON 4- AT 1000 AM       If you were given a blood bank ID arm band remember to bring it with you the day of surgery.    Preventing a Surgical Site Infection:  For 2 to 3 days before surgery, avoid shaving with a razor because the razor can irritate skin and make it easier to develop an infection.    Any areas of open skin can increase the risk of a post-operative wound infection by allowing bacteria to enter and travel throughout the  body.  Notify your surgeon if you have any skin wounds / rashes even if it is not near the expected surgical site.  The area will need assessed to determine if surgery should be delayed until it is healed.  The night prior to surgery shower using a fresh bar of anti-bacterial soap (such as Dial) and clean washcloth.  Sleep in a clean bed with clean clothing.  Do not allow pets to sleep with you.  Shower on the morning of surgery using a fresh bar of anti-bacterial soap (such as Dial) and clean washcloth.  Dry with a clean towel and dress in clean clothing.  Ask your surgeon if you will be receiving antibiotics prior to surgery.  Make sure you, your family, and all healthcare providers clean their hands with soap and water or an alcohol based hand  before caring for you or your wound.    Day of surgery:  Your arrival time is approximately two hours before your scheduled surgery time.  Upon arrival, a Pre-op nurse and Anesthesiologist will review your health history, obtain vital signs, and answer questions you may have.  The only belongings needed at this time will be a list of your home medications and if applicable your C-PAP/BI-PAP machine.  A Pre-op nurse will start an IV and you may receive medication in preparation for surgery, including something to help you relax.     Please be aware that surgery does come with discomfort.  We want to make every effort to control your discomfort so please discuss any uncontrolled symptoms with your nurse.   Your doctor will most likely have prescribed pain medications.      CHLORHEXIDINE CLOTH INSTRUCTIONS  The morning of surgery follow these instructions using the Chlorhexidine cloths you've been given.  These steps reduce bacteria on the body.  Do not use the cloths near your eyes, ears mouth, genitalia or on open wounds.  Throw the cloths away after use but do not try to flush them down a toilet.      Open and remove one cloth at a time from the package.    Leave  the cloth unfolded and begin the bathing.  Massage the skin with the cloths using gentle pressure to remove bacteria.  Do not scrub harshly.   Follow the steps below with one 2% CHG cloth per area (6 total cloths).  One cloth for neck, shoulders and chest.  One cloth for both arms, hands, fingers and underarms (do underarms last).  One cloth for the abdomen followed by groin.  One cloth for right leg and foot including between the toes.  One cloth for left leg and foot including between the toes.  The last cloth is to be used for the back of the neck, back and buttocks.    Allow the CHG to air dry 3 minutes on the skin which will give it time to work and decrease the chance of irritation.  The skin may feel sticky until it is dry.  Do not rinse with water or any other liquid or you will lose the beneficial effects of the CHG.  If mild skin irritation occurs, do rinse the skin to remove the CHG.  Report this to the nurse at time of admission.  Do not apply lotions, creams, ointments, deodorants or perfumes after using the clothes. Dress in clean clothes before coming to the hospital.     If you are staying overnight following surgery, you will be transported to your hospital room following the recovery period.  [facility] has all private rooms.    If you have any questions please call Pre-Admission Testing at [phone].  Deductibles and co-payments are collected on the day of service. Please be prepared to pay the required co-pay, deductible or deposit on the day of service as defined by your plan.    Call your surgeon immediately if you experience any of the following symptoms:  Sore Throat  Shortness of Breath or difficulty breathing  Cough  Chills  Body soreness or muscle pain  Headache  Fever  New loss of taste or smell  Do not arrive for your surgery ill.  Your procedure will need to be rescheduled to another time.  You will need to call your physician before the day of surgery to avoid any  unnecessary exposure to hospital staff as well as other patients.

## 2023-04-18 ENCOUNTER — ANESTHESIA (OUTPATIENT)
Dept: PERIOP | Facility: HOSPITAL | Age: 68
End: 2023-04-18
Payer: MEDICARE

## 2023-04-18 ENCOUNTER — HOSPITAL ENCOUNTER (INPATIENT)
Facility: HOSPITAL | Age: 68
LOS: 3 days | Discharge: HOME | End: 2023-04-21
Attending: SURGERY | Admitting: SURGERY
Payer: MEDICARE

## 2023-04-18 ENCOUNTER — ANESTHESIA EVENT (OUTPATIENT)
Dept: PERIOP | Facility: HOSPITAL | Age: 68
End: 2023-04-18
Payer: MEDICARE

## 2023-04-18 DIAGNOSIS — Z86.010 HISTORY OF ADENOMATOUS POLYP OF COLON: ICD-10-CM

## 2023-04-18 DIAGNOSIS — Z01.810 PREOPERATIVE CARDIOVASCULAR EXAMINATION: ICD-10-CM

## 2023-04-18 DIAGNOSIS — D12.6 TUBULOVILLOUS ADENOMA OF COLON: ICD-10-CM

## 2023-04-18 PROBLEM — Z86.0101 ENCOUNTER FOR COLONOSCOPY DUE TO HISTORY OF ADENOMATOUS COLONIC POLYPS: Status: RESOLVED | Noted: 2022-05-12 | Resolved: 2023-04-18

## 2023-04-18 PROBLEM — Z12.11 ENCOUNTER FOR COLONOSCOPY DUE TO HISTORY OF ADENOMATOUS COLONIC POLYPS: Status: RESOLVED | Noted: 2022-05-12 | Resolved: 2023-04-18

## 2023-04-18 PROBLEM — Z12.11 SCREENING FOR COLON CANCER: Status: RESOLVED | Noted: 2022-05-12 | Resolved: 2023-04-18

## 2023-04-18 LAB
GLUCOSE BLDC GLUCOMTR-MCNC: 103 MG/DL (ref 70–130)
GLUCOSE BLDC GLUCOMTR-MCNC: 126 MG/DL (ref 70–130)
QT INTERVAL: 451 MS

## 2023-04-18 PROCEDURE — 88341 IMHCHEM/IMCYTCHM EA ADD ANTB: CPT | Performed by: SURGERY

## 2023-04-18 PROCEDURE — 25010000002 ONDANSETRON PER 1 MG: Performed by: NURSE ANESTHETIST, CERTIFIED REGISTERED

## 2023-04-18 PROCEDURE — 0DTF4ZZ RESECTION OF RIGHT LARGE INTESTINE, PERCUTANEOUS ENDOSCOPIC APPROACH: ICD-10-PCS | Performed by: SURGERY

## 2023-04-18 PROCEDURE — 25010000002 FENTANYL CITRATE (PF) 50 MCG/ML SOLUTION: Performed by: ANESTHESIOLOGY

## 2023-04-18 PROCEDURE — 88360 TUMOR IMMUNOHISTOCHEM/MANUAL: CPT | Performed by: SURGERY

## 2023-04-18 PROCEDURE — 93005 ELECTROCARDIOGRAM TRACING: CPT | Performed by: ANESTHESIOLOGY

## 2023-04-18 PROCEDURE — 25010000002 DEXAMETHASONE PER 1 MG: Performed by: ANESTHESIOLOGY

## 2023-04-18 PROCEDURE — 88342 IMHCHEM/IMCYTCHM 1ST ANTB: CPT | Performed by: SURGERY

## 2023-04-18 PROCEDURE — 25010000002 FENTANYL CITRATE (PF) 50 MCG/ML SOLUTION

## 2023-04-18 PROCEDURE — 25010000002 CEFAZOLIN IN DEXTROSE 2-4 GM/100ML-% SOLUTION: Performed by: SURGERY

## 2023-04-18 PROCEDURE — 82962 GLUCOSE BLOOD TEST: CPT

## 2023-04-18 PROCEDURE — 44204 LAPARO PARTIAL COLECTOMY: CPT | Performed by: SURGERY

## 2023-04-18 PROCEDURE — 25010000002 HYDROMORPHONE 1 MG/ML SOLUTION: Performed by: ANESTHESIOLOGY

## 2023-04-18 PROCEDURE — 25010000002 KETOROLAC TROMETHAMINE PER 15 MG: Performed by: ANESTHESIOLOGY

## 2023-04-18 PROCEDURE — 44204 LAPARO PARTIAL COLECTOMY: CPT | Performed by: SPECIALIST/TECHNOLOGIST, OTHER

## 2023-04-18 PROCEDURE — 25010000002 HYDROMORPHONE PER 4 MG: Performed by: NURSE ANESTHETIST, CERTIFIED REGISTERED

## 2023-04-18 PROCEDURE — 88309 TISSUE EXAM BY PATHOLOGIST: CPT | Performed by: SURGERY

## 2023-04-18 PROCEDURE — 25010000002 FENTANYL CITRATE (PF) 50 MCG/ML SOLUTION: Performed by: NURSE ANESTHETIST, CERTIFIED REGISTERED

## 2023-04-18 PROCEDURE — 25010000002 ONDANSETRON PER 1 MG: Performed by: ANESTHESIOLOGY

## 2023-04-18 PROCEDURE — 93010 ELECTROCARDIOGRAM REPORT: CPT | Performed by: INTERNAL MEDICINE

## 2023-04-18 PROCEDURE — 25010000002 PHENYLEPHRINE 10 MG/ML SOLUTION: Performed by: ANESTHESIOLOGY

## 2023-04-18 PROCEDURE — 25010000002 PROPOFOL 10 MG/ML EMULSION: Performed by: ANESTHESIOLOGY

## 2023-04-18 DEVICE — PROXIMATE RELOADABLE LINEAR CUTTER WITH SAFETY LOCK-OUT, 75MM
Type: IMPLANTABLE DEVICE | Site: ABDOMEN | Status: FUNCTIONAL
Brand: PROXIMATE

## 2023-04-18 DEVICE — HORIZON TI ML 6 CLIPS/CART
Type: IMPLANTABLE DEVICE | Site: ABDOMEN | Status: FUNCTIONAL
Brand: WECK

## 2023-04-18 DEVICE — PROXIMATE LINEAR CUTTER RELOAD, BLUE, 75MM
Type: IMPLANTABLE DEVICE | Site: ABDOMEN | Status: FUNCTIONAL
Brand: PROXIMATE

## 2023-04-18 RX ORDER — LIDOCAINE HYDROCHLORIDE 10 MG/ML
0.5 INJECTION, SOLUTION EPIDURAL; INFILTRATION; INTRACAUDAL; PERINEURAL ONCE AS NEEDED
Status: CANCELLED | OUTPATIENT
Start: 2023-04-18

## 2023-04-18 RX ORDER — SODIUM CHLORIDE 9 MG/ML
INJECTION, SOLUTION INTRAVENOUS CONTINUOUS PRN
Status: COMPLETED | OUTPATIENT
Start: 2023-04-18 | End: 2023-04-18

## 2023-04-18 RX ORDER — PROPOFOL 10 MG/ML
VIAL (ML) INTRAVENOUS AS NEEDED
Status: DISCONTINUED | OUTPATIENT
Start: 2023-04-18 | End: 2023-04-18 | Stop reason: SURG

## 2023-04-18 RX ORDER — DROPERIDOL 2.5 MG/ML
0.62 INJECTION, SOLUTION INTRAMUSCULAR; INTRAVENOUS
Status: DISCONTINUED | OUTPATIENT
Start: 2023-04-18 | End: 2023-04-18 | Stop reason: HOSPADM

## 2023-04-18 RX ORDER — IPRATROPIUM BROMIDE AND ALBUTEROL SULFATE 2.5; .5 MG/3ML; MG/3ML
3 SOLUTION RESPIRATORY (INHALATION) ONCE AS NEEDED
Status: DISCONTINUED | OUTPATIENT
Start: 2023-04-18 | End: 2023-04-18 | Stop reason: HOSPADM

## 2023-04-18 RX ORDER — OXYCODONE HYDROCHLORIDE AND ACETAMINOPHEN 5; 325 MG/1; MG/1
1 TABLET ORAL EVERY 4 HOURS PRN
Status: DISCONTINUED | OUTPATIENT
Start: 2023-04-18 | End: 2023-04-21 | Stop reason: HOSPADM

## 2023-04-18 RX ORDER — EPHEDRINE SULFATE 50 MG/ML
5 INJECTION, SOLUTION INTRAVENOUS ONCE AS NEEDED
Status: DISCONTINUED | OUTPATIENT
Start: 2023-04-18 | End: 2023-04-18 | Stop reason: HOSPADM

## 2023-04-18 RX ORDER — METRONIDAZOLE 500 MG/100ML
500 INJECTION, SOLUTION INTRAVENOUS ONCE
Status: COMPLETED | OUTPATIENT
Start: 2023-04-18 | End: 2023-04-18

## 2023-04-18 RX ORDER — FENTANYL CITRATE 50 UG/ML
50 INJECTION, SOLUTION INTRAMUSCULAR; INTRAVENOUS
Status: DISCONTINUED | OUTPATIENT
Start: 2023-04-18 | End: 2023-04-18 | Stop reason: HOSPADM

## 2023-04-18 RX ORDER — BUPIVACAINE HYDROCHLORIDE AND EPINEPHRINE 5; 5 MG/ML; UG/ML
INJECTION, SOLUTION EPIDURAL; INTRACAUDAL; PERINEURAL AS NEEDED
Status: DISCONTINUED | OUTPATIENT
Start: 2023-04-18 | End: 2023-04-18 | Stop reason: HOSPADM

## 2023-04-18 RX ORDER — HYDRALAZINE HYDROCHLORIDE 20 MG/ML
5 INJECTION INTRAMUSCULAR; INTRAVENOUS
Status: DISCONTINUED | OUTPATIENT
Start: 2023-04-18 | End: 2023-04-18 | Stop reason: HOSPADM

## 2023-04-18 RX ORDER — SODIUM CHLORIDE 0.9 % (FLUSH) 0.9 %
3-10 SYRINGE (ML) INJECTION AS NEEDED
Status: DISCONTINUED | OUTPATIENT
Start: 2023-04-18 | End: 2023-04-18 | Stop reason: HOSPADM

## 2023-04-18 RX ORDER — SODIUM CHLORIDE 0.9 % (FLUSH) 0.9 %
3 SYRINGE (ML) INJECTION EVERY 12 HOURS SCHEDULED
Status: DISCONTINUED | OUTPATIENT
Start: 2023-04-18 | End: 2023-04-18 | Stop reason: HOSPADM

## 2023-04-18 RX ORDER — AMLODIPINE BESYLATE 10 MG/1
10 TABLET ORAL EVERY MORNING
Status: DISCONTINUED | OUTPATIENT
Start: 2023-04-19 | End: 2023-04-21 | Stop reason: HOSPADM

## 2023-04-18 RX ORDER — HYDROMORPHONE HYDROCHLORIDE 1 MG/ML
0.5 INJECTION, SOLUTION INTRAMUSCULAR; INTRAVENOUS; SUBCUTANEOUS
Status: DISCONTINUED | OUTPATIENT
Start: 2023-04-18 | End: 2023-04-18 | Stop reason: HOSPADM

## 2023-04-18 RX ORDER — PANTOPRAZOLE SODIUM 40 MG/1
40 TABLET, DELAYED RELEASE ORAL DAILY
Status: DISCONTINUED | OUTPATIENT
Start: 2023-04-18 | End: 2023-04-21 | Stop reason: HOSPADM

## 2023-04-18 RX ORDER — NALOXONE HCL 0.4 MG/ML
0.2 VIAL (ML) INJECTION AS NEEDED
Status: DISCONTINUED | OUTPATIENT
Start: 2023-04-18 | End: 2023-04-18 | Stop reason: HOSPADM

## 2023-04-18 RX ORDER — FAMOTIDINE 10 MG/ML
20 INJECTION, SOLUTION INTRAVENOUS ONCE
Status: CANCELLED | OUTPATIENT
Start: 2023-04-18 | End: 2023-04-18

## 2023-04-18 RX ORDER — ONDANSETRON 2 MG/ML
INJECTION INTRAMUSCULAR; INTRAVENOUS AS NEEDED
Status: DISCONTINUED | OUTPATIENT
Start: 2023-04-18 | End: 2023-04-18 | Stop reason: SURG

## 2023-04-18 RX ORDER — OXYCODONE AND ACETAMINOPHEN 7.5; 325 MG/1; MG/1
1 TABLET ORAL EVERY 4 HOURS PRN
Status: DISCONTINUED | OUTPATIENT
Start: 2023-04-18 | End: 2023-04-18 | Stop reason: HOSPADM

## 2023-04-18 RX ORDER — HYDROCHLOROTHIAZIDE 12.5 MG/1
12.5 TABLET ORAL
Status: DISCONTINUED | OUTPATIENT
Start: 2023-04-19 | End: 2023-04-21 | Stop reason: HOSPADM

## 2023-04-18 RX ORDER — PROMETHAZINE HYDROCHLORIDE 25 MG/1
25 SUPPOSITORY RECTAL ONCE AS NEEDED
Status: DISCONTINUED | OUTPATIENT
Start: 2023-04-18 | End: 2023-04-18 | Stop reason: HOSPADM

## 2023-04-18 RX ORDER — ALVIMOPAN 12 MG/1
12 CAPSULE ORAL ONCE
Status: COMPLETED | OUTPATIENT
Start: 2023-04-18 | End: 2023-04-18

## 2023-04-18 RX ORDER — ALVIMOPAN 12 MG/1
12 CAPSULE ORAL ONCE
Status: DISCONTINUED | OUTPATIENT
Start: 2023-04-18 | End: 2023-04-18

## 2023-04-18 RX ORDER — CEFAZOLIN SODIUM 2 G/100ML
2 INJECTION, SOLUTION INTRAVENOUS ONCE
Status: COMPLETED | OUTPATIENT
Start: 2023-04-18 | End: 2023-04-18

## 2023-04-18 RX ORDER — FENTANYL CITRATE 50 UG/ML
INJECTION, SOLUTION INTRAMUSCULAR; INTRAVENOUS
Status: COMPLETED
Start: 2023-04-18 | End: 2023-04-18

## 2023-04-18 RX ORDER — SODIUM CHLORIDE, SODIUM LACTATE, POTASSIUM CHLORIDE, CALCIUM CHLORIDE 600; 310; 30; 20 MG/100ML; MG/100ML; MG/100ML; MG/100ML
9 INJECTION, SOLUTION INTRAVENOUS CONTINUOUS
Status: DISCONTINUED | OUTPATIENT
Start: 2023-04-18 | End: 2023-04-18

## 2023-04-18 RX ORDER — ALLOPURINOL 300 MG/1
300 TABLET ORAL DAILY
Status: DISCONTINUED | OUTPATIENT
Start: 2023-04-18 | End: 2023-04-21 | Stop reason: HOSPADM

## 2023-04-18 RX ORDER — ATORVASTATIN CALCIUM 20 MG/1
20 TABLET, FILM COATED ORAL NIGHTLY
Status: DISCONTINUED | OUTPATIENT
Start: 2023-04-18 | End: 2023-04-21 | Stop reason: HOSPADM

## 2023-04-18 RX ORDER — DIPHENHYDRAMINE HYDROCHLORIDE 50 MG/ML
12.5 INJECTION INTRAMUSCULAR; INTRAVENOUS
Status: DISCONTINUED | OUTPATIENT
Start: 2023-04-18 | End: 2023-04-18 | Stop reason: HOSPADM

## 2023-04-18 RX ORDER — LIDOCAINE HYDROCHLORIDE 10 MG/ML
0.5 INJECTION, SOLUTION EPIDURAL; INFILTRATION; INTRACAUDAL; PERINEURAL ONCE AS NEEDED
Status: DISCONTINUED | OUTPATIENT
Start: 2023-04-18 | End: 2023-04-18 | Stop reason: HOSPADM

## 2023-04-18 RX ORDER — NALOXONE HCL 0.4 MG/ML
0.4 VIAL (ML) INJECTION
Status: DISCONTINUED | OUTPATIENT
Start: 2023-04-18 | End: 2023-04-20

## 2023-04-18 RX ORDER — PHENYLEPHRINE HYDROCHLORIDE 10 MG/ML
INJECTION INTRAVENOUS AS NEEDED
Status: DISCONTINUED | OUTPATIENT
Start: 2023-04-18 | End: 2023-04-18 | Stop reason: SURG

## 2023-04-18 RX ORDER — MIDAZOLAM HYDROCHLORIDE 1 MG/ML
0.5 INJECTION INTRAMUSCULAR; INTRAVENOUS
Status: DISCONTINUED | OUTPATIENT
Start: 2023-04-18 | End: 2023-04-18 | Stop reason: HOSPADM

## 2023-04-18 RX ORDER — FENTANYL CITRATE 50 UG/ML
50 INJECTION, SOLUTION INTRAMUSCULAR; INTRAVENOUS
Status: CANCELLED | OUTPATIENT
Start: 2023-04-18

## 2023-04-18 RX ORDER — ROCURONIUM BROMIDE 10 MG/ML
INJECTION, SOLUTION INTRAVENOUS AS NEEDED
Status: DISCONTINUED | OUTPATIENT
Start: 2023-04-18 | End: 2023-04-18 | Stop reason: SURG

## 2023-04-18 RX ORDER — MIDAZOLAM HYDROCHLORIDE 1 MG/ML
0.5 INJECTION INTRAMUSCULAR; INTRAVENOUS
Status: CANCELLED | OUTPATIENT
Start: 2023-04-18

## 2023-04-18 RX ORDER — SODIUM CHLORIDE 0.9 % (FLUSH) 0.9 %
10 SYRINGE (ML) INJECTION EVERY 12 HOURS SCHEDULED
Status: DISCONTINUED | OUTPATIENT
Start: 2023-04-18 | End: 2023-04-21 | Stop reason: HOSPADM

## 2023-04-18 RX ORDER — HYDROCODONE BITARTRATE AND ACETAMINOPHEN 7.5; 325 MG/1; MG/1
1 TABLET ORAL ONCE AS NEEDED
Status: DISCONTINUED | OUTPATIENT
Start: 2023-04-18 | End: 2023-04-18 | Stop reason: HOSPADM

## 2023-04-18 RX ORDER — HYDROMORPHONE HYDROCHLORIDE 1 MG/ML
0.5 INJECTION, SOLUTION INTRAMUSCULAR; INTRAVENOUS; SUBCUTANEOUS
Status: DISCONTINUED | OUTPATIENT
Start: 2023-04-18 | End: 2023-04-20

## 2023-04-18 RX ORDER — FAMOTIDINE 10 MG/ML
20 INJECTION, SOLUTION INTRAVENOUS ONCE
Status: DISCONTINUED | OUTPATIENT
Start: 2023-04-18 | End: 2023-04-18 | Stop reason: HOSPADM

## 2023-04-18 RX ORDER — FLUMAZENIL 0.1 MG/ML
0.2 INJECTION INTRAVENOUS AS NEEDED
Status: DISCONTINUED | OUTPATIENT
Start: 2023-04-18 | End: 2023-04-18 | Stop reason: HOSPADM

## 2023-04-18 RX ORDER — KETOROLAC TROMETHAMINE 30 MG/ML
INJECTION, SOLUTION INTRAMUSCULAR; INTRAVENOUS AS NEEDED
Status: DISCONTINUED | OUTPATIENT
Start: 2023-04-18 | End: 2023-04-18 | Stop reason: SURG

## 2023-04-18 RX ORDER — TERAZOSIN 5 MG/1
5 CAPSULE ORAL EVERY 12 HOURS SCHEDULED
Status: DISCONTINUED | OUTPATIENT
Start: 2023-04-18 | End: 2023-04-21 | Stop reason: HOSPADM

## 2023-04-18 RX ORDER — DEXAMETHASONE SODIUM PHOSPHATE 4 MG/ML
INJECTION, SOLUTION INTRA-ARTICULAR; INTRALESIONAL; INTRAMUSCULAR; INTRAVENOUS; SOFT TISSUE AS NEEDED
Status: DISCONTINUED | OUTPATIENT
Start: 2023-04-18 | End: 2023-04-18 | Stop reason: SURG

## 2023-04-18 RX ORDER — DEXTROSE, SODIUM CHLORIDE, AND POTASSIUM CHLORIDE 5; .45; .15 G/100ML; G/100ML; G/100ML
75 INJECTION INTRAVENOUS CONTINUOUS
Status: DISCONTINUED | OUTPATIENT
Start: 2023-04-18 | End: 2023-04-19

## 2023-04-18 RX ORDER — ONDANSETRON 2 MG/ML
4 INJECTION INTRAMUSCULAR; INTRAVENOUS ONCE AS NEEDED
Status: COMPLETED | OUTPATIENT
Start: 2023-04-18 | End: 2023-04-18

## 2023-04-18 RX ORDER — SODIUM CHLORIDE 0.9 % (FLUSH) 0.9 %
3 SYRINGE (ML) INJECTION EVERY 12 HOURS SCHEDULED
Status: CANCELLED | OUTPATIENT
Start: 2023-04-18

## 2023-04-18 RX ORDER — SODIUM CHLORIDE 9 MG/ML
40 INJECTION, SOLUTION INTRAVENOUS AS NEEDED
Status: DISCONTINUED | OUTPATIENT
Start: 2023-04-18 | End: 2023-04-21 | Stop reason: HOSPADM

## 2023-04-18 RX ORDER — FAMOTIDINE 10 MG/ML
20 INJECTION, SOLUTION INTRAVENOUS ONCE
Status: COMPLETED | OUTPATIENT
Start: 2023-04-18 | End: 2023-04-18

## 2023-04-18 RX ORDER — GLYCOPYRROLATE 0.2 MG/ML
0.1 INJECTION INTRAMUSCULAR; INTRAVENOUS ONCE
Status: DISCONTINUED | OUTPATIENT
Start: 2023-04-18 | End: 2023-04-18 | Stop reason: HOSPADM

## 2023-04-18 RX ORDER — SODIUM CHLORIDE 0.9 % (FLUSH) 0.9 %
10 SYRINGE (ML) INJECTION AS NEEDED
Status: DISCONTINUED | OUTPATIENT
Start: 2023-04-18 | End: 2023-04-21 | Stop reason: HOSPADM

## 2023-04-18 RX ORDER — ENOXAPARIN SODIUM 100 MG/ML
40 INJECTION SUBCUTANEOUS DAILY
Status: DISCONTINUED | OUTPATIENT
Start: 2023-04-19 | End: 2023-04-21 | Stop reason: HOSPADM

## 2023-04-18 RX ORDER — ALVIMOPAN 12 MG/1
12 CAPSULE ORAL 2 TIMES DAILY
Status: DISCONTINUED | OUTPATIENT
Start: 2023-04-18 | End: 2023-04-21

## 2023-04-18 RX ORDER — SODIUM CHLORIDE, SODIUM LACTATE, POTASSIUM CHLORIDE, CALCIUM CHLORIDE 600; 310; 30; 20 MG/100ML; MG/100ML; MG/100ML; MG/100ML
9 INJECTION, SOLUTION INTRAVENOUS CONTINUOUS
Status: CANCELLED | OUTPATIENT
Start: 2023-04-18

## 2023-04-18 RX ORDER — LOSARTAN POTASSIUM 100 MG/1
100 TABLET ORAL
Status: DISCONTINUED | OUTPATIENT
Start: 2023-04-19 | End: 2023-04-21 | Stop reason: HOSPADM

## 2023-04-18 RX ORDER — ONDANSETRON 2 MG/ML
4 INJECTION INTRAMUSCULAR; INTRAVENOUS EVERY 6 HOURS PRN
Status: DISCONTINUED | OUTPATIENT
Start: 2023-04-18 | End: 2023-04-21 | Stop reason: HOSPADM

## 2023-04-18 RX ORDER — PROMETHAZINE HYDROCHLORIDE 25 MG/1
25 TABLET ORAL ONCE AS NEEDED
Status: DISCONTINUED | OUTPATIENT
Start: 2023-04-18 | End: 2023-04-18 | Stop reason: HOSPADM

## 2023-04-18 RX ORDER — LABETALOL HYDROCHLORIDE 5 MG/ML
5 INJECTION, SOLUTION INTRAVENOUS
Status: DISCONTINUED | OUTPATIENT
Start: 2023-04-18 | End: 2023-04-18 | Stop reason: HOSPADM

## 2023-04-18 RX ORDER — SODIUM CHLORIDE 0.9 % (FLUSH) 0.9 %
3-10 SYRINGE (ML) INJECTION AS NEEDED
Status: CANCELLED | OUTPATIENT
Start: 2023-04-18

## 2023-04-18 RX ADMIN — HYDROMORPHONE HYDROCHLORIDE 0.5 MG: 1 INJECTION, SOLUTION INTRAMUSCULAR; INTRAVENOUS; SUBCUTANEOUS at 14:44

## 2023-04-18 RX ADMIN — OXYCODONE AND ACETAMINOPHEN 1 TABLET: 5; 325 TABLET ORAL at 21:59

## 2023-04-18 RX ADMIN — HYDROMORPHONE HYDROCHLORIDE 0.5 MG: 1 INJECTION, SOLUTION INTRAMUSCULAR; INTRAVENOUS; SUBCUTANEOUS at 13:04

## 2023-04-18 RX ADMIN — SUGAMMADEX 200 MG: 100 INJECTION, SOLUTION INTRAVENOUS at 14:03

## 2023-04-18 RX ADMIN — ONDANSETRON 4 MG: 2 INJECTION INTRAMUSCULAR; INTRAVENOUS at 14:04

## 2023-04-18 RX ADMIN — FAMOTIDINE 20 MG: 10 INJECTION INTRAVENOUS at 12:03

## 2023-04-18 RX ADMIN — ALVIMOPAN 12 MG: 12 CAPSULE ORAL at 11:08

## 2023-04-18 RX ADMIN — DEXAMETHASONE SODIUM PHOSPHATE 8 MG: 4 INJECTION, SOLUTION INTRA-ARTICULAR; INTRALESIONAL; INTRAMUSCULAR; INTRAVENOUS; SOFT TISSUE at 12:32

## 2023-04-18 RX ADMIN — HYDROMORPHONE HYDROCHLORIDE 0.5 MG: 1 INJECTION, SOLUTION INTRAMUSCULAR; INTRAVENOUS; SUBCUTANEOUS at 15:16

## 2023-04-18 RX ADMIN — METRONIDAZOLE 500 MG: 500 INJECTION, SOLUTION INTRAVENOUS at 12:01

## 2023-04-18 RX ADMIN — PHENYLEPHRINE HYDROCHLORIDE 100 MCG: 10 INJECTION, SOLUTION INTRAVENOUS at 12:24

## 2023-04-18 RX ADMIN — PROPOFOL 200 MG: 10 INJECTION, EMULSION INTRAVENOUS at 12:19

## 2023-04-18 RX ADMIN — FENTANYL CITRATE 50 MCG: 50 INJECTION, SOLUTION INTRAMUSCULAR; INTRAVENOUS at 14:31

## 2023-04-18 RX ADMIN — CEFAZOLIN SODIUM 2 G: 2 INJECTION, SOLUTION INTRAVENOUS at 12:00

## 2023-04-18 RX ADMIN — FENTANYL CITRATE 50 MCG: 50 INJECTION, SOLUTION INTRAMUSCULAR; INTRAVENOUS at 14:56

## 2023-04-18 RX ADMIN — KETOROLAC TROMETHAMINE 30 MG: 30 INJECTION, SOLUTION INTRAMUSCULAR at 14:04

## 2023-04-18 RX ADMIN — HYDROMORPHONE HYDROCHLORIDE 0.5 MG: 1 INJECTION, SOLUTION INTRAMUSCULAR; INTRAVENOUS; SUBCUTANEOUS at 15:46

## 2023-04-18 RX ADMIN — ROCURONIUM BROMIDE 20 MG: 50 INJECTION INTRAVENOUS at 12:56

## 2023-04-18 RX ADMIN — ONDANSETRON 4 MG: 2 INJECTION INTRAMUSCULAR; INTRAVENOUS at 14:36

## 2023-04-18 RX ADMIN — Medication 10 ML: at 21:56

## 2023-04-18 RX ADMIN — SODIUM CHLORIDE, POTASSIUM CHLORIDE, SODIUM LACTATE AND CALCIUM CHLORIDE 9 ML/HR: 600; 310; 30; 20 INJECTION, SOLUTION INTRAVENOUS at 12:05

## 2023-04-18 RX ADMIN — TERAZOSIN HYDROCHLORIDE 5 MG: 5 CAPSULE ORAL at 21:56

## 2023-04-18 RX ADMIN — ROCURONIUM BROMIDE 50 MG: 50 INJECTION INTRAVENOUS at 12:20

## 2023-04-18 RX ADMIN — ALVIMOPAN 12 MG: 12 CAPSULE ORAL at 21:56

## 2023-04-18 RX ADMIN — FENTANYL CITRATE 50 MCG: 50 INJECTION, SOLUTION INTRAMUSCULAR; INTRAVENOUS at 12:28

## 2023-04-18 RX ADMIN — ATORVASTATIN CALCIUM 20 MG: 20 TABLET, FILM COATED ORAL at 21:56

## 2023-04-18 RX ADMIN — POTASSIUM CHLORIDE, DEXTROSE MONOHYDRATE AND SODIUM CHLORIDE 75 ML/HR: 150; 5; 450 INJECTION, SOLUTION INTRAVENOUS at 17:45

## 2023-04-18 RX ADMIN — PANTOPRAZOLE SODIUM 40 MG: 40 TABLET, DELAYED RELEASE ORAL at 17:45

## 2023-04-18 RX ADMIN — PHENYLEPHRINE HYDROCHLORIDE 100 MCG: 10 INJECTION, SOLUTION INTRAVENOUS at 12:29

## 2023-04-18 NOTE — ANESTHESIA POSTPROCEDURE EVALUATION
Patient: Aida Bal Jr.    Procedure Summary     Date: 04/18/23 Room / Location: [facility] OR 03 / [facility] MAIN OR    Anesthesia Start: 1210 Anesthesia Stop: 1416    Procedure: Laparoscopic right colon resection (Right: Abdomen) Diagnosis:       Tubulovillous adenoma of colon      History of adenomatous polyp of colon      Preoperative cardiovascular examination      (Tubulovillous adenoma of colon [D12.6])      (History of adenomatous polyp of colon [Z86.010])      (Preoperative cardiovascular examination [Z01.810])    Surgeons: Fannie Caro MD Provider: Gordo Brower MD    Anesthesia Type: general ASA Status: 4          Anesthesia Type: general    Vitals  Vitals Value Taken Time   /57 04/18/23 1446   Temp 37 °C (98.6 °F) 04/18/23 1415   Pulse 63 04/18/23 1452   Resp 18 04/18/23 1425   SpO2 100 % 04/18/23 1452   Vitals shown include unvalidated device data.        Post Anesthesia Care and Evaluation    Patient location during evaluation: PACU  Patient participation: complete - patient participated  Level of consciousness: awake  Pain management: adequate    Airway patency: patent  Anesthetic complications: No anesthetic complications  PONV Status: none  Cardiovascular status: acceptable  Respiratory status: acceptable  Hydration status: acceptable    Comments: Patient seen and examined postoperatively; vital signs stable; SpO2 greater than or equal to 90%; cardiopulmonary status stable; nausea/vomiting adequately controlled; pain adequately controlled; no apparent anesthesia complications; patient discharged from anesthesia care when discharge criteria were met

## 2023-04-18 NOTE — PLAN OF CARE
Goal Outcome Evaluation:           Progress: improving  Outcome Evaluation: VSS. A&Ox4. drsg CDI. Cardiac monitoring in use. Pt tolerating full liquid diet well at this time. D5 1/2NS infusing through L hand IV. family to remain at bedside.

## 2023-04-18 NOTE — OP NOTE
Operative Note :  Fannie Caro MD      Aida Bal Jr.  1955    Procedure Date: 04/18/23    Pre-op Diagnosis:  Recurrent tubulovillous adenoma of colon [D12.6]    Post-Operative Diagnosis:  Recurrent tubulovillous adenoma of colon [D12.6]    Procedure:   Laparoscopic right colon resection    Surgeon: Fannie Caro MD    Assistant: Cyndee Colunga CSA (Cyndee was responsible for suctioning, retracting, suturing of all surgical incisions, and application of sterile dressings at the completion of the case)    Anesthesia:  General (general endotracheal tube)    Estimated Blood Loss: minimal    Specimens: Right colon    Complications: None    Indications:  The patient is a 68-year-old gentleman who has undergone 5 previous colonoscopies, all of which have demonstrated a recurrent adenomatous colon polyp of the cecum refractory to previous endoscopic removal.  Most recent colonoscopy revealed a persistent tubulovillous adenoma of the cecum with no evidence of high-grade dysplasia.  Given the recurrent nature of his polyp, I would recommend proceeding with laparoscopic partial colectomy to remove the polyp in its entirety for good.  He understands the risks of the procedure include bleeding, wound infection, anastomotic leak, and possible damage to surrounding structures such as the right ureter, small bowel, or stomach.  Despite these risks, he has consented to proceed    Description of procedure:  The patient was brought to the operating room and placed on the OR table in supine position.  An endotracheal tube was inserted and general anesthesia induced.  His left arm was tucked at his side and a Leiva catheter inserted into the urinary bladder using sterile technique.  His abdominal wall was prepped and draped in a sterile fashion and a surgical timeout completed.  A supraumbilical skin incision was made after instilling 0.5% Marcaine with epinephrine.  The abdominal wall fascia was grasped and elevated and  a longitudinal fasciotomy made.  A Brown trocar was inserted and secured with 0 Vicryl stay sutures.  The abdomen was insufflated and 2 additional 5 mm trocars were placed in the infraumbilical and left lower quadrant spaces under direct visualization.  The cecum and appendix were visualized in the right lower quadrant.  Lateral attachments of the appendix and cecum were divided using the harmonic scalpel and dissection was carried superiorly along the right paracolic gutter dividing the peritoneum at the white line of Toldt.  The right colon was gently swept medially and the hepatic flexure completely mobilized in a similar fashion with the harmonic scalpel, revealing the duodenum beneath the hepatic flexure.  Once the entirety of the right colon and terminal ileum as well as appendix had been mobilized medially across midline and the gastrocolic omentum had been divided beyond midline closer to splenic flexure, the Brown was removed and that incision extended for a length of about 4 cm.  A medium [place] wound protector was inserted and the ascending colon eviscerated.  The terminal ileum was divided using a 75 mm blue loaded JOSE ARMANDO stapler.  The hepatic flexure was divided similarly just proximal to the middle colic vessels also using a blue load on the 75 mm JOSE ARMANDO stapler.  The adjoining mesentery was divided using the harmonic scalpel down to the ileocolic vascular pedicle which was divided between Silvia clamps.  The proximal end of the ileocolic vessels was oversewn using a 0 silk figure-of-eight suture after identifying the right ureter and ensuring it was well away from the dissection.  The staple lines across terminal ileum and transverse colon were oversewn using 3-0 silk Lembert style sutures.  A side-to-side stapled anastomosis was then fashioned between ileum and colon using a blue load on the JOSE ARMANDO stapler.  The common enterotomy channel was closed in 2 layers using running 3-0 PDS suture forward and back  upon itself.  The mesenteric defect was closed using interrupted 3-0 silk figure-of-eight sutures.  All dirty instruments and gloves were then set aside for clean versions of each.  The bowel was returned to the abdominal cavity and the fascia in the midline closed using a running 0 PDS suture.  The abdomen was reinsufflated and laparoscope inserted, showing no evidence of bleeding within the peritoneal cavity.  All trocars were then removed and the abdomen was desufflated.  Skin incisions were closed using a combination of 3-0 and 4-0 Vicryl suture and dressed with topical Exofin glue.  He was then extubated, his Leiva catheter was removed, and he transferred to PACU in stable condition with all counts correct per nursing.    This procedure was not performed to treat colon cancer through resection        Fannie Caro MD  General, Robotic, and Endoscopic Surgery  [facility]    [address], [address]  [place] [address]  P: [phone]  F: [phone]

## 2023-04-18 NOTE — ANESTHESIA PROCEDURE NOTES
Airway  Urgency: elective    Date/Time: 4/18/2023 12:21 PM  Airway not difficult    General Information and Staff    Patient location during procedure: OR  Anesthesiologist: Gordo Brower MD    Indications and Patient Condition  Indications for airway management: airway protection    Preoxygenated: yes  MILS not maintained throughout  Mask difficulty assessment: 1 - vent by mask    Final Airway Details  Final airway type: endotracheal airway      Successful airway: ETT  Cuffed: yes   Successful intubation technique: direct laryngoscopy  Facilitating devices/methods: intubating stylet  Endotracheal tube insertion site: oral  Blade: Aniya  Blade size: 4  ETT size (mm): 7.5  Cormack-Lehane Classification: grade I - full view of glottis  Placement verified by: capnometry   Measured from: teeth  ETT/EBT  to teeth (cm): 21  Number of attempts at approach: 1  Assessment: lips, teeth, and gum same as pre-op and atraumatic intubation

## 2023-04-18 NOTE — ANESTHESIA PREPROCEDURE EVALUATION
Anesthesia Evaluation     Patient summary reviewed and Nursing notes reviewed                Airway   Mallampati: II  TM distance: >3 FB  Neck ROM: full  Dental      Pulmonary    (+) sleep apnea on CPAP,   Cardiovascular     ECG reviewed  PT is on anticoagulation therapy  Rhythm: irregular  Rate: normal    (+) hypertension, CAD, dysrhythmias PAC, DVT, hyperlipidemia,  carotid artery disease      Neuro/Psych  (+) TIA,    GI/Hepatic/Renal/Endo    (+)   renal disease, diabetes mellitus,     Musculoskeletal (-) negative ROS    Abdominal    Substance History - negative use     OB/GYN negative ob/gyn ROS         Other   blood dyscrasia anemia,   history of cancer active                    Anesthesia Plan    ASA 4     general     (AICD  CLL    I have reviewed the patient's history with the patient and the chart, including all pertinent laboratory results and imaging. I have explained the risks of anesthesia including but not limited to dental damage, corneal abrasion, nerve injury, MI, stroke, and death. Questions asked and answered. Anesthetic plan discussed with patient and team as indicated. Patient expressed understanding of the above.  )  intravenous induction     Anesthetic plan, risks, benefits, and alternatives have been provided, discussed and informed consent has been obtained with: patient and spouse/significant other.        CODE STATUS:

## 2023-04-19 LAB
ANION GAP SERPL CALCULATED.3IONS-SCNC: 9.3 MMOL/L (ref 5–15)
BUN SERPL-MCNC: 18 MG/DL (ref 8–23)
BUN/CREAT SERPL: 15.4 (ref 7–25)
CALCIUM SPEC-SCNC: 9.6 MG/DL (ref 8.6–10.5)
CHLORIDE SERPL-SCNC: 105 MMOL/L (ref 98–107)
CO2 SERPL-SCNC: 27.7 MMOL/L (ref 22–29)
CREAT SERPL-MCNC: 1.17 MG/DL (ref 0.76–1.27)
DEPRECATED RDW RBC AUTO: 44.8 FL (ref 37–54)
EGFRCR SERPLBLD CKD-EPI 2021: 67.1 ML/MIN/1.73
ERYTHROCYTE [DISTWIDTH] IN BLOOD BY AUTOMATED COUNT: 12.7 % (ref 12.3–15.4)
GLUCOSE SERPL-MCNC: 158 MG/DL (ref 65–99)
HCT VFR BLD AUTO: 33.8 % (ref 37.5–51)
HGB BLD-MCNC: 11.3 G/DL (ref 13–17.7)
LYMPHOCYTES # BLD MANUAL: 6.03 10*3/MM3 (ref 0.7–3.1)
LYMPHOCYTES NFR BLD MANUAL: 2 % (ref 5–12)
MCH RBC QN AUTO: 32.8 PG (ref 26.6–33)
MCHC RBC AUTO-ENTMCNC: 33.4 G/DL (ref 31.5–35.7)
MCV RBC AUTO: 98.3 FL (ref 79–97)
MONOCYTES # BLD: 0.22 10*3/MM3 (ref 0.1–0.9)
NEUTROPHILS # BLD AUTO: 4.91 10*3/MM3 (ref 1.7–7)
NEUTROPHILS NFR BLD MANUAL: 44 % (ref 42.7–76)
NRBC BLD AUTO-RTO: 0 /100 WBC (ref 0–0.2)
PLAT MORPH BLD: NORMAL
PLATELET # BLD AUTO: 142 10*3/MM3 (ref 140–450)
PMV BLD AUTO: 10.6 FL (ref 6–12)
POTASSIUM SERPL-SCNC: 4.2 MMOL/L (ref 3.5–5.2)
RBC # BLD AUTO: 3.44 10*6/MM3 (ref 4.14–5.8)
RBC MORPH BLD: NORMAL
SMUDGE CELLS BLD QL SMEAR: ABNORMAL
SODIUM SERPL-SCNC: 142 MMOL/L (ref 136–145)
VARIANT LYMPHS NFR BLD MANUAL: 54 % (ref 19.6–45.3)
WBC NRBC COR # BLD: 10.88 10*3/MM3 (ref 3.4–10.8)

## 2023-04-19 PROCEDURE — 80048 BASIC METABOLIC PNL TOTAL CA: CPT | Performed by: SURGERY

## 2023-04-19 PROCEDURE — 85007 BL SMEAR W/DIFF WBC COUNT: CPT | Performed by: SURGERY

## 2023-04-19 PROCEDURE — 99024 POSTOP FOLLOW-UP VISIT: CPT | Performed by: SURGERY

## 2023-04-19 PROCEDURE — 25010000002 ONDANSETRON PER 1 MG: Performed by: SURGERY

## 2023-04-19 PROCEDURE — 25010000002 ENOXAPARIN PER 10 MG: Performed by: SURGERY

## 2023-04-19 PROCEDURE — 85025 COMPLETE CBC W/AUTO DIFF WBC: CPT | Performed by: SURGERY

## 2023-04-19 RX ORDER — DOCUSATE SODIUM 100 MG/1
100 CAPSULE, LIQUID FILLED ORAL 2 TIMES DAILY
Status: DISCONTINUED | OUTPATIENT
Start: 2023-04-19 | End: 2023-04-21 | Stop reason: HOSPADM

## 2023-04-19 RX ADMIN — ONDANSETRON 4 MG: 2 INJECTION INTRAMUSCULAR; INTRAVENOUS at 05:52

## 2023-04-19 RX ADMIN — OXYCODONE AND ACETAMINOPHEN 1 TABLET: 5; 325 TABLET ORAL at 15:51

## 2023-04-19 RX ADMIN — Medication 10 ML: at 21:25

## 2023-04-19 RX ADMIN — PANTOPRAZOLE SODIUM 40 MG: 40 TABLET, DELAYED RELEASE ORAL at 08:30

## 2023-04-19 RX ADMIN — ALLOPURINOL 300 MG: 300 TABLET ORAL at 08:30

## 2023-04-19 RX ADMIN — ATORVASTATIN CALCIUM 20 MG: 20 TABLET, FILM COATED ORAL at 21:24

## 2023-04-19 RX ADMIN — OXYCODONE AND ACETAMINOPHEN 1 TABLET: 5; 325 TABLET ORAL at 23:02

## 2023-04-19 RX ADMIN — LOSARTAN POTASSIUM 100 MG: 100 TABLET, FILM COATED ORAL at 08:31

## 2023-04-19 RX ADMIN — DOCUSATE SODIUM 100 MG: 100 CAPSULE, LIQUID FILLED ORAL at 21:24

## 2023-04-19 RX ADMIN — AMLODIPINE BESYLATE 10 MG: 10 TABLET ORAL at 08:30

## 2023-04-19 RX ADMIN — POTASSIUM CHLORIDE, DEXTROSE MONOHYDRATE AND SODIUM CHLORIDE 75 ML/HR: 150; 5; 450 INJECTION, SOLUTION INTRAVENOUS at 05:52

## 2023-04-19 RX ADMIN — HYDROCHLOROTHIAZIDE 12.5 MG: 12.5 TABLET ORAL at 08:32

## 2023-04-19 RX ADMIN — ALVIMOPAN 12 MG: 12 CAPSULE ORAL at 08:32

## 2023-04-19 RX ADMIN — ENOXAPARIN SODIUM 40 MG: 100 INJECTION SUBCUTANEOUS at 08:30

## 2023-04-19 RX ADMIN — TERAZOSIN HYDROCHLORIDE 5 MG: 5 CAPSULE ORAL at 08:30

## 2023-04-19 RX ADMIN — ALVIMOPAN 12 MG: 12 CAPSULE ORAL at 21:24

## 2023-04-19 RX ADMIN — Medication 10 ML: at 08:32

## 2023-04-19 RX ADMIN — TERAZOSIN HYDROCHLORIDE 5 MG: 5 CAPSULE ORAL at 21:24

## 2023-04-19 RX ADMIN — DOCUSATE SODIUM 100 MG: 100 CAPSULE, LIQUID FILLED ORAL at 10:37

## 2023-04-19 NOTE — PLAN OF CARE
Goal Outcome Evaluation:   Pt alert and oriented x 4, Pt on room air ambulating per self.  PIV saline locked to left hand, flushed this shift.  Abdomin has Island dressing and 2 x lap sites S/P surgery 4/18/23.   Pt had C/O of pain this shift (see MAR), no C/O Nausea this shift.  Diet was advanced to regular.    Will continue to monitor for all needs and additional orders.

## 2023-04-19 NOTE — PLAN OF CARE
Goal Outcome Evaluation:  Plan of Care Reviewed With: patient        Progress: improving  Outcome Evaluation: VSS. Pain meds per MAR. Dressing and lap sites c/d/i. SR on tele. Up to BR with minimal assist. Intermittent nausea, prn zofran x1. Continue plan of care.

## 2023-04-19 NOTE — CASE MANAGEMENT/SOCIAL WORK
Discharge Planning Assessment  [facility]     Patient Name: Aida Bal Jr.  MRN: [id number]  Today's Date: 4/19/2023    Admit Date: 4/18/2023    Plan: Home with wife   Discharge Needs Assessment     Row Name 04/19/23 1039       Living Environment    People in Home spouse    Current Living Arrangements home    Potentially Unsafe Housing Conditions none    Primary Care Provided by self    Provides Primary Care For no one    Family Caregiver if Needed spouse    Quality of Family Relationships helpful;involved;supportive    Able to Return to Prior Arrangements yes       Resource/Environmental Concerns    Resource/Environmental Concerns none    Transportation Concerns none       Transition Planning    Patient/Family Anticipates Transition to home with family    Patient/Family Anticipated Services at Transition none    Transportation Anticipated family or friend will provide       Discharge Needs Assessment    Readmission Within the Last 30 Days no previous admission in last 30 days    Equipment Currently Used at Home cpap    Concerns to be Addressed no discharge needs identified;denies needs/concerns at this time    Anticipated Changes Related to Illness none    Equipment Needed After Discharge none    Provided Post Acute Provider List? N/A    Provided Post Acute Provider Quality & Resource List? N/A               Discharge Plan     Row Name 04/19/23 1040       Plan    Plan Home with wife    Patient/Family in Agreement with Plan yes    Plan Comments IMM noted. CCP met with patient at bedside, introduced self and explained role. Patient confirmed the information on his face sheet is accurate. Patient states that he lives at home with his wife Meghana. Patient confirmed his PCP is Roxane Marsh. Patient states he has worked with  in the past but can’t remember the name of the [employer]. Patient denies a history of SNF. Patient states that he uses a CPAP from [facility]. Patient states he has 2 steps to enter  his home with a handrail on the left hand side. Patient states he goes downstairs and there are handrails on both sides. Patient is enrolled in the [facility] program. Patient denies needs and states his wife can transport him at discharge. CCP to follow.              Continued Care and Services - Admitted Since 4/18/2023    Coordination has not been started for this encounter.     Selected Continued Care - Episodes Includes continued care and service providers with selected services from the active episodes listed below    Oncology Episode start date: 11/1/2021   There are no active outsourced providers for this episode.               Expected Discharge Date and Time     Expected Discharge Date Expected Discharge Time    Apr 20, 2023          Demographic Summary     Row Name 04/19/23 1035       General Information    Admission Type inpatient    Arrived From emergency department    Required Notices Provided Important Message from Medicare    Referral Source admission list    Reason for Consult discharge planning    Preferred Language English               Functional Status     Row Name 04/19/23 1039       Functional Status    Usual Activity Tolerance good    Current Activity Tolerance moderate       Functional Status, IADL    Medications independent    Meal Preparation independent    Housekeeping independent    Laundry independent    Shopping independent       Mental Status    General Appearance WDL WDL       Mental Status Summary    Recent Changes in Mental Status/Cognitive Functioning no changes       Employment/    Employment Status retired               Psychosocial    No documentation.                Abuse/Neglect    No documentation.                Legal    No documentation.                Substance Abuse    No documentation.                Patient Forms    No documentation.

## 2023-04-19 NOTE — PROGRESS NOTES
"General Surgery  Progress Note    CC: Follow-up recurrent TVA    POD#1 laparoscopic right colon resection    S: He had some nausea last night without vomiting. This morning he is feeling great with no abdominal pain or nausea. He has been voiding and ambulating independently.    O:/69 (BP Location: Right arm, Patient Position: Lying)   Pulse 66   Temp 98 °F (36.7 °C) (Oral)   Resp 16   Ht 180.3 cm (71\")   Wt 109 kg (240 lb 15.4 oz)   SpO2 100%   BMI 33.61 kg/m²     Intake & Output:  UOP: 900 mL/24 hrs    GENERAL: alert, well appearing, and in no distress  HEENT: normocephalic, atraumatic, moist mucous membranes, clear sclerae   CHEST: clear to auscultation, no wheezes, rales or rhonchi, symmetric air entry  CARDIAC: regular rate and rhythm    ABDOMEN: Soft, nontender, nondistended, incisions clean/dry/intact with glue  EXTREMITIES: no cyanosis, clubbing, or edema   SKIN: Warm and moist, no rashes    LABS  Results from last 7 days   Lab Units 04/19/23  0631   WBC 10*3/mm3 10.88*   HEMOGLOBIN g/dL 11.3*   HEMATOCRIT % 33.8*   PLATELETS 10*3/mm3 142   MONOCYTES % % 2.0*     Results from last 7 days   Lab Units 04/19/23  0631   SODIUM mmol/L 142   POTASSIUM mmol/L 4.2   CHLORIDE mmol/L 105   CO2 mmol/L 27.7   BUN mg/dL 18   CREATININE mg/dL 1.17   CALCIUM mg/dL 9.6   GLUCOSE mg/dL 158*             A/P: 68 y.o. male POD#1 laparoscopic right colon resection    Advance to regular diet  Discontinue IV fluids  Discontinue cardiac monitor  Continue Entereg and begin Colace, await return of bowel function  Follow-up pathology results    Fannie Caro MD  General, Robotic, and Endoscopic Surgery  [facility]    [address], [address]  [place] [address]  P: [phone]  F: [phone]     "

## 2023-04-20 PROCEDURE — 99024 POSTOP FOLLOW-UP VISIT: CPT | Performed by: SURGERY

## 2023-04-20 PROCEDURE — 25010000002 ENOXAPARIN PER 10 MG: Performed by: SURGERY

## 2023-04-20 RX ORDER — ACETAMINOPHEN 325 MG/1
650 TABLET ORAL EVERY 6 HOURS PRN
Status: DISCONTINUED | OUTPATIENT
Start: 2023-04-20 | End: 2023-04-21 | Stop reason: HOSPADM

## 2023-04-20 RX ADMIN — OXYCODONE AND ACETAMINOPHEN 1 TABLET: 5; 325 TABLET ORAL at 13:35

## 2023-04-20 RX ADMIN — OXYCODONE AND ACETAMINOPHEN 1 TABLET: 5; 325 TABLET ORAL at 17:18

## 2023-04-20 RX ADMIN — Medication 10 ML: at 09:47

## 2023-04-20 RX ADMIN — HYDROCHLOROTHIAZIDE 12.5 MG: 12.5 TABLET ORAL at 09:13

## 2023-04-20 RX ADMIN — AMLODIPINE BESYLATE 10 MG: 10 TABLET ORAL at 09:12

## 2023-04-20 RX ADMIN — LOSARTAN POTASSIUM 100 MG: 100 TABLET, FILM COATED ORAL at 09:12

## 2023-04-20 RX ADMIN — DOCUSATE SODIUM 100 MG: 100 CAPSULE, LIQUID FILLED ORAL at 09:12

## 2023-04-20 RX ADMIN — ENOXAPARIN SODIUM 40 MG: 100 INJECTION SUBCUTANEOUS at 09:11

## 2023-04-20 RX ADMIN — ALVIMOPAN 12 MG: 12 CAPSULE ORAL at 09:12

## 2023-04-20 RX ADMIN — ALLOPURINOL 300 MG: 300 TABLET ORAL at 09:12

## 2023-04-20 RX ADMIN — TERAZOSIN HYDROCHLORIDE 5 MG: 5 CAPSULE ORAL at 20:35

## 2023-04-20 RX ADMIN — DOCUSATE SODIUM 100 MG: 100 CAPSULE, LIQUID FILLED ORAL at 20:35

## 2023-04-20 RX ADMIN — OXYCODONE AND ACETAMINOPHEN 1 TABLET: 5; 325 TABLET ORAL at 09:12

## 2023-04-20 RX ADMIN — ALVIMOPAN 12 MG: 12 CAPSULE ORAL at 20:35

## 2023-04-20 RX ADMIN — Medication 10 ML: at 20:37

## 2023-04-20 RX ADMIN — TERAZOSIN HYDROCHLORIDE 5 MG: 5 CAPSULE ORAL at 09:12

## 2023-04-20 RX ADMIN — ATORVASTATIN CALCIUM 20 MG: 20 TABLET, FILM COATED ORAL at 20:35

## 2023-04-20 RX ADMIN — PANTOPRAZOLE SODIUM 40 MG: 40 TABLET, DELAYED RELEASE ORAL at 09:12

## 2023-04-20 NOTE — CASE MANAGEMENT/SOCIAL WORK
Continued Stay Note  [facility]     Patient Name: Aida Bal Jr.  MRN: [id number]  Today's Date: 4/20/2023    Admit Date: 4/18/2023    Plan: Home with spouse   Discharge Plan     Row Name 04/20/23 1143       Plan    Plan Home with spouse    Patient/Family in Agreement with Plan yes    Plan Comments Met with patient at bedside who confirmed plan is home with spouse and family will transport at DC. Patient anticipates no DC needs at this time. CCP to follow. Eric CERON RN               Discharge Codes    No documentation.               Expected Discharge Date and Time     Expected Discharge Date Expected Discharge Time    Apr 21, 2023             Eric Henderson RN

## 2023-04-20 NOTE — PLAN OF CARE
Goal Outcome Evaluation:   Patient is post op day 1 from a right colon resection. Patient has four lap sites that are all clean dry and intact with liquid adhesive. Patient's shift assessment completed, VSS. Patient states that his pain is well managed at this time.

## 2023-04-20 NOTE — PROGRESS NOTES
"General Surgery  Progress Note    CC: Follow-up recurrent TVA    POD#2 laparoscopic right colon resection    S: He has been ambulating independently, voiding on his own, tolerating a regular diet, and denies any nausea or vomiting.  His pain is well controlled with Percocet.  He has passed flatus but has not yet had a bowel movement.    O:/72 (BP Location: Right arm, Patient Position: Lying)   Pulse 72   Temp 97.8 °F (36.6 °C) (Oral)   Resp 16   Ht 180.3 cm (71\")   Wt 109 kg (240 lb 15.4 oz)   SpO2 96%   BMI 33.61 kg/m²     Intake & Output:  UOP: 250 mL/24 hrs    GENERAL: alert, well appearing, and in no distress  HEENT: normocephalic, atraumatic, moist mucous membranes, clear sclerae   CHEST: clear to auscultation, no wheezes, rales or rhonchi, symmetric air entry  CARDIAC: regular rate and rhythm    ABDOMEN: Soft, nontender, nondistended, incisions clean/dry/intact with glue  EXTREMITIES: no cyanosis, clubbing, or edema   SKIN: Warm and moist, no rashes    LABS  Results from last 7 days   Lab Units 04/19/23  0631   WBC 10*3/mm3 10.88*   HEMOGLOBIN g/dL 11.3*   HEMATOCRIT % 33.8*   PLATELETS 10*3/mm3 142   MONOCYTES % % 2.0*     Results from last 7 days   Lab Units 04/19/23  0631   SODIUM mmol/L 142   POTASSIUM mmol/L 4.2   CHLORIDE mmol/L 105   CO2 mmol/L 27.7   BUN mg/dL 18   CREATININE mg/dL 1.17   CALCIUM mg/dL 9.6   GLUCOSE mg/dL 158*             A/P: 68 y.o. male POD#2 laparoscopic right colon resection    Await return of bowel function  Follow-up pathology results  He can be discharged home once he has a bowel movement    Fannie Caro MD  General, Robotic, and Endoscopic Surgery  [facility] [address], [address]  [place] [address]  P: [phone]  F: [phone]     "

## 2023-04-20 NOTE — PLAN OF CARE
Goal Outcome Evaluation:   Pt alert and oriented x 4, family at bedside this shift.   Pt on room air ambulating per self.   PIV saline locked to left hand, flushed this shift.    Abdomin has Island dressing and 2 x lap sites S/P surgery 4/18/23.     Pt had C/O of pain this shift (see MAR), no C/O Nausea this shift.   Diet is regular.      Will continue to monitor for all needs and additional orders.

## 2023-04-21 ENCOUNTER — READMISSION MANAGEMENT (OUTPATIENT)
Dept: CALL CENTER | Facility: HOSPITAL | Age: 68
End: 2023-04-21
Payer: MEDICARE

## 2023-04-21 VITALS
BODY MASS INDEX: 33.73 KG/M2 | TEMPERATURE: 98.3 F | HEART RATE: 73 BPM | OXYGEN SATURATION: 99 % | RESPIRATION RATE: 16 BRPM | SYSTOLIC BLOOD PRESSURE: 125 MMHG | DIASTOLIC BLOOD PRESSURE: 66 MMHG | WEIGHT: 240.96 LBS | HEIGHT: 71 IN

## 2023-04-21 LAB
LAB AP CASE REPORT: NORMAL
LAB AP DIAGNOSIS COMMENT: NORMAL
PATH REPORT.FINAL DX SPEC: NORMAL
PATH REPORT.GROSS SPEC: NORMAL

## 2023-04-21 PROCEDURE — 99024 POSTOP FOLLOW-UP VISIT: CPT | Performed by: SURGERY

## 2023-04-21 PROCEDURE — 25010000002 ENOXAPARIN PER 10 MG: Performed by: SURGERY

## 2023-04-21 RX ORDER — OXYCODONE HYDROCHLORIDE AND ACETAMINOPHEN 5; 325 MG/1; MG/1
1 TABLET ORAL EVERY 4 HOURS PRN
Qty: 15 TABLET | Refills: 0 | Status: SHIPPED | OUTPATIENT
Start: 2023-04-21

## 2023-04-21 RX ADMIN — HYDROCHLOROTHIAZIDE 12.5 MG: 12.5 TABLET ORAL at 08:20

## 2023-04-21 RX ADMIN — Medication 10 ML: at 08:21

## 2023-04-21 RX ADMIN — ENOXAPARIN SODIUM 40 MG: 100 INJECTION SUBCUTANEOUS at 08:19

## 2023-04-21 RX ADMIN — OXYCODONE AND ACETAMINOPHEN 1 TABLET: 5; 325 TABLET ORAL at 08:20

## 2023-04-21 RX ADMIN — AMLODIPINE BESYLATE 10 MG: 10 TABLET ORAL at 08:20

## 2023-04-21 RX ADMIN — ALVIMOPAN 12 MG: 12 CAPSULE ORAL at 08:20

## 2023-04-21 RX ADMIN — TERAZOSIN HYDROCHLORIDE 5 MG: 5 CAPSULE ORAL at 08:20

## 2023-04-21 RX ADMIN — LOSARTAN POTASSIUM 100 MG: 100 TABLET, FILM COATED ORAL at 08:20

## 2023-04-21 RX ADMIN — OXYCODONE AND ACETAMINOPHEN 1 TABLET: 5; 325 TABLET ORAL at 12:56

## 2023-04-21 RX ADMIN — DOCUSATE SODIUM 100 MG: 100 CAPSULE, LIQUID FILLED ORAL at 08:21

## 2023-04-21 RX ADMIN — ALLOPURINOL 300 MG: 300 TABLET ORAL at 08:20

## 2023-04-21 RX ADMIN — PANTOPRAZOLE SODIUM 40 MG: 40 TABLET, DELAYED RELEASE ORAL at 08:20

## 2023-04-21 NOTE — PLAN OF CARE
Goal Outcome Evaluation:      Pt alert and oriented x 4, no visitors at bedside during discharge.    PIV removed catheter intact from left hand, flushed this shift.    Abdomin Island dressing removed by physician.   Pt had C/O of pain this shift (see MAR), no C/O Nausea this shift.   Diet is regular.       Will continue to monitor for all needs and additional orders.

## 2023-04-21 NOTE — CASE MANAGEMENT/SOCIAL WORK
Case Management Discharge Note      Final Note: dc home    Provided Post Acute Provider List?: N/A  Provided Post Acute Provider Quality & Resource List?: N/A    Selected Continued Care - Discharged on 4/21/2023 Admission date: 4/18/2023 - Discharge disposition: Home or Self Care    Destination    No services have been selected for the patient.              Durable Medical Equipment    No services have been selected for the patient.              Dialysis/Infusion    No services have been selected for the patient.              Home Medical Care    No services have been selected for the patient.              Therapy    No services have been selected for the patient.              Community Resources    No services have been selected for the patient.              Community & DME    No services have been selected for the patient.                Selected Continued Care - Episodes Includes continued care and service providers with selected services from the active episodes listed below    Oncology Episode start date: 11/1/2021   There are no active outsourced providers for this episode.                    Final Discharge Disposition Code: 01 - home or self-care

## 2023-04-21 NOTE — PLAN OF CARE
Goal Outcome Evaluation:   Patient will likely discharge today. Awaiting bowels to move for discharge.

## 2023-04-21 NOTE — DISCHARGE SUMMARY
Discharge Summary    Patient name: Aida Bal Jr.    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 male that was admitted to the hospital with a recurrent tubulovillous adenoma of the cecum refractory to multiple attempts at endoscopic removal over the last 4 years.  I discussed with him the options moving forward which include continued colonoscopies yearly with repeat attempts at removal of this adenomatous polyp versus surgical partial colectomy to completely remove this portion of the colon and prevent recurrence definitively.  He would like to proceed with surgical intervention.  Surgery was actually scheduled for last fall but he had a sudden cardiac arrest due to complete heart block and required defibrillator/pacemaker placement at [facility].  It has not been 6 months since his cardiac arrest and his cardiologist has cleared him to undergo general anesthesia.  He understands the risks of the procedure and has consented to proceed.    Hospital course: He was brought to the hospital electively for a planned laparoscopic right colon resection which was achieved without difficulty.  He was admitted postoperatively to [facility] floor for 3 days with his diet slowly being advanced and IV fluids saline locked once he was able to tolerate a regular diet.  He began ambulating immediately and was kept on a continuous heart monitor for 24 hours postop with no cardiac events noted.  His heart monitor was discontinued.  Once he had return of bowel function, he  was discharged to home with instructions to follow-up with me in 2 weeks.    Discharge medications:      Discharge Medications      New Medications      Instructions Start Date   oxyCODONE-acetaminophen 5-325 MG per tablet  Commonly known as: PERCOCET   1 tablet, Oral, Every 4 Hours PRN         Changes to Medications      Instructions Start Date   atorvastatin 40 MG tablet  Commonly known as: LIPITOR  What changed:   · how much to take  · how to take this  · when to take this  · additional instructions   Hold until told to resume by primary care provider         Continue These Medications      Instructions Start Date   allopurinol 300 MG tablet  Commonly known as: ZYLOPRIM   300 mg, Oral, Daily      amLODIPine 10 MG tablet  Commonly known as: NORVASC   10 mg, Oral, Every Morning      Aspirin Low Dose 81 MG EC tablet  Generic drug: aspirin   1 tablet Daily.      doxazosin 4 MG tablet  Commonly known as: CARDURA   Take 1 tablet by mouth 2 (Two) Times a Day.      fluticasone 50 MCG/ACT nasal spray  Commonly known as: FLONASE   1 spray, Nasal, As Needed      irbesartan-hydrochlorothiazide 300-12.5 MG tablet  Commonly known as: AVALIDE   1 tablet, Oral, Daily      Lumigan 0.01 % ophthalmic drops  Generic drug: bimatoprost   Administer 1 drop to both eyes Every Night.      pantoprazole 40 MG EC tablet  Commonly known as: PROTONIX   40 mg, Oral, Daily      Vitamin D3 50 MCG (2000 UT) tablet   2,000 Units, Oral, Daily             Discharge instructions:    - Resume regular diet as tolerated.  - No lifting anything heavier than 15 pounds for 6 weeks postop. You may resume all other activities as tolerated once your pain level allows.  - No driving while taking narcotic pain medications.  - You may resume showering, no tub bathing for two weeks while your incisions heal.  - Wash your incisions with soap and water daily in the shower, pat dry, and leave open to air.    Follow-up appointment: Follow up with Fannie Caro MD  in the office in 2 weeks. Call for appointment at [phone].    CODE STATUS: Full code

## 2023-04-22 LAB
BH BB BLOOD EXPIRATION DATE: NORMAL
BH BB BLOOD EXPIRATION DATE: NORMAL
BH BB BLOOD TYPE BARCODE: 5100
BH BB BLOOD TYPE BARCODE: 5100
BH BB DISPENSE STATUS: NORMAL
BH BB DISPENSE STATUS: NORMAL
BH BB PRODUCT CODE: NORMAL
BH BB PRODUCT CODE: NORMAL
BH BB UNIT NUMBER: NORMAL
BH BB UNIT NUMBER: NORMAL
CROSSMATCH INTERPRETATION: NORMAL
CROSSMATCH INTERPRETATION: NORMAL
UNIT  ABO: NORMAL
UNIT  ABO: NORMAL
UNIT  RH: NORMAL
UNIT  RH: NORMAL

## 2023-04-22 NOTE — OUTREACH NOTE
Prep Survey    Flowsheet Row Responses   [facility] facility patient discharged from? [facility]   Is LACE score < 7 ? 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 General Surgery   Does the patient have Home health ordered? No   Is there a DME ordered? No   Prep survey completed? Yes          Patty NICHOLS - Registered Nurse

## 2023-04-25 ENCOUNTER — READMISSION MANAGEMENT (OUTPATIENT)
Dept: CALL CENTER | Facility: HOSPITAL | Age: 68
End: 2023-04-25
Payer: MEDICARE

## 2023-04-25 NOTE — OUTREACH NOTE
General Surgery Week 1 Survey    Flowsheet Row Responses   [facility] patient discharged from? [facility]   Does the patient have one of the following disease processes/diagnoses(primary or secondary)? General Surgery   Week 1 attempt successful? 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 Yes   Is the patient having any side effects they believe may be caused by any medication additions or changes? No   Does the patient have all medications related to this admission filled (includes all antibiotics, pain medications, etc.) Yes   Is the patient taking all medications as directed (includes completed medication regime)? Yes   Does the patient have a follow up appointment scheduled with their surgeon? Yes  [Surgery follow up 5/6/23]   Has the patient kept scheduled appointments due by today? N/A   Comments Has PCP follow up in June   Has home health visited the patient within 72 hours of discharge? N/A   Psychosocial issues? No   Did the patient receive a copy of their discharge instructions? Yes   Nursing interventions Reviewed instructions with patient   What is the patient's perception of their health status since discharge? Improving   Nursing interventions Nurse provided patient education   Is the patient /caregiver able to teach back basic post-op care? Continue use of incentive spirometry at least 1 week post discharge, Drive as instructed by MD in discharge instructions, No tub bath, swimming, or hot tub until instructed by MD, Keep incision areas clean,dry and protected   Is the patient/caregiver able to teach back signs and symptoms of incisional infection? Increased redness, swelling or pain at the incisonal site, Increased drainage or bleeding, Incisional warmth, Pus or odor from incision, Fever   Is the patient/caregiver  able to teach back steps to recovery at home? Set small, achievable goals for return to baseline health, Rest and rebuild strength, gradually increase activity, Eat a well-balance diet   If the patient is a current smoker, are they able to teach back resources for cessation? Not a smoker   Is the patient/caregiver able to teach back the hierarchy of who to call/visit for symptoms/problems? PCP, Specialist, Home health nurse, Urgent Care, ED, 911 Yes   Week 1 call completed? Yes   Is the patient interested in additional calls from an ambulatory ?  NOTE:  applies to high risk patients requiring additional follow-up. No   Wrap up additional comments Patient doing well. Denies any concerns          Natasha JARAMILLO - Registered Nurse

## 2023-05-05 ENCOUNTER — OFFICE VISIT (OUTPATIENT)
Dept: SURGERY | Facility: CLINIC | Age: 68
End: 2023-05-05
Payer: MEDICARE

## 2023-05-05 VITALS — OXYGEN SATURATION: 98 % | DIASTOLIC BLOOD PRESSURE: 66 MMHG | HEART RATE: 82 BPM | SYSTOLIC BLOOD PRESSURE: 140 MMHG

## 2023-05-05 DIAGNOSIS — Z09 SURGICAL FOLLOWUP: Primary | ICD-10-CM

## 2023-05-05 NOTE — PROGRESS NOTES
CHIEF COMPLAINT:   Chief Complaint   Patient presents with   • Post-op     Laparoscopic right colon resection 4/18/23       HISTORY OF PRESENT ILLNESS:  This is a 68 y.o. male who presents for a post-operative visit after undergoing laparoscopic right colon resection for recurrent adenomatous large colon polyp of the cecum refractory to multiple endoscopic removal attempts.  He went home on postoperative day #3 and has had return of normal bowel function and improvement in his appetite since returning home.  He has been avoiding heavy lifting as tolerated but has returned to some cardio exercising on a stationary bike.  He denies any fever, chills, nausea, or vomiting.    Pathology:   Right Colon, Right Partial Colectomy:                 A. Tubular adenoma.               B. No high grade glandular dysplasia nor malignancy identified.               C. Associated benign appendix with fibrous obliteration.               D. Surgical margins are viable and negative for neoplasm/malignancy.               E. Separately identified developing hyperplastic polyp.   F. Twelve lymph nodes/lymph node aggregates negative for metastatic carcinoma (0/12).               G. Majority of lymph nodes with diffusely distorted architecture consistent with involvement by patient's known                     chronic lymphocytic leukemia/small lymphocytic lymphoma (CLL/SLL) (see comment).    PHYSICAL EXAM:  Lungs: Clear  Heart: RRR  ABD: Incisions are healing well without any erythema or signs of infection.  Ext: no significant edema, calves nontender    A/P:  This is a 68 y.o. male patient who is S/P laparoscopic right colon resection on 4/18/2023    He is healing very well.  He knows to continue to avoid lifting anything heavier than 15 pounds until the first week in June.  This will help minimize his risk of developing an incisional hernia.  He is scheduled to see Dr. Aidan Cheng on 5/24 to discuss initiation of treatment for his CLL.  I  would like to see him back the next day, 5/25 to examine his wounds and as long as everything is continue to heal perfectly I would have no reservations with him starting treatment anytime after June 1st.    Fannie Caro MD  General, Robotic, and Endoscopic Surgery  [facility]    [address], [address]  [place] [address]  P: [phone]  F: [phone]

## 2023-05-17 ENCOUNTER — HOSPITAL ENCOUNTER (OUTPATIENT)
Dept: CT IMAGING | Facility: HOSPITAL | Age: 68
Discharge: HOME | End: 2023-05-17
Admitting: INTERNAL MEDICINE
Payer: MEDICARE

## 2023-05-17 DIAGNOSIS — C91.10 CLL (CHRONIC LYMPHOCYTIC LEUKEMIA): ICD-10-CM

## 2023-05-17 PROCEDURE — 74177 CT ABD & PELVIS W/CONTRAST: CPT

## 2023-05-17 PROCEDURE — 0 DIATRIZOATE MEGLUMINE & SODIUM PER 1 ML: Performed by: INTERNAL MEDICINE

## 2023-05-17 PROCEDURE — 70491 CT SOFT TISSUE NECK W/DYE: CPT

## 2023-05-17 PROCEDURE — 25510000001 IOPAMIDOL 61 % SOLUTION: Performed by: INTERNAL MEDICINE

## 2023-05-17 PROCEDURE — 71260 CT THORAX DX C+: CPT

## 2023-05-17 RX ADMIN — IOPAMIDOL 85 ML: 612 INJECTION, SOLUTION INTRAVENOUS at 10:13

## 2023-05-17 RX ADMIN — DIATRIZOATE MEGLUMINE AND DIATRIZOATE SODIUM 30 ML: 660; 100 LIQUID ORAL; RECTAL at 10:13

## 2023-05-23 NOTE — PROGRESS NOTES
[facility] GROUP OUTPATIENT FOLLOW UP CLINIC VISIT    REASON FOR FOLLOW-UP:    1.  Chronic lymphocytic leukemia diagnosed in November 2015.  CLL FISH panel negative at that time.    2.  Autoimmune hemolytic anemia associated with CLL.  He was treated with steroids and he received 4 weeks of weekly Rituxan.  Otherwise no treatment has been performed  3.  CT imaging of the chest abdomen and pelvis from 4/25/2018 showed lymphadenopathy with mildly enlarged retroperitoneal and mesenteric lymph nodes with the largest measuring about 3.2 cm.  The spleen measured 13.6 cm.  Slight increase in lymphadenopathy compared with 11/27/2015.  4.  Acute anemia with a hemoglobin of 6.9 as of 6/20/2018.  Low reticulocyte count.  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  Prednisone initiated.  Erythropoietin 73.9.  Parvovirus B19 PCR negative.  Steroids complete on 8/22/2018.  5.  Bone marrow biopsy on 7/3/2018 with 70% CLL (87% by flow cytometry) with a t(14;18) translocation by cytogenetics, 3 copies of IGH in 87.5% of cells but negative for CCND1/IGH rearrangement.  Negative for all other rearrangements/deletions.  IgVH somatic hypermutation was not detected.            6.  Therapy with ibrutinib initiated at the end of July 2018.  Discontinued in late 2022 due to cardiac arrest.      HISTORY OF PRESENT ILLNESS:  Aida Bal Jr. is a 68 y.o. male who returns today for follow up of the above issue.     He did have a right hemicolectomy performed on 4/18/2023 with pathology showing a tubular adenoma without high risk features.  12 lymph nodes were also negative for colon cancer but consistent with CLL/SLL.    He has recovered nicely from his surgery.  He denies any abdominal pain or any issues regarding this.  No bleeding.  No fevers chills night sweats or unintended weight loss.  No enlarging adenopathy.    REVIEW OF SYSTEMS:  As per the HPI    Vitals:    05/24/23 1121   BP: 132/70   Pulse: 62   Resp: 18   Temp: 98.7 °F (37.1  "°C)   TempSrc: Temporal   SpO2: 98%   Weight: 107 kg (235 lb 12.8 oz)   Height: 180.3 cm (70.98\")   PainSc: 0-No pain       PHYSICAL EXAMINATION:  GENERAL:  Well-developed well-nourished male; awake, alert and oriented, in no acute distress.  Wearing a facemask.  Well appearing again today.  SKIN:  Warm and dry, without rashes, purpura, or petechiae.  HEAD:  Normocephalic, atraumatic.  EARS:  Hearing intact.  LYMPHATICS: Small bilateral palpable cervical adenopathy measuring 1 to 2 cm.  CHEST:  Lungs are clear to auscultation bilaterally.  No wheezes, rales, or rhonchi.  An implanted defibrillator is present in the left subclavian area.  HEART: Regular rate and rhythm.  Grade 2/6 early systolic murmur  EXTREMITIES: No clubbing cyanosis or edema.  NEUROLOGICAL:  No focal neurologic deficits.      DIAGNOSTIC DATA:  CBC & Differential (05/24/2023 11:06)      IMAGING:  CT Soft Tissue Neck With Contrast (05/17/2023 10:13)  CT Abdomen Pelvis With Contrast (05/17/2023 10:13)  CT Chest With Contrast Diagnostic (05/17/2023 10:13)    CT images personally reviewed.  Enlarging adenopathy throughout the neck, supraclavicular areas, axillary, subpectoral, and retroperitoneal areas.    ASSESSMENT:  This is a 68 y.o. male with a history of chronic lymphocytic leukemia and related autoimmune hemolytic anemia.    *CLL:   · Diagnosed 11/26/2015.    · Normal FISH panel  · Initially received Rituxan for 4 weeks at diagnosis for this and the autoimmune hemolytic anemia.  · Given the worsening anemia even though it responded to steroids we needed to treat his CLL particularly given the 70% involvement on his bone marrow biopsy.  We started treating with ibrutinib 420 mg daily, initiated at the end of July 2018. Held around the time of COVID infection; resumed when he completed steroids.  · Ibrutinib now discontinued due to cardiac arrest.  · 1/4/2023: White blood cell count remains normal at 5.57 with 66% lymphocytes  · 3/22/2023: White " blood cell count 12,000 with 80% lymphocytes  · CT imaging of the neck chest abdomen and pelvis from 5/17/2023 with enlarging adenopathy throughout the neck chest abdomen and pelvis  · 5/24/2023: Normal white blood cell count of 10.05 with 72.7% lymphocytes    *Macrocytic anemia:   · He has a history of autoimmune hemolytic anemia when he presented with CLL back in November 2015.  His reticulocyte count was very elevated at that time.  His hemoglobin dropped to as low as about 5.  He responded to steroids initially intravenously and then with prednisone and he was treated with 4 weeks of Rituxan.  · He was noted to have worsening anemia.  His reticulocyte count was low.  He initially did not respond very well to transfusions and he required 4 units of packed red cells  There was no evidence for hemolysis although his ADOLFO is positive for IgG.  This was persistently positive.  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  He responded to steroids.  He discontinued steroids as of 8/22/2018.    · 5/2/2022: Hemoglobin lower at 7.9 with a high MCV of 102   · He required red cell transfusion.  · 1/4/2023: Hemoglobin improving at 11.5  · 3/22/2023: Hemoglobin 12.3 with a high MCV at 102.7  · 5/24/2023: Hemoglobin 10.7    *History of systolic hypertension: Blood pressure acceptable at 132/70 today.    *History of thrombocytopenia:   · Platelets normal at 207,000    *COVID-19 pandemic: He has received a total of 3 vaccinations.  Subsequent infection in April 2020.  He received a monoclonal antibody as an outpatient and remdesivir as an inpatient. He was readmitted on 5/2 with hypotension, persistent fevers, persistent COVID-19 positivity on testing. He received IVIG and steroids. He improved and was treated with outpatient Paxlovid.  Symptoms resolved at this point.  Last vaccine 9/21/2022, bivalent.  Cardiac arrest several days after receiving this on 9/24/2022.    *Left lower extremity DVT and gastrocnemius  vein and right lower extremity superficial thrombophlebitis in the small saphenous vein on bilateral lower extremity venous duplex on 4/15/2022  · Associated with COVID-19 infection  · Repeat venous duplex 8/1/22 without DVT, only superficial thrombophlebitis    *Out of hospital cardiac arrest on 9/24/2022  · Quickly achieved return of spontaneous circulation with CPR and medication  · He had an ICD placed at the [facility]  · Ibrutinib discontinued as a result of this    PLAN:   1. He needs therapy for relapsing disease.  Plan venetoclax and rituximab.  Venetoclax is 20 mg for 1 week then 50 mg for 1 week then 100 mg for 1 week then 200 mg for 1 week then 400 mg following that.  Rituximab starts at week 6 at 375 mg per metered squared and then proceeds on week 10, 14, 18, 22, and 26 at 500 mg.  We may elect to start both drugs on day 1.  I will communicate with our pharmacy staff.  Goal is disease control.  He agrees to proceed.  2. He was given written information regarding the medication today.  He will have an education session.  We will plan to start appropriate prophylaxis.  3. He will continue to follow-up with cardiology at the [facility]  4. Follow-up to be scheduled.  We will initiate therapy in the next couple of weeks.    I spent 45 minutes in this visit today reviewing his record communicating with him examining him placing orders medicating with staff documenting the encounter

## 2023-05-24 ENCOUNTER — LAB (OUTPATIENT)
Dept: LAB | Facility: HOSPITAL | Age: 68
End: 2023-05-24
Payer: MEDICARE

## 2023-05-24 ENCOUNTER — OFFICE VISIT (OUTPATIENT)
Dept: ONCOLOGY | Facility: CLINIC | Age: 68
End: 2023-05-24
Payer: MEDICARE

## 2023-05-24 VITALS
BODY MASS INDEX: 33.01 KG/M2 | SYSTOLIC BLOOD PRESSURE: 132 MMHG | HEART RATE: 62 BPM | TEMPERATURE: 98.7 F | DIASTOLIC BLOOD PRESSURE: 70 MMHG | HEIGHT: 71 IN | WEIGHT: 235.8 LBS | OXYGEN SATURATION: 98 % | RESPIRATION RATE: 18 BRPM

## 2023-05-24 DIAGNOSIS — D64.9 NORMOCYTIC ANEMIA: ICD-10-CM

## 2023-05-24 DIAGNOSIS — C91.10 CLL (CHRONIC LYMPHOCYTIC LEUKEMIA): ICD-10-CM

## 2023-05-24 DIAGNOSIS — C91.10 CLL (CHRONIC LYMPHOCYTIC LEUKEMIA): Primary | ICD-10-CM

## 2023-05-24 LAB
ALBUMIN SERPL-MCNC: 4.4 G/DL (ref 3.5–5.2)
ALBUMIN/GLOB SERPL: 2.2 G/DL (ref 1.1–2.4)
ALP SERPL-CCNC: 120 U/L (ref 38–116)
ALT SERPL W P-5'-P-CCNC: 9 U/L (ref 0–41)
ANION GAP SERPL CALCULATED.3IONS-SCNC: 13.5 MMOL/L (ref 5–15)
AST SERPL-CCNC: 19 U/L (ref 0–40)
BASOPHILS # BLD AUTO: 0.03 10*3/MM3 (ref 0–0.2)
BASOPHILS NFR BLD AUTO: 0.3 % (ref 0–1.5)
BILIRUB SERPL-MCNC: 0.6 MG/DL (ref 0.2–1.2)
BUN SERPL-MCNC: 14 MG/DL (ref 6–20)
BUN/CREAT SERPL: 12 (ref 7.3–30)
CALCIUM SPEC-SCNC: 9.6 MG/DL (ref 8.5–10.2)
CHLORIDE SERPL-SCNC: 102 MMOL/L (ref 98–107)
CO2 SERPL-SCNC: 25.5 MMOL/L (ref 22–29)
CREAT SERPL-MCNC: 1.17 MG/DL (ref 0.7–1.3)
DEPRECATED RDW RBC AUTO: 53.3 FL (ref 37–54)
EGFRCR SERPLBLD CKD-EPI 2021: 67.1 ML/MIN/1.73
EOSINOPHIL # BLD AUTO: 0.12 10*3/MM3 (ref 0–0.4)
EOSINOPHIL NFR BLD AUTO: 1.2 % (ref 0.3–6.2)
ERYTHROCYTE [DISTWIDTH] IN BLOOD BY AUTOMATED COUNT: 13.6 % (ref 12.3–15.4)
GLOBULIN UR ELPH-MCNC: 2 GM/DL (ref 1.8–3.5)
GLUCOSE SERPL-MCNC: 107 MG/DL (ref 74–124)
HCT VFR BLD AUTO: 34.5 % (ref 37.5–51)
HGB BLD-MCNC: 10.7 G/DL (ref 13–17.7)
IMM GRANULOCYTES # BLD AUTO: 0.01 10*3/MM3 (ref 0–0.05)
IMM GRANULOCYTES NFR BLD AUTO: 0.1 % (ref 0–0.5)
LDH SERPL-CCNC: 228 U/L (ref 99–259)
LYMPHOCYTES # BLD AUTO: 7.31 10*3/MM3 (ref 0.7–3.1)
LYMPHOCYTES NFR BLD AUTO: 72.7 % (ref 19.6–45.3)
MCH RBC QN AUTO: 33.1 PG (ref 26.6–33)
MCHC RBC AUTO-ENTMCNC: 31 G/DL (ref 31.5–35.7)
MCV RBC AUTO: 106.8 FL (ref 79–97)
MONOCYTES # BLD AUTO: 0.75 10*3/MM3 (ref 0.1–0.9)
MONOCYTES NFR BLD AUTO: 7.5 % (ref 5–12)
NEUTROPHILS NFR BLD AUTO: 1.83 10*3/MM3 (ref 1.7–7)
NEUTROPHILS NFR BLD AUTO: 18.2 % (ref 42.7–76)
NRBC BLD AUTO-RTO: 0 /100 WBC (ref 0–0.2)
PLATELET # BLD AUTO: 207 10*3/MM3 (ref 140–450)
PMV BLD AUTO: 9.5 FL (ref 6–12)
POTASSIUM SERPL-SCNC: 3.8 MMOL/L (ref 3.5–4.7)
PROT SERPL-MCNC: 6.4 G/DL (ref 6.3–8)
RBC # BLD AUTO: 3.23 10*6/MM3 (ref 4.14–5.8)
SODIUM SERPL-SCNC: 141 MMOL/L (ref 134–145)
WBC NRBC COR # BLD: 10.05 10*3/MM3 (ref 3.4–10.8)

## 2023-05-24 PROCEDURE — 36415 COLL VENOUS BLD VENIPUNCTURE: CPT

## 2023-05-24 PROCEDURE — 83615 LACTATE (LD) (LDH) ENZYME: CPT

## 2023-05-24 PROCEDURE — 80053 COMPREHEN METABOLIC PANEL: CPT

## 2023-05-24 PROCEDURE — 85025 COMPLETE CBC W/AUTO DIFF WBC: CPT

## 2023-05-24 RX ORDER — FAMOTIDINE 40 MG/1
40 TABLET, FILM COATED ORAL NIGHTLY
COMMUNITY
Start: 2023-05-17

## 2023-05-25 ENCOUNTER — OFFICE VISIT (OUTPATIENT)
Dept: SURGERY | Facility: CLINIC | Age: 68
End: 2023-05-25
Payer: MEDICARE

## 2023-05-25 ENCOUNTER — DOCUMENTATION (OUTPATIENT)
Dept: PHARMACY | Facility: HOSPITAL | Age: 68
End: 2023-05-25
Payer: MEDICARE

## 2023-05-25 VITALS
DIASTOLIC BLOOD PRESSURE: 78 MMHG | SYSTOLIC BLOOD PRESSURE: 135 MMHG | WEIGHT: 234 LBS | BODY MASS INDEX: 33.5 KG/M2 | HEIGHT: 70 IN

## 2023-05-25 DIAGNOSIS — Z09 SURGICAL FOLLOWUP: Primary | ICD-10-CM

## 2023-05-25 PROCEDURE — 99024 POSTOP FOLLOW-UP VISIT: CPT | Performed by: SURGERY

## 2023-05-25 NOTE — PROGRESS NOTES
CHIEF COMPLAINT:   Chief Complaint   Patient presents with   • Post-op Follow-up       HISTORY OF PRESENT ILLNESS:  This is a 68 y.o. male who presents for a post-operative visit after undergoing laparoscopic right colon resection for recurrent tubulovillous adenoma of the cecum on 4/18/2023.  He has been doing very well since surgery, has resumed his aspirin, and has not noticed any persistent diarrhea and has had no blood in his stool.  His appetite and energy level are improving.  He denies any fever, chills, or weight loss.    Pathology:   Right Colon, Right Partial Colectomy:                 A. Tubular adenoma.               B. No high grade glandular dysplasia nor malignancy identified.               C. Associated benign appendix with fibrous obliteration.               D. Surgical margins are viable and negative for neoplasm/malignancy.               E. Separately identified developing hyperplastic polyp.  F. Twelve lymph nodes/lymph node aggregates negative for metastatic carcinoma (0/12).               G. Majority of lymph nodes with diffusely distorted architecture consistent with involvement by patient's known                     chronic lymphocytic leukemia/small lymphocytic lymphoma (CLL/SLL) (see comment).    PHYSICAL EXAM:  Lungs: Clear  Heart: RRR  ABD: Incisions are healing well without any erythema or signs of infection.  Ext: no significant edema, calves nontender    A/P:  This is a 68 y.o. male patient who is S/P laparoscopic right colon resection for recurrent tubulovillous adenoma on 4/18/2023    He is healing beautifully.  He can return to all activities as tolerated in about 2 weeks, but should continue to avoid heavy lifting in the interim to minimize risk of developing an incisional hernia.  He is scheduled to begin treatment for his CLL in about 2 weeks and I have no reservations with that plan.  He can see me back on an as-needed basis.    Fannie Caro MD  General, Robotic, and  Endoscopic Surgery  [facility]    [address], [address]  [place] [address]  P: [phone]  F: [phone]

## 2023-05-25 NOTE — PROGRESS NOTES
Staff message rec from [facility] Pharmacist-Dr Cheng would like to start pt on Venclexta/Rituxan. Dose has been clarified.    I have submitted the PA to [employer] through [employer]. The medication is available without authorization.     I ran a test claim through [facility] and it returned a copay of $1,067.27-Copay assistance will be looked into as he has a Medicare Part D plan.      Sheri Ptael, [facility] sent to Toñito Cheng MD; Haylee Montanez; [facility] Oral Onc Pool; Marianela Romeo, [facility]  Cc: Dorothy Tucker, RN  Hi everyone,     NCCN template recommends as you have stated:     Venetoclax 20 mg days 1-7, 50 mg days 8-14, 100 mg days 15-21, 200 mg days 22-28, and 400 mg thereafter.  Start Rituxan after venetoclax ramp up duke dose of 375 mg/m2 on c1d1 followed by 500 mg/m2 on day 1 of cycles 2-6.     Thanks,   Venita           Previous Messages       ----- Message -----   From: Toñito Cheng MD   Sent: 5/24/2023   1:08 PM EDT   To: Dorothy Tucker, ALIA, Haylee Montanez, *   Subject: RE: correction                                   I have a resource that says the venetoclax starts week 1-5 and rituximab starts week 6. It looks like our plan has rituximab starting week 1. I have one other patient I started on this regimen but it's been a few years and I don't remember what we did. Any thoughts? [facility]     ----- Message -----   From: Haylee Montanez   Sent: 5/24/2023  12:20 PM EDT   To: Toñito Cheng MD, Dorothy Tucker, RN, *   Subject: RE: correction                                   Thank you! I will start working on him.       ----- Message -----   From: Toñito Cheng MD   Sent: 5/24/2023  11:52 AM EDT   To: Dorothy Tucker RN, Haylee Montanez, *   Subject: correction                                       I think actually the venetoclax/rituximab plan has rituximab day 1 as well (not starting with month 2 as I initially said). Sorry about that. Thanks! SANDAR Montanez  Specialty Pharmacy  Technician

## 2023-05-26 ENCOUNTER — SPECIALTY PHARMACY (OUTPATIENT)
Dept: PHARMACY | Facility: HOSPITAL | Age: 68
End: 2023-05-26
Payer: MEDICARE

## 2023-05-26 ENCOUNTER — DOCUMENTATION (OUTPATIENT)
Dept: PHARMACY | Facility: HOSPITAL | Age: 68
End: 2023-05-26
Payer: MEDICARE

## 2023-05-26 NOTE — PROGRESS NOTES
TREATMENT  PREPARATION    Aida Bal Jr.  [id number]  1955    Chief Complaint: Treatment preparation and needs assessment    History of present illness:  Aida Bal Jr. is a 68 y.o. year old male who is here today for treatment preparation and needs assessment.  The patient has been diagnosed with   Encounter Diagnosis   Name Primary?   • CLL (chronic lymphocytic leukemia) (HCC) Yes    and is scheduled to begin treatment with:     Oncology History:    Oncology/Hematology History   CLL (chronic lymphocytic leukemia) (HCC)   11/25/2015 Initial Diagnosis    CLL (chronic lymphocytic leukemia)     12/1/2015 - 12/23/2015 Chemotherapy    Rituxan weekly x4     7/26/2018 - 7/26/2018 Chemotherapy    OP LYMPHOMA (CLL) Ibrutinib 420 mg     6/7/2023 -  Chemotherapy    OP CLL Venetoclax / RiTUXimab          The current medication list and allergy list were reviewed and reconciled.     Past Medical History, Past Surgical History, Social History, Family History have been reviewed and are without significant changes except as mentioned.    Physical Exam:    There were no vitals filed for this visit.  There were no vitals filed for this visit.                   Physical Exam  HENT:      Head: Normocephalic and atraumatic.   Eyes:      Extraocular Movements: Extraocular movements intact.      Conjunctiva/sclera: Conjunctivae normal.   Pulmonary:      Effort: Pulmonary effort is normal. No respiratory distress.   Neurological:      General: No focal deficit present.      Mental Status: He is alert and oriented to person, place, and time.   Psychiatric:         Mood and Affect: Mood normal.         Behavior: Behavior normal.           NEEDS ASSESSMENTS    Genetics  The patient's new diagnosis and family history have been reviewed for genetic counseling needs. The patient will not be referred..     Psychosocial and Barriers to care  The patient has completed a PHQ-9 Depression Screening and the Distress Thermometer (DT)  today.  PHQ-9 results show PHQ-2 Total Score: 0 PHQ-9 Total Score: PHQ-9 Total Score: 0     The patient scored their distress today as Distress Level: 0-No distress on a scale of 0-10 with 0 being no distress and 10 being extreme distress. Problems marked by the patient as being an issue for them within the last week include   .      Results were reviewed along with psychosocial resources offered by our cancer center.  Our Supportive Oncology team will be flagged for a score of 4 or above, and a same day call will be made for a score of 9 or 10.  A mental health referral is offered at that time. Patients who score less than 4 have been educated on our support services and can be referred to our  upon request.  The patient will not be referred to our .       Nutrition  The patient has completed the malnutrition screening today. They scored Malnutrition Screening Tool  Have you recently lost weight without trying?  If yes, how much weight have you lost?: 0--> No  Have you been eating poorly because of a decreased appetite?: 0--> No  MST score: 0   with a score of 0-1 meaning not at risk in a score of 2 or greater meaning at risk.  Patients with a score of 3 or higher will be referred to our oncology dietitian for support. Patients beginning at risk treatment regimens or who have dietary concerns will also be referred to our oncology dietitian. The patient will not be referred.    Functional Assessment  Persons who are age 70 or greater will be screened for qualification of a comprehensive geriatric assessment by our survivorship nurse practitioner.  Older adults with cancer face unique challenges. These may include an increased risk of drug reactions, financial burdens, and caregiver stress. The patient scored G8 Questionnaire  Has food intake declined over the past 3 months due to loss of appetite, digestive problems, chewing or swallowing difficulties?: No decrease in food intake  Weight  loss during the last 3 months: No weight loss  Mobility: Goes out  Neuropsychological Problems: No psychological problems  Body Mass Index (BMI (weight in kg) / (height in m2)): BMI 23 and > 23  Takes more than 3 medications a day: Yes, takes more than 3 prescription drugs per day  In comparison with other people of the same age, how does the patient consider his/her health status?: As good  Age: < 80  Total Score: 15 . Patients scoring 14 or lower will referred for an older adult functional assessment with the survivorship advanced practice registered nurse to ensure all needed support is provided as patients plan for their treatments. NOT APPLICABLE    Intravenous Access Assessment  The patient and I discussed planned intravenous chemo/biotherapy as well as other IV treatments that are often needed throughout the course of treatment. These may include, but are not limited to blood transfusions, antibiotics, and IV hydration. Discussed that depending on selected treatment and vein assessment, patient may require venous access device (VAD) which could include but not limited to a Mediport or PICC line. Risks and benefits of VADs reviewed. The patient will be treated via Oral Treatment.    Reproductive/Sexual Activity   People should avoid becoming pregnant and should not get a partner pregnant while undergoing chemo/biotherapy.  People of childbearing age should use effective contraception during active therapy. The best recommendation for all people is to use a barrier method for a minimum of 1 week after the last infusion of chemo/biotherapy to prevent your partner being exposed to byproducts from treatment medications in bodily fluids. Effective contraception should be discussed with your oncology team to make sure it is safe to take based on your diagnosis. Possible options include oral contraceptives, barrier methods. Chemo/biotherapy can change your ability to reproduce children in the future.  There are  "options for fertility preservation. NOT APPLICABLE    Advanced Care Planning  Advance Care Planning   The patient and I discussed advanced care planning, \"Conversations that Matter\".   This service is offered for development of advance directives with a certified ACP facilitator.  The patient does not have an up-to-date advanced directive. This document is not on file with our office. The patient is not interested in an appointment with one of our facilitators to create or update their advanced directives.    Have you reviewed your Advance Directive and is it valid for this stay?: Not applicable  Patient Requests Assistance on Advance Directives: Patient Declined          Smoking cessation  Tobacco Use: Low Risk    • Smoking Tobacco Use: Never   • Smokeless Tobacco Use: Never   • Passive Exposure: Not on file       Patient and I discussed their tobacco use history. Referral will not be made for smoking cessation.      Palliative Care  When appropriate, the patient and I discussed the availability palliative care services and when appropriate Hospice care. Palliative care is not the same as Hospice care which was explained to the patient.NOT APPLICABLE.    Survivorship   When appropriate, we discussed that we will refer the patient to survivorship clinic to discuss next steps following completion of planned treatment.  Reviewed this visit will include assessment of your physical, psychological, functional, and spiritual needs as a survivor and the need at attend this visit when scheduled.    TREATMENT EDUCATION    Today I met with the patient to discuss the chemo/biotherapy regimen recommended for treatment of CLL (chronic lymphocytic leukemia) (HCC)  .  The patient was given explanation of treatment premed side effects including office policy that prohibits patients to drive if sedating medications are administered, MD explanation given regarding benefits, side effects, toxicities and goals of treatment.  The " patient received a Chemotherapy/Biotherapy Plan Summary including diagnosis and explanation of specific treatment plan.    SIDE EFFECTS:  Common side effects were discussed with the patient and/or significant other.  Discussion included where applicable hair loss/discoloration, anemia/fatigue, infection/chills/fever, appetite, bleeding risk/precautions, constipation, diarrhea, mouth sores, taste alteration, loss of appetite, nausea/vomiting, peripheral neuropathy, skin/nail changes, rash, muscle aches/weakness, photosensitivity, weight gain/loss, hearing loss, dizziness, menopausal symptoms, menstrual irregularity, sterility, high blood pressure, heart damage, liver damage, lung damage, kidney damage, DVT/PE risk, fluid retention, pleural/pericardial effusion, somnolence, electrolyte/LFT imbalance, vein exercises and/or the possible need for vascular access/port placement.  The patient was advised that although uncommon, leakage of an infused medication from the vein or venous access device may lead to skin breakdown and/or other tissue damage.  The patient was advised that he/she may have pain, bleeding, and/or bruising from the insertion of a needle in their vein or venous access device (port).  The patient was further advised that, in spite of proper technique, infection with redness and irritation may rarely occur at the site where the needle was inserted.  The patient was advised that if complications occur, additional medical treatment is available.  Finally, where applicable we have reviewed rare but potential immune mediated side effects including shortness of breath, cough, chest pain (pneumonitis), abdominal pain, diarrhea (colitis), thyroiditis (hypothyroid or hyperthyroid), hepatitis and liver dysfunction, nephritis and renal dysfunction.    Discussion also included side effects specific to drugs in the treatment plan, specifically:    Treatment Plans     Name Type Plan Dates Plan Provider         Active     OP CLL Venetoclax / RiTUXimab  ONCOLOGY TREATMENT  4/26/2023 - Present Toñito Cheng MD                    Questions answered and additional information discussed on topics including:  Anemia, Thrombocytopenia, Neutropenia, Nutrition and appetite changes, Nausea & vomiting, Pain, Organ toxicities, Home care and Oral medication handling and disposal       Assessment and Plan:    Diagnoses and all orders for this visit:    1. CLL (chronic lymphocytic leukemia) (HCC) (Primary)      No orders of the defined types were placed in this encounter.        1. The patient and I have reviewed their diagnosis and scheduled treatment plan. Needs assessment was completed where applicable including genetics, psychosocial needs, barriers to care, VAD evaluation, advanced care planning, survivorship, and palliative care services where indicated. Referrals have been ordered as appropriate based upon evaluation today and patient desires.   2. Chemo/biotherapy teaching was completed today and consent obtained. See separate documentation for further details.  3. Adequate time was given to answer questions.  Patient made aware of their care team members and contact information if they have questions or problems throughout the treatment course.  4. Discussion held and written information provided describing frequency of office visits and ongoing monitoring throughout the treatment plan.     5. Reviewed with patient any prescribed medication sent to pharmacy.  Education provided regarding proper storage, safe handling, and proper disposal of unused medication.  6. Proper handling of body fluids and waste discussed and written information provided.  7. If appropriate, patient had pretreatment labs drawn today.    Learning assessment completed at initial patient encounter. See separate flowsheet. Chemo/biotherapy education comprehension assessed at today's visit.    I spent 25 minutes caring for Aida on this date of service. This time  includes time spent by me in the following activities: preparing for the visit, reviewing tests, obtaining and/or reviewing a separately obtained history, performing a medically appropriate examination and/or evaluation, counseling and educating the patient/family/caregiver, ordering medications, tests, or procedures, referring and communicating with other health care professionals, documenting information in the medical record, independently interpreting results and communicating that information with the patient/family/caregiver and care coordination.     Marisol Lang, APRN   05/30/23

## 2023-05-26 NOTE — PROGRESS NOTES
Oral Chemotherapy - New Referral    Received a referral from Dr. Cheng    Treatment Plan: Venclexta (venetoclax) + Rituxan  Start date of treatment planned for: As soon as oral specialty medication is available.  Indication: CLL  Relevant past treatments: steroids, rituxan, Imbruvica  Is the therapy appropriate based on treatment guidelines and FDA labeling?: yes  Therapeutic Goals: Continue treatment until progression or intolerable toxicity  Patient can self-administer oral medications: Yes    • Drug-Drug Interactions: The current medication list was reviewed and there are no relevant drug-drug interactions.  • Medication Allergies: The patient has no relevant allergies as it relates to their oral specialty medication  • Review of Labs/Dose Adjustments: The patient's most recent labs were reviewed and all are WNL to start treatment at this dose.     A prescription was released to [facility] specialty pharmacy for   Drug: Venclexta (venetoclax)  Strength: 10 mg, 50 mg, 100 mg  Directions: Take 20 mg once daily x 7 days, then take 50 mg once daily x 7 days, then take 100 mg once daily x 7 days, then take 200 mg once daily x 7 days, then take 400 mg once daily thereafter.    Refills: 0    Pharmacy education is scheduled for 5/30/23., CCA and consent will be signed at that time.    Name/Credentials: Marianela Romeo, MeryD, BCPS    5/26/2023  10:50 EDT      Completed independent double check on medication order/RX. Sheri Patel, [facility], BCOP

## 2023-05-26 NOTE — PROGRESS NOTES
Venclexta ramp up dosing supply secured from [facility]. This will be delivered to the [facility] [place] office next week.    Manufacture assistance to be submitted as the [employer] are not open with funding.     Haylee Montanez  Specialty Pharmacy Technician

## 2023-05-30 ENCOUNTER — SPECIALTY PHARMACY (OUTPATIENT)
Dept: ONCOLOGY | Facility: HOSPITAL | Age: 68
End: 2023-05-30

## 2023-05-30 ENCOUNTER — OFFICE VISIT (OUTPATIENT)
Dept: ONCOLOGY | Facility: CLINIC | Age: 68
End: 2023-05-30

## 2023-05-30 VITALS
SYSTOLIC BLOOD PRESSURE: 118 MMHG | TEMPERATURE: 97.8 F | BODY MASS INDEX: 34.17 KG/M2 | RESPIRATION RATE: 18 BRPM | OXYGEN SATURATION: 97 % | HEART RATE: 74 BPM | DIASTOLIC BLOOD PRESSURE: 65 MMHG | WEIGHT: 238.7 LBS | HEIGHT: 70 IN

## 2023-05-30 DIAGNOSIS — C91.10 CLL (CHRONIC LYMPHOCYTIC LEUKEMIA): Primary | ICD-10-CM

## 2023-05-30 PROCEDURE — 99213 OFFICE O/P EST LOW 20 MIN: CPT | Performed by: NURSE PRACTITIONER

## 2023-05-30 PROCEDURE — 1126F AMNT PAIN NOTED NONE PRSNT: CPT | Performed by: NURSE PRACTITIONER

## 2023-05-30 RX ORDER — ONDANSETRON HYDROCHLORIDE 8 MG/1
8 TABLET, FILM COATED ORAL 3 TIMES DAILY PRN
Qty: 30 TABLET | Refills: 5 | Status: SHIPPED | OUTPATIENT
Start: 2023-05-30

## 2023-05-30 NOTE — PROGRESS NOTES
Specialty Pharmacy Patient Management Program  Oncology Initial Assessment       Aida Bal Jr. is a 68 y.o. male with CLL seen by an Oncology provider and enrolled in the Oncology Patient Management program offered by [facility].  An initial outreach was conducted, including assessment of therapy appropriateness and specialty medication education for Venclexta (venetoclax) and Rituxan (rituximab). The patient was introduced to services offered by [facility], including: regular assessments, refill coordination, mail order delivery options, prior authorization maintenance, and financial assistance programs as applicable. The patient was also provided with contact information for the pharmacy team.     Diagnosis: CLL    Goal of chemotherapy: disease control    Treatment Medication(s) / Frequency and Dosing    1. Venetoclax 20 mg po daily x 7 days; 50 mg po daily x 7 days; 100 mg po daily x 7 days; 200 mg po daily x 7 days; then 400 mg daily thereafter  2. Rituximab 375 mg/m^2 IV on cycle 1 and 500 mg/m^2 IV for cycles 2-6 (28 day cycle)    Number of cycles: Rituximab for 6 cycles and Venetoclax for approximately 2 years    Start date of oral specialty medication: As soon as oral specialty medication is available.    Follow-up Testing to be determined after TBD cycles by MD.     Items for home use: Imodium AD (for diarrhea) and Thermometer     Rx written for: [x] Nausea    [] Pre-Chemo   ondansetron 8 mg by mouth every 8 hours as needed for nausea      Completing Pharmacist: Carmen Dewtit Pharmacy Intern             Date/time: 05/30/2023 10:34 EDT         Relevant Past Medical History, Comorbidities, and Vaccines  Relevant medical history and concomitant health conditions were discussed with the patient. The patient's chart has been reviewed for relevant past medical history and comorbid health conditions and updated as necessary.  Vaccines are coordinated by the  patient's oncologist and primary care provider.  Past Medical History:   Diagnosis Date   • AICD (automatic cardioverter/defibrillator) present 09/28/2022    FOLLOWS WITH DR. HARRISON   • Autoimmune hemolytic anemia    • Cardiac arrest 09/24/2022    trying to get into ballgame   • Carotid artery occlusion     Left internal carotid artery   • Colon polyp two years ago   • Coronary artery disease    • Deep vein thrombosis when I was in the hospital in May 4-2022   • Diabetes mellitus     Steroid induced    ( while in hospital )   • Gout    • High blood sugar    • High cholesterol    • History of COVID-19 04/2022   • History of transfusion Contact Dr. Toñito Cheng   • Hypertension    • Leukemia, chronic     dx 2014   • Sleep apnea     c-pap   • TIA (transient ischemic attack)      Social History     Socioeconomic History   • Marital status:      Spouse name: Meghana   Tobacco Use   • Smoking status: Never   • Smokeless tobacco: Never   Vaping Use   • Vaping Use: Never used   Substance and Sexual Activity   • Alcohol use: No   • Drug use: No   • Sexual activity: Yes     Partners: Female     Birth control/protection: None     Comment: I only have sex with my wife       Allergies  Known allergies and reactions were discussed with the patient. The patient's chart has been reviewed for allergy information and updated as necessary.   Allergies   Allergen Reactions   • Ace Inhibitors Unknown - Low Severity   • Candesartan Unknown - Low Severity   • Pravastatin Unknown - Low Severity     Annotation - 09Jan2017: medication caused side effects but he is able to other statins ie Crestor        Current Medication List  This medication list has been reviewed with the patient and evaluated for any interactions or necessary modifications/recommendations, and updated to include all prescription medications, OTC medications, and supplements the patient is currently taking.  This list reflects what is contained in the patient's profile,  which has also been marked as reviewed to communicate to other providers it is the most up to date version of the patient's current medication therapy.   Prior to Admission medications    Medication Sig Start Date End Date Taking? Authorizing Provider   allopurinol (ZYLOPRIM) 300 MG tablet Take 1 tablet by mouth Daily.   Yes Tate Valdez MD   amLODIPine (NORVASC) 10 MG tablet Take 1 tablet by mouth Every Morning. 7/26/21  Yes Tate Valdez MD   Aspirin Low Dose 81 MG EC tablet 1 tablet Daily. 8/28/20  Yes Tate Valdez MD   atorvastatin (LIPITOR) 40 MG tablet Hold until told to resume by primary care provider  Patient taking differently: Take 20 mg by mouth Every Night. 5/5/22  Yes Barak Cruz MD   Cholecalciferol (VITAMIN D3) 2000 UNITS tablet Take 1 tablet by mouth Daily.   Yes Tate Valdez MD   doxazosin (CARDURA) 4 MG tablet Take 1 tablet by mouth 2 (Two) Times a Day. 4/4/18  Yes Tate Valdez MD   famotidine (PEPCID) 40 MG tablet Take 1 tablet by mouth Every Night. 5/17/23  Yes Tate Valdez MD   irbesartan-hydrochlorothiazide (AVALIDE) 300-12.5 MG tablet Take 1 tablet by mouth Daily. 9/24/22  Yes Tate Valdez MD Lumigan 0.01 % ophthalmic drops Administer 1 drop to both eyes Every Night. 7/12/22  Yes Tate Valdez MD   pantoprazole (PROTONIX) 40 MG EC tablet Take 1 tablet by mouth Daily. 8/7/22  Yes Tate Valdez MD   Venetoclax (VENCLEXTA) 10 MG chemo tablet Take 2 tablets by mouth Daily for 7 days. Then move on to 50 mg. 5/26/23 6/2/23 Yes Toñito Cheng MD   Venetoclax (VENCLEXTA) 100 MG chemo tablet Starting week 3, take one tablet (100 mg) daily for 7 days, then take two tablets (200 mg) daily for 7 days, then take four tablets (400 mg) daily thereafter 5/26/23  Yes Toñito Cheng MD   Venetoclax (VENCLEXTA) 50 MG chemo tablet Take 1 tablet by mouth Daily for 7 days. Then move on to 100 mg tablets. 5/26/23 6/2/23 Yes Prosper  MD Toñito   fluticasone (FLONASE) 50 MCG/ACT nasal spray 1 spray into the nostril(s) as directed by provider As Needed. 4/7/22 4/7/23  ProviderTate MD   candesartan (ATACAND) 32 MG tablet Take  by mouth.  5/3/22  ProviderTate MD       Drug Interactions  • Reviewed concomitant medications, allergies, labs, comorbidities/medical history, quality of life 7/10, and immunization history.   • Drug-drug interactions noted and discussed during education: no significant drug interactions noted. . Reminded the patient to let us know before making any changes or starting any new prescription or OTC medications so we can first assess drug interactions.  • Drug-food interactions noted and discussed during education: Patient was instructed to avoid eating grapefruit and drinking grapefruit juice, eating [place] oranges (commonly found in orange marmalade) and eating starfruit    Recommended Medications Assessment  • Tumor lysis syndrome prophylaxis - Risk for TLS Low. The patient is currently on allopurinol 300 mg PO daily. Adequate hydration was discussed during education.    Relevant Laboratory Values  Lab Results   Component Value Date    GLUCOSE 107 05/24/2023    CALCIUM 9.6 05/24/2023     05/24/2023    K 3.8 05/24/2023    CO2 25.5 05/24/2023     05/24/2023    BUN 14 05/24/2023    CREATININE 1.17 05/24/2023    EGFRIFAFRI 75 01/22/2022    EGFRIFNONA 66 05/19/2016    BCR 12.0 05/24/2023    ANIONGAP 13.5 05/24/2023     Lab Results   Component Value Date    WBC 10.05 05/24/2023    RBC 3.23 (L) 05/24/2023    HGB 10.7 (L) 05/24/2023    HCT 34.5 (L) 05/24/2023    .8 (H) 05/24/2023    MCH 33.1 (H) 05/24/2023    MCHC 31.0 (L) 05/24/2023    RDW 13.6 05/24/2023    RDWSD 53.3 05/24/2023    MPV 9.5 05/24/2023     05/24/2023    NEUTRORELPCT 18.2 (L) 05/24/2023    LYMPHORELPCT 72.7 (H) 05/24/2023    MONORELPCT 7.5 05/24/2023    EOSRELPCT 1.2 05/24/2023    BASORELPCT 0.3 05/24/2023    AUTOIGPER  0.1 05/24/2023    NEUTROABS 1.83 05/24/2023    LYMPHSABS 7.31 (H) 05/24/2023    MONOSABS 0.75 05/24/2023    EOSABS 0.12 05/24/2023    BASOSABS 0.03 05/24/2023    AUTOIGNUM 0.01 05/24/2023    NRBC 0.0 05/24/2023       The above labs have been reviewed. No dose adjustments are needed for the oral specialty medication(s) based on the labs.    Initial Education Provided for Specialty Medication  The patient has been provided with the following education. All questions and concerns have been addressed prior to the patient receiving the medication, and the patient has verbalized understanding of the education and any materials provided.  Additional patient education shall be provided and documented upon request by the patient, provider or payer.      Provided patient with:   Education sheets about the medication, 24-hour clinic phone number and my contact information and instructions to call should additional questions arise.     Medication Education Sheets Provided: (select all that apply)  • Oral Specialty Medication: Venclexta (venetoclax)  • IV: Rituximab  • Steroid: None      TOPICS COMMENTS   Storage and Handling of Oral Specialty Medication Store in the original container, in a dry location out of direct sunlight, and out of reach of children or pets. and Store at room temperature.  Discussed safe handling and what to do with any unused medication.   Administration of Oral Specialty Medication Take with food at the same time(s) each day.   Adherence to Oral Specialty Regimen and Handling Missed Doses Patient is likely to have good treatment adherence; reinforced the importance of adherence. Reviewed how to address missed doses and to let us know of any missed doses.   Anemia: role of RBC, cause, s/s, ways to manage, role of transfusion Reviewed the role of RBC and the use of transfusions if hemoglobin decreases too much.  Patient to notify us if they experience shortness of breath, dizziness, or palpitations.  Also  let patient know they could feel more tired than usual and to try to stay active, but rest if they need to.    Thrombocytopenia: role of platelet, cause, s/s, ways to prevent bleeding, things to avoid, when to seek help Reviewed the role of platelets in blood clotting and when to call clinic (bloody nose that bleeds for 5 mins despite pressure, a cut that won't stop bleeding despite pressure, gums that bleed excessively with brushing or flossing). Recommended using an electric razor, soft bristle toothbrush, and blowing your nose gently.    Neutropenia: role of WBC, cause, infection precautions, s/s of infection, when to call MD Reviewed the role of WBC, good infection prevention practices, and when to call the clinic (temperature 100.4F, sore throat, burning urination, etc)  • COVID Vaccines: 2 doses received  • Flu Vaccine: no 2022 flu vaccine received   Nutrition and Appetite Changes:  importance of maintaining healthy diet & weight, ways to manage to improve intake, dietary consult, exercise regimen, electrolyte and/or blood glucose abnormalities Electrolyte Abnormalities:  Explained that the oncology therapy may lead to abnormalities in electrolytes, specifically: low calcium, increased blood glucose, low soidum, low phosphorous, increased liver enzymes, increased bilirubin   Diarrhea: causes, s/s of dehydration, ways to manage, dietary changes, when to call MD Chemotherapy : Discussed the risk of diarrhea. Instructed patient on use OTC loperamide with diarrhea onset, but emphasized the importance of calling the clinic if 4-6 episodes in 24 hours not relieved by OTC loperamide.   Nausea/Vomiting: cause, use of antiemetics, dietary changes, when to call MD • Emetic risk: low-minimal  • PRN home meds: Ondansetron  • Pharmacy home meds sent to: [address] ([facility] [place] - [address] [facility] [phone] PH    Instructed the patient to take a dose of the PRN medication at the first  onset of nausea and if it's not working to call us for additional medications.  Also provided non-drug measures to mitigate nausea.   Mouth Sores: causes, oral care, ways to manage Mouth sores can be prevented by making a mouth wash mixture of salt, baking soda, and water. The patient was instructed to swish and spit four times daily after meals and before bedtime.  Use of a soft bristle toothbrush was recommended.  The patient was instructed to avoid alcohol-containing OTC mouthwashes.    Nervous System Changes: causes, s/s, neuropathies, cognitive changes, ways to manage discussed the rare but serious side effect of PML and instructed the patient to watch for signs and symptoms of vision changes, confusion, one-sided weakness, etc   Organ Toxicities: cause, s/s, need for diagnostic tests, labs, when to notify MD Discussed potential effects on organ systems, monitoring, diagnostic tests, labs, and when to notify their MD. Discussed the signs/symptoms of the following: GI perforation, hepatotoxicity, nephrotoxicity and skin changes   Infusion-Related Reactions or Injection-Site Reaction: Cause, s/s, anaphylaxis, monitoring, etc. • Premeds: Acetaminophen and Diphenhydramine  Discussed the risk of an infusion reaction and symptoms such as: fever, chills, dizziness, itchiness or rash, flushing, trouble breathing, wheezing, sudden back pain, or feeling faint.  Instructed the patient to notify their nurse if they start feeling weird at any point during their infusion.    1st dose will be given over 50 mg/hour, increasing by 50 mg/hour until rate of 400 mg/hour is reached as long as first dose tolerated.    Reviewed how infusion or injection-site reactions are managed.   Miscellaneous • Financial Issues: None  • Lab Draws: On or before day 1 of each cycle, no sooner than 3 days early.   Infertility and Sexuality:  causes, fertility preservation options, sexuality changes, ways to manage, importance of birth control IV  Oncology Therapy: Reviewed safe sex practices and the importance of minimizing exposure to body fluids for 48 hours after each dose of IV oncology therapy., Oral Oncology Therapy: Reviewed safe sex practices and the importance of minimizing exposure to body fluids while on oral oncology therapy.   Home Care: how to manage bodily fluids Counseled on management of soiled linens and proper flush technique.  Discussed how to manage all the side effects at home and advised when to contact the MD office     Adherence and Self-Administration  • Barriers to Patient Adherence and/or Self-Administration: None  • Methods for Supporting Patient Adherence and/or Self-Administration: medication action plan with ramp-up dosing schedule listed  • Expected duration of therapy: Rituximab: 6 planned cycles and Venetoclax: until intolerance or two years    Goals of Therapy  • Patient Goals of Therapy:   o Consistently take medications as prescribed  o Patient will adhere to medication regimen  o Patient will report any medication side effects to healthcare provider  • Clinical Goals:    Goals     • Specialty Pharmacy General Goal      adherence          o Support patient understanding of medication regimen  o Ensure patient knows the pharmacy contact information  o Schedule regular follow-up to monitor the treatment serious adverse events  o Schedule regular follow-up to confirm medication adherence  o Schedule regular follow-up to monitor disease progression or stability    Quality of Life Assessment   The patient's current (pre-therapy) quality of life was discussed with the patient. The QOL segment of this outreach has been reviewed and updated.   • Quality of Life Score: 7/10    Wrap up  Discussed aforementioned material with patient in person, face-to-face, in clinic.   Chemo consents/CCA were signed at today's visit.   Medication availability: patient is aware to contact our office if medication is not delivered in timely  manner  Patient and wife, present in today's appointment, expressed understanding.  Patient demonstrates ability to self-administer medication. No barriers to adherence identified.  All questions and concerns addressed.     Reassessment Plan & Follow-Up  1. Pharmacist to perform regular reassessments no more than (6) months from the previous assessment.  2. Welcome information and patient satisfaction survey to be sent by retail team with patient's initial fill.  3. Care Coordinator to set up future refill outreaches, coordinate prescription delivery, and escalate clinical questions to pharmacist.     Additional Plans, Therapy Recommendations or Therapy Problems to Be Addressed: None     Attestation  I attest that the initiated specialty medication(s) are appropriate for the patient based on my assessment.  If the prescribed therapy is at any point deemed not appropriate based on the current or future assessments, a consultation will be initiated with the patient's specialty care provider to determine the best course of action. The revised plan of therapy will be documented along with any additional patient education provided.     Name/Credentials Carmen Dewitt PharmD Candidate    Date and Time: 5/30/2023  10:34 EDT     Marianela Romeo, MeryD, BCPS

## 2023-05-31 ENCOUNTER — SPECIALTY PHARMACY (OUTPATIENT)
Dept: PHARMACY | Facility: HOSPITAL | Age: 68
End: 2023-05-31

## 2023-05-31 NOTE — PROGRESS NOTES
I have received Venclexta 10, 50, & 100 mg  from [facility] and placed it in the [facility] at [facility].    Medication verified by Marianela Romeo [facility].      Silvia Womack  Specialty Pharmacy Technician

## 2023-06-05 ENCOUNTER — DOCUMENTATION (OUTPATIENT)
Dept: PHARMACY | Facility: HOSPITAL | Age: 68
End: 2023-06-05
Payer: MEDICARE

## 2023-06-05 DIAGNOSIS — C91.10 CLL (CHRONIC LYMPHOCYTIC LEUKEMIA): Primary | ICD-10-CM

## 2023-06-05 NOTE — PROGRESS NOTES
Venclexta (ramp up dosing) supply from [facility] picked up by pt on 6/5/2023.     Haylee Montanez  Specialty Pharmacy Technician

## 2023-06-05 NOTE — PROGRESS NOTES
Funding for CLL has opened with [employer] have secured pt $10,000 funding from 6/5/2023-6/5/2024.    This will be used on his second cycle fill through [facility].      Haylee Montanez  Specialty Pharmacy Technician

## 2023-06-06 ENCOUNTER — CLINICAL SUPPORT (OUTPATIENT)
Dept: ONCOLOGY | Facility: HOSPITAL | Age: 68
End: 2023-06-06
Payer: MEDICARE

## 2023-06-06 ENCOUNTER — LAB (OUTPATIENT)
Dept: LAB | Facility: HOSPITAL | Age: 68
End: 2023-06-06
Payer: MEDICARE

## 2023-06-06 ENCOUNTER — OFFICE VISIT (OUTPATIENT)
Dept: ONCOLOGY | Facility: CLINIC | Age: 68
End: 2023-06-06
Payer: MEDICARE

## 2023-06-06 VITALS
RESPIRATION RATE: 18 BRPM | HEIGHT: 70 IN | WEIGHT: 237.8 LBS | OXYGEN SATURATION: 97 % | SYSTOLIC BLOOD PRESSURE: 132 MMHG | BODY MASS INDEX: 34.04 KG/M2 | DIASTOLIC BLOOD PRESSURE: 73 MMHG | TEMPERATURE: 98.2 F | HEART RATE: 71 BPM

## 2023-06-06 DIAGNOSIS — C91.10 CLL (CHRONIC LYMPHOCYTIC LEUKEMIA): ICD-10-CM

## 2023-06-06 DIAGNOSIS — Z79.899 HIGH RISK MEDICATION USE: ICD-10-CM

## 2023-06-06 DIAGNOSIS — C91.10 CLL (CHRONIC LYMPHOCYTIC LEUKEMIA): Primary | ICD-10-CM

## 2023-06-06 LAB
ALBUMIN SERPL-MCNC: 4.4 G/DL (ref 3.5–5.2)
ALBUMIN SERPL-MCNC: 4.5 G/DL (ref 3.5–5.2)
ALBUMIN/GLOB SERPL: 2.3 G/DL (ref 1.1–2.4)
ALBUMIN/GLOB SERPL: 2.4 G/DL (ref 1.1–2.4)
ALP SERPL-CCNC: 124 U/L (ref 38–116)
ALP SERPL-CCNC: 126 U/L (ref 38–116)
ALT SERPL W P-5'-P-CCNC: 12 U/L (ref 0–41)
ALT SERPL W P-5'-P-CCNC: 12 U/L (ref 0–41)
ANION GAP SERPL CALCULATED.3IONS-SCNC: 11.3 MMOL/L (ref 5–15)
ANION GAP SERPL CALCULATED.3IONS-SCNC: 13.1 MMOL/L (ref 5–15)
AST SERPL-CCNC: 17 U/L (ref 0–40)
AST SERPL-CCNC: 17 U/L (ref 0–40)
BASOPHILS # BLD AUTO: 0.02 10*3/MM3 (ref 0–0.2)
BASOPHILS # BLD AUTO: 0.03 10*3/MM3 (ref 0–0.2)
BASOPHILS NFR BLD AUTO: 0.2 % (ref 0–1.5)
BASOPHILS NFR BLD AUTO: 0.3 % (ref 0–1.5)
BILIRUB SERPL-MCNC: 0.7 MG/DL (ref 0.2–1.2)
BILIRUB SERPL-MCNC: 0.7 MG/DL (ref 0.2–1.2)
BUN SERPL-MCNC: 14 MG/DL (ref 6–20)
BUN SERPL-MCNC: 14 MG/DL (ref 6–20)
BUN/CREAT SERPL: 12.3 (ref 7.3–30)
BUN/CREAT SERPL: 12.5 (ref 7.3–30)
CALCIUM SPEC-SCNC: 9.5 MG/DL (ref 8.5–10.2)
CALCIUM SPEC-SCNC: 9.6 MG/DL (ref 8.5–10.2)
CHLORIDE SERPL-SCNC: 104 MMOL/L (ref 98–107)
CHLORIDE SERPL-SCNC: 105 MMOL/L (ref 98–107)
CO2 SERPL-SCNC: 25.9 MMOL/L (ref 22–29)
CO2 SERPL-SCNC: 26.7 MMOL/L (ref 22–29)
CREAT SERPL-MCNC: 1.12 MG/DL (ref 0.7–1.3)
CREAT SERPL-MCNC: 1.14 MG/DL (ref 0.7–1.3)
DEPRECATED RDW RBC AUTO: 53.1 FL (ref 37–54)
DEPRECATED RDW RBC AUTO: 53.8 FL (ref 37–54)
EGFRCR SERPLBLD CKD-EPI 2021: 69.2 ML/MIN/1.73
EGFRCR SERPLBLD CKD-EPI 2021: 70.7 ML/MIN/1.73
EOSINOPHIL # BLD AUTO: 0.06 10*3/MM3 (ref 0–0.4)
EOSINOPHIL # BLD AUTO: 0.08 10*3/MM3 (ref 0–0.4)
EOSINOPHIL NFR BLD AUTO: 0.5 % (ref 0.3–6.2)
EOSINOPHIL NFR BLD AUTO: 0.9 % (ref 0.3–6.2)
ERYTHROCYTE [DISTWIDTH] IN BLOOD BY AUTOMATED COUNT: 13.4 % (ref 12.3–15.4)
ERYTHROCYTE [DISTWIDTH] IN BLOOD BY AUTOMATED COUNT: 13.5 % (ref 12.3–15.4)
GLOBULIN UR ELPH-MCNC: 1.8 GM/DL (ref 1.8–3.5)
GLOBULIN UR ELPH-MCNC: 2 GM/DL (ref 1.8–3.5)
GLUCOSE SERPL-MCNC: 162 MG/DL (ref 74–124)
GLUCOSE SERPL-MCNC: 99 MG/DL (ref 74–124)
HBV SURFACE AB SER RIA-ACNC: NORMAL
HBV SURFACE AG SERPL QL IA: NORMAL
HCT VFR BLD AUTO: 34.7 % (ref 37.5–51)
HCT VFR BLD AUTO: 35.1 % (ref 37.5–51)
HGB BLD-MCNC: 10.8 G/DL (ref 13–17.7)
HGB BLD-MCNC: 11 G/DL (ref 13–17.7)
IMM GRANULOCYTES # BLD AUTO: 0 10*3/MM3 (ref 0–0.05)
IMM GRANULOCYTES # BLD AUTO: 0.01 10*3/MM3 (ref 0–0.05)
IMM GRANULOCYTES NFR BLD AUTO: 0 % (ref 0–0.5)
IMM GRANULOCYTES NFR BLD AUTO: 0.1 % (ref 0–0.5)
LDH SERPL-CCNC: 230 U/L (ref 99–259)
LDH SERPL-CCNC: 238 U/L (ref 99–259)
LYMPHOCYTES # BLD AUTO: 5.73 10*3/MM3 (ref 0.7–3.1)
LYMPHOCYTES # BLD AUTO: 8.41 10*3/MM3 (ref 0.7–3.1)
LYMPHOCYTES NFR BLD AUTO: 64.4 % (ref 19.6–45.3)
LYMPHOCYTES NFR BLD AUTO: 76.7 % (ref 19.6–45.3)
MAGNESIUM SERPL-MCNC: 1.9 MG/DL (ref 1.8–2.5)
MAGNESIUM SERPL-MCNC: 1.9 MG/DL (ref 1.8–2.5)
MCH RBC QN AUTO: 33.3 PG (ref 26.6–33)
MCH RBC QN AUTO: 33.8 PG (ref 26.6–33)
MCHC RBC AUTO-ENTMCNC: 31.1 G/DL (ref 31.5–35.7)
MCHC RBC AUTO-ENTMCNC: 31.3 G/DL (ref 31.5–35.7)
MCV RBC AUTO: 107.1 FL (ref 79–97)
MCV RBC AUTO: 108 FL (ref 79–97)
MONOCYTES # BLD AUTO: 0.76 10*3/MM3 (ref 0.1–0.9)
MONOCYTES # BLD AUTO: 1.43 10*3/MM3 (ref 0.1–0.9)
MONOCYTES NFR BLD AUTO: 16.1 % (ref 5–12)
MONOCYTES NFR BLD AUTO: 6.9 % (ref 5–12)
NEUTROPHILS NFR BLD AUTO: 1.63 10*3/MM3 (ref 1.7–7)
NEUTROPHILS NFR BLD AUTO: 1.7 10*3/MM3 (ref 1.7–7)
NEUTROPHILS NFR BLD AUTO: 15.6 % (ref 42.7–76)
NEUTROPHILS NFR BLD AUTO: 18.3 % (ref 42.7–76)
NRBC BLD AUTO-RTO: 0 /100 WBC (ref 0–0.2)
NRBC BLD AUTO-RTO: 0 /100 WBC (ref 0–0.2)
PHOSPHATE SERPL-MCNC: 3.3 MG/DL (ref 2.5–4.5)
PHOSPHATE SERPL-MCNC: 3.7 MG/DL (ref 2.5–4.5)
PLATELET # BLD AUTO: 160 10*3/MM3 (ref 140–450)
PLATELET # BLD AUTO: 163 10*3/MM3 (ref 140–450)
PMV BLD AUTO: 9.8 FL (ref 6–12)
PMV BLD AUTO: 9.9 FL (ref 6–12)
POTASSIUM SERPL-SCNC: 3.8 MMOL/L (ref 3.5–4.7)
POTASSIUM SERPL-SCNC: 4.1 MMOL/L (ref 3.5–4.7)
PROT SERPL-MCNC: 6.2 G/DL (ref 6.3–8)
PROT SERPL-MCNC: 6.5 G/DL (ref 6.3–8)
RBC # BLD AUTO: 3.24 10*6/MM3 (ref 4.14–5.8)
RBC # BLD AUTO: 3.25 10*6/MM3 (ref 4.14–5.8)
SODIUM SERPL-SCNC: 142 MMOL/L (ref 134–145)
SODIUM SERPL-SCNC: 144 MMOL/L (ref 134–145)
URATE SERPL-MCNC: 4.2 MG/DL (ref 2.8–7.4)
URATE SERPL-MCNC: 4.4 MG/DL (ref 2.8–7.4)
WBC NRBC COR # BLD: 10.96 10*3/MM3 (ref 3.4–10.8)
WBC NRBC COR # BLD: 8.9 10*3/MM3 (ref 3.4–10.8)

## 2023-06-06 PROCEDURE — 86706 HEP B SURFACE ANTIBODY: CPT | Performed by: INTERNAL MEDICINE

## 2023-06-06 PROCEDURE — 83735 ASSAY OF MAGNESIUM: CPT

## 2023-06-06 PROCEDURE — 85025 COMPLETE CBC W/AUTO DIFF WBC: CPT

## 2023-06-06 PROCEDURE — 87340 HEPATITIS B SURFACE AG IA: CPT | Performed by: INTERNAL MEDICINE

## 2023-06-06 PROCEDURE — 80053 COMPREHEN METABOLIC PANEL: CPT

## 2023-06-06 PROCEDURE — 3075F SYST BP GE 130 - 139MM HG: CPT | Performed by: NURSE PRACTITIONER

## 2023-06-06 PROCEDURE — 84100 ASSAY OF PHOSPHORUS: CPT

## 2023-06-06 PROCEDURE — 3078F DIAST BP <80 MM HG: CPT | Performed by: NURSE PRACTITIONER

## 2023-06-06 PROCEDURE — 83615 LACTATE (LD) (LDH) ENZYME: CPT

## 2023-06-06 PROCEDURE — 84550 ASSAY OF BLOOD/URIC ACID: CPT

## 2023-06-06 PROCEDURE — 1160F RVW MEDS BY RX/DR IN RCRD: CPT | Performed by: NURSE PRACTITIONER

## 2023-06-06 PROCEDURE — 1126F AMNT PAIN NOTED NONE PRSNT: CPT | Performed by: NURSE PRACTITIONER

## 2023-06-06 PROCEDURE — 1159F MED LIST DOCD IN RCRD: CPT | Performed by: NURSE PRACTITIONER

## 2023-06-06 PROCEDURE — 36415 COLL VENOUS BLD VENIPUNCTURE: CPT

## 2023-06-06 PROCEDURE — 99214 OFFICE O/P EST MOD 30 MIN: CPT | Performed by: NURSE PRACTITIONER

## 2023-06-06 NOTE — PROGRESS NOTES
[facility] GROUP OUTPATIENT FOLLOW UP CLINIC VISIT    REASON FOR FOLLOW-UP:    1.  Chronic lymphocytic leukemia diagnosed in November 2015.  CLL FISH panel negative at that time.    2.  Autoimmune hemolytic anemia associated with CLL.  He was treated with steroids and he received 4 weeks of weekly Rituxan.  Otherwise no treatment has been performed  3.  CT imaging of the chest abdomen and pelvis from 4/25/2018 showed lymphadenopathy with mildly enlarged retroperitoneal and mesenteric lymph nodes with the largest measuring about 3.2 cm.  The spleen measured 13.6 cm.  Slight increase in lymphadenopathy compared with 11/27/2015.  4.  Acute anemia with a hemoglobin of 6.9 as of 6/20/2018.  Low reticulocyte count.  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  Prednisone initiated.  Erythropoietin 73.9.  Parvovirus B19 PCR negative.  Steroids complete on 8/22/2018.  5.  Bone marrow biopsy on 7/3/2018 with 70% CLL (87% by flow cytometry) with a t(14;18) translocation by cytogenetics, 3 copies of IGH in 87.5% of cells but negative for CCND1/IGH rearrangement.  Negative for all other rearrangements/deletions.  IgVH somatic hypermutation was not detected.            6.  Therapy with ibrutinib initiated at the end of July 2018.  Discontinued in late 2022 due to cardiac arrest.      HISTORY OF PRESENT ILLNESS:  Aida Bal Jr. is a 68 y.o. male with the above-mentioned history who is here today for lab review.  Patient initiated on venetoclax 20 mg this morning, with plans for dose escalation over the next few weeks. He took his first dose around 1030 this morning.  So far he is feeling completely fine.  We reviewed labs from this afternoon which are stable.  Patient states that he is hydrating as he was instructed.  He has no problems or concerns at this time.    Patient states that he will likely take his next dose of venetoclax in the morning around 730 or 8 AM with his breakfast, when he typically takes his  "medication.    REVIEW OF SYSTEMS:  As per the [facility]    Vitals:    06/06/23 1343   BP: 132/73   Pulse: 71   Resp: 18   Temp: 98.2 °F (36.8 °C)   TempSrc: Temporal   SpO2: 97%   Weight: 108 kg (237 lb 12.8 oz)   Height: 177.8 cm (70\")   PainSc: 0-No pain       PHYSICAL EXAMINATION:  GENERAL:  Well-developed well-nourished male; awake, alert and oriented, in no acute distress.  Wearing a facemask.  Well appearing again today.  SKIN:  Warm and dry, without rashes, purpura, or petechiae.  HEAD:  Normocephalic, atraumatic.  EARS:  Hearing intact.  LYMPHATICS: Small bilateral palpable cervical adenopathy measuring 1 to 2 cm.  CHEST:  Lungs are clear to auscultation bilaterally.  No wheezes, rales, or rhonchi.  An implanted defibrillator is present in the left subclavian area.  HEART: Regular rate and rhythm.  Grade 2/6 early systolic murmur  EXTREMITIES: No clubbing cyanosis or edema.  NEUROLOGICAL:  No focal neurologic deficits.      DIAGNOSTIC DATA:   Latest Reference Range & Units 06/06/23 08:45 06/06/23 13:24   WBC 3.40 - 10.80 10*3/mm3 8.90 10.96 (H)   Neutrophils Absolute 1.70 - 7.00 10*3/mm3 1.63 (L) 1.70   Hemoglobin 13.0 - 17.7 g/dL 11.0 (L) 10.8 (L)   Hematocrit 37.5 - 51.0 % 35.1 (L) 34.7 (L)   Platelets 140 - 450 10*3/mm3 160 163   Creatinine 0.70 - 1.30 mg/dL 1.12 1.14   BUN 6 - 20 mg/dL 14 14   Sodium 134 - 145 mmol/L 142 144   Potassium 3.5 - 4.7 mmol/L 3.8 4.1   Glucose 74 - 124 mg/dL 162 (H) 99   Magnesium 1.8 - 2.5 mg/dL 1.9 1.9   Calcium 8.5 - 10.2 mg/dL 9.5 9.6   Albumin 3.5 - 5.2 g/dL 4.4 4.5   Total Protein 6.3 - 8.0 g/dL 6.2 (L) 6.5   AST (SGOT) 0 - 40 U/L 17 17   ALT (SGPT) 0 - 41 U/L 12 12   Alkaline Phosphatase 38 - 116 U/L 124 (H) 126 (H)   Total Bilirubin 0.2 - 1.2 mg/dL 0.7 0.7   Phosphorus 2.5 - 4.5 mg/dL 3.3 3.7         Component      Latest Ref Rn 6/6/2023  8:45 AM 6/6/2023  1:24 PM   LDH      99 - 259 U/L 230  238    Uric Acid      2.8 - 7.4 mg/dL 4.4  4.2          IMAGING:  CT Soft Tissue " Neck With Contrast (05/17/2023 10:13)  CT Abdomen Pelvis With Contrast (05/17/2023 10:13)  CT Chest With Contrast Diagnostic (05/17/2023 10:13)    CT images personally reviewed.  Enlarging adenopathy throughout the neck, supraclavicular areas, axillary, subpectoral, and retroperitoneal areas.    ASSESSMENT:  This is a 68 y.o. male with a history of chronic lymphocytic leukemia and related autoimmune hemolytic anemia.    *CLL:   Diagnosed 11/26/2015.    Normal FISH panel  Initially received Rituxan for 4 weeks at diagnosis for this and the autoimmune hemolytic anemia.  Given the worsening anemia even though it responded to steroids we needed to treat his CLL particularly given the 70% involvement on his bone marrow biopsy.  We started treating with ibrutinib 420 mg daily, initiated at the end of July 2018. Held around the time of COVID infection; resumed when he completed steroids.  Ibrutinib now discontinued due to cardiac arrest.  1/4/2023: White blood cell count remains normal at 5.57 with 66% lymphocytes  3/22/2023: White blood cell count 12,000 with 80% lymphocytes  CT imaging of the neck chest abdomen and pelvis from 5/17/2023 with enlarging adenopathy throughout the neck chest abdomen and pelvis  5/24/2023: Normal white blood cell count of 10.05 with 72.7% lymphocytes  He needs therapy for relapsing disease.  Plan venetoclax and rituximab.  Venetoclax is 20 mg for 1 week then 50 mg for 1 week then 100 mg for 1 week then 200 mg for 1 week then 400 mg following that.  Rituximab starts at week 6 at 375 mg per metered squared and then proceeds on week 10, 14, 18, 22, and 26 at 500 mg.  We may elect to start both drugs on day 1.  I will communicate with our pharmacy staff.  Goal is disease control.  He agrees to proceed.  6/6/2023 WBC 10.96, 76.7% lymphocytes.  Patient initiated venetoclax 20 mg daily this morning around 1030.  Tolerating well so far and hydrating as he was instructed.  He will return in the  morning for 24-hour labs.    *Macrocytic anemia:   He has a history of autoimmune hemolytic anemia when he presented with CLL back in November 2015.  His reticulocyte count was very elevated at that time.  His hemoglobin dropped to as low as about 5.  He responded to steroids initially intravenously and then with prednisone and he was treated with 4 weeks of Rituxan.  He was noted to have worsening anemia.  His reticulocyte count was low.  He initially did not respond very well to transfusions and he required 4 units of packed red cells  There was no evidence for hemolysis although his ADOLFO is positive for IgG.  This was persistently positive.  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  He responded to steroids.  He discontinued steroids as of 8/22/2018.    5/2/2022: Hemoglobin lower at 7.9 with a high MCV of 102   He required red cell transfusion.  1/4/2023: Hemoglobin improving at 11.5  3/22/2023: Hemoglobin 12.3 with a high MCV at 102.7  5/24/2023: Hemoglobin 10.7  6/6/20203 hemoglobin 10.8    *History of systolic hypertension: Blood pressure acceptable at 132/73 today.    *History of thrombocytopenia:   Platelets normal at 163,000    *COVID-19 pandemic: He has received a total of 3 vaccinations.  Subsequent infection in April 2020.  He received a monoclonal antibody as an outpatient and remdesivir as an inpatient. He was readmitted on 5/2 with hypotension, persistent fevers, persistent COVID-19 positivity on testing. He received IVIG and steroids. He improved and was treated with outpatient Paxlovid.  Symptoms resolved at this point.  Last vaccine 9/21/2022, bivalent.  Cardiac arrest several days after receiving this on 9/24/2022.    *Left lower extremity DVT and gastrocnemius vein and right lower extremity superficial thrombophlebitis in the small saphenous vein on bilateral lower extremity venous duplex on 4/15/2022  Associated with COVID-19 infection  Repeat venous duplex 8/1/22 without DVT,  only superficial thrombophlebitis    *Out of hospital cardiac arrest on 9/24/2022  Quickly achieved return of spontaneous circulation with CPR and medication  He had an ICD placed at the [facility]  Ibrutinib discontinued as a result of this    PLAN:   Continue venetoclax 20 mg daily.  Patient will return tomorrow morning for his 24-hour labs for reevaluation by nurse practitioner.  Patient scheduled for follow-up on 6/12/2023 with Dr. Cheng with repeat labs reevaluation.  On 6/13/2023 he will be due to increase his venetoclax to 50 mg daily x1 week.   He will continue to follow-up with cardiology at the [facility]  Call/ return sooner should the patient develop any new concerns or problems.  Continue aggressive hydration as instructed.    Patient is on a high risk medication requiring close monitoring for toxicity.

## 2023-06-06 NOTE — PROGRESS NOTES
Reviewed pt with [facility] pharmacy re: his labs today. After all resulted, phoned pt and advised him to take his venetoclax (call made at 10:30am). Pt scheduled back this afternoon to re-check labs and evaluate with NP. Pt v/u.

## 2023-06-07 ENCOUNTER — OFFICE VISIT (OUTPATIENT)
Dept: ONCOLOGY | Facility: CLINIC | Age: 68
End: 2023-06-07
Payer: MEDICARE

## 2023-06-07 ENCOUNTER — LAB (OUTPATIENT)
Dept: LAB | Facility: HOSPITAL | Age: 68
End: 2023-06-07
Payer: MEDICARE

## 2023-06-07 VITALS
DIASTOLIC BLOOD PRESSURE: 71 MMHG | OXYGEN SATURATION: 100 % | WEIGHT: 236.7 LBS | HEIGHT: 70 IN | TEMPERATURE: 98.6 F | HEART RATE: 79 BPM | SYSTOLIC BLOOD PRESSURE: 143 MMHG | BODY MASS INDEX: 33.89 KG/M2 | RESPIRATION RATE: 16 BRPM

## 2023-06-07 DIAGNOSIS — C91.10 CLL (CHRONIC LYMPHOCYTIC LEUKEMIA): ICD-10-CM

## 2023-06-07 DIAGNOSIS — D64.9 NORMOCYTIC ANEMIA: ICD-10-CM

## 2023-06-07 DIAGNOSIS — C91.10 CLL (CHRONIC LYMPHOCYTIC LEUKEMIA): Primary | ICD-10-CM

## 2023-06-07 DIAGNOSIS — Z79.899 HIGH RISK MEDICATION USE: ICD-10-CM

## 2023-06-07 LAB
ANION GAP SERPL CALCULATED.3IONS-SCNC: 13.6 MMOL/L (ref 5–15)
BASOPHILS # BLD AUTO: 0.02 10*3/MM3 (ref 0–0.2)
BASOPHILS NFR BLD AUTO: 0.3 % (ref 0–1.5)
BUN SERPL-MCNC: 16 MG/DL (ref 6–20)
BUN/CREAT SERPL: 13.6 (ref 7.3–30)
CALCIUM SPEC-SCNC: 9.3 MG/DL (ref 8.5–10.2)
CHLORIDE SERPL-SCNC: 104 MMOL/L (ref 98–107)
CO2 SERPL-SCNC: 25.4 MMOL/L (ref 22–29)
CREAT SERPL-MCNC: 1.18 MG/DL (ref 0.7–1.3)
DEPRECATED RDW RBC AUTO: 53.6 FL (ref 37–54)
EGFRCR SERPLBLD CKD-EPI 2021: 66.4 ML/MIN/1.73
EOSINOPHIL # BLD AUTO: 0.06 10*3/MM3 (ref 0–0.4)
EOSINOPHIL NFR BLD AUTO: 0.8 % (ref 0.3–6.2)
ERYTHROCYTE [DISTWIDTH] IN BLOOD BY AUTOMATED COUNT: 13.5 % (ref 12.3–15.4)
GLUCOSE SERPL-MCNC: 150 MG/DL (ref 74–124)
HBV CORE AB SERPL QL IA: NEGATIVE
HCT VFR BLD AUTO: 34.4 % (ref 37.5–51)
HGB BLD-MCNC: 10.8 G/DL (ref 13–17.7)
IMM GRANULOCYTES # BLD AUTO: 0.02 10*3/MM3 (ref 0–0.05)
IMM GRANULOCYTES NFR BLD AUTO: 0.3 % (ref 0–0.5)
LDH SERPL-CCNC: 566 U/L (ref 99–259)
LYMPHOCYTES # BLD AUTO: 4.55 10*3/MM3 (ref 0.7–3.1)
LYMPHOCYTES NFR BLD AUTO: 57.2 % (ref 19.6–45.3)
MAGNESIUM SERPL-MCNC: 1.9 MG/DL (ref 1.8–2.5)
MCH RBC QN AUTO: 33.6 PG (ref 26.6–33)
MCHC RBC AUTO-ENTMCNC: 31.4 G/DL (ref 31.5–35.7)
MCV RBC AUTO: 107.2 FL (ref 79–97)
MONOCYTES # BLD AUTO: 0.74 10*3/MM3 (ref 0.1–0.9)
MONOCYTES NFR BLD AUTO: 9.3 % (ref 5–12)
NEUTROPHILS NFR BLD AUTO: 2.57 10*3/MM3 (ref 1.7–7)
NEUTROPHILS NFR BLD AUTO: 32.1 % (ref 42.7–76)
NRBC BLD AUTO-RTO: 0 /100 WBC (ref 0–0.2)
PHOSPHATE SERPL-MCNC: 3.4 MG/DL (ref 2.5–4.5)
PLATELET # BLD AUTO: 144 10*3/MM3 (ref 140–450)
PMV BLD AUTO: 10.6 FL (ref 6–12)
POTASSIUM SERPL-SCNC: 4 MMOL/L (ref 3.5–4.7)
RBC # BLD AUTO: 3.21 10*6/MM3 (ref 4.14–5.8)
SODIUM SERPL-SCNC: 143 MMOL/L (ref 134–145)
URATE SERPL-MCNC: 4.5 MG/DL (ref 2.8–7.4)
WBC NRBC COR # BLD: 7.96 10*3/MM3 (ref 3.4–10.8)

## 2023-06-07 PROCEDURE — 84100 ASSAY OF PHOSPHORUS: CPT

## 2023-06-07 PROCEDURE — 80048 BASIC METABOLIC PNL TOTAL CA: CPT

## 2023-06-07 PROCEDURE — 83615 LACTATE (LD) (LDH) ENZYME: CPT

## 2023-06-07 PROCEDURE — 84550 ASSAY OF BLOOD/URIC ACID: CPT

## 2023-06-07 PROCEDURE — 83735 ASSAY OF MAGNESIUM: CPT

## 2023-06-07 NOTE — PROGRESS NOTES
[facility] GROUP OUTPATIENT FOLLOW UP CLINIC VISIT    REASON FOR FOLLOW-UP:    1.  Chronic lymphocytic leukemia diagnosed in November 2015.  CLL FISH panel negative at that time.    2.  Autoimmune hemolytic anemia associated with CLL.  He was treated with steroids and he received 4 weeks of weekly Rituxan.  Otherwise no treatment has been performed  3.  CT imaging of the chest abdomen and pelvis from 4/25/2018 showed lymphadenopathy with mildly enlarged retroperitoneal and mesenteric lymph nodes with the largest measuring about 3.2 cm.  The spleen measured 13.6 cm.  Slight increase in lymphadenopathy compared with 11/27/2015.  4.  Acute anemia with a hemoglobin of 6.9 as of 6/20/2018.  Low reticulocyte count.  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  Prednisone initiated.  Erythropoietin 73.9.  Parvovirus B19 PCR negative.  Steroids complete on 8/22/2018.  5.  Bone marrow biopsy on 7/3/2018 with 70% CLL (87% by flow cytometry) with a t(14;18) translocation by cytogenetics, 3 copies of IGH in 87.5% of cells but negative for CCND1/IGH rearrangement.  Negative for all other rearrangements/deletions.  IgVH somatic hypermutation was not detected.            6.  Therapy with ibrutinib initiated at the end of July 2018.  Discontinued in late 2022 due to cardiac arrest.      HISTORY OF PRESENT ILLNESS:  Aida Bal Jr. is a 68 y.o. male with the above-mentioned history who is here today for lab review.  He returns to the office today, 6/7/2023 for short follow-up and lab review.  He initiated venetoclax 20 mg yesterday morning.  He returns the office today for labs and evaluation to ensure he is not experiencing tumor lysis syndrome.      Reports he continues to feel very well.  He has tolerated venetoclax without issue.  He denies nausea or vomiting.  His bowels are moving normally.  He reports he is drinking plenty of fluids.  He has no new concerns today.    REVIEW OF SYSTEMS:  As per the HPI    Vitals:     "06/07/23 0859   BP: 143/71   Pulse: 79   Resp: 16   Temp: 98.6 °F (37 °C)   TempSrc: Temporal   SpO2: 100%   Weight: 107 kg (236 lb 11.2 oz)   Height: 177.8 cm (70\")   PainSc: 0-No pain       PHYSICAL EXAMINATION:  GENERAL:  Well-developed well-nourished male; awake, alert and oriented, in no acute distress.  Wearing a facemask.  Well appearing again today.  SKIN:  Warm and dry, without rashes, purpura, or petechiae.  HEAD:  Normocephalic, atraumatic.  EARS:  Hearing intact.  LYMPHATICS: Small bilateral palpable cervical adenopathy measuring 1 to 2 cm.  This is unchanged today  CHEST:  Lungs are clear to auscultation bilaterally.  No wheezes, rales, or rhonchi.  An implanted defibrillator is present in the left subclavian area.  HEART: Regular rate and rhythm.  Grade 2/6 early systolic murmur  EXTREMITIES: No clubbing cyanosis or edema.  NEUROLOGICAL:  No focal neurologic deficits.    I have reexamined the patient and the results are consistent with the previously documented exam. Felicity Lee, ABDIEL      DIAGNOSTIC DATA:    CBC & Differential (06/07/2023 08:32)  Uric acid (06/07/2023 08:32)  Lactate dehydrogenase (06/07/2023 08:32)  Phosphorus (06/07/2023 08:32)  Magnesium (06/07/2023 08:32)  Basic metabolic panel (06/07/2023 08:32)    IMAGING:      ASSESSMENT:  This is a 68 y.o. male with a history of chronic lymphocytic leukemia and related autoimmune hemolytic anemia.    *CLL:   Diagnosed 11/26/2015.    Normal FISH panel  Initially received Rituxan for 4 weeks at diagnosis for this and the autoimmune hemolytic anemia.  Given the worsening anemia even though it responded to steroids we needed to treat his CLL particularly given the 70% involvement on his bone marrow biopsy.  We started treating with ibrutinib 420 mg daily, initiated at the end of July 2018. Held around the time of COVID infection; resumed when he completed steroids.  Ibrutinib now discontinued due to cardiac arrest.  1/4/2023: White " blood cell count remains normal at 5.57 with 66% lymphocytes  3/22/2023: White blood cell count 12,000 with 80% lymphocytes  CT imaging of the neck chest abdomen and pelvis from 5/17/2023 with enlarging adenopathy throughout the neck chest abdomen and pelvis  5/24/2023: Normal white blood cell count of 10.05 with 72.7% lymphocytes  He needs therapy for relapsing disease.  Plan venetoclax and rituximab.  Venetoclax is 20 mg for 1 week then 50 mg for 1 week then 100 mg for 1 week then 200 mg for 1 week then 400 mg following that.  Rituximab starts at week 6 at 375 mg per metered squared and then proceeds on week 10, 14, 18, 22, and 26 at 500 mg.  We may elect to start both drugs on day 1.  I will communicate with our pharmacy staff.  Goal is disease control.  He agrees to proceed.  6/6/2023 WBC 10.96, 76.7% lymphocytes.  Venetoclax 20 mg daily initiated 6/6/2023 6/7/2023 WBC 7.96, 57.2% lymphocytes, continue venetoclax 20 mg daily.  , suspect this is secondary to cell breakdown.  Thankfully without evidence of tumor lysis syndrome    *Macrocytic anemia:   He has a history of autoimmune hemolytic anemia when he presented with CLL back in November 2015.  His reticulocyte count was very elevated at that time.  His hemoglobin dropped to as low as about 5.  He responded to steroids initially intravenously and then with prednisone and he was treated with 4 weeks of Rituxan.  He was noted to have worsening anemia.  His reticulocyte count was low.  He initially did not respond very well to transfusions and he required 4 units of packed red cells  There was no evidence for hemolysis although his ADOLFO is positive for IgG.  This was persistently positive.  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  He responded to steroids.  He discontinued steroids as of 8/22/2018.    5/2/2022: Hemoglobin lower at 7.9 with a high MCV of 102   He required red cell transfusion.  1/4/2023: Hemoglobin improving at  11.5  3/22/2023: Hemoglobin 12.3 with a high MCV at 102.7  5/24/2023: Hemoglobin 10.7  6/7/20203 hemoglobin 10.8    *History of systolic hypertension: Blood pressure acceptable at 132/73 today.    *History of thrombocytopenia:   Platelets normal at 144,000    *COVID-19 pandemic: He has received a total of 3 vaccinations.  Subsequent infection in April 2020.  He received a monoclonal antibody as an outpatient and remdesivir as an inpatient. He was readmitted on 5/2 with hypotension, persistent fevers, persistent COVID-19 positivity on testing. He received IVIG and steroids. He improved and was treated with outpatient Paxlovid.  Symptoms resolved at this point.  Last vaccine 9/21/2022, bivalent.  Cardiac arrest several days after receiving this on 9/24/2022.    *Left lower extremity DVT and gastrocnemius vein and right lower extremity superficial thrombophlebitis in the small saphenous vein on bilateral lower extremity venous duplex on 4/15/2022  Associated with COVID-19 infection  Repeat venous duplex 8/1/22 without DVT, only superficial thrombophlebitis    *Out of hospital cardiac arrest on 9/24/2022  Quickly achieved return of spontaneous circulation with CPR and medication  He had an ICD placed at the [facility]  Ibrutinib discontinued as a result of this    PLAN:   Continue venetoclax 20 mg daily.  Initiated 6/6/2023  Currently no evidence of tumor lysis, continue to maintain adequate oral hydration  Patient scheduled for follow-up on 6/12/2023 with Dr. Cheng with repeat labs reevaluation.  On 6/13/2023 he will be due to increase his venetoclax to 50 mg daily x1 week.   He will continue to follow-up with cardiology at the [facility]  Call/ return sooner should the patient develop any new concerns or problems.    Patient is on a high risk medication requiring close monitoring for toxicity.    Felicity Lee, ABDIEL  06/07/2023

## 2023-06-11 NOTE — PROGRESS NOTES
"[facility] CBC GROUP OUTPATIENT FOLLOW UP CLINIC VISIT    REASON FOR FOLLOW-UP:    1.  Chronic lymphocytic leukemia diagnosed in November 2015.  CLL FISH panel negative at that time.    2.  Autoimmune hemolytic anemia associated with CLL.  He was treated with steroids and he received 4 weeks of weekly Rituxan.  Otherwise no treatment has been performed  3.  CT imaging of the chest abdomen and pelvis from 4/25/2018 showed lymphadenopathy with mildly enlarged retroperitoneal and mesenteric lymph nodes with the largest measuring about 3.2 cm.  The spleen measured 13.6 cm.  Slight increase in lymphadenopathy compared with 11/27/2015.  4.  Acute anemia with a hemoglobin of 6.9 as of 6/20/2018.  Low reticulocyte count.  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  Prednisone initiated.  Erythropoietin 73.9.  Parvovirus B19 PCR negative.  Steroids complete on 8/22/2018.  5.  Bone marrow biopsy on 7/3/2018 with 70% CLL (87% by flow cytometry) with a t(14;18) translocation by cytogenetics, 3 copies of IGH in 87.5% of cells but negative for CCND1/IGH rearrangement.  Negative for all other rearrangements/deletions.  IgVH somatic hypermutation was not detected.            6.  Therapy with ibrutinib initiated at the end of July 2018.  Discontinued in late 2022 due to cardiac arrest.      HISTORY OF PRESENT ILLNESS:  Aida Bal Jr. is a 68 y.o. male who returns today for follow up of the above issue.     He initiated venetoclax at 20 mg daily last Tuesday.  He has tolerated this very well.  His labs have remained very stable aside from an increase in his LDH.  He does note a knot on the back of his neck at the base of his skull on the right.  No tenderness or erythema in this area.    REVIEW OF SYSTEMS:  As per the HPI    Vitals:    06/12/23 0758   BP: 131/73   Pulse: 71   Resp: 20   Temp: 98.6 °F (37 °C)   TempSrc: Temporal   SpO2: 99%   Weight: 108 kg (238 lb 3.2 oz)   Height: 177.8 cm (70\")   PainSc:   6   PainLoc: " Head  Comment: back of head         PHYSICAL EXAMINATION:  GENERAL:  Well-developed well-nourished male; awake, alert and oriented, in no acute distress.  Wearing a facemask.  Well appearing again today.  SKIN:  Warm and dry, without rashes, purpura, or petechiae.  HEAD:  Normocephalic, atraumatic.  Neck: There is some fullness at the base of the skull on the right posterior neck but I do not think this is pathologic as it is reasonably symmetric with the left side  EARS:  Hearing intact.  LYMPHATICS: Small bilateral palpable cervical adenopathy measuring about 1 cm, probably slightly smaller today.  CHEST:  Lungs are clear to auscultation bilaterally.  No wheezes, rales, or rhonchi.  An implanted defibrillator is present in the left subclavian area.  HEART: Regular rate and rhythm.  Grade 2/6 early systolic murmur  EXTREMITIES: No clubbing cyanosis or edema.  NEUROLOGICAL:  No focal neurologic deficits.      DIAGNOSTIC DATA:  CBC & Differential (06/12/2023 07:35)     IMAGING:  CT Soft Tissue Neck With Contrast (05/17/2023 10:13)  CT Abdomen Pelvis With Contrast (05/17/2023 10:13)  CT Chest With Contrast Diagnostic (05/17/2023 10:13)      ASSESSMENT:  This is a 68 y.o. male with a history of chronic lymphocytic leukemia and related autoimmune hemolytic anemia.    *CLL:   Diagnosed 11/26/2015.    Normal FISH panel  Initially received Rituxan for 4 weeks at diagnosis for this and the autoimmune hemolytic anemia.  Given the worsening anemia even though it responded to steroids we needed to treat his CLL particularly given the 70% involvement on his bone marrow biopsy.  We started treating with ibrutinib 420 mg daily, initiated at the end of July 2018. Held around the time of COVID infection; resumed when he completed steroids.  Ibrutinib now discontinued due to cardiac arrest.  1/4/2023: White blood cell count remains normal at 5.57 with 66% lymphocytes  3/22/2023: White blood cell count 12,000 with 80% lymphocytes  CT  imaging of the neck chest abdomen and pelvis from 5/17/2023 with enlarging adenopathy throughout the neck chest abdomen and pelvis  5/24/2023: Normal white blood cell count of 10.05 with 72.7% lymphocytes  Initiation of venetoclax on 6/6/2023.  Well-tolerated.  Following labs very closely.    *Macrocytic anemia:   He has a history of autoimmune hemolytic anemia when he presented with CLL back in November 2015.  His reticulocyte count was very elevated at that time.  His hemoglobin dropped to as low as about 5.  He responded to steroids initially intravenously and then with prednisone and he was treated with 4 weeks of Rituxan.  He was noted to have worsening anemia.  His reticulocyte count was low.  He initially did not respond very well to transfusions and he required 4 units of packed red cells  There was no evidence for hemolysis although his ADOLFO is positive for IgG.  This was persistently positive.  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  He responded to steroids.  He discontinued steroids as of 8/22/2018.    5/2/2022: Hemoglobin lower at 7.9 with a high MCV of 102   He required red cell transfusion.  1/4/2023: Hemoglobin improving at 11.5  3/22/2023: Hemoglobin 12.3 with a high MCV at 102.7  5/24/2023: Hemoglobin 10.7  6/12/2023: Hemoglobin 10.7    *History of systolic hypertension: Blood pressure acceptable at 131/73 today.    *History of thrombocytopenia:   Platelets normal at 152,000    *COVID-19 pandemic: He has received a total of 3 vaccinations.  Subsequent infection in April 2020.  He received a monoclonal antibody as an outpatient and remdesivir as an inpatient. He was readmitted on 5/2 with hypotension, persistent fevers, persistent COVID-19 positivity on testing. He received IVIG and steroids. He improved and was treated with outpatient Paxlovid.  Symptoms resolved at this point.  Last vaccine 9/21/2022, bivalent.  Cardiac arrest several days after receiving this on  9/24/2022.    *Left lower extremity DVT and gastrocnemius vein and right lower extremity superficial thrombophlebitis in the small saphenous vein on bilateral lower extremity venous duplex on 4/15/2022  Associated with COVID-19 infection  Repeat venous duplex 8/1/22 without DVT, only superficial thrombophlebitis    *Out of hospital cardiac arrest on 9/24/2022  Quickly achieved return of spontaneous circulation with CPR and medication  He had an ICD placed at the [facility]  Ibrutinib discontinued as a result of this    PLAN:   Continue with the plan for venetoclax and rituximab.  Venetoclax is 20 mg for 1 week then 50 mg for 1 week then 100 mg for 1 week then 200 mg for 1 week then 400 mg following that.  Rituximab starts at week 6 at 375 mg per metered squared and then proceeds on week 10, 14, 18, 22, and 26 at 500 mg.    Tomorrow he will initiate the venetoclax dose of 50 mg.  He will come in tomorrow afternoon and Wednesday morning for labs  Assuming his labs look okay this week, I believe he only needs to come in then on Tuesdays to check labs before increasing his venetoclax dose for the next several weeks.  We will confirm with pharmacy.  Follow-up weekly will be planned and he will return at week 6 to initiate rituximab  He will continue to follow-up with cardiology at the [facility]    High risk medication requiring intensive monitoring

## 2023-06-12 ENCOUNTER — LAB (OUTPATIENT)
Dept: LAB | Facility: HOSPITAL | Age: 68
End: 2023-06-12
Payer: MEDICARE

## 2023-06-12 ENCOUNTER — OFFICE VISIT (OUTPATIENT)
Dept: ONCOLOGY | Facility: CLINIC | Age: 68
End: 2023-06-12
Payer: MEDICARE

## 2023-06-12 VITALS
TEMPERATURE: 98.6 F | HEIGHT: 70 IN | RESPIRATION RATE: 20 BRPM | DIASTOLIC BLOOD PRESSURE: 73 MMHG | OXYGEN SATURATION: 99 % | BODY MASS INDEX: 34.1 KG/M2 | HEART RATE: 71 BPM | WEIGHT: 238.2 LBS | SYSTOLIC BLOOD PRESSURE: 131 MMHG

## 2023-06-12 DIAGNOSIS — C91.10 CLL (CHRONIC LYMPHOCYTIC LEUKEMIA): Primary | ICD-10-CM

## 2023-06-12 DIAGNOSIS — D64.9 NORMOCYTIC ANEMIA: ICD-10-CM

## 2023-06-12 DIAGNOSIS — C91.10 CLL (CHRONIC LYMPHOCYTIC LEUKEMIA): ICD-10-CM

## 2023-06-12 LAB
ANION GAP SERPL CALCULATED.3IONS-SCNC: 11.1 MMOL/L (ref 5–15)
BASOPHILS # BLD AUTO: 0.01 10*3/MM3 (ref 0–0.2)
BASOPHILS NFR BLD AUTO: 0.1 % (ref 0–1.5)
BUN SERPL-MCNC: 15 MG/DL (ref 6–20)
BUN/CREAT SERPL: 13.8 (ref 7.3–30)
CALCIUM SPEC-SCNC: 9.2 MG/DL (ref 8.5–10.2)
CHLORIDE SERPL-SCNC: 104 MMOL/L (ref 98–107)
CO2 SERPL-SCNC: 26.9 MMOL/L (ref 22–29)
CREAT SERPL-MCNC: 1.09 MG/DL (ref 0.7–1.3)
DEPRECATED RDW RBC AUTO: 52.3 FL (ref 37–54)
EGFRCR SERPLBLD CKD-EPI 2021: 73 ML/MIN/1.73
EOSINOPHIL # BLD AUTO: 0.08 10*3/MM3 (ref 0–0.4)
EOSINOPHIL NFR BLD AUTO: 1.1 % (ref 0.3–6.2)
ERYTHROCYTE [DISTWIDTH] IN BLOOD BY AUTOMATED COUNT: 13.1 % (ref 12.3–15.4)
GLUCOSE SERPL-MCNC: 109 MG/DL (ref 74–124)
HCT VFR BLD AUTO: 35.2 % (ref 37.5–51)
HGB BLD-MCNC: 10.7 G/DL (ref 13–17.7)
IMM GRANULOCYTES # BLD AUTO: 0 10*3/MM3 (ref 0–0.05)
IMM GRANULOCYTES NFR BLD AUTO: 0 % (ref 0–0.5)
LDH SERPL-CCNC: 245 U/L (ref 99–259)
LYMPHOCYTES # BLD AUTO: 4.6 10*3/MM3 (ref 0.7–3.1)
LYMPHOCYTES NFR BLD AUTO: 65.2 % (ref 19.6–45.3)
MAGNESIUM SERPL-MCNC: 2 MG/DL (ref 1.8–2.5)
MCH RBC QN AUTO: 33 PG (ref 26.6–33)
MCHC RBC AUTO-ENTMCNC: 30.4 G/DL (ref 31.5–35.7)
MCV RBC AUTO: 108.6 FL (ref 79–97)
MONOCYTES # BLD AUTO: 0.9 10*3/MM3 (ref 0.1–0.9)
MONOCYTES NFR BLD AUTO: 12.7 % (ref 5–12)
NEUTROPHILS NFR BLD AUTO: 1.47 10*3/MM3 (ref 1.7–7)
NEUTROPHILS NFR BLD AUTO: 20.9 % (ref 42.7–76)
NRBC BLD AUTO-RTO: 0 /100 WBC (ref 0–0.2)
PHOSPHATE SERPL-MCNC: 3.7 MG/DL (ref 2.5–4.5)
PLATELET # BLD AUTO: 152 10*3/MM3 (ref 140–450)
PMV BLD AUTO: 9.9 FL (ref 6–12)
POTASSIUM SERPL-SCNC: 3.9 MMOL/L (ref 3.5–4.7)
RBC # BLD AUTO: 3.24 10*6/MM3 (ref 4.14–5.8)
SODIUM SERPL-SCNC: 142 MMOL/L (ref 134–145)
URATE SERPL-MCNC: 4.5 MG/DL (ref 2.8–7.4)
WBC NRBC COR # BLD: 7.06 10*3/MM3 (ref 3.4–10.8)

## 2023-06-12 PROCEDURE — 1159F MED LIST DOCD IN RCRD: CPT | Performed by: INTERNAL MEDICINE

## 2023-06-12 PROCEDURE — 3078F DIAST BP <80 MM HG: CPT | Performed by: INTERNAL MEDICINE

## 2023-06-12 PROCEDURE — 83615 LACTATE (LD) (LDH) ENZYME: CPT

## 2023-06-12 PROCEDURE — 36415 COLL VENOUS BLD VENIPUNCTURE: CPT

## 2023-06-12 PROCEDURE — 80048 BASIC METABOLIC PNL TOTAL CA: CPT

## 2023-06-12 PROCEDURE — 3075F SYST BP GE 130 - 139MM HG: CPT | Performed by: INTERNAL MEDICINE

## 2023-06-12 PROCEDURE — 83735 ASSAY OF MAGNESIUM: CPT

## 2023-06-12 PROCEDURE — 1160F RVW MEDS BY RX/DR IN RCRD: CPT | Performed by: INTERNAL MEDICINE

## 2023-06-12 PROCEDURE — 85025 COMPLETE CBC W/AUTO DIFF WBC: CPT

## 2023-06-12 PROCEDURE — 84100 ASSAY OF PHOSPHORUS: CPT

## 2023-06-12 PROCEDURE — 1125F AMNT PAIN NOTED PAIN PRSNT: CPT | Performed by: INTERNAL MEDICINE

## 2023-06-12 PROCEDURE — 84550 ASSAY OF BLOOD/URIC ACID: CPT

## 2023-06-12 PROCEDURE — 99214 OFFICE O/P EST MOD 30 MIN: CPT | Performed by: INTERNAL MEDICINE

## 2023-06-12 NOTE — PROGRESS NOTES
His labs look great. No need to come in tomorrow morning but he can keep the afternoon and Wed AM appointments. SANDRA

## 2023-06-13 ENCOUNTER — LAB (OUTPATIENT)
Dept: LAB | Facility: HOSPITAL | Age: 68
End: 2023-06-13
Payer: MEDICARE

## 2023-06-13 ENCOUNTER — APPOINTMENT (OUTPATIENT)
Dept: ONCOLOGY | Facility: HOSPITAL | Age: 68
End: 2023-06-13
Payer: MEDICARE

## 2023-06-13 ENCOUNTER — OFFICE VISIT (OUTPATIENT)
Dept: ONCOLOGY | Facility: CLINIC | Age: 68
End: 2023-06-13
Payer: MEDICARE

## 2023-06-13 ENCOUNTER — APPOINTMENT (OUTPATIENT)
Dept: LAB | Facility: HOSPITAL | Age: 68
End: 2023-06-13
Payer: MEDICARE

## 2023-06-13 VITALS
WEIGHT: 237.8 LBS | TEMPERATURE: 99.3 F | DIASTOLIC BLOOD PRESSURE: 73 MMHG | HEART RATE: 70 BPM | SYSTOLIC BLOOD PRESSURE: 133 MMHG | OXYGEN SATURATION: 99 % | BODY MASS INDEX: 34.04 KG/M2 | HEIGHT: 70 IN

## 2023-06-13 DIAGNOSIS — C91.10 CLL (CHRONIC LYMPHOCYTIC LEUKEMIA): Primary | ICD-10-CM

## 2023-06-13 DIAGNOSIS — C91.10 CLL (CHRONIC LYMPHOCYTIC LEUKEMIA): ICD-10-CM

## 2023-06-13 DIAGNOSIS — Z79.899 HIGH RISK MEDICATION USE: ICD-10-CM

## 2023-06-13 LAB
ALBUMIN SERPL-MCNC: 4.3 G/DL (ref 3.5–5.2)
ALBUMIN/GLOB SERPL: 2.3 G/DL (ref 1.1–2.4)
ALP SERPL-CCNC: 112 U/L (ref 38–116)
ALT SERPL W P-5'-P-CCNC: 11 U/L (ref 0–41)
ANION GAP SERPL CALCULATED.3IONS-SCNC: 10.3 MMOL/L (ref 5–15)
AST SERPL-CCNC: 17 U/L (ref 0–40)
BASOPHILS # BLD AUTO: 0.01 10*3/MM3 (ref 0–0.2)
BASOPHILS NFR BLD AUTO: 0.1 % (ref 0–1.5)
BILIRUB SERPL-MCNC: 0.5 MG/DL (ref 0.2–1.2)
BUN SERPL-MCNC: 14 MG/DL (ref 6–20)
BUN/CREAT SERPL: 12.2 (ref 7.3–30)
CALCIUM SPEC-SCNC: 9.5 MG/DL (ref 8.5–10.2)
CHLORIDE SERPL-SCNC: 102 MMOL/L (ref 98–107)
CO2 SERPL-SCNC: 27.7 MMOL/L (ref 22–29)
CREAT SERPL-MCNC: 1.15 MG/DL (ref 0.7–1.3)
DEPRECATED RDW RBC AUTO: 52.8 FL (ref 37–54)
EGFRCR SERPLBLD CKD-EPI 2021: 68.5 ML/MIN/1.73
EOSINOPHIL # BLD AUTO: 0.08 10*3/MM3 (ref 0–0.4)
EOSINOPHIL NFR BLD AUTO: 1.1 % (ref 0.3–6.2)
ERYTHROCYTE [DISTWIDTH] IN BLOOD BY AUTOMATED COUNT: 13.2 % (ref 12.3–15.4)
GLOBULIN UR ELPH-MCNC: 1.9 GM/DL (ref 1.8–3.5)
GLUCOSE SERPL-MCNC: 119 MG/DL (ref 74–124)
HCT VFR BLD AUTO: 35 % (ref 37.5–51)
HGB BLD-MCNC: 10.9 G/DL (ref 13–17.7)
IMM GRANULOCYTES # BLD AUTO: 0.01 10*3/MM3 (ref 0–0.05)
IMM GRANULOCYTES NFR BLD AUTO: 0.1 % (ref 0–0.5)
LDH SERPL-CCNC: 246 U/L (ref 99–259)
LYMPHOCYTES # BLD AUTO: 4.36 10*3/MM3 (ref 0.7–3.1)
LYMPHOCYTES NFR BLD AUTO: 58.6 % (ref 19.6–45.3)
MAGNESIUM SERPL-MCNC: 2 MG/DL (ref 1.8–2.5)
MCH RBC QN AUTO: 33.9 PG (ref 26.6–33)
MCHC RBC AUTO-ENTMCNC: 31.1 G/DL (ref 31.5–35.7)
MCV RBC AUTO: 108.7 FL (ref 79–97)
MONOCYTES # BLD AUTO: 1.08 10*3/MM3 (ref 0.1–0.9)
MONOCYTES NFR BLD AUTO: 14.5 % (ref 5–12)
NEUTROPHILS NFR BLD AUTO: 1.9 10*3/MM3 (ref 1.7–7)
NEUTROPHILS NFR BLD AUTO: 25.6 % (ref 42.7–76)
NRBC BLD AUTO-RTO: 0 /100 WBC (ref 0–0.2)
PHOSPHATE SERPL-MCNC: 3.6 MG/DL (ref 2.5–4.5)
PLATELET # BLD AUTO: 167 10*3/MM3 (ref 140–450)
PMV BLD AUTO: 9.8 FL (ref 6–12)
POTASSIUM SERPL-SCNC: 4.4 MMOL/L (ref 3.5–4.7)
PROT SERPL-MCNC: 6.2 G/DL (ref 6.3–8)
RBC # BLD AUTO: 3.22 10*6/MM3 (ref 4.14–5.8)
SODIUM SERPL-SCNC: 140 MMOL/L (ref 134–145)
URATE SERPL-MCNC: 4.4 MG/DL (ref 2.8–7.4)
WBC NRBC COR # BLD: 7.44 10*3/MM3 (ref 3.4–10.8)

## 2023-06-13 PROCEDURE — 84550 ASSAY OF BLOOD/URIC ACID: CPT

## 2023-06-13 PROCEDURE — 85025 COMPLETE CBC W/AUTO DIFF WBC: CPT

## 2023-06-13 PROCEDURE — 83735 ASSAY OF MAGNESIUM: CPT

## 2023-06-13 PROCEDURE — 80053 COMPREHEN METABOLIC PANEL: CPT

## 2023-06-13 PROCEDURE — 83615 LACTATE (LD) (LDH) ENZYME: CPT

## 2023-06-13 PROCEDURE — 36415 COLL VENOUS BLD VENIPUNCTURE: CPT

## 2023-06-13 PROCEDURE — 84100 ASSAY OF PHOSPHORUS: CPT

## 2023-06-13 NOTE — PROGRESS NOTES
"[facility] CBC GROUP OUTPATIENT FOLLOW UP CLINIC VISIT    REASON FOR FOLLOW-UP:    1.  Chronic lymphocytic leukemia diagnosed in November 2015.  CLL FISH panel negative at that time.    2.  Autoimmune hemolytic anemia associated with CLL.  He was treated with steroids and he received 4 weeks of weekly Rituxan.  Otherwise no treatment has been performed  3.  CT imaging of the chest abdomen and pelvis from 4/25/2018 showed lymphadenopathy with mildly enlarged retroperitoneal and mesenteric lymph nodes with the largest measuring about 3.2 cm.  The spleen measured 13.6 cm.  Slight increase in lymphadenopathy compared with 11/27/2015.  4.  Acute anemia with a hemoglobin of 6.9 as of 6/20/2018.  Low reticulocyte count.  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  Prednisone initiated.  Erythropoietin 73.9.  Parvovirus B19 PCR negative.  Steroids complete on 8/22/2018.  5.  Bone marrow biopsy on 7/3/2018 with 70% CLL (87% by flow cytometry) with a t(14;18) translocation by cytogenetics, 3 copies of IGH in 87.5% of cells but negative for CCND1/IGH rearrangement.  Negative for all other rearrangements/deletions.  IgVH somatic hypermutation was not detected.            6.  Therapy with ibrutinib initiated at the end of July 2018.  Discontinued in late 2022 due to cardiac arrest.      HISTORY OF PRESENT ILLNESS:  Aida Bal Jr. is a 68 y.o. male with the above-mentioned history is here today for lab review and evaluation.  He initiated venetoclax 50 mg daily this morning.  Patient reports that he is feeling well.  He denies any significant side effects at all from the medication.      REVIEW OF SYSTEMS:  As per the HPI    Vitals:    06/13/23 1346   BP: 133/73   Pulse: 70   Temp: 99.3 °F (37.4 °C)   TempSrc: Temporal   SpO2: 99%   Weight: 108 kg (237 lb 12.8 oz)   Height: 177.8 cm (70\")   PainSc: 0-No pain       Physical Exam  Constitutional:       General: He is not in acute distress.     Appearance: He is well-developed. "   Pulmonary:      Effort: Pulmonary effort is normal. No respiratory distress.   Skin:     General: Skin is warm and dry.   Neurological:      Mental Status: He is alert and oriented to person, place, and time.       PHYSICAL EXAMINATION:  GENERAL:  Well-developed well-nourished male; awake, alert and oriented, in no acute distress.  Wearing a facemask.  Well appearing again today.  SKIN:  Warm and dry, without rashes, purpura, or petechiae.  HEAD:  Normocephalic, atraumatic.  Neck: There is some fullness at the base of the skull on the right posterior neck but I do not think this is pathologic as it is reasonably symmetric with the left side  EARS:  Hearing intact.  LYMPHATICS: Small bilateral palpable cervical adenopathy measuring about 1 cm, probably slightly smaller today.  CHEST:  Lungs are clear to auscultation bilaterally.  No wheezes, rales, or rhonchi.  An implanted defibrillator is present in the left subclavian area.  HEART: Regular rate and rhythm.  Grade 2/6 early systolic murmur  EXTREMITIES: No clubbing cyanosis or edema.  NEUROLOGICAL:  No focal neurologic deficits.      DIAGNOSTIC DATA:  CBC and Differential (06/13/2023 13:28)  Comprehensive metabolic panel (06/13/2023 13:28)  Magnesium (06/13/2023 13:28)  Phosphorus (06/13/2023 13:28)  Lactate dehydrogenase (06/13/2023 13:28)  Uric acid (06/13/2023 13:28)    IMAGING:  CT Soft Tissue Neck With Contrast (05/17/2023 10:13)  CT Abdomen Pelvis With Contrast (05/17/2023 10:13)  CT Chest With Contrast Diagnostic (05/17/2023 10:13)      ASSESSMENT:  This is a 68 y.o. male with a history of chronic lymphocytic leukemia and related autoimmune hemolytic anemia.    *CLL:   Diagnosed 11/26/2015.    Normal FISH panel  Initially received Rituxan for 4 weeks at diagnosis for this and the autoimmune hemolytic anemia.  Given the worsening anemia even though it responded to steroids we needed to treat his CLL particularly given the 70% involvement on his bone marrow  biopsy.  We started treating with ibrutinib 420 mg daily, initiated at the end of July 2018. Held around the time of COVID infection; resumed when he completed steroids.  Ibrutinib now discontinued due to cardiac arrest.  1/4/2023: White blood cell count remains normal at 5.57 with 66% lymphocytes  3/22/2023: White blood cell count 12,000 with 80% lymphocytes  CT imaging of the neck chest abdomen and pelvis from 5/17/2023 with enlarging adenopathy throughout the neck chest abdomen and pelvis  5/24/2023: Normal white blood cell count of 10.05 with 72.7% lymphocytes  Initiation of venetoclax on 6/6/2023.  Well-tolerated.  Following labs very closely.  6/13/2023 increased venetoclax to 50 mg daily today, so far tolerating quite well.    *Macrocytic anemia:   He has a history of autoimmune hemolytic anemia when he presented with CLL back in November 2015.  His reticulocyte count was very elevated at that time.  His hemoglobin dropped to as low as about 5.  He responded to steroids initially intravenously and then with prednisone and he was treated with 4 weeks of Rituxan.  He was noted to have worsening anemia.  His reticulocyte count was low.  He initially did not respond very well to transfusions and he required 4 units of packed red cells  There was no evidence for hemolysis although his ADOLFO is positive for IgG.  This was persistently positive.  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  He responded to steroids.  He discontinued steroids as of 8/22/2018.    5/2/2022: Hemoglobin lower at 7.9 with a high MCV of 102   He required red cell transfusion.  1/4/2023: Hemoglobin improving at 11.5  3/22/2023: Hemoglobin 12.3 with a high MCV at 102.7  5/24/2023: Hemoglobin 10.7  6/13/2023: Hemoglobin 10.9    *History of systolic hypertension: Blood pressure acceptable at 133/73 today.    *History of thrombocytopenia:   Platelets normal at 167,000    *COVID-19 pandemic: He has received a total of 3  vaccinations.  Subsequent infection in April 2020.  He received a monoclonal antibody as an outpatient and remdesivir as an inpatient. He was readmitted on 5/2 with hypotension, persistent fevers, persistent COVID-19 positivity on testing. He received IVIG and steroids. He improved and was treated with outpatient Paxlovid.  Symptoms resolved at this point.  Last vaccine 9/21/2022, bivalent.  Cardiac arrest several days after receiving this on 9/24/2022.    *Left lower extremity DVT and gastrocnemius vein and right lower extremity superficial thrombophlebitis in the small saphenous vein on bilateral lower extremity venous duplex on 4/15/2022  Associated with COVID-19 infection  Repeat venous duplex 8/1/22 without DVT, only superficial thrombophlebitis    *Out of hospital cardiac arrest on 9/24/2022  Quickly achieved return of spontaneous circulation with CPR and medication  He had an ICD placed at the [facility]  Ibrutinib discontinued as a result of this    PLAN:   Continue venetoclax 50 mg daily (patient increased to this dose today).  He will continue this dose for 1 week, then 100 mg for 1 week, then 200 mg for 1 week, then 400 mg following that.  Rituximab starts at week 6 at 375 mg/m² and then proceeds on weeks 10, 14, 18, 22, and 26 at 500 mg.  Patient will return tomorrow 6/14/2023 for repeat CBC, CMP, LDH, uric acid, phosphorus, magnesium.  As long as labs look okay tomorrow patient will only need to come in on Tuesdays to check labs before he increases his venetoclax dose for the next several weeks.  Follow-up will be planned weekly and he will return at week 6 to initiate rituximab.  Continue to follow closely with cardiology at the [facility].    Call/ return sooner should the patient develop any new concerns or problems.    High risk medication requiring intensive monitoring

## 2023-06-14 ENCOUNTER — OFFICE VISIT (OUTPATIENT)
Dept: ONCOLOGY | Facility: CLINIC | Age: 68
End: 2023-06-14
Payer: MEDICARE

## 2023-06-14 ENCOUNTER — LAB (OUTPATIENT)
Dept: LAB | Facility: HOSPITAL | Age: 68
End: 2023-06-14
Payer: MEDICARE

## 2023-06-14 VITALS
HEART RATE: 68 BPM | TEMPERATURE: 98.7 F | RESPIRATION RATE: 18 BRPM | BODY MASS INDEX: 33.87 KG/M2 | DIASTOLIC BLOOD PRESSURE: 71 MMHG | HEIGHT: 70 IN | WEIGHT: 236.6 LBS | SYSTOLIC BLOOD PRESSURE: 125 MMHG | OXYGEN SATURATION: 98 %

## 2023-06-14 DIAGNOSIS — C91.10 CLL (CHRONIC LYMPHOCYTIC LEUKEMIA): ICD-10-CM

## 2023-06-14 DIAGNOSIS — C91.10 CLL (CHRONIC LYMPHOCYTIC LEUKEMIA): Primary | ICD-10-CM

## 2023-06-14 LAB
ANION GAP SERPL CALCULATED.3IONS-SCNC: 12 MMOL/L (ref 5–15)
BASOPHILS # BLD AUTO: 0.02 10*3/MM3 (ref 0–0.2)
BASOPHILS NFR BLD AUTO: 0.3 % (ref 0–1.5)
BUN SERPL-MCNC: 15 MG/DL (ref 6–20)
BUN/CREAT SERPL: 12.6 (ref 7.3–30)
CALCIUM SPEC-SCNC: 9.7 MG/DL (ref 8.5–10.2)
CHLORIDE SERPL-SCNC: 103 MMOL/L (ref 98–107)
CO2 SERPL-SCNC: 26 MMOL/L (ref 22–29)
CREAT SERPL-MCNC: 1.19 MG/DL (ref 0.7–1.3)
DEPRECATED RDW RBC AUTO: 52.9 FL (ref 37–54)
EGFRCR SERPLBLD CKD-EPI 2021: 65.7 ML/MIN/1.73
EOSINOPHIL # BLD AUTO: 0.06 10*3/MM3 (ref 0–0.4)
EOSINOPHIL NFR BLD AUTO: 0.9 % (ref 0.3–6.2)
ERYTHROCYTE [DISTWIDTH] IN BLOOD BY AUTOMATED COUNT: 13.2 % (ref 12.3–15.4)
GLUCOSE SERPL-MCNC: 146 MG/DL (ref 74–124)
HCT VFR BLD AUTO: 34.2 % (ref 37.5–51)
HGB BLD-MCNC: 10.6 G/DL (ref 13–17.7)
IMM GRANULOCYTES # BLD AUTO: 0.01 10*3/MM3 (ref 0–0.05)
IMM GRANULOCYTES NFR BLD AUTO: 0.2 % (ref 0–0.5)
LDH SERPL-CCNC: 252 U/L (ref 99–259)
LYMPHOCYTES # BLD AUTO: 3.94 10*3/MM3 (ref 0.7–3.1)
LYMPHOCYTES NFR BLD AUTO: 61.5 % (ref 19.6–45.3)
MAGNESIUM SERPL-MCNC: 2 MG/DL (ref 1.8–2.5)
MCH RBC QN AUTO: 33.5 PG (ref 26.6–33)
MCHC RBC AUTO-ENTMCNC: 31 G/DL (ref 31.5–35.7)
MCV RBC AUTO: 108.2 FL (ref 79–97)
MONOCYTES # BLD AUTO: 0.73 10*3/MM3 (ref 0.1–0.9)
MONOCYTES NFR BLD AUTO: 11.4 % (ref 5–12)
NEUTROPHILS NFR BLD AUTO: 1.65 10*3/MM3 (ref 1.7–7)
NEUTROPHILS NFR BLD AUTO: 25.7 % (ref 42.7–76)
NRBC BLD AUTO-RTO: 0 /100 WBC (ref 0–0.2)
PHOSPHATE SERPL-MCNC: 3 MG/DL (ref 2.5–4.5)
PLATELET # BLD AUTO: 166 10*3/MM3 (ref 140–450)
PMV BLD AUTO: 10.2 FL (ref 6–12)
POTASSIUM SERPL-SCNC: 4 MMOL/L (ref 3.5–4.7)
RBC # BLD AUTO: 3.16 10*6/MM3 (ref 4.14–5.8)
SODIUM SERPL-SCNC: 141 MMOL/L (ref 134–145)
URATE SERPL-MCNC: 4.6 MG/DL (ref 2.8–7.4)
WBC NRBC COR # BLD: 6.41 10*3/MM3 (ref 3.4–10.8)

## 2023-06-14 PROCEDURE — 85025 COMPLETE CBC W/AUTO DIFF WBC: CPT | Performed by: NURSE PRACTITIONER

## 2023-06-14 PROCEDURE — 84100 ASSAY OF PHOSPHORUS: CPT

## 2023-06-14 PROCEDURE — 83735 ASSAY OF MAGNESIUM: CPT

## 2023-06-14 PROCEDURE — 80048 BASIC METABOLIC PNL TOTAL CA: CPT

## 2023-06-14 PROCEDURE — 83615 LACTATE (LD) (LDH) ENZYME: CPT

## 2023-06-14 PROCEDURE — 84550 ASSAY OF BLOOD/URIC ACID: CPT

## 2023-06-14 NOTE — PROGRESS NOTES
"[facility] CBC GROUP OUTPATIENT FOLLOW UP CLINIC VISIT    REASON FOR FOLLOW-UP:    1.  Chronic lymphocytic leukemia diagnosed in November 2015.  CLL FISH panel negative at that time.    2.  Autoimmune hemolytic anemia associated with CLL.  He was treated with steroids and he received 4 weeks of weekly Rituxan.  Otherwise no treatment has been performed  3.  CT imaging of the chest abdomen and pelvis from 4/25/2018 showed lymphadenopathy with mildly enlarged retroperitoneal and mesenteric lymph nodes with the largest measuring about 3.2 cm.  The spleen measured 13.6 cm.  Slight increase in lymphadenopathy compared with 11/27/2015.  4.  Acute anemia with a hemoglobin of 6.9 as of 6/20/2018.  Low reticulocyte count.  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  Prednisone initiated.  Erythropoietin 73.9.  Parvovirus B19 PCR negative.  Steroids complete on 8/22/2018.  5.  Bone marrow biopsy on 7/3/2018 with 70% CLL (87% by flow cytometry) with a t(14;18) translocation by cytogenetics, 3 copies of IGH in 87.5% of cells but negative for CCND1/IGH rearrangement.  Negative for all other rearrangements/deletions.  IgVH somatic hypermutation was not detected.            6.  Therapy with ibrutinib initiated at the end of July 2018.  Discontinued in late 2022 due to cardiac arrest.      HISTORY OF PRESENT ILLNESS:  Aida Bal Jr. is a 68 y.o. male with the above-mentioned trigger today for lab review and evaluation.  He increased his venetoclax yesterday to 50 mg daily.  He is tolerating this quite well without any significant side effects at all.  He is hydrating well, and states that he slept well other than getting up to urinate multiple times.  He has no other new problems or concerns today.      REVIEW OF SYSTEMS:  As per the HPI    Vitals:    06/14/23 0847   BP: 125/71   Pulse: 68   Resp: 18   Temp: 98.7 °F (37.1 °C)   TempSrc: Temporal   SpO2: 98%   Weight: 107 kg (236 lb 9.6 oz)   Height: 177.8 cm (70\")   PainSc: 0-No " pain       Physical Exam  Vitals and nursing note reviewed.   Constitutional:       Appearance: Normal appearance. He is well-developed.   HENT:      Head: Normocephalic and atraumatic.      Nose: Nose normal.   Eyes:      Pupils: Pupils are equal, round, and reactive to light.   Cardiovascular:      Rate and Rhythm: Normal rate and regular rhythm.      Heart sounds: Normal heart sounds.   Pulmonary:      Effort: Pulmonary effort is normal. No respiratory distress.      Breath sounds: Normal breath sounds. No wheezing, rhonchi or rales.   Abdominal:      General: Bowel sounds are normal. There is no distension.      Palpations: Abdomen is soft.      Tenderness: There is no abdominal tenderness.   Musculoskeletal:         General: Normal range of motion.      Cervical back: Normal range of motion and neck supple.   Skin:     General: Skin is warm and dry.   Neurological:      Mental Status: He is alert and oriented to person, place, and time.   Psychiatric:         Behavior: Behavior normal.       DIAGNOSTIC DATA:  CBC & Differential (06/14/2023 08:35)     IMAGING:  CT Soft Tissue Neck With Contrast (05/17/2023 10:13)  CT Abdomen Pelvis With Contrast (05/17/2023 10:13)  CT Chest With Contrast Diagnostic (05/17/2023 10:13)      ASSESSMENT:  This is a 68 y.o. male with a history of chronic lymphocytic leukemia and related autoimmune hemolytic anemia.    *CLL:   Diagnosed 11/26/2015.    Normal FISH panel  Initially received Rituxan for 4 weeks at diagnosis for this and the autoimmune hemolytic anemia.  Given the worsening anemia even though it responded to steroids we needed to treat his CLL particularly given the 70% involvement on his bone marrow biopsy.  We started treating with ibrutinib 420 mg daily, initiated at the end of July 2018. Held around the time of COVID infection; resumed when he completed steroids.  Ibrutinib now discontinued due to cardiac arrest.  1/4/2023: White blood cell count remains normal at  5.57 with 66% lymphocytes  3/22/2023: White blood cell count 12,000 with 80% lymphocytes  CT imaging of the neck chest abdomen and pelvis from 5/17/2023 with enlarging adenopathy throughout the neck chest abdomen and pelvis  5/24/2023: Normal white blood cell count of 10.05 with 72.7% lymphocytes  Initiation of venetoclax on 6/6/2023.  Well-tolerated.  Following labs very closely.  6/13/2023 increased venetoclax to 50 mg daily, so far tolerating quite well.    *Macrocytic anemia:   He has a history of autoimmune hemolytic anemia when he presented with CLL back in November 2015.  His reticulocyte count was very elevated at that time.  His hemoglobin dropped to as low as about 5.  He responded to steroids initially intravenously and then with prednisone and he was treated with 4 weeks of Rituxan.  He was noted to have worsening anemia.  His reticulocyte count was low.  He initially did not respond very well to transfusions and he required 4 units of packed red cells  There was no evidence for hemolysis although his ADOLFO is positive for IgG.  This was persistently positive.  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  He responded to steroids.  He discontinued steroids as of 8/22/2018.    5/2/2022: Hemoglobin lower at 7.9 with a high MCV of 102   He required red cell transfusion.  1/4/2023: Hemoglobin improving at 11.5  3/22/2023: Hemoglobin 12.3 with a high MCV at 102.7  5/24/2023: Hemoglobin 10.7  6/14/2023: Hemoglobin 10.6    *History of systolic hypertension: Blood pressure acceptable at 133/73 today.    *History of thrombocytopenia:   Platelets normal at 167,000.    *COVID-19 pandemic: He has received a total of 3 vaccinations.  Subsequent infection in April 2020.  He received a monoclonal antibody as an outpatient and remdesivir as an inpatient. He was readmitted on 5/2 with hypotension, persistent fevers, persistent COVID-19 positivity on testing. He received IVIG and steroids. He improved and was  treated with outpatient Paxlovid.  Symptoms resolved at this point.  Last vaccine 9/21/2022, bivalent.  Cardiac arrest several days after receiving this on 9/24/2022.    *Left lower extremity DVT and gastrocnemius vein and right lower extremity superficial thrombophlebitis in the small saphenous vein on bilateral lower extremity venous duplex on 4/15/2022  Associated with COVID-19 infection  Repeat venous duplex 8/1/22 without DVT, only superficial thrombophlebitis    *Out of hospital cardiac arrest on 9/24/2022  Quickly achieved return of spontaneous circulation with CPR and medication  He had an ICD placed at the [facility]  Ibrutinib discontinued as a result of this    PLAN:   Continue venetoclax 50 mg daily increased to this dose on 6/13/2023).  He will continue 50 mg daily for 1 week, then 100 mg for 1 week, then 200 mg for 1 week, then 400 mg following that.  Rituximab starts at week 6 at 375 mg/m² and then proceeds on weeks 10, 14, 18, 22, and 26 at 500 mg.  Patient will return in weekly for reassessment with repeat labs CBC, CMP, uric acid, LDH, phosphorus, magnesium.  He will return at week 6 to initiate rituximab.  Continue to follow closely with [facility] cardiology.    Call/ return sooner should the patient develop any new concerns or problems.    High risk medication requiring intensive monitoring

## 2023-07-20 ENCOUNTER — LAB (OUTPATIENT)
Dept: LAB | Facility: HOSPITAL | Age: 68
End: 2023-07-20
Payer: MEDICARE

## 2023-07-20 DIAGNOSIS — C91.10 CLL (CHRONIC LYMPHOCYTIC LEUKEMIA): Primary | ICD-10-CM

## 2023-07-20 LAB
BASOPHILS # BLD AUTO: 0.03 10*3/MM3 (ref 0–0.2)
BASOPHILS NFR BLD AUTO: 1.8 % (ref 0–1.5)
DEPRECATED RDW RBC AUTO: 47.8 FL (ref 37–54)
EOSINOPHIL # BLD AUTO: 0.01 10*3/MM3 (ref 0–0.4)
EOSINOPHIL NFR BLD AUTO: 0.6 % (ref 0.3–6.2)
ERYTHROCYTE [DISTWIDTH] IN BLOOD BY AUTOMATED COUNT: 12.8 % (ref 12.3–15.4)
HCT VFR BLD AUTO: 36 % (ref 37.5–51)
HGB BLD-MCNC: 11.7 G/DL (ref 13–17.7)
IMM GRANULOCYTES # BLD AUTO: 0.08 10*3/MM3 (ref 0–0.05)
IMM GRANULOCYTES NFR BLD AUTO: 4.7 % (ref 0–0.5)
LYMPHOCYTES # BLD AUTO: 0.69 10*3/MM3 (ref 0.7–3.1)
LYMPHOCYTES NFR BLD AUTO: 40.4 % (ref 19.6–45.3)
MCH RBC QN AUTO: 33.1 PG (ref 26.6–33)
MCHC RBC AUTO-ENTMCNC: 32.5 G/DL (ref 31.5–35.7)
MCV RBC AUTO: 102 FL (ref 79–97)
MONOCYTES # BLD AUTO: 0.18 10*3/MM3 (ref 0.1–0.9)
MONOCYTES NFR BLD AUTO: 10.5 % (ref 5–12)
NEUTROPHILS NFR BLD AUTO: 0.72 10*3/MM3 (ref 1.7–7)
NEUTROPHILS NFR BLD AUTO: 42 % (ref 42.7–76)
NRBC BLD AUTO-RTO: 0 /100 WBC (ref 0–0.2)
PLATELET # BLD AUTO: 213 10*3/MM3 (ref 140–450)
PMV BLD AUTO: 10.4 FL (ref 6–12)
RBC # BLD AUTO: 3.53 10*6/MM3 (ref 4.14–5.8)
WBC NRBC COR # BLD: 1.71 10*3/MM3 (ref 3.4–10.8)

## 2023-07-20 PROCEDURE — 85025 COMPLETE CBC W/AUTO DIFF WBC: CPT

## 2023-07-20 PROCEDURE — 36415 COLL VENOUS BLD VENIPUNCTURE: CPT

## 2023-07-25 ENCOUNTER — SPECIALTY PHARMACY (OUTPATIENT)
Dept: PHARMACY | Facility: HOSPITAL | Age: 68
End: 2023-07-25
Payer: MEDICARE

## 2023-07-25 ENCOUNTER — DOCUMENTATION (OUTPATIENT)
Dept: PHARMACY | Facility: HOSPITAL | Age: 68
End: 2023-07-25
Payer: MEDICARE

## 2023-07-25 NOTE — PROGRESS NOTES
MTM 6 month tox check on Venclexta     Left a voicemail with Mr. Bal letting him know that I was checking in to see he is doing on his Venclexta. Told him that he could give us a call back at his earliest convenience at [phone].     Carlyn Hall, Pharmacy Intern/PharmD Candidate 2024   Marianela Romeo, PharmD, BCPS

## 2023-07-25 NOTE — PROGRESS NOTES
I contacted pt to check his supply of Venclexta-he rec a delivery recently but his dose changed.    He states he has NOT opened the supply he rec on 6/30/2023 (enough for 400 mg dose but he is reduced now to 200 mg daily). This supply will last him 2 months if he continues on the 200 mg daily dose. I will check in with him in 3-4 weeks.      Haylee Montanez, Pharmacy Technician  Specialty Pharmacy Technician

## 2023-07-27 ENCOUNTER — LAB (OUTPATIENT)
Dept: LAB | Facility: HOSPITAL | Age: 68
End: 2023-07-27
Payer: MEDICARE

## 2023-07-27 ENCOUNTER — CLINICAL SUPPORT (OUTPATIENT)
Dept: ONCOLOGY | Facility: HOSPITAL | Age: 68
End: 2023-07-27
Payer: MEDICARE

## 2023-07-27 DIAGNOSIS — C91.10 CLL (CHRONIC LYMPHOCYTIC LEUKEMIA): ICD-10-CM

## 2023-07-27 DIAGNOSIS — Z79.899 HIGH RISK MEDICATION USE: ICD-10-CM

## 2023-07-27 LAB
BASOPHILS # BLD AUTO: 0.02 10*3/MM3 (ref 0–0.2)
BASOPHILS NFR BLD AUTO: 1.2 % (ref 0–1.5)
DEPRECATED RDW RBC AUTO: 47.3 FL (ref 37–54)
EOSINOPHIL # BLD AUTO: 0.02 10*3/MM3 (ref 0–0.4)
EOSINOPHIL NFR BLD AUTO: 1.2 % (ref 0.3–6.2)
ERYTHROCYTE [DISTWIDTH] IN BLOOD BY AUTOMATED COUNT: 12.7 % (ref 12.3–15.4)
HCT VFR BLD AUTO: 35.5 % (ref 37.5–51)
HGB BLD-MCNC: 11.6 G/DL (ref 13–17.7)
IMM GRANULOCYTES # BLD AUTO: 0.04 10*3/MM3 (ref 0–0.05)
IMM GRANULOCYTES NFR BLD AUTO: 2.5 % (ref 0–0.5)
LYMPHOCYTES # BLD AUTO: 0.78 10*3/MM3 (ref 0.7–3.1)
LYMPHOCYTES NFR BLD AUTO: 48.1 % (ref 19.6–45.3)
MCH RBC QN AUTO: 33 PG (ref 26.6–33)
MCHC RBC AUTO-ENTMCNC: 32.7 G/DL (ref 31.5–35.7)
MCV RBC AUTO: 100.9 FL (ref 79–97)
MONOCYTES # BLD AUTO: 0.47 10*3/MM3 (ref 0.1–0.9)
MONOCYTES NFR BLD AUTO: 29 % (ref 5–12)
NEUTROPHILS NFR BLD AUTO: 0.29 10*3/MM3 (ref 1.7–7)
NEUTROPHILS NFR BLD AUTO: 18 % (ref 42.7–76)
NRBC BLD AUTO-RTO: 0 /100 WBC (ref 0–0.2)
PLATELET # BLD AUTO: 188 10*3/MM3 (ref 140–450)
PMV BLD AUTO: 10 FL (ref 6–12)
RBC # BLD AUTO: 3.52 10*6/MM3 (ref 4.14–5.8)
WBC NRBC COR # BLD: 1.62 10*3/MM3 (ref 3.4–10.8)

## 2023-07-27 PROCEDURE — 36415 COLL VENOUS BLD VENIPUNCTURE: CPT

## 2023-07-27 PROCEDURE — 85025 COMPLETE CBC W/AUTO DIFF WBC: CPT

## 2023-07-27 NOTE — PROGRESS NOTES
Labs reviewed with Dr. Cheng. Per Dr. Cheng pt will hold Venetoclax and monitor for S/S of infection including a fever of 100.5 or greater. He will call if he develops any. Pt V/U.     Lab Results   Component Value Date    WBC 1.62 (L) 07/27/2023    HGB 11.6 (L) 07/27/2023    HCT 35.5 (L) 07/27/2023    .9 (H) 07/27/2023     07/27/2023

## 2023-08-03 ENCOUNTER — CLINICAL SUPPORT (OUTPATIENT)
Dept: ONCOLOGY | Facility: HOSPITAL | Age: 68
End: 2023-08-03
Payer: MEDICARE

## 2023-08-03 ENCOUNTER — LAB (OUTPATIENT)
Dept: LAB | Facility: HOSPITAL | Age: 68
End: 2023-08-03
Payer: MEDICARE

## 2023-08-03 DIAGNOSIS — C91.10 CLL (CHRONIC LYMPHOCYTIC LEUKEMIA): ICD-10-CM

## 2023-08-03 DIAGNOSIS — Z79.899 HIGH RISK MEDICATION USE: ICD-10-CM

## 2023-08-03 PROBLEM — D70.1 CHEMOTHERAPY-INDUCED NEUTROPENIA: Status: ACTIVE | Noted: 2023-08-03

## 2023-08-03 PROBLEM — T45.1X5A CHEMOTHERAPY-INDUCED NEUTROPENIA: Status: ACTIVE | Noted: 2023-08-03

## 2023-08-03 LAB
BASOPHILS # BLD AUTO: 0.04 10*3/MM3 (ref 0–0.2)
BASOPHILS NFR BLD AUTO: 2.6 % (ref 0–1.5)
DEPRECATED RDW RBC AUTO: 45.7 FL (ref 37–54)
EOSINOPHIL # BLD AUTO: 0.01 10*3/MM3 (ref 0–0.4)
EOSINOPHIL NFR BLD AUTO: 0.6 % (ref 0.3–6.2)
ERYTHROCYTE [DISTWIDTH] IN BLOOD BY AUTOMATED COUNT: 12.4 % (ref 12.3–15.4)
HCT VFR BLD AUTO: 38.2 % (ref 37.5–51)
HGB BLD-MCNC: 12.4 G/DL (ref 13–17.7)
IMM GRANULOCYTES # BLD AUTO: 0.02 10*3/MM3 (ref 0–0.05)
IMM GRANULOCYTES NFR BLD AUTO: 1.3 % (ref 0–0.5)
LYMPHOCYTES # BLD AUTO: 0.83 10*3/MM3 (ref 0.7–3.1)
LYMPHOCYTES NFR BLD AUTO: 53.5 % (ref 19.6–45.3)
MCH RBC QN AUTO: 32.5 PG (ref 26.6–33)
MCHC RBC AUTO-ENTMCNC: 32.5 G/DL (ref 31.5–35.7)
MCV RBC AUTO: 100.3 FL (ref 79–97)
MONOCYTES # BLD AUTO: 0.48 10*3/MM3 (ref 0.1–0.9)
MONOCYTES NFR BLD AUTO: 31 % (ref 5–12)
NEUTROPHILS NFR BLD AUTO: 0.17 10*3/MM3 (ref 1.7–7)
NEUTROPHILS NFR BLD AUTO: 11 % (ref 42.7–76)
NRBC BLD AUTO-RTO: 0 /100 WBC (ref 0–0.2)
PLATELET # BLD AUTO: 169 10*3/MM3 (ref 140–450)
PMV BLD AUTO: 11.1 FL (ref 6–12)
RBC # BLD AUTO: 3.81 10*6/MM3 (ref 4.14–5.8)
WBC NRBC COR # BLD: 1.55 10*3/MM3 (ref 3.4–10.8)

## 2023-08-03 PROCEDURE — 85025 COMPLETE CBC W/AUTO DIFF WBC: CPT

## 2023-08-03 PROCEDURE — 36415 COLL VENOUS BLD VENIPUNCTURE: CPT

## 2023-08-03 RX ORDER — LEVOFLOXACIN 500 MG/1
500 TABLET, FILM COATED ORAL DAILY
Qty: 7 TABLET | Refills: 0 | Status: SHIPPED | OUTPATIENT
Start: 2023-08-03 | End: 2023-08-10

## 2023-08-04 ENCOUNTER — INFUSION (OUTPATIENT)
Dept: ONCOLOGY | Facility: HOSPITAL | Age: 68
End: 2023-08-04
Payer: MEDICARE

## 2023-08-04 DIAGNOSIS — D70.1 CHEMOTHERAPY-INDUCED NEUTROPENIA: ICD-10-CM

## 2023-08-04 DIAGNOSIS — T45.1X5A CHEMOTHERAPY-INDUCED NEUTROPENIA: ICD-10-CM

## 2023-08-04 DIAGNOSIS — C91.10 CLL (CHRONIC LYMPHOCYTIC LEUKEMIA): Primary | ICD-10-CM

## 2023-08-04 PROCEDURE — 96372 THER/PROPH/DIAG INJ SC/IM: CPT

## 2023-08-04 PROCEDURE — 25010000002 FILGRASTIM-SNDZ 480 MCG/0.8ML SOLUTION PREFILLED SYRINGE: Performed by: INTERNAL MEDICINE

## 2023-08-04 RX ADMIN — FILGRASTIM-SNDZ 480 MCG: 480 INJECTION, SOLUTION INTRAVENOUS; SUBCUTANEOUS at 13:20

## 2023-08-05 ENCOUNTER — INFUSION (OUTPATIENT)
Dept: ONCOLOGY | Facility: HOSPITAL | Age: 68
End: 2023-08-05
Payer: MEDICARE

## 2023-08-05 VITALS
RESPIRATION RATE: 18 BRPM | HEART RATE: 85 BPM | SYSTOLIC BLOOD PRESSURE: 114 MMHG | TEMPERATURE: 97.2 F | DIASTOLIC BLOOD PRESSURE: 67 MMHG

## 2023-08-05 DIAGNOSIS — T45.1X5A CHEMOTHERAPY-INDUCED NEUTROPENIA: Primary | ICD-10-CM

## 2023-08-05 DIAGNOSIS — C91.10 CLL (CHRONIC LYMPHOCYTIC LEUKEMIA): ICD-10-CM

## 2023-08-05 DIAGNOSIS — D70.1 CHEMOTHERAPY-INDUCED NEUTROPENIA: Primary | ICD-10-CM

## 2023-08-05 PROCEDURE — 96372 THER/PROPH/DIAG INJ SC/IM: CPT

## 2023-08-05 PROCEDURE — 25010000002 FILGRASTIM-SNDZ 480 MCG/0.8ML SOLUTION PREFILLED SYRINGE: Performed by: INTERNAL MEDICINE

## 2023-08-05 RX ADMIN — FILGRASTIM-SNDZ 480 MCG: 480 INJECTION, SOLUTION INTRAVENOUS; SUBCUTANEOUS at 13:13

## 2023-08-06 ENCOUNTER — INFUSION (OUTPATIENT)
Dept: ONCOLOGY | Facility: HOSPITAL | Age: 68
End: 2023-08-06
Payer: MEDICARE

## 2023-08-06 DIAGNOSIS — C91.10 CLL (CHRONIC LYMPHOCYTIC LEUKEMIA): Primary | ICD-10-CM

## 2023-08-06 DIAGNOSIS — D70.1 CHEMOTHERAPY-INDUCED NEUTROPENIA: ICD-10-CM

## 2023-08-06 DIAGNOSIS — T45.1X5A CHEMOTHERAPY-INDUCED NEUTROPENIA: ICD-10-CM

## 2023-08-06 LAB
BASOPHILS # BLD MANUAL: 0.26 10*3/MM3 (ref 0–0.2)
BASOPHILS NFR BLD MANUAL: 2 % (ref 0–1.5)
DEPRECATED RDW RBC AUTO: 42.5 FL (ref 37–54)
ERYTHROCYTE [DISTWIDTH] IN BLOOD BY AUTOMATED COUNT: 12.1 % (ref 12.3–15.4)
HCT VFR BLD AUTO: 36.1 % (ref 37.5–51)
HGB BLD-MCNC: 12.2 G/DL (ref 13–17.7)
LYMPHOCYTES # BLD MANUAL: 1.56 10*3/MM3 (ref 0.7–3.1)
LYMPHOCYTES NFR BLD MANUAL: 11.2 % (ref 5–12)
MCH RBC QN AUTO: 32.9 PG (ref 26.6–33)
MCHC RBC AUTO-ENTMCNC: 33.8 G/DL (ref 31.5–35.7)
MCV RBC AUTO: 97.3 FL (ref 79–97)
MONOCYTES # BLD: 1.43 10*3/MM3 (ref 0.1–0.9)
NEUTROPHILS # BLD AUTO: 9.51 10*3/MM3 (ref 1.7–7)
NEUTROPHILS NFR BLD MANUAL: 74.5 % (ref 42.7–76)
PLAT MORPH BLD: NORMAL
PLATELET # BLD AUTO: 173 10*3/MM3 (ref 140–450)
PMV BLD AUTO: 11.2 FL (ref 6–12)
RBC # BLD AUTO: 3.71 10*6/MM3 (ref 4.14–5.8)
RBC MORPH BLD: NORMAL
VARIANT LYMPHS NFR BLD MANUAL: 12.2 % (ref 19.6–45.3)
WBC MORPH BLD: NORMAL
WBC NRBC COR # BLD: 12.77 10*3/MM3 (ref 3.4–10.8)

## 2023-08-06 PROCEDURE — 85007 BL SMEAR W/DIFF WBC COUNT: CPT | Performed by: INTERNAL MEDICINE

## 2023-08-06 PROCEDURE — G0463 HOSPITAL OUTPT CLINIC VISIT: HCPCS

## 2023-08-06 PROCEDURE — 85025 COMPLETE CBC W/AUTO DIFF WBC: CPT | Performed by: INTERNAL MEDICINE

## 2023-08-06 NOTE — NURSING NOTE
RN Verified  CBC order with Dr. Cole. Per Dr. Cheng check CBC if ANC greater than 2000 hold Zarixo and cancel Monday appointment    Dr. Cole reviewed CBC results, verified same as above. Pt informed prior to leaving.

## 2023-08-07 ENCOUNTER — TELEPHONE (OUTPATIENT)
Dept: ONCOLOGY | Facility: CLINIC | Age: 68
End: 2023-08-07
Payer: MEDICARE

## 2023-08-07 NOTE — TELEPHONE ENCOUNTER
Caller: Hui Bal Jr.    Relationship: Self    Best call back number: [phone]    What was the call regarding: HUI CALLED REGARDING HIS ANTIBIOTICS. HE SAYS HIS INFUSIONS WERE STOPPED STARTING YESTERDAY. HE STILL HAS 3 PILLS LEFT, AND IS NEEDING TO KNOW IF HE SHOULD CONTINUE TAKING THEM.

## 2023-08-08 NOTE — PROGRESS NOTES
[facility] GROUP OUTPATIENT FOLLOW UP CLINIC VISIT    REASON FOR FOLLOW-UP:    1.  Chronic lymphocytic leukemia diagnosed in November 2015.  CLL FISH panel negative at that time.    2.  Autoimmune hemolytic anemia associated with CLL.  He was treated with steroids and he received 4 weeks of weekly Rituxan.  Otherwise no treatment has been performed  3.  CT imaging of the chest abdomen and pelvis from 4/25/2018 showed lymphadenopathy with mildly enlarged retroperitoneal and mesenteric lymph nodes with the largest measuring about 3.2 cm.  The spleen measured 13.6 cm.  Slight increase in lymphadenopathy compared with 11/27/2015.  4.  Acute anemia with a hemoglobin of 6.9 as of 6/20/2018.  Low reticulocyte count.  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  Prednisone initiated.  Erythropoietin 73.9.  Parvovirus B19 PCR negative.  Steroids complete on 8/22/2018.  5.  Bone marrow biopsy on 7/3/2018 with 70% CLL (87% by flow cytometry) with a t(14;18) translocation by cytogenetics, 3 copies of IGH in 87.5% of cells but negative for CCND1/IGH rearrangement.  Negative for all other rearrangements/deletions.  IgVH somatic hypermutation was not detected.            6.  Therapy with ibrutinib initiated at the end of July 2018.  Discontinued in late 2022 due to cardiac arrest.      HISTORY OF PRESENT ILLNESS:  Aida Bal Jr. is a 68 y.o. male with the above-mentioned history today for lab review and evaluation.      We anticipate cycle #2 of rituximab today.  He overall has been doing well.  He did get profoundly neutropenic and ultimately we held venetoclax and started G-CSF last Friday.  He received 2 doses and his white blood cell count significantly increased by Sunday.  He has been on Levaquin.  He had some abdominal cramping following his first dose but otherwise has done well with Levaquin.  He denies any new problems.  No fevers or chills.    REVIEW OF SYSTEMS:  As per the HPI    PHYSICAL EXAMINATION:  Vitals:     "08/09/23 0905   Height: 177.8 cm (70\")   General:  No acute distress, awake, alert and oriented  Skin:  Warm and dry, no visible rash  HEENT:  Normocephalic/atraumatic.    Chest:  Normal respiratory effort.  Lungs clear to auscultation bilaterally.  ICD in place.  Heart: Regular rate and rhythm  Extremities:  No visible clubbing, cyanosis, or edema  Lymphatics: No palpable cervical supraclavicular or axillary adenopathy  Neuro/psych:  Grossly nonfocal.  Normal mood and affect.             DIAGNOSTIC DATA:  CBC and Differential (08/09/2023 09:01)     IMAGING:  None reviewed      ASSESSMENT:  This is a 68 y.o. male with a history of chronic lymphocytic leukemia and related autoimmune hemolytic anemia.    *CLL:   Diagnosed 11/26/2015.    Normal FISH panel  Initially received Rituxan for 4 weeks at diagnosis for this and the autoimmune hemolytic anemia.  Given the worsening anemia even though it responded to steroids we needed to treat his CLL particularly given the 70% involvement on his bone marrow biopsy.  We started treating with ibrutinib 420 mg daily, initiated at the end of July 2018. Held around the time of COVID infection; resumed when he completed steroids.  Ibrutinib now discontinued due to cardiac arrest.  1/4/2023: White blood cell count remains normal at 5.57 with 66% lymphocytes  3/22/2023: White blood cell count 12,000 with 80% lymphocytes  CT imaging of the neck chest abdomen and pelvis from 5/17/2023 with enlarging adenopathy throughout the neck chest abdomen and pelvis  5/24/2023: Normal white blood cell count of 10.05 with 72.7% lymphocytes  Initiation of venetoclax on 6/6/2023.  Well-tolerated.  Following labs very closely.  6/13/2023 increased venetoclax to 50 mg daily, so far tolerating quite well.  6/20/2023 increased venetoclax to 100 mg daily  6/27/2023 increase venetoclax to 200 mg daily continuing to tolerate well.  Patient did notice 2 nodular areas, 1 on each hip, is very lateral on the " hip.  Advised him just to continue to closely monitor these areas, as it is nontender, no erythema, or warmth.  7/4/2023 venetoclax increase to 400 mg daily  7/12/2023 Initiate rituximab which will be given every 4 weeks.  Neutropenia noted with WBC 1.93, ANC 0.94.  We will therefore reduce venetoclax to 200 mg daily.  Ongoing neutropenia 7/20/2023, continue venetoclax at a reduced dose of 200 mg daily  Ultimately neutropenia worsened and he required G-CSF with prompt improvement    *Neutropenia related to therapy  He received Zarzio 480 mcg 8/4 and 8/5/2023 with prompt improvement in his white blood cell count from 1.55 up to 12.77    *Macrocytic anemia:   He has a history of autoimmune hemolytic anemia when he presented with CLL back in November 2015.  His reticulocyte count was very elevated at that time.  His hemoglobin dropped to as low as about 5.  He responded to steroids initially intravenously and then with prednisone and he was treated with 4 weeks of Rituxan.  He was noted to have worsening anemia.  His reticulocyte count was low.  He initially did not respond very well to transfusions and he required 4 units of packed red cells  There was no evidence for hemolysis although his ADOLFO is positive for IgG.  This was persistently positive.  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  He responded to steroids.  He discontinued steroids as of 8/22/2018.    5/2/2022: Hemoglobin lower at 7.9 with a high MCV of 102   He required red cell transfusion.  1/4/2023: Hemoglobin improving at 11.5  3/22/2023: Hemoglobin 12.3 with a high MCV at 102.7  7/20/2023, hemoglobin improved at 11.7  8/9/2023: Hemoglobin stable at 12.3    *History of systolic hypertension: Blood pressure acceptable at 122/62 today.    *History of thrombocytopenia:   Platelets normal at 156,000    *COVID-19 pandemic: He has received a total of 3 vaccinations.  Subsequent infection in April 2020.  He received a monoclonal antibody as an  outpatient and remdesivir as an inpatient. He was readmitted on 5/2 with hypotension, persistent fevers, persistent COVID-19 positivity on testing. He received IVIG and steroids. He improved and was treated with outpatient Paxlovid.  Symptoms resolved at this point.  Last vaccine 9/21/2022, bivalent.  Cardiac arrest several days after receiving this on 9/24/2022.    *Left lower extremity DVT and gastrocnemius vein and right lower extremity superficial thrombophlebitis in the small saphenous vein on bilateral lower extremity venous duplex on 4/15/2022  Associated with COVID-19 infection  Repeat venous duplex 8/1/22 without DVT, only superficial thrombophlebitis    *Out of hospital cardiac arrest on 9/24/2022  Quickly achieved return of spontaneous circulation with CPR and medication  He had an ICD [facility] at the [facility]  Ibrutinib discontinued as a result of this    *Possible zoster of the left upper extremity  Completed Valtrex 1 g 3 times daily x7 days  Now begin prophylactic acyclovir 400 mg twice daily.  Rash is completely resolved    PLAN:   Cycle 2 rituximab today  Resume venetoclax 100 mg daily and continue at this dose as long as his counts remain normal and he is not neutropenic with an ANC above 1000  Continue acyclovir for prophylaxis  Nurse practitioner visit in 2 weeks with a CBC  CT chest abdomen and pelvis in 3 weeks  Follow-up with me in 4 weeks with cycle #3 rituximab 500 mg/mý   Rituximab will be continued every 4 weeks for a total of 6 doses.   Continue to follow with [facility] cardiology.    Patient is on a high risk medication requiring close monitoring for toxicity.      Toñito Cheng MD  08/09/2023

## 2023-08-09 ENCOUNTER — OFFICE VISIT (OUTPATIENT)
Dept: ONCOLOGY | Facility: CLINIC | Age: 68
End: 2023-08-09
Payer: MEDICARE

## 2023-08-09 ENCOUNTER — LAB (OUTPATIENT)
Dept: LAB | Facility: HOSPITAL | Age: 68
End: 2023-08-09
Payer: MEDICARE

## 2023-08-09 ENCOUNTER — INFUSION (OUTPATIENT)
Dept: ONCOLOGY | Facility: HOSPITAL | Age: 68
End: 2023-08-09
Payer: MEDICARE

## 2023-08-09 VITALS — DIASTOLIC BLOOD PRESSURE: 62 MMHG | SYSTOLIC BLOOD PRESSURE: 122 MMHG | HEART RATE: 62 BPM

## 2023-08-09 VITALS
RESPIRATION RATE: 18 BRPM | TEMPERATURE: 98.6 F | OXYGEN SATURATION: 96 % | BODY MASS INDEX: 34.03 KG/M2 | HEIGHT: 70 IN | WEIGHT: 237.7 LBS | DIASTOLIC BLOOD PRESSURE: 64 MMHG | SYSTOLIC BLOOD PRESSURE: 107 MMHG | HEART RATE: 79 BPM

## 2023-08-09 DIAGNOSIS — C91.10 CLL (CHRONIC LYMPHOCYTIC LEUKEMIA): ICD-10-CM

## 2023-08-09 DIAGNOSIS — C91.10 CLL (CHRONIC LYMPHOCYTIC LEUKEMIA): Primary | ICD-10-CM

## 2023-08-09 LAB
ALBUMIN SERPL-MCNC: 4 G/DL (ref 3.5–5.2)
ALBUMIN/GLOB SERPL: 2.7 G/DL
ALP SERPL-CCNC: 115 U/L (ref 39–117)
ALT SERPL W P-5'-P-CCNC: 8 U/L (ref 1–41)
ANION GAP SERPL CALCULATED.3IONS-SCNC: 14.5 MMOL/L (ref 5–15)
AST SERPL-CCNC: 22 U/L (ref 1–40)
BASOPHILS # BLD AUTO: 0.07 10*3/MM3 (ref 0–0.2)
BASOPHILS NFR BLD AUTO: 1 % (ref 0–1.5)
BILIRUB SERPL-MCNC: 0.7 MG/DL (ref 0–1.2)
BUN SERPL-MCNC: 22 MG/DL (ref 8–23)
BUN/CREAT SERPL: 15.3 (ref 7–25)
CALCIUM SPEC-SCNC: 9.1 MG/DL (ref 8.6–10.5)
CHLORIDE SERPL-SCNC: 105 MMOL/L (ref 98–107)
CO2 SERPL-SCNC: 23.5 MMOL/L (ref 22–29)
CREAT SERPL-MCNC: 1.44 MG/DL (ref 0.7–1.3)
DEPRECATED RDW RBC AUTO: 50.2 FL (ref 37–54)
EGFRCR SERPLBLD CKD-EPI 2021: 52.3 ML/MIN/1.73
EOSINOPHIL # BLD AUTO: 0.03 10*3/MM3 (ref 0–0.4)
EOSINOPHIL NFR BLD AUTO: 0.4 % (ref 0.3–6.2)
ERYTHROCYTE [DISTWIDTH] IN BLOOD BY AUTOMATED COUNT: 13 % (ref 12.3–15.4)
GLOBULIN UR ELPH-MCNC: 1.5 GM/DL
GLUCOSE SERPL-MCNC: 153 MG/DL (ref 65–99)
HCT VFR BLD AUTO: 39.4 % (ref 37.5–51)
HGB BLD-MCNC: 12.3 G/DL (ref 13–17.7)
IMM GRANULOCYTES # BLD AUTO: 0.95 10*3/MM3 (ref 0–0.05)
IMM GRANULOCYTES NFR BLD AUTO: 12.9 % (ref 0–0.5)
LYMPHOCYTES # BLD AUTO: 1.53 10*3/MM3 (ref 0.7–3.1)
LYMPHOCYTES NFR BLD AUTO: 20.8 % (ref 19.6–45.3)
MAGNESIUM SERPL-MCNC: 1.9 MG/DL (ref 1.6–2.4)
MCH RBC QN AUTO: 32.7 PG (ref 26.6–33)
MCHC RBC AUTO-ENTMCNC: 31.2 G/DL (ref 31.5–35.7)
MCV RBC AUTO: 104.8 FL (ref 79–97)
MONOCYTES # BLD AUTO: 0.75 10*3/MM3 (ref 0.1–0.9)
MONOCYTES NFR BLD AUTO: 10.2 % (ref 5–12)
NEUTROPHILS NFR BLD AUTO: 4.01 10*3/MM3 (ref 1.7–7)
NEUTROPHILS NFR BLD AUTO: 54.7 % (ref 42.7–76)
NRBC BLD AUTO-RTO: 0 /100 WBC (ref 0–0.2)
PHOSPHATE SERPL-MCNC: 3.3 MG/DL (ref 2.5–4.5)
PLATELET # BLD AUTO: 156 10*3/MM3 (ref 140–450)
PMV BLD AUTO: 10.5 FL (ref 6–12)
POTASSIUM SERPL-SCNC: 3.6 MMOL/L (ref 3.5–5.2)
PROT SERPL-MCNC: 5.5 G/DL (ref 6–8.5)
RBC # BLD AUTO: 3.76 10*6/MM3 (ref 4.14–5.8)
SODIUM SERPL-SCNC: 143 MMOL/L (ref 136–145)
URATE SERPL-MCNC: 4.4 MG/DL (ref 3.4–7)
WBC NRBC COR # BLD: 7.34 10*3/MM3 (ref 3.4–10.8)

## 2023-08-09 PROCEDURE — 25010000002 RITUXIMAB 10 MG/ML SOLUTION 10 ML VIAL: Performed by: INTERNAL MEDICINE

## 2023-08-09 PROCEDURE — 1126F AMNT PAIN NOTED NONE PRSNT: CPT | Performed by: INTERNAL MEDICINE

## 2023-08-09 PROCEDURE — 3078F DIAST BP <80 MM HG: CPT | Performed by: INTERNAL MEDICINE

## 2023-08-09 PROCEDURE — 96375 TX/PRO/DX INJ NEW DRUG ADDON: CPT

## 2023-08-09 PROCEDURE — 25010000002 DIPHENHYDRAMINE PER 50 MG: Performed by: INTERNAL MEDICINE

## 2023-08-09 PROCEDURE — 99214 OFFICE O/P EST MOD 30 MIN: CPT | Performed by: INTERNAL MEDICINE

## 2023-08-09 PROCEDURE — 84550 ASSAY OF BLOOD/URIC ACID: CPT | Performed by: INTERNAL MEDICINE

## 2023-08-09 PROCEDURE — 25010000002 RITUXIMAB 10 MG/ML SOLUTION 50 ML VIAL: Performed by: INTERNAL MEDICINE

## 2023-08-09 PROCEDURE — 96415 CHEMO IV INFUSION ADDL HR: CPT

## 2023-08-09 PROCEDURE — 3074F SYST BP LT 130 MM HG: CPT | Performed by: INTERNAL MEDICINE

## 2023-08-09 PROCEDURE — 84100 ASSAY OF PHOSPHORUS: CPT

## 2023-08-09 PROCEDURE — 96413 CHEMO IV INFUSION 1 HR: CPT

## 2023-08-09 PROCEDURE — 1159F MED LIST DOCD IN RCRD: CPT | Performed by: INTERNAL MEDICINE

## 2023-08-09 PROCEDURE — 80053 COMPREHEN METABOLIC PANEL: CPT

## 2023-08-09 PROCEDURE — 85025 COMPLETE CBC W/AUTO DIFF WBC: CPT

## 2023-08-09 PROCEDURE — 83735 ASSAY OF MAGNESIUM: CPT

## 2023-08-09 PROCEDURE — 36415 COLL VENOUS BLD VENIPUNCTURE: CPT

## 2023-08-09 PROCEDURE — 1160F RVW MEDS BY RX/DR IN RCRD: CPT | Performed by: INTERNAL MEDICINE

## 2023-08-09 RX ORDER — DIPHENHYDRAMINE HYDROCHLORIDE 50 MG/ML
50 INJECTION INTRAMUSCULAR; INTRAVENOUS AS NEEDED
Status: CANCELLED | OUTPATIENT
Start: 2023-08-09

## 2023-08-09 RX ORDER — ACETAMINOPHEN 325 MG/1
650 TABLET ORAL ONCE
Status: CANCELLED | OUTPATIENT
Start: 2023-08-09

## 2023-08-09 RX ORDER — FAMOTIDINE 10 MG/ML
20 INJECTION, SOLUTION INTRAVENOUS ONCE
Status: COMPLETED | OUTPATIENT
Start: 2023-08-09 | End: 2023-08-09

## 2023-08-09 RX ORDER — FAMOTIDINE 10 MG/ML
20 INJECTION, SOLUTION INTRAVENOUS ONCE
Status: CANCELLED | OUTPATIENT
Start: 2023-08-09

## 2023-08-09 RX ORDER — FAMOTIDINE 10 MG/ML
20 INJECTION, SOLUTION INTRAVENOUS AS NEEDED
Status: CANCELLED | OUTPATIENT
Start: 2023-08-09

## 2023-08-09 RX ORDER — SODIUM CHLORIDE 9 MG/ML
250 INJECTION, SOLUTION INTRAVENOUS ONCE
Status: CANCELLED | OUTPATIENT
Start: 2023-08-09

## 2023-08-09 RX ORDER — ACETAMINOPHEN 325 MG/1
650 TABLET ORAL ONCE
Status: COMPLETED | OUTPATIENT
Start: 2023-08-09 | End: 2023-08-09

## 2023-08-09 RX ORDER — MEPERIDINE HYDROCHLORIDE 25 MG/ML
25 INJECTION INTRAMUSCULAR; INTRAVENOUS; SUBCUTANEOUS
Status: CANCELLED | OUTPATIENT
Start: 2023-08-09

## 2023-08-09 RX ORDER — SODIUM CHLORIDE 9 MG/ML
250 INJECTION, SOLUTION INTRAVENOUS ONCE
Status: COMPLETED | OUTPATIENT
Start: 2023-08-09 | End: 2023-08-09

## 2023-08-09 RX ADMIN — RITUXIMAB 1130 MG: 10 INJECTION, SOLUTION INTRAVENOUS at 11:26

## 2023-08-09 RX ADMIN — SODIUM CHLORIDE 250 ML: 9 INJECTION, SOLUTION INTRAVENOUS at 10:23

## 2023-08-09 RX ADMIN — FAMOTIDINE 20 MG: 10 INJECTION INTRAVENOUS at 10:34

## 2023-08-09 RX ADMIN — DIPHENHYDRAMINE HYDROCHLORIDE 25 MG: 50 INJECTION INTRAMUSCULAR; INTRAVENOUS at 10:36

## 2023-08-09 RX ADMIN — ACETAMINOPHEN 650 MG: 325 TABLET ORAL at 10:34

## 2023-08-09 NOTE — NURSING NOTE
PT REPORTS THAT HE HAS ALWAYS DONE WELL (NO ADVERSE REACTIONS) W/ RITUXAN. PT SAID THAT LAST TIME HE FELT VERY GROGGY AFTER REC'ING 50MG OF BENADRYL. PT WENT HOME AND FELT VERY LETHARGIC AND FATIGUED. CHANGING BENADRYL TO 25MG GOING FORWARD. MESSAGED AF RN TO MAKE CHANGES IN PLAN.     PT TOLERATED TX TODAY AND SAID THAT HE FELT MUCH BETTER AT THE END OF THE INFU W/ THE LOWER DOSE OF BENADRYL.

## 2023-08-17 ENCOUNTER — DOCUMENTATION (OUTPATIENT)
Dept: PHARMACY | Facility: HOSPITAL | Age: 68
End: 2023-08-17
Payer: MEDICARE

## 2023-08-17 NOTE — PROGRESS NOTES
Pt in my queue to call and check his supply of Venclexta. At our last conversation on 7/25/2023 he reported have a full supply of #120 tabs. He was seen by Dr Cheng on 8/9/23 and he was instructed to take 100 mg daily. He will be seen in the office on 8/23/2023 and I will follow up on his dose at that time.    Resume venetoclax 100 mg daily and continue at this dose as long as his counts remain normal and he is not neutropenic with an ANC above 1000    Haylee Montanez, Pharmacy Technician  Specialty Pharmacy Technician

## 2023-08-23 ENCOUNTER — OFFICE VISIT (OUTPATIENT)
Dept: ONCOLOGY | Facility: CLINIC | Age: 68
End: 2023-08-23
Payer: MEDICARE

## 2023-08-23 ENCOUNTER — DOCUMENTATION (OUTPATIENT)
Dept: PHARMACY | Facility: HOSPITAL | Age: 68
End: 2023-08-23
Payer: MEDICARE

## 2023-08-23 ENCOUNTER — LAB (OUTPATIENT)
Dept: LAB | Facility: HOSPITAL | Age: 68
End: 2023-08-23
Payer: MEDICARE

## 2023-08-23 VITALS
HEIGHT: 70 IN | HEART RATE: 68 BPM | BODY MASS INDEX: 33.94 KG/M2 | TEMPERATURE: 98.2 F | DIASTOLIC BLOOD PRESSURE: 78 MMHG | SYSTOLIC BLOOD PRESSURE: 146 MMHG | WEIGHT: 237.1 LBS | RESPIRATION RATE: 18 BRPM | OXYGEN SATURATION: 97 %

## 2023-08-23 DIAGNOSIS — C91.10 CLL (CHRONIC LYMPHOCYTIC LEUKEMIA): Primary | ICD-10-CM

## 2023-08-23 DIAGNOSIS — C91.10 CLL (CHRONIC LYMPHOCYTIC LEUKEMIA): ICD-10-CM

## 2023-08-23 LAB
BASOPHILS # BLD AUTO: 0.04 10*3/MM3 (ref 0–0.2)
BASOPHILS NFR BLD AUTO: 0.9 % (ref 0–1.5)
DEPRECATED RDW RBC AUTO: 48.9 FL (ref 37–54)
EOSINOPHIL # BLD AUTO: 0.11 10*3/MM3 (ref 0–0.4)
EOSINOPHIL NFR BLD AUTO: 2.6 % (ref 0.3–6.2)
ERYTHROCYTE [DISTWIDTH] IN BLOOD BY AUTOMATED COUNT: 13.3 % (ref 12.3–15.4)
HCT VFR BLD AUTO: 35 % (ref 37.5–51)
HGB BLD-MCNC: 11.5 G/DL (ref 13–17.7)
IMM GRANULOCYTES # BLD AUTO: 0.05 10*3/MM3 (ref 0–0.05)
IMM GRANULOCYTES NFR BLD AUTO: 1.2 % (ref 0–0.5)
LYMPHOCYTES # BLD AUTO: 0.71 10*3/MM3 (ref 0.7–3.1)
LYMPHOCYTES NFR BLD AUTO: 16.8 % (ref 19.6–45.3)
MCH RBC QN AUTO: 33 PG (ref 26.6–33)
MCHC RBC AUTO-ENTMCNC: 32.9 G/DL (ref 31.5–35.7)
MCV RBC AUTO: 100.6 FL (ref 79–97)
MONOCYTES # BLD AUTO: 0.61 10*3/MM3 (ref 0.1–0.9)
MONOCYTES NFR BLD AUTO: 14.4 % (ref 5–12)
NEUTROPHILS NFR BLD AUTO: 2.71 10*3/MM3 (ref 1.7–7)
NEUTROPHILS NFR BLD AUTO: 64.1 % (ref 42.7–76)
NRBC BLD AUTO-RTO: 0.5 /100 WBC (ref 0–0.2)
PLATELET # BLD AUTO: 241 10*3/MM3 (ref 140–450)
PMV BLD AUTO: 10.1 FL (ref 6–12)
RBC # BLD AUTO: 3.48 10*6/MM3 (ref 4.14–5.8)
WBC NRBC COR # BLD: 4.23 10*3/MM3 (ref 3.4–10.8)

## 2023-08-23 PROCEDURE — 85025 COMPLETE CBC W/AUTO DIFF WBC: CPT

## 2023-08-23 PROCEDURE — 36415 COLL VENOUS BLD VENIPUNCTURE: CPT

## 2023-08-23 NOTE — PROGRESS NOTES
Pt was seen by Kathleen WINSLOW NP today-per her note pt will continue Venclexta 100 mg daily.    As of 7/25-pt had a full bottle of #120 Venclexta tabs. Rough calculation up to todays date-Pt should have 74 tabs on hand.    Pt is to see Dr Cheng on 9/6/2023-I will check in with pt at that time on his supply. He should have 60 tabs at that point.    Refill task moved to 9/6/23     Haylee Montanez, Pharmacy Technician  Specialty Pharmacy Technician

## 2023-08-23 NOTE — PROGRESS NOTES
"[facility] GROUP OUTPATIENT FOLLOW UP CLINIC VISIT    REASON FOR FOLLOW-UP:    1.  Chronic lymphocytic leukemia diagnosed in November 2015.  CLL FISH panel negative at that time.    2.  Autoimmune hemolytic anemia associated with CLL.  He was treated with steroids and he received 4 weeks of weekly Rituxan.  Otherwise no treatment has been performed  3.  CT imaging of the chest abdomen and pelvis from 4/25/2018 showed lymphadenopathy with mildly enlarged retroperitoneal and mesenteric lymph nodes with the largest measuring about 3.2 cm.  The spleen measured 13.6 cm.  Slight increase in lymphadenopathy compared with 11/27/2015.  4.  Acute anemia with a hemoglobin of 6.9 as of 6/20/2018.  Low reticulocyte count.  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  Prednisone initiated.  Erythropoietin 73.9.  Parvovirus B19 PCR negative.  Steroids complete on 8/22/2018.  5.  Bone marrow biopsy on 7/3/2018 with 70% CLL (87% by flow cytometry) with a t(14;18) translocation by cytogenetics, 3 copies of IGH in 87.5% of cells but negative for CCND1/IGH rearrangement.  Negative for all other rearrangements/deletions.  IgVH somatic hypermutation was not detected.            6.  Therapy with ibrutinib initiated at the end of July 2018.  Discontinued in late 2022 due to cardiac arrest.      HISTORY OF PRESENT ILLNESS:  Aida Bal Jr. is a 68 y.o. male with the above-mentioned history here today for lab review.  He continues on venetoclax 100 mg daily.  He is tolerating this quite well.  He denies any significant side effects at all.  Patient has a good appetite.  He remains active.  He has no new problems or concerns today.        REVIEW OF SYSTEMS:  As per the HPI    PHYSICAL EXAMINATION:  Vitals:    08/23/23 0925   BP: 146/78   Pulse: 68   Resp: 18   Temp: 98.2 øF (36.8 øC)   TempSrc: Temporal   SpO2: 97%   Weight: 108 kg (237 lb 1.6 oz)   Height: 177.8 cm (70\")   PainSc: 0-No pain   General:  No acute distress, awake, alert and " oriented  Skin:  Warm and dry, no visible rash  HEENT:  Normocephalic/atraumatic.    Chest:  Normal respiratory effort.  Lungs clear to auscultation bilaterally.  ICD in place.  Heart: Regular rate and rhythm  Extremities:  No visible clubbing, cyanosis, or edema  Lymphatics: No palpable cervical supraclavicular or axillary adenopathy  Neuro/psych:  Grossly nonfocal.  Normal mood and affect.             DIAGNOSTIC DATA:  CBC & Differential (08/23/2023 09:17)     IMAGING:  None reviewed      ASSESSMENT:  This is a 68 y.o. male with a history of chronic lymphocytic leukemia and related autoimmune hemolytic anemia.    *CLL:   Diagnosed 11/26/2015.    Normal FISH panel  Initially received Rituxan for 4 weeks at diagnosis for this and the autoimmune hemolytic anemia.  Given the worsening anemia even though it responded to steroids we needed to treat his CLL particularly given the 70% involvement on his bone marrow biopsy.  We started treating with ibrutinib 420 mg daily, initiated at the end of July 2018. Held around the time of COVID infection; resumed when he completed steroids.  Ibrutinib now discontinued due to cardiac arrest.  1/4/2023: White blood cell count remains normal at 5.57 with 66% lymphocytes  3/22/2023: White blood cell count 12,000 with 80% lymphocytes  CT imaging of the neck chest abdomen and pelvis from 5/17/2023 with enlarging adenopathy throughout the neck chest abdomen and pelvis  5/24/2023: Normal white blood cell count of 10.05 with 72.7% lymphocytes  Initiation of venetoclax on 6/6/2023.  Well-tolerated.  Following labs very closely.  6/13/2023 increased venetoclax to 50 mg daily, so far tolerating quite well.  6/20/2023 increased venetoclax to 100 mg daily  6/27/2023 increase venetoclax to 200 mg daily continuing to tolerate well.  Patient did notice 2 nodular areas, 1 on each hip, is very lateral on the hip.  Advised him just to continue to closely monitor these areas, as it is nontender, no  erythema, or warmth.  7/4/2023 venetoclax increase to 400 mg daily  7/12/2023 Initiate rituximab which will be given every 4 weeks.  Neutropenia noted with WBC 1.93, ANC 0.94.  We will therefore reduce venetoclax to 200 mg daily.  Ongoing neutropenia 7/20/2023, continue venetoclax at a reduced dose of 200 mg daily  Ultimately neutropenia worsened and he required G-CSF with prompt improvement  8/9/2023 patient resume venetoclax at 100 mg daily.  Also received cycle 2 rituximab that day as well.  8/23/2023 today WBC 4.23, ANC 2.7, hemoglobin 11.5, platelet count 241,000.    *Neutropenia related to therapy  He received Zarzio 480 mcg 8/4 and 8/5/2023 with prompt improvement in his white blood cell count from 1.55 up to 12.77  8/23/2023 WBC 4.23, ANC 2.7.    *Macrocytic anemia:   He has a history of autoimmune hemolytic anemia when he presented with CLL back in November 2015.  His reticulocyte count was very elevated at that time.  His hemoglobin dropped to as low as about 5.  He responded to steroids initially intravenously and then with prednisone and he was treated with 4 weeks of Rituxan.  He was noted to have worsening anemia.  His reticulocyte count was low.  He initially did not respond very well to transfusions and he required 4 units of packed red cells  There was no evidence for hemolysis although his ADOLFO is positive for IgG.  This was persistently positive.  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  He responded to steroids.  He discontinued steroids as of 8/22/2018.    5/2/2022: Hemoglobin lower at 7.9 with a high MCV of 102   He required red cell transfusion.  1/4/2023: Hemoglobin improving at 11.5  3/22/2023: Hemoglobin 12.3 with a high MCV at 102.7  7/20/2023, hemoglobin improved at 11.7  8/9/2023: Hemoglobin stable at 12.3  8/23/2023 hgb 11.5.    *History of systolic hypertension: Blood pressure acceptable at 122/62 today.    *History of thrombocytopenia:   Platelets normal at  241,000.    *COVID-19 pandemic: He has received a total of 3 vaccinations.  Subsequent infection in April 2020.  He received a monoclonal antibody as an outpatient and remdesivir as an inpatient. He was readmitted on 5/2 with hypotension, persistent fevers, persistent COVID-19 positivity on testing. He received IVIG and steroids. He improved and was treated with outpatient Paxlovid.  Symptoms resolved at this point.  Last vaccine 9/21/2022, bivalent.  Cardiac arrest several days after receiving this on 9/24/2022.    *Left lower extremity DVT and gastrocnemius vein and right lower extremity superficial thrombophlebitis in the small saphenous vein on bilateral lower extremity venous duplex on 4/15/2022  Associated with COVID-19 infection  Repeat venous duplex 8/1/22 without DVT, only superficial thrombophlebitis    *Out of hospital cardiac arrest on 9/24/2022  Quickly achieved return of spontaneous circulation with CPR and medication  He had an ICD placed at the [facility]  Ibrutinib discontinued as a result of this    *Possible zoster of the left upper extremity  Completed Valtrex 1 g 3 times daily x7 days  Now begin prophylactic acyclovir 400 mg twice daily.  Rash is completely resolved    PLAN:   Continue venetoclax 100 mg daily.  Continue acyclovir for prophylaxis.  In 1 week patient will have CT scan of the chest, abdomen, and pelvis.  Follow-up in 2 weeks with Dr. Cheng for scan review with repeat labs and consideration of cycle 3 rituximab 500 mg/mý.  Rituximab will be continued every 4 weeks for total of 6 doses.  Continue to follow with [facility] cardiology.  Call/ return sooner should the patient develop any new concerns or problems.      Patient is on a high risk medication requiring close monitoring for toxicity.      Kathleen Gil, APRN  08/23/2023

## 2023-08-31 ENCOUNTER — HOSPITAL ENCOUNTER (OUTPATIENT)
Facility: HOSPITAL | Age: 68
Discharge: HOME | End: 2023-08-31
Admitting: INTERNAL MEDICINE
Payer: MEDICARE

## 2023-08-31 DIAGNOSIS — C91.10 CLL (CHRONIC LYMPHOCYTIC LEUKEMIA): ICD-10-CM

## 2023-08-31 PROCEDURE — 25510000001 IOPAMIDOL PER 1 ML: Performed by: INTERNAL MEDICINE

## 2023-08-31 PROCEDURE — 25510000001 IOPAMIDOL 61 % SOLUTION: Performed by: INTERNAL MEDICINE

## 2023-08-31 PROCEDURE — 74177 CT ABD & PELVIS W/CONTRAST: CPT

## 2023-08-31 PROCEDURE — 0 DIATRIZOATE MEGLUMINE & SODIUM PER 1 ML: Performed by: INTERNAL MEDICINE

## 2023-08-31 PROCEDURE — 70491 CT SOFT TISSUE NECK W/DYE: CPT

## 2023-08-31 PROCEDURE — 71260 CT THORAX DX C+: CPT

## 2023-08-31 RX ADMIN — DIATRIZOATE MEGLUMINE AND DIATRIZOATE SODIUM 30 ML: 600; 100 SOLUTION ORAL; RECTAL at 09:21

## 2023-08-31 RX ADMIN — IOPAMIDOL 50 ML: 755 INJECTION, SOLUTION INTRAVENOUS at 09:36

## 2023-08-31 RX ADMIN — IOPAMIDOL 100 ML: 612 INJECTION, SOLUTION INTRAVENOUS at 09:21

## 2023-09-01 NOTE — PROGRESS NOTES
"[facility] CBC GROUP OUTPATIENT FOLLOW UP CLINIC VISIT    REASON FOR FOLLOW-UP:    1.  Chronic lymphocytic leukemia diagnosed in November 2015.  CLL FISH panel negative at that time.    2.  Autoimmune hemolytic anemia associated with CLL.  He was treated with steroids and he received 4 weeks of weekly Rituxan.  Otherwise no treatment has been performed  3.  CT imaging of the chest abdomen and pelvis from 4/25/2018 showed lymphadenopathy with mildly enlarged retroperitoneal and mesenteric lymph nodes with the largest measuring about 3.2 cm.  The spleen measured 13.6 cm.  Slight increase in lymphadenopathy compared with 11/27/2015.  4.  Acute anemia with a hemoglobin of 6.9 as of 6/20/2018.  Low reticulocyte count.  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  Prednisone initiated.  Erythropoietin 73.9.  Parvovirus B19 PCR negative.  Steroids complete on 8/22/2018.  5.  Bone marrow biopsy on 7/3/2018 with 70% CLL (87% by flow cytometry) with a t(14;18) translocation by cytogenetics, 3 copies of IGH in 87.5% of cells but negative for CCND1/IGH rearrangement.  Negative for all other rearrangements/deletions.  IgVH somatic hypermutation was not detected.            6.  Therapy with ibrutinib initiated at the end of July 2018.  Discontinued in late 2022 due to cardiac arrest.      HISTORY OF PRESENT ILLNESS:  Aida Bal Jr. is a 68 y.o. male with the above-mentioned history today for lab review and evaluation.      He is doing well.  He continues venetoclax 100 mg daily along with the monthly rituximab.  He tolerates the drugs well.  He remains active.  Energy level is excellent.    REVIEW OF SYSTEMS:  As per the HPI    PHYSICAL EXAMINATION:  Vitals:    09/06/23 1027   BP: 134/64   Pulse: 64   Resp: 18   Temp: 98.1 °F (36.7 °C)   TempSrc: Temporal   SpO2: 99%   Weight: 108 kg (238 lb)   Height: 177.8 cm (70\")   PainSc: 0-No pain     General:  No acute distress, awake, alert and oriented  Skin:  Warm and dry, no visible " rash  HEENT:  Normocephalic/atraumatic.    Chest:  Normal respiratory effort.  Lungs clear to auscultation bilaterally.  ICD in place.  Heart: Regular rate and rhythm  Extremities:  No visible clubbing, cyanosis, or edema  Lymphatics: No palpable cervical supraclavicular or axillary adenopathy again today  Neuro/psych:  Grossly nonfocal.  Normal mood and affect.       DIAGNOSTIC DATA:  CBC and Differential (09/06/2023 10:19)     IMAGING:  CT Abdomen Pelvis With Contrast (08/31/2023 09:35)  CT Chest With Contrast Diagnostic (08/31/2023 09:35)  CT Soft Tissue Neck With Contrast (08/31/2023 09:35)    CT images personally reviewed.  Significant improvement in adenopathy throughout.  Spleen size decreased.      ASSESSMENT:  This is a 68 y.o. male with a history of chronic lymphocytic leukemia and related autoimmune hemolytic anemia.    *CLL:   Diagnosed 11/26/2015.    Normal FISH panel  Initially received Rituxan for 4 weeks at diagnosis for this and the autoimmune hemolytic anemia.  Given the worsening anemia even though it responded to steroids we needed to treat his CLL particularly given the 70% involvement on his bone marrow biopsy.  We started treating with ibrutinib 420 mg daily, initiated at the end of July 2018. Held around the time of COVID infection; resumed when he completed steroids.  Ibrutinib now discontinued due to cardiac arrest.  1/4/2023: White blood cell count remains normal at 5.57 with 66% lymphocytes  3/22/2023: White blood cell count 12,000 with 80% lymphocytes  CT imaging of the neck chest abdomen and pelvis from 5/17/2023 with enlarging adenopathy throughout the neck chest abdomen and pelvis  5/24/2023: Normal white blood cell count of 10.05 with 72.7% lymphocytes  Initiation of venetoclax on 6/6/2023.  Well-tolerated.  Following labs very closely.  6/13/2023 increased venetoclax to 50 mg daily, so far tolerating quite well.  6/20/2023 increased venetoclax to 100 mg daily  6/27/2023 increase  venetoclax to 200 mg daily continuing to tolerate well.  Patient did notice 2 nodular areas, 1 on each hip, is very lateral on the hip.  Advised him just to continue to closely monitor these areas, as it is nontender, no erythema, or warmth.  7/4/2023 venetoclax increase to 400 mg daily  7/12/2023 Initiate rituximab which will be given every 4 weeks.  Neutropenia noted with WBC 1.93, ANC 0.94.  We will therefore reduce venetoclax to 200 mg daily.  Ongoing neutropenia 7/20/2023, continue venetoclax at a reduced dose of 200 mg daily  Ultimately neutropenia worsened and he required G-CSF with prompt improvement  CT imaging 8/31/2023 with near resolution of lymphadenopathy.  9/6/2023: Blood counts acceptable to continue treatment.  Cycle #4 rituximab.    *Neutropenia related to therapy  He received Zarzio 480 mcg 8/4 and 8/5/2023 with prompt improvement in his white blood cell count from 1.55 up to 12.77  9/6/2023: ANC remains acceptable for therapy    *Macrocytic anemia:   He has a history of autoimmune hemolytic anemia when he presented with CLL back in November 2015.  His reticulocyte count was very elevated at that time.  His hemoglobin dropped to as low as about 5.  He responded to steroids initially intravenously and then with prednisone and he was treated with 4 weeks of Rituxan.  He was noted to have worsening anemia.  His reticulocyte count was low.  He initially did not respond very well to transfusions and he required 4 units of packed red cells  There was no evidence for hemolysis although his ADOLFO is positive for IgG.  This was persistently positive.  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  He responded to steroids.  He discontinued steroids as of 8/22/2018.    5/2/2022: Hemoglobin lower at 7.9 with a high MCV of 102   He required red cell transfusion.  9/6/2023: Hemoglobin overall improved and has remained stable at 12.3    *History of systolic hypertension: Blood pressure acceptable at  134/64 today.    *History of thrombocytopenia:   Platelets normal at 142,000    *COVID-19 pandemic: He has received a total of 3 vaccinations.  Subsequent infection in April 2020.  He received a monoclonal antibody as an outpatient and remdesivir as an inpatient. He was readmitted on 5/2 with hypotension, persistent fevers, persistent COVID-19 positivity on testing. He received IVIG and steroids. He improved and was treated with outpatient Paxlovid.  Symptoms resolved at this point.  Last vaccine 9/21/2022, bivalent.  Cardiac arrest several days after receiving this on 9/24/2022.  He may hold off on getting the new COVID-19 booster shot for now    *Left lower extremity DVT and gastrocnemius vein and right lower extremity superficial thrombophlebitis in the small saphenous vein on bilateral lower extremity venous duplex on 4/15/2022  Associated with COVID-19 infection  Repeat venous duplex 8/1/22 without DVT, only superficial thrombophlebitis    *Out of hospital cardiac arrest on 9/24/2022  Quickly achieved return of spontaneous circulation with CPR and medication  He had an ICD placed at the [facility]  Ibrutinib discontinued as a result of this    *Possible zoster of the left upper extremity  Completed Valtrex 1 g 3 times daily x7 days  Now continue prophylactic acyclovir 400 mg twice daily.  Rash is completely resolved    PLAN:   Cycle #4 rituximab today  Continue venetoclax 100 mg daily and continue at this dose as long as his counts remain normal and he is not neutropenic with an ANC above 1000  Continue acyclovir for prophylaxis  Nurse practitioner visit in 4 weeks for cycle #4 of therapy  I will see him back in 8 weeks for cycle #5.  The plan cycle numbers are 1 number off.  Rituximab will be continued every 4 weeks for a total of 6 doses.   Continue to follow with [facility] cardiology.    Patient is on a high risk medication requiring close monitoring for toxicity.       Toñito Cheng MD  09/06/2023

## 2023-09-06 ENCOUNTER — DOCUMENTATION (OUTPATIENT)
Dept: PHARMACY | Facility: HOSPITAL | Age: 68
End: 2023-09-06
Payer: MEDICARE

## 2023-09-06 ENCOUNTER — OFFICE VISIT (OUTPATIENT)
Dept: ONCOLOGY | Facility: CLINIC | Age: 68
End: 2023-09-06
Payer: MEDICARE

## 2023-09-06 ENCOUNTER — SPECIALTY PHARMACY (OUTPATIENT)
Dept: ONCOLOGY | Facility: HOSPITAL | Age: 68
End: 2023-09-06
Payer: MEDICARE

## 2023-09-06 ENCOUNTER — LAB (OUTPATIENT)
Dept: LAB | Facility: HOSPITAL | Age: 68
End: 2023-09-06
Payer: MEDICARE

## 2023-09-06 ENCOUNTER — INFUSION (OUTPATIENT)
Dept: ONCOLOGY | Facility: HOSPITAL | Age: 68
End: 2023-09-06
Payer: MEDICARE

## 2023-09-06 VITALS
HEART RATE: 64 BPM | HEIGHT: 70 IN | DIASTOLIC BLOOD PRESSURE: 64 MMHG | OXYGEN SATURATION: 99 % | TEMPERATURE: 98.1 F | BODY MASS INDEX: 34.07 KG/M2 | SYSTOLIC BLOOD PRESSURE: 134 MMHG | WEIGHT: 238 LBS | RESPIRATION RATE: 18 BRPM

## 2023-09-06 DIAGNOSIS — C91.10 CLL (CHRONIC LYMPHOCYTIC LEUKEMIA): Primary | ICD-10-CM

## 2023-09-06 DIAGNOSIS — C91.10 CLL (CHRONIC LYMPHOCYTIC LEUKEMIA): ICD-10-CM

## 2023-09-06 LAB
ALBUMIN SERPL-MCNC: 4.4 G/DL (ref 3.5–5.2)
ALBUMIN/GLOB SERPL: 2.8 G/DL
ALP SERPL-CCNC: 106 U/L (ref 39–117)
ALT SERPL W P-5'-P-CCNC: 10 U/L (ref 1–41)
ANION GAP SERPL CALCULATED.3IONS-SCNC: 12.9 MMOL/L (ref 5–15)
AST SERPL-CCNC: 20 U/L (ref 1–40)
BASOPHILS # BLD AUTO: 0.02 10*3/MM3 (ref 0–0.2)
BASOPHILS NFR BLD AUTO: 0.6 % (ref 0–1.5)
BILIRUB SERPL-MCNC: 0.6 MG/DL (ref 0–1.2)
BUN SERPL-MCNC: 18 MG/DL (ref 8–23)
BUN/CREAT SERPL: 14.5 (ref 7–25)
CALCIUM SPEC-SCNC: 9.4 MG/DL (ref 8.6–10.5)
CHLORIDE SERPL-SCNC: 105 MMOL/L (ref 98–107)
CO2 SERPL-SCNC: 26.1 MMOL/L (ref 22–29)
CREAT SERPL-MCNC: 1.24 MG/DL (ref 0.7–1.3)
DEPRECATED RDW RBC AUTO: 49.2 FL (ref 37–54)
EGFRCR SERPLBLD CKD-EPI 2021: 62.5 ML/MIN/1.73
EOSINOPHIL # BLD AUTO: 0.03 10*3/MM3 (ref 0–0.4)
EOSINOPHIL NFR BLD AUTO: 0.8 % (ref 0.3–6.2)
ERYTHROCYTE [DISTWIDTH] IN BLOOD BY AUTOMATED COUNT: 13 % (ref 12.3–15.4)
GLOBULIN UR ELPH-MCNC: 1.6 GM/DL
GLUCOSE SERPL-MCNC: 112 MG/DL (ref 65–99)
HCT VFR BLD AUTO: 39.4 % (ref 37.5–51)
HGB BLD-MCNC: 12.3 G/DL (ref 13–17.7)
IMM GRANULOCYTES # BLD AUTO: 0.01 10*3/MM3 (ref 0–0.05)
IMM GRANULOCYTES NFR BLD AUTO: 0.3 % (ref 0–0.5)
LYMPHOCYTES # BLD AUTO: 0.7 10*3/MM3 (ref 0.7–3.1)
LYMPHOCYTES NFR BLD AUTO: 19.4 % (ref 19.6–45.3)
MAGNESIUM SERPL-MCNC: 2 MG/DL (ref 1.6–2.4)
MCH RBC QN AUTO: 32.2 PG (ref 26.6–33)
MCHC RBC AUTO-ENTMCNC: 31.2 G/DL (ref 31.5–35.7)
MCV RBC AUTO: 103.1 FL (ref 79–97)
MONOCYTES # BLD AUTO: 0.39 10*3/MM3 (ref 0.1–0.9)
MONOCYTES NFR BLD AUTO: 10.8 % (ref 5–12)
NEUTROPHILS NFR BLD AUTO: 2.46 10*3/MM3 (ref 1.7–7)
NEUTROPHILS NFR BLD AUTO: 68.1 % (ref 42.7–76)
NRBC BLD AUTO-RTO: 0 /100 WBC (ref 0–0.2)
PHOSPHATE SERPL-MCNC: 3.2 MG/DL (ref 2.5–4.5)
PLATELET # BLD AUTO: 142 10*3/MM3 (ref 140–450)
PMV BLD AUTO: 10.1 FL (ref 6–12)
POTASSIUM SERPL-SCNC: 4.1 MMOL/L (ref 3.5–5.2)
PROT SERPL-MCNC: 6 G/DL (ref 6–8.5)
RBC # BLD AUTO: 3.82 10*6/MM3 (ref 4.14–5.8)
SODIUM SERPL-SCNC: 144 MMOL/L (ref 136–145)
WBC NRBC COR # BLD: 3.61 10*3/MM3 (ref 3.4–10.8)

## 2023-09-06 PROCEDURE — 25010000002 RITUXIMAB 10 MG/ML SOLUTION 10 ML VIAL: Performed by: INTERNAL MEDICINE

## 2023-09-06 PROCEDURE — 25010000002 DIPHENHYDRAMINE PER 50 MG: Performed by: INTERNAL MEDICINE

## 2023-09-06 PROCEDURE — 36415 COLL VENOUS BLD VENIPUNCTURE: CPT

## 2023-09-06 PROCEDURE — 99214 OFFICE O/P EST MOD 30 MIN: CPT | Performed by: INTERNAL MEDICINE

## 2023-09-06 PROCEDURE — 3075F SYST BP GE 130 - 139MM HG: CPT | Performed by: INTERNAL MEDICINE

## 2023-09-06 PROCEDURE — 96415 CHEMO IV INFUSION ADDL HR: CPT

## 2023-09-06 PROCEDURE — 83735 ASSAY OF MAGNESIUM: CPT

## 2023-09-06 PROCEDURE — 96413 CHEMO IV INFUSION 1 HR: CPT

## 2023-09-06 PROCEDURE — 1126F AMNT PAIN NOTED NONE PRSNT: CPT | Performed by: INTERNAL MEDICINE

## 2023-09-06 PROCEDURE — 1159F MED LIST DOCD IN RCRD: CPT | Performed by: INTERNAL MEDICINE

## 2023-09-06 PROCEDURE — 25010000002 RITUXIMAB 10 MG/ML SOLUTION 50 ML VIAL: Performed by: INTERNAL MEDICINE

## 2023-09-06 PROCEDURE — 96375 TX/PRO/DX INJ NEW DRUG ADDON: CPT

## 2023-09-06 PROCEDURE — 80053 COMPREHEN METABOLIC PANEL: CPT

## 2023-09-06 PROCEDURE — 3078F DIAST BP <80 MM HG: CPT | Performed by: INTERNAL MEDICINE

## 2023-09-06 PROCEDURE — 84100 ASSAY OF PHOSPHORUS: CPT

## 2023-09-06 PROCEDURE — 85025 COMPLETE CBC W/AUTO DIFF WBC: CPT

## 2023-09-06 PROCEDURE — 1160F RVW MEDS BY RX/DR IN RCRD: CPT | Performed by: INTERNAL MEDICINE

## 2023-09-06 RX ORDER — ACETAMINOPHEN 325 MG/1
650 TABLET ORAL ONCE
Status: CANCELLED | OUTPATIENT
Start: 2023-09-06

## 2023-09-06 RX ORDER — SODIUM CHLORIDE 9 MG/ML
250 INJECTION, SOLUTION INTRAVENOUS ONCE
Status: CANCELLED | OUTPATIENT
Start: 2023-09-06

## 2023-09-06 RX ORDER — SODIUM CHLORIDE 9 MG/ML
250 INJECTION, SOLUTION INTRAVENOUS ONCE
Status: COMPLETED | OUTPATIENT
Start: 2023-09-06 | End: 2023-09-06

## 2023-09-06 RX ORDER — DIPHENHYDRAMINE HYDROCHLORIDE 50 MG/ML
25 INJECTION INTRAMUSCULAR; INTRAVENOUS AS NEEDED
Status: CANCELLED | OUTPATIENT
Start: 2023-09-06

## 2023-09-06 RX ORDER — ACETAMINOPHEN 325 MG/1
650 TABLET ORAL ONCE
Status: COMPLETED | OUTPATIENT
Start: 2023-09-06 | End: 2023-09-06

## 2023-09-06 RX ORDER — FAMOTIDINE 10 MG/ML
20 INJECTION, SOLUTION INTRAVENOUS ONCE
Status: COMPLETED | OUTPATIENT
Start: 2023-09-06 | End: 2023-09-06

## 2023-09-06 RX ORDER — FAMOTIDINE 10 MG/ML
20 INJECTION, SOLUTION INTRAVENOUS ONCE
Status: CANCELLED | OUTPATIENT
Start: 2023-09-06

## 2023-09-06 RX ORDER — FAMOTIDINE 10 MG/ML
20 INJECTION, SOLUTION INTRAVENOUS AS NEEDED
Status: CANCELLED | OUTPATIENT
Start: 2023-09-06

## 2023-09-06 RX ORDER — MEPERIDINE HYDROCHLORIDE 25 MG/ML
25 INJECTION INTRAMUSCULAR; INTRAVENOUS; SUBCUTANEOUS
Status: CANCELLED | OUTPATIENT
Start: 2023-09-06

## 2023-09-06 RX ADMIN — RITUXIMAB 1130 MG: 10 INJECTION, SOLUTION INTRAVENOUS at 12:53

## 2023-09-06 RX ADMIN — FAMOTIDINE 20 MG: 10 INJECTION INTRAVENOUS at 11:45

## 2023-09-06 RX ADMIN — ACETAMINOPHEN 650 MG: 325 TABLET ORAL at 11:43

## 2023-09-06 RX ADMIN — SODIUM CHLORIDE 250 ML: 9 INJECTION, SOLUTION INTRAVENOUS at 11:43

## 2023-09-06 RX ADMIN — DIPHENHYDRAMINE HYDROCHLORIDE 25 MG: 50 INJECTION, SOLUTION INTRAMUSCULAR; INTRAVENOUS at 11:44

## 2023-09-06 NOTE — PROGRESS NOTES
Pt is in my queue for a refill of Venclexta. Pt has reported to [facility] Pharmacist that he opened a bottle this morning. This is the bottle of #120 that he rec prior to the dose decrease.     Pt was seen by Dr Cheng today as well and per his note: Continue venetoclax 100 mg daily and continue at this dose as long as his counts remain normal and he is not neutropenic with an ANC above 1000    Pt currently has 120 days of medication which will last him until roughly 1/4/2024. I will push the refill task out to 12/11/2023 and then contact pt to discuss supply.       Haylee Montanez, Pharmacy Technician  Specialty Pharmacy Technician

## 2023-09-06 NOTE — NURSING NOTE
Per nursing note dated 8/9, Benadryl was to be decreased to 25 mg IV with subsequent treatments and it had not been changed as of yet. Treatment plan adjusted per this RN for benadryl dose of 25 mg IV moving forward.

## 2023-09-06 NOTE — PROGRESS NOTES
Specialty Pharmacy Patient Management Program  Oncology 6-Month Clinical Assessment       Aida Bal Jr. is a 68 y.o. male with CLL seen today to assess adherence and side effects.    Regimen: venetoclax 100 mg po daily    Reason for Outreach: Routine medication check-in .       Problem list reviewed by Marianela Romeo RPH on 9/6/2023 at 11:32 AM  Medicines reviewed by Marianela Romeo RPH on 9/6/2023 at 11:32 AM  Allergies reviewed by Marianela Romeo RPH on 9/6/2023 at 11:32 AM     Goals Addressed Today    None       Medication Assessment:  Medication Assessment  Follow Up Clinical Assessment  Medication(s) assessed: venetoclax  Therapeutic appropriateness: Appropriate  Medication tolerability: Tolerating with no to minimal ADRs  Medication plan: Continue therapy with normal follow-up  Quality of Life Improvement Scale: No change  Administration: Patient is taking every day at the same time  and with food .   Patient can self administer medications: yes  Medication Follow-Up Plan: Next clinical assessment  Lab Review: The labs listed below have been reviewed. No dose adjustments are needed for the oral specialty medication(s) based on the labs.    Lab Results   Component Value Date    GLUCOSE 112 (H) 09/06/2023    CALCIUM 9.4 09/06/2023     09/06/2023    K 4.1 09/06/2023    CO2 26.1 09/06/2023     09/06/2023    BUN 18 09/06/2023    CREATININE 1.24 09/06/2023    EGFRIFAFRI 75 01/22/2022    EGFRIFNONA 66 05/19/2016    BCR 14.5 09/06/2023    ANIONGAP 12.9 09/06/2023     Lab Results   Component Value Date    WBC 3.61 09/06/2023    RBC 3.82 (L) 09/06/2023    HGB 12.3 (L) 09/06/2023    HCT 39.4 09/06/2023    .1 (H) 09/06/2023    MCH 32.2 09/06/2023    MCHC 31.2 (L) 09/06/2023    RDW 13.0 09/06/2023    RDWSD 49.2 09/06/2023    MPV 10.1 09/06/2023     09/06/2023    NEUTRORELPCT 68.1 09/06/2023    LYMPHORELPCT 19.4 (L) 09/06/2023    MONORELPCT 10.8 09/06/2023    EOSRELPCT 0.8 09/06/2023     BASORELPCT 0.6 09/06/2023    AUTOIGPER 0.3 09/06/2023    NEUTROABS 2.46 09/06/2023    LYMPHSABS 0.70 09/06/2023    MONOSABS 0.39 09/06/2023    EOSABS 0.03 09/06/2023    BASOSABS 0.02 09/06/2023    AUTOIGNUM 0.01 09/06/2023    NRBC 0.0 09/06/2023     Drug-drug interactions  Completed medication reconciliation today to assess for drug interactions. Patient denies starting or stopping any medications.    Assessed medication list for interactions, no significant drug interactions noted.   Advised patient to call the clinic if any new medications are started so we can assess for drug-drug interactions.  Drug-food interactions discussed: eating grapefruit and drinking grapefruit juice, eating [place] oranges (commonly found in orange marmalade), and eating starfruit  Vaccines are coordinated by the patient's oncologist and primary care provider.    Allergies  Known allergies and reactions were discussed with the patient. The patient's chart has been reviewed for allergy information and updated as necessary.   Allergies   Allergen Reactions    Ace Inhibitors Unknown - Low Severity    Candesartan Unknown - Low Severity    Pravastatin Unknown - Low Severity     Annotation - [id number]: medication caused side effects but he is able to other statins ie Crestor        Hospitalizations and Urgent Care Visits Since Last Assessment:  Unplanned hospitalizations or inpatient admissions: no  ED Visits: no  Urgent Office Visits: no    Adherence Assessment:  Adherence Questions  Medication(s) assessed: venetoclax  On average, how many doses/injections does the patient miss per month?: 0  What are the identified reasons for non-adherence or missed doses? : no problems identfied  What is the estimated medication adherence level?: %  Based on the patient/caregiver response and refill history, does this patient require an MTP to track adherence improvements?: no    Quality of Life Assessment:  Quality of Life Assessment  Quality of  Life Improvement Scale: No change  -- Quality of Life: 9/10    Financial Assessment:  Medication availability/affordability: Patient has had no issues obtaining medication from pharmacy.    Attestation     I attest the patient was actively involved in and has agreed to the above plan of care.  If the prescribed therapy is at any point deemed not appropriate based on the current or future assessments, a consultation will be initiated with the patient's specialty care provider to determine the best course of action. The revised plan of therapy will be documented along with any required assessments and/or additional patient education provided.       All questions addressed and patient had no additional concerns. Patient has pharmacy contact information.    Name/Credentials: Marianela Romeo PharmD, BCPS    9/6/2023  11:36 EDT

## 2023-09-22 LAB — CREAT BLDA-MCNC: 1.2 MG/DL (ref 0.6–1.3)

## 2023-10-04 ENCOUNTER — INFUSION (OUTPATIENT)
Dept: ONCOLOGY | Facility: HOSPITAL | Age: 68
End: 2023-10-04
Payer: MEDICARE

## 2023-10-04 ENCOUNTER — APPOINTMENT (OUTPATIENT)
Dept: ONCOLOGY | Facility: HOSPITAL | Age: 68
End: 2023-10-04
Payer: MEDICARE

## 2023-10-04 ENCOUNTER — OFFICE VISIT (OUTPATIENT)
Dept: ONCOLOGY | Facility: CLINIC | Age: 68
End: 2023-10-04
Payer: MEDICARE

## 2023-10-04 ENCOUNTER — LAB (OUTPATIENT)
Dept: ONCOLOGY | Facility: HOSPITAL | Age: 68
End: 2023-10-04
Payer: MEDICARE

## 2023-10-04 VITALS
HEART RATE: 67 BPM | OXYGEN SATURATION: 98 % | SYSTOLIC BLOOD PRESSURE: 144 MMHG | TEMPERATURE: 98.2 F | HEIGHT: 70 IN | BODY MASS INDEX: 33.39 KG/M2 | DIASTOLIC BLOOD PRESSURE: 78 MMHG | WEIGHT: 233.2 LBS

## 2023-10-04 DIAGNOSIS — Z79.899 HIGH RISK MEDICATION USE: ICD-10-CM

## 2023-10-04 DIAGNOSIS — C91.10 CLL (CHRONIC LYMPHOCYTIC LEUKEMIA): ICD-10-CM

## 2023-10-04 DIAGNOSIS — C91.10 CLL (CHRONIC LYMPHOCYTIC LEUKEMIA): Primary | ICD-10-CM

## 2023-10-04 LAB
ALBUMIN SERPL-MCNC: 4.2 G/DL (ref 3.5–5.2)
ALBUMIN/GLOB SERPL: 3 G/DL
ALP SERPL-CCNC: 112 U/L (ref 39–117)
ALT SERPL W P-5'-P-CCNC: 23 U/L (ref 1–41)
ANION GAP SERPL CALCULATED.3IONS-SCNC: 13 MMOL/L (ref 5–15)
AST SERPL-CCNC: 30 U/L (ref 1–40)
BASOPHILS # BLD AUTO: 0.01 10*3/MM3 (ref 0–0.2)
BASOPHILS NFR BLD AUTO: 0.3 % (ref 0–1.5)
BILIRUB SERPL-MCNC: 0.7 MG/DL (ref 0–1.2)
BUN SERPL-MCNC: 17 MG/DL (ref 8–23)
BUN/CREAT SERPL: 15.7 (ref 7–25)
CALCIUM SPEC-SCNC: 9.2 MG/DL (ref 8.6–10.5)
CHLORIDE SERPL-SCNC: 104 MMOL/L (ref 98–107)
CO2 SERPL-SCNC: 26 MMOL/L (ref 22–29)
CREAT SERPL-MCNC: 1.08 MG/DL (ref 0.7–1.3)
DEPRECATED RDW RBC AUTO: 48.2 FL (ref 37–54)
EGFRCR SERPLBLD CKD-EPI 2021: 73.8 ML/MIN/1.73
EOSINOPHIL # BLD AUTO: 0.02 10*3/MM3 (ref 0–0.4)
EOSINOPHIL NFR BLD AUTO: 0.6 % (ref 0.3–6.2)
ERYTHROCYTE [DISTWIDTH] IN BLOOD BY AUTOMATED COUNT: 12.8 % (ref 12.3–15.4)
GLOBULIN UR ELPH-MCNC: 1.4 GM/DL
GLUCOSE SERPL-MCNC: 108 MG/DL (ref 65–99)
HCT VFR BLD AUTO: 40.8 % (ref 37.5–51)
HGB BLD-MCNC: 12.9 G/DL (ref 13–17.7)
IMM GRANULOCYTES # BLD AUTO: 0 10*3/MM3 (ref 0–0.05)
IMM GRANULOCYTES NFR BLD AUTO: 0 % (ref 0–0.5)
LYMPHOCYTES # BLD AUTO: 0.53 10*3/MM3 (ref 0.7–3.1)
LYMPHOCYTES NFR BLD AUTO: 16.3 % (ref 19.6–45.3)
MAGNESIUM SERPL-MCNC: 1.9 MG/DL (ref 1.6–2.4)
MCH RBC QN AUTO: 32.3 PG (ref 26.6–33)
MCHC RBC AUTO-ENTMCNC: 31.6 G/DL (ref 31.5–35.7)
MCV RBC AUTO: 102.3 FL (ref 79–97)
MONOCYTES # BLD AUTO: 0.27 10*3/MM3 (ref 0.1–0.9)
MONOCYTES NFR BLD AUTO: 8.3 % (ref 5–12)
NEUTROPHILS NFR BLD AUTO: 2.42 10*3/MM3 (ref 1.7–7)
NEUTROPHILS NFR BLD AUTO: 74.5 % (ref 42.7–76)
NRBC BLD AUTO-RTO: 0 /100 WBC (ref 0–0.2)
PHOSPHATE SERPL-MCNC: 2.9 MG/DL (ref 2.5–4.5)
PLATELET # BLD AUTO: 147 10*3/MM3 (ref 140–450)
PMV BLD AUTO: 10.8 FL (ref 6–12)
POTASSIUM SERPL-SCNC: 4.2 MMOL/L (ref 3.5–5.2)
PROT SERPL-MCNC: 5.6 G/DL (ref 6–8.5)
RBC # BLD AUTO: 3.99 10*6/MM3 (ref 4.14–5.8)
SODIUM SERPL-SCNC: 143 MMOL/L (ref 136–145)
WBC NRBC COR # BLD: 3.25 10*3/MM3 (ref 3.4–10.8)

## 2023-10-04 PROCEDURE — 96415 CHEMO IV INFUSION ADDL HR: CPT

## 2023-10-04 PROCEDURE — 85025 COMPLETE CBC W/AUTO DIFF WBC: CPT

## 2023-10-04 PROCEDURE — 80053 COMPREHEN METABOLIC PANEL: CPT

## 2023-10-04 PROCEDURE — 84100 ASSAY OF PHOSPHORUS: CPT

## 2023-10-04 PROCEDURE — 25810000003 SODIUM CHLORIDE 0.9 % SOLUTION 250 ML FLEX CONT: Performed by: NURSE PRACTITIONER

## 2023-10-04 PROCEDURE — 83735 ASSAY OF MAGNESIUM: CPT

## 2023-10-04 PROCEDURE — 25810000003 SODIUM CHLORIDE 0.9 % SOLUTION: Performed by: NURSE PRACTITIONER

## 2023-10-04 PROCEDURE — 96413 CHEMO IV INFUSION 1 HR: CPT

## 2023-10-04 PROCEDURE — 25010000002 RITUXIMAB 10 MG/ML SOLUTION 10 ML VIAL: Performed by: NURSE PRACTITIONER

## 2023-10-04 PROCEDURE — 25010000002 RITUXIMAB 10 MG/ML SOLUTION 50 ML VIAL: Performed by: NURSE PRACTITIONER

## 2023-10-04 PROCEDURE — 25010000002 DIPHENHYDRAMINE PER 50 MG: Performed by: NURSE PRACTITIONER

## 2023-10-04 PROCEDURE — 96375 TX/PRO/DX INJ NEW DRUG ADDON: CPT

## 2023-10-04 RX ORDER — FAMOTIDINE 10 MG/ML
20 INJECTION, SOLUTION INTRAVENOUS ONCE
Status: COMPLETED | OUTPATIENT
Start: 2023-10-04 | End: 2023-10-04

## 2023-10-04 RX ORDER — SODIUM CHLORIDE 9 MG/ML
250 INJECTION, SOLUTION INTRAVENOUS ONCE
Status: CANCELLED | OUTPATIENT
Start: 2023-10-04

## 2023-10-04 RX ORDER — ACETAMINOPHEN 325 MG/1
650 TABLET ORAL ONCE
Status: COMPLETED | OUTPATIENT
Start: 2023-10-04 | End: 2023-10-04

## 2023-10-04 RX ORDER — FAMOTIDINE 10 MG/ML
20 INJECTION, SOLUTION INTRAVENOUS ONCE
Status: CANCELLED | OUTPATIENT
Start: 2023-10-04

## 2023-10-04 RX ORDER — FAMOTIDINE 10 MG/ML
20 INJECTION, SOLUTION INTRAVENOUS AS NEEDED
Status: CANCELLED | OUTPATIENT
Start: 2023-10-04

## 2023-10-04 RX ORDER — DIPHENHYDRAMINE HYDROCHLORIDE 50 MG/ML
25 INJECTION INTRAMUSCULAR; INTRAVENOUS AS NEEDED
Status: CANCELLED | OUTPATIENT
Start: 2023-10-04

## 2023-10-04 RX ORDER — SODIUM CHLORIDE 9 MG/ML
250 INJECTION, SOLUTION INTRAVENOUS ONCE
Status: COMPLETED | OUTPATIENT
Start: 2023-10-04 | End: 2023-10-04

## 2023-10-04 RX ORDER — ACETAMINOPHEN 325 MG/1
650 TABLET ORAL ONCE
Status: CANCELLED | OUTPATIENT
Start: 2023-10-04

## 2023-10-04 RX ADMIN — RITUXIMAB 1130 MG: 10 INJECTION, SOLUTION INTRAVENOUS at 11:52

## 2023-10-04 RX ADMIN — DIPHENHYDRAMINE HYDROCHLORIDE 25 MG: 50 INJECTION, SOLUTION INTRAMUSCULAR; INTRAVENOUS at 11:14

## 2023-10-04 RX ADMIN — SODIUM CHLORIDE 250 ML: 9 INJECTION, SOLUTION INTRAVENOUS at 11:12

## 2023-10-04 RX ADMIN — ACETAMINOPHEN 650 MG: 325 TABLET ORAL at 11:12

## 2023-10-04 RX ADMIN — FAMOTIDINE 20 MG: 10 INJECTION INTRAVENOUS at 11:13

## 2023-10-04 NOTE — PROGRESS NOTES
"[facility] CBC GROUP OUTPATIENT FOLLOW UP CLINIC VISIT    REASON FOR FOLLOW-UP:    1.  Chronic lymphocytic leukemia diagnosed in November 2015.  CLL FISH panel negative at that time.    2.  Autoimmune hemolytic anemia associated with CLL.  He was treated with steroids and he received 4 weeks of weekly Rituxan.  Otherwise no treatment has been performed  3.  CT imaging of the chest abdomen and pelvis from 4/25/2018 showed lymphadenopathy with mildly enlarged retroperitoneal and mesenteric lymph nodes with the largest measuring about 3.2 cm.  The spleen measured 13.6 cm.  Slight increase in lymphadenopathy compared with 11/27/2015.  4.  Acute anemia with a hemoglobin of 6.9 as of 6/20/2018.  Low reticulocyte count.  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  Prednisone initiated.  Erythropoietin 73.9.  Parvovirus B19 PCR negative.  Steroids complete on 8/22/2018.  5.  Bone marrow biopsy on 7/3/2018 with 70% CLL (87% by flow cytometry) with a t(14;18) translocation by cytogenetics, 3 copies of IGH in 87.5% of cells but negative for CCND1/IGH rearrangement.  Negative for all other rearrangements/deletions.  IgVH somatic hypermutation was not detected.            6.  Therapy with ibrutinib initiated at the end of July 2018.  Discontinued in late 2022 due to cardiac arrest.      HISTORY OF PRESENT ILLNESS:  Aida Bal Jr. is a 68 y.o. male with the above-mentioned history today for lab review and evaluation.      Patient returns continuing to do well.  He is tolerating Venetoclax 100 mg daily with stability of his WBC count at this time.  He continues on monthly Rituxan due today.  He remains active.  Energy level is good.  He denies new concerns today.    REVIEW OF SYSTEMS:  As per the HPI    PHYSICAL EXAMINATION:  Vitals:    10/04/23 1033   BP: 144/78   Pulse: 67   Temp: 98.2 °F (36.8 °C)   TempSrc: Temporal   SpO2: 98%   Weight: 106 kg (233 lb 3.2 oz)   Height: 177.8 cm (70\")   PainSc: 0-No pain     General:  No " acute distress, awake, alert and oriented  Skin:  Warm and dry, no visible rash  HEENT:  Normocephalic/atraumatic.    Chest:  Normal respiratory effort.  Lungs clear to auscultation bilaterally.  ICD in place.  Heart: Regular rate and rhythm  Extremities:  No visible clubbing, cyanosis, or edema  Lymphatics: No palpable cervical supraclavicular or axillary adenopathy again today  Neuro/psych:  Grossly nonfocal.  Normal mood and affect.       DIAGNOSTIC DATA:  Results from last 7 days   Lab Units 10/04/23  1027   WBC 10*3/mm3 3.25*   NEUTROS ABS 10*3/mm3 2.42   HEMOGLOBIN g/dL 12.9*   HEMATOCRIT % 40.8   PLATELETS 10*3/mm3 147     Results from last 7 days   Lab Units 10/04/23  1027   SODIUM mmol/L 143   POTASSIUM mmol/L 4.2   CHLORIDE mmol/L 104   CO2 mmol/L 26.0   BUN mg/dL 17   CREATININE mg/dL 1.08   CALCIUM mg/dL 9.2   ALBUMIN g/dL 4.2   BILIRUBIN mg/dL 0.7   ALK PHOS U/L 112   ALT (SGPT) U/L 23   AST (SGOT) U/L 30   GLUCOSE mg/dL 108*   MAGNESIUM mg/dL 1.9             IMAGING:  CT Abdomen Pelvis With Contrast (08/31/2023 09:35)  CT Chest With Contrast Diagnostic (08/31/2023 09:35)  CT Soft Tissue Neck With Contrast (08/31/2023 09:35)    CT images personally reviewed.  Significant improvement in adenopathy throughout.  Spleen size decreased.      ASSESSMENT:  This is a 68 y.o. male with a history of chronic lymphocytic leukemia and related autoimmune hemolytic anemia.    *CLL:   Diagnosed 11/26/2015.    Normal FISH panel  Initially received Rituxan for 4 weeks at diagnosis for this and the autoimmune hemolytic anemia.  Given the worsening anemia even though it responded to steroids we needed to treat his CLL particularly given the 70% involvement on his bone marrow biopsy.  We started treating with ibrutinib 420 mg daily, initiated at the end of July 2018. Held around the time of COVID infection; resumed when he completed steroids.  Ibrutinib now discontinued due to cardiac arrest.  1/4/2023: White blood cell  count remains normal at 5.57 with 66% lymphocytes  3/22/2023: White blood cell count 12,000 with 80% lymphocytes  CT imaging of the neck chest abdomen and pelvis from 5/17/2023 with enlarging adenopathy throughout the neck chest abdomen and pelvis  5/24/2023: Normal white blood cell count of 10.05 with 72.7% lymphocytes  Initiation of venetoclax on 6/6/2023.  Well-tolerated.  Following labs very closely.  6/13/2023 increased venetoclax to 50 mg daily, so far tolerating quite well.  6/20/2023 increased venetoclax to 100 mg daily  6/27/2023 increase venetoclax to 200 mg daily continuing to tolerate well.  Patient did notice 2 nodular areas, 1 on each hip, is very lateral on the hip.  Advised him just to continue to closely monitor these areas, as it is nontender, no erythema, or warmth.  7/4/2023 venetoclax increase to 400 mg daily  7/12/2023 Initiate rituximab which will be given every 4 weeks.  Neutropenia noted with WBC 1.93, ANC 0.94.  We will therefore reduce venetoclax to 200 mg daily.  Ongoing neutropenia 7/20/2023, continue venetoclax at a reduced dose of 200 mg daily  Ultimately neutropenia worsened and he required G-CSF with prompt improvement  CT imaging 8/31/2023 with near resolution of lymphadenopathy.  10/4/2023: Blood counts acceptable to continue treatment.  Cycle #4 rituximab today.    *Neutropenia related to therapy  He received Zarzio 480 mcg 8/4 and 8/5/2023 with prompt improvement in his white blood cell count from 1.55 up to 12.77  10/4/2023: ANC remains acceptable for therapy    *Macrocytic anemia:   He has a history of autoimmune hemolytic anemia when he presented with CLL back in November 2015.  His reticulocyte count was very elevated at that time.  His hemoglobin dropped to as low as about 5.  He responded to steroids initially intravenously and then with prednisone and he was treated with 4 weeks of Rituxan.  He was noted to have worsening anemia.  His reticulocyte count was low.  He  initially did not respond very well to transfusions and he required 4 units of packed red cells  There was no evidence for hemolysis although his ADOLFO is positive for IgG.  This was persistently positive.  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  He responded to steroids.  He discontinued steroids as of 8/22/2018.    5/2/2022: Hemoglobin lower at 7.9 with a high MCV of 102   He required red cell transfusion.  10/4/2023: Hemoglobin stable at 12.9    *History of systolic hypertension: Blood pressure acceptable at 144/78 today.    *History of thrombocytopenia:   Platelets normal at 147,000    *COVID-19 pandemic: He has received a total of 3 vaccinations.  Subsequent infection in April 2020.  He received a monoclonal antibody as an outpatient and remdesivir as an inpatient. He was readmitted on 5/2 with hypotension, persistent fevers, persistent COVID-19 positivity on testing. He received IVIG and steroids. He improved and was treated with outpatient Paxlovid.  Symptoms resolved at this point.  Last vaccine 9/21/2022, bivalent.  Cardiac arrest several days after receiving this on 9/24/2022.  He may hold off on getting the new COVID-19 booster shot for now    *Left lower extremity DVT and gastrocnemius vein and right lower extremity superficial thrombophlebitis in the small saphenous vein on bilateral lower extremity venous duplex on 4/15/2022  Associated with COVID-19 infection  Repeat venous duplex 8/1/22 without DVT, only superficial thrombophlebitis    *Out of hospital cardiac arrest on 9/24/2022  Quickly achieved return of spontaneous circulation with CPR and medication  He had an ICD placed at the [facility]  Ibrutinib discontinued as a result of this    *Possible zoster of the left upper extremity  Completed Valtrex 1 g 3 times daily x7 days  Now continue prophylactic acyclovir 400 mg twice daily.  Rash is completely resolved    PLAN:   Cycle #4 rituximab today (care plan  is 1 number off).  Continue venetoclax 100 mg daily and continue at this dose as long as his counts remain normal and he is not neutropenic with an ANC above 1000  Continue acyclovir for prophylaxis  Follow-up with Dr. Cheng in 4 weeks with cycle #5 (again care plan does not reflect this).  Planning rituximab every 4 weeks for total of 6 doses followed by Venetoclax alone, hopefully to increase to 200 mg dosing at that time if counts allow..  Continue to follow with [facility] cardiology.    Patient is on a high risk medication requiring close monitoring for toxicity.      Kelsey Mccrary, APRN  10/04/2023

## 2023-10-11 ENCOUNTER — SPECIALTY PHARMACY (OUTPATIENT)
Dept: PHARMACY | Facility: HOSPITAL | Age: 68
End: 2023-10-11
Payer: MEDICARE

## 2023-10-31 NOTE — PROGRESS NOTES
"[facility] CBC GROUP OUTPATIENT FOLLOW UP CLINIC VISIT    REASON FOR FOLLOW-UP:    1.  Chronic lymphocytic leukemia diagnosed in November 2015.  CLL FISH panel negative at that time.    2.  Autoimmune hemolytic anemia associated with CLL.  He was treated with steroids and he received 4 weeks of weekly Rituxan.  Otherwise no treatment has been performed  3.  CT imaging of the chest abdomen and pelvis from 4/25/2018 showed lymphadenopathy with mildly enlarged retroperitoneal and mesenteric lymph nodes with the largest measuring about 3.2 cm.  The spleen measured 13.6 cm.  Slight increase in lymphadenopathy compared with 11/27/2015.  4.  Acute anemia with a hemoglobin of 6.9 as of 6/20/2018.  Low reticulocyte count.  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  Prednisone initiated.  Erythropoietin 73.9.  Parvovirus B19 PCR negative.  Steroids complete on 8/22/2018.  5.  Bone marrow biopsy on 7/3/2018 with 70% CLL (87% by flow cytometry) with a t(14;18) translocation by cytogenetics, 3 copies of IGH in 87.5% of cells but negative for CCND1/IGH rearrangement.  Negative for all other rearrangements/deletions.  IgVH somatic hypermutation was not detected.            6.  Therapy with ibrutinib initiated at the end of July 2018.  Discontinued in late 2022 due to cardiac arrest.      HISTORY OF PRESENT ILLNESS:  Aida Bal Jr. is a 68 y.o. male with the above-mentioned history today for lab review and evaluation.      Cycle #5 rituximab anticipated today.    He continues to feel well.  He desires referral to urology for erectile dysfunction.  No lymphadenopathy.  Energy level is excellent.      REVIEW OF SYSTEMS:  As per the HPI    PHYSICAL EXAMINATION:  Vitals:    11/01/23 1004   BP: 136/54   Pulse: 71   Resp: 18   Temp: 96.4 °F (35.8 °C)   TempSrc: Temporal   SpO2: 98%   Weight: 108 kg (238 lb 1.6 oz)   Height: 177.8 cm (70\")   PainSc: 0-No pain       General:  No acute distress, awake, alert and oriented  Skin:  Warm and " dry, no visible rash  HEENT:  Normocephalic/atraumatic.    Chest:  Normal respiratory effort.  Lungs clear to auscultation bilaterally.  ICD in place.  Heart: Regular rate and rhythm  Extremities:  No visible clubbing, cyanosis, or edema  Lymphatics: No palpable cervical supraclavicular or axillary adenopathy present today  Neuro/psych:  Grossly nonfocal.  Normal mood and affect.       DIAGNOSTIC DATA:  Results from last 7 days   Lab Units 11/01/23  0946   WBC 10*3/mm3 3.17*   NEUTROS ABS 10*3/mm3 2.17   HEMOGLOBIN g/dL 12.7*   HEMATOCRIT % 40.9   PLATELETS 10*3/mm3 156       IMAGING:  None reviewed      ASSESSMENT:  This is a 68 y.o. male with a history of chronic lymphocytic leukemia and related autoimmune hemolytic anemia.    *CLL:   Diagnosed 11/26/2015.    Normal FISH panel  Initially received Rituxan for 4 weeks at diagnosis for this and the autoimmune hemolytic anemia.  Given the worsening anemia even though it responded to steroids we needed to treat his CLL particularly given the 70% involvement on his bone marrow biopsy.  We started treating with ibrutinib 420 mg daily, initiated at the end of July 2018. Held around the time of COVID infection; resumed when he completed steroids.  Ibrutinib now discontinued due to cardiac arrest.  1/4/2023: White blood cell count remains normal at 5.57 with 66% lymphocytes  3/22/2023: White blood cell count 12,000 with 80% lymphocytes  CT imaging of the neck chest abdomen and pelvis from 5/17/2023 with enlarging adenopathy throughout the neck chest abdomen and pelvis  5/24/2023: Normal white blood cell count of 10.05 with 72.7% lymphocytes  Initiation of venetoclax on 6/6/2023.  Well-tolerated.  Following labs very closely.  6/13/2023 increased venetoclax to 50 mg daily, so far tolerating quite well.  6/20/2023 increased venetoclax to 100 mg daily  6/27/2023 increase venetoclax to 200 mg daily continuing to tolerate well.  Patient did notice 2 nodular areas, 1 on each hip,  is very lateral on the hip.  Advised him just to continue to closely monitor these areas, as it is nontender, no erythema, or warmth.  7/4/2023 venetoclax increase to 400 mg daily  7/12/2023 Initiate rituximab which will be given every 4 weeks.  Neutropenia noted with WBC 1.93, ANC 0.94.  We will therefore reduce venetoclax to 200 mg daily.  Ongoing neutropenia 7/20/2023, continue venetoclax at a reduced dose of 200 mg daily  Ultimately neutropenia worsened and he required G-CSF with prompt improvement  Venetoclax at 100 mg daily  CT imaging 8/31/2023 with near resolution of lymphadenopathy.  10/4/2023: Blood counts acceptable to continue treatment.  Cycle #4 rituximab today.  11/1/2023: Cycle #5 rituximab.  Blood counts acceptable.    *Neutropenia related to therapy  He received Zarzio 480 mcg 8/4 and 8/5/2023 with prompt improvement in his white blood cell count from 1.55 up to 12.77  11/1/2023: ANC remains acceptable for therapy    *Macrocytic anemia:   He has a history of autoimmune hemolytic anemia when he presented with CLL back in November 2015.  His reticulocyte count was very elevated at that time.  His hemoglobin dropped to as low as about 5.  He responded to steroids initially intravenously and then with prednisone and he was treated with 4 weeks of Rituxan.  He was noted to have worsening anemia.  His reticulocyte count was low.  He initially did not respond very well to transfusions and he required 4 units of packed red cells  There was no evidence for hemolysis although his ADOLFO is positive for IgG.  This was persistently positive.  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  He responded to steroids.  He discontinued steroids as of 8/22/2018.    5/2/2022: Hemoglobin lower at 7.9 with a high MCV of 102   He required red cell transfusion.  Hemoglobin remains stable and nearly normal at 12.7    *History of systolic hypertension: Blood pressure acceptable at 144/78 today.    *History of  thrombocytopenia:   Platelets normal at 156,000    *COVID-19 pandemic: He has received a total of 3 vaccinations.  Subsequent infection in April 2020.  He received a monoclonal antibody as an outpatient and remdesivir as an inpatient. He was readmitted on 5/2 with hypotension, persistent fevers, persistent COVID-19 positivity on testing. He received IVIG and steroids. He improved and was treated with outpatient Paxlovid.  Symptoms resolved at this point.  Last vaccine 9/21/2022, bivalent.  Cardiac arrest several days after receiving this on 9/24/2022.  He may hold off on getting the new COVID-19 booster shot for now    *Left lower extremity DVT and gastrocnemius vein and right lower extremity superficial thrombophlebitis in the small saphenous vein on bilateral lower extremity venous duplex on 4/15/2022  Associated with COVID-19 infection  Repeat venous duplex 8/1/22 without DVT, only superficial thrombophlebitis    *Out of hospital cardiac arrest on 9/24/2022  Quickly achieved return of spontaneous circulation with CPR and medication  He had an ICD placed at the [facility]  Ibrutinib discontinued as a result of this    *Possible zoster of the left upper extremity  Completed Valtrex 1 g 3 times daily x7 days  Now continue prophylactic acyclovir 400 mg twice daily.  Rash is completely resolved    *Erectile dysfunction: He desires referral to urology    PLAN:     Cycle #5 rituximab today even though the treatment plan says cycle #6  Continue venetoclax 100 mg daily and continue at this dose as long as his counts remain normal and he is not neutropenic with an ANC above 1000  Continue acyclovir for prophylaxis  Follow-up in 4 weeks on Thursday with cycle # 6, final planned cycle of rituximab  Following that, proceed with venetoclax alone, hopefully to increase to 200 mg dosing at some point if blood counts allow.  Continue to follow with [facility] cardiology.  Refer to  urology    Patient is on a high risk medication requiring close monitoring for toxicity.      Toñito Cheng MD  11/01/2023

## 2023-11-01 ENCOUNTER — INFUSION (OUTPATIENT)
Dept: ONCOLOGY | Facility: HOSPITAL | Age: 68
End: 2023-11-01
Payer: MEDICARE

## 2023-11-01 ENCOUNTER — OFFICE VISIT (OUTPATIENT)
Dept: ONCOLOGY | Facility: CLINIC | Age: 68
End: 2023-11-01
Payer: MEDICARE

## 2023-11-01 VITALS
DIASTOLIC BLOOD PRESSURE: 54 MMHG | BODY MASS INDEX: 34.09 KG/M2 | HEIGHT: 70 IN | WEIGHT: 238.1 LBS | OXYGEN SATURATION: 98 % | TEMPERATURE: 96.4 F | HEART RATE: 71 BPM | SYSTOLIC BLOOD PRESSURE: 136 MMHG | RESPIRATION RATE: 18 BRPM

## 2023-11-01 VITALS — DIASTOLIC BLOOD PRESSURE: 70 MMHG | SYSTOLIC BLOOD PRESSURE: 125 MMHG | HEART RATE: 60 BPM

## 2023-11-01 DIAGNOSIS — C91.10 CLL (CHRONIC LYMPHOCYTIC LEUKEMIA): Primary | ICD-10-CM

## 2023-11-01 DIAGNOSIS — N52.9 ERECTILE DYSFUNCTION, UNSPECIFIED ERECTILE DYSFUNCTION TYPE: ICD-10-CM

## 2023-11-01 DIAGNOSIS — C91.10 CLL (CHRONIC LYMPHOCYTIC LEUKEMIA): ICD-10-CM

## 2023-11-01 LAB
ALBUMIN SERPL-MCNC: 4.1 G/DL (ref 3.5–5.2)
ALBUMIN/GLOB SERPL: 2.1 G/DL
ALP SERPL-CCNC: 113 U/L (ref 39–117)
ALT SERPL W P-5'-P-CCNC: 16 U/L (ref 1–41)
ANION GAP SERPL CALCULATED.3IONS-SCNC: 12.2 MMOL/L (ref 5–15)
AST SERPL-CCNC: 21 U/L (ref 1–40)
BASOPHILS # BLD AUTO: 0.02 10*3/MM3 (ref 0–0.2)
BASOPHILS NFR BLD AUTO: 0.6 % (ref 0–1.5)
BILIRUB SERPL-MCNC: 0.5 MG/DL (ref 0–1.2)
BUN SERPL-MCNC: 17 MG/DL (ref 8–23)
BUN/CREAT SERPL: 14.9 (ref 7–25)
CALCIUM SPEC-SCNC: 9.2 MG/DL (ref 8.6–10.5)
CHLORIDE SERPL-SCNC: 105 MMOL/L (ref 98–107)
CO2 SERPL-SCNC: 26.8 MMOL/L (ref 22–29)
CREAT SERPL-MCNC: 1.14 MG/DL (ref 0.7–1.3)
DEPRECATED RDW RBC AUTO: 48.6 FL (ref 37–54)
EGFRCR SERPLBLD CKD-EPI 2021: 69.2 ML/MIN/1.73
EOSINOPHIL # BLD AUTO: 0.02 10*3/MM3 (ref 0–0.4)
EOSINOPHIL NFR BLD AUTO: 0.6 % (ref 0.3–6.2)
ERYTHROCYTE [DISTWIDTH] IN BLOOD BY AUTOMATED COUNT: 13.1 % (ref 12.3–15.4)
GLOBULIN UR ELPH-MCNC: 2 GM/DL
GLUCOSE SERPL-MCNC: 103 MG/DL (ref 65–99)
HCT VFR BLD AUTO: 40.9 % (ref 37.5–51)
HGB BLD-MCNC: 12.7 G/DL (ref 13–17.7)
IMM GRANULOCYTES # BLD AUTO: 0.01 10*3/MM3 (ref 0–0.05)
IMM GRANULOCYTES NFR BLD AUTO: 0.3 % (ref 0–0.5)
LYMPHOCYTES # BLD AUTO: 0.59 10*3/MM3 (ref 0.7–3.1)
LYMPHOCYTES NFR BLD AUTO: 18.6 % (ref 19.6–45.3)
MAGNESIUM SERPL-MCNC: 1.9 MG/DL (ref 1.6–2.4)
MCH RBC QN AUTO: 31.4 PG (ref 26.6–33)
MCHC RBC AUTO-ENTMCNC: 31.1 G/DL (ref 31.5–35.7)
MCV RBC AUTO: 101 FL (ref 79–97)
MONOCYTES # BLD AUTO: 0.36 10*3/MM3 (ref 0.1–0.9)
MONOCYTES NFR BLD AUTO: 11.4 % (ref 5–12)
NEUTROPHILS NFR BLD AUTO: 2.17 10*3/MM3 (ref 1.7–7)
NEUTROPHILS NFR BLD AUTO: 68.5 % (ref 42.7–76)
NRBC BLD AUTO-RTO: 0 /100 WBC (ref 0–0.2)
PHOSPHATE SERPL-MCNC: 3.5 MG/DL (ref 2.5–4.5)
PLATELET # BLD AUTO: 156 10*3/MM3 (ref 140–450)
PMV BLD AUTO: 10 FL (ref 6–12)
POTASSIUM SERPL-SCNC: 3.9 MMOL/L (ref 3.5–5.2)
PROT SERPL-MCNC: 6.1 G/DL (ref 6–8.5)
RBC # BLD AUTO: 4.05 10*6/MM3 (ref 4.14–5.8)
SODIUM SERPL-SCNC: 144 MMOL/L (ref 136–145)
WBC NRBC COR # BLD: 3.17 10*3/MM3 (ref 3.4–10.8)

## 2023-11-01 PROCEDURE — 1160F RVW MEDS BY RX/DR IN RCRD: CPT | Performed by: INTERNAL MEDICINE

## 2023-11-01 PROCEDURE — 1159F MED LIST DOCD IN RCRD: CPT | Performed by: INTERNAL MEDICINE

## 2023-11-01 PROCEDURE — 80053 COMPREHEN METABOLIC PANEL: CPT

## 2023-11-01 PROCEDURE — 3075F SYST BP GE 130 - 139MM HG: CPT | Performed by: INTERNAL MEDICINE

## 2023-11-01 PROCEDURE — 96413 CHEMO IV INFUSION 1 HR: CPT

## 2023-11-01 PROCEDURE — 85025 COMPLETE CBC W/AUTO DIFF WBC: CPT

## 2023-11-01 PROCEDURE — 3078F DIAST BP <80 MM HG: CPT | Performed by: INTERNAL MEDICINE

## 2023-11-01 PROCEDURE — 25010000002 RITUXIMAB 10 MG/ML SOLUTION 10 ML VIAL: Performed by: INTERNAL MEDICINE

## 2023-11-01 PROCEDURE — 25810000003 SODIUM CHLORIDE 0.9 % SOLUTION 500 ML FLEX CONT: Performed by: INTERNAL MEDICINE

## 2023-11-01 PROCEDURE — 96375 TX/PRO/DX INJ NEW DRUG ADDON: CPT

## 2023-11-01 PROCEDURE — 99214 OFFICE O/P EST MOD 30 MIN: CPT | Performed by: INTERNAL MEDICINE

## 2023-11-01 PROCEDURE — 84100 ASSAY OF PHOSPHORUS: CPT

## 2023-11-01 PROCEDURE — 83735 ASSAY OF MAGNESIUM: CPT

## 2023-11-01 PROCEDURE — 25010000002 RITUXIMAB 10 MG/ML SOLUTION 50 ML VIAL: Performed by: INTERNAL MEDICINE

## 2023-11-01 PROCEDURE — 1126F AMNT PAIN NOTED NONE PRSNT: CPT | Performed by: INTERNAL MEDICINE

## 2023-11-01 PROCEDURE — 25810000003 SODIUM CHLORIDE 0.9 % SOLUTION: Performed by: INTERNAL MEDICINE

## 2023-11-01 PROCEDURE — 96415 CHEMO IV INFUSION ADDL HR: CPT

## 2023-11-01 PROCEDURE — 25010000002 DIPHENHYDRAMINE PER 50 MG: Performed by: INTERNAL MEDICINE

## 2023-11-01 RX ORDER — ACETAMINOPHEN 325 MG/1
650 TABLET ORAL ONCE
Status: COMPLETED | OUTPATIENT
Start: 2023-11-01 | End: 2023-11-01

## 2023-11-01 RX ORDER — MEPERIDINE HYDROCHLORIDE 25 MG/ML
25 INJECTION INTRAMUSCULAR; INTRAVENOUS; SUBCUTANEOUS
Status: CANCELLED | OUTPATIENT
Start: 2023-11-01

## 2023-11-01 RX ORDER — FAMOTIDINE 10 MG/ML
20 INJECTION, SOLUTION INTRAVENOUS ONCE
Status: CANCELLED | OUTPATIENT
Start: 2023-11-01

## 2023-11-01 RX ORDER — FAMOTIDINE 10 MG/ML
20 INJECTION, SOLUTION INTRAVENOUS AS NEEDED
Status: CANCELLED | OUTPATIENT
Start: 2023-11-01

## 2023-11-01 RX ORDER — SODIUM CHLORIDE 9 MG/ML
250 INJECTION, SOLUTION INTRAVENOUS ONCE
Status: COMPLETED | OUTPATIENT
Start: 2023-11-01 | End: 2023-11-01

## 2023-11-01 RX ORDER — ACETAMINOPHEN 325 MG/1
650 TABLET ORAL ONCE
Status: CANCELLED | OUTPATIENT
Start: 2023-11-01

## 2023-11-01 RX ORDER — FAMOTIDINE 10 MG/ML
20 INJECTION, SOLUTION INTRAVENOUS ONCE
Status: COMPLETED | OUTPATIENT
Start: 2023-11-01 | End: 2023-11-01

## 2023-11-01 RX ORDER — DIPHENHYDRAMINE HYDROCHLORIDE 50 MG/ML
25 INJECTION INTRAMUSCULAR; INTRAVENOUS AS NEEDED
Status: CANCELLED | OUTPATIENT
Start: 2023-11-01

## 2023-11-01 RX ORDER — SODIUM CHLORIDE 9 MG/ML
250 INJECTION, SOLUTION INTRAVENOUS ONCE
Status: CANCELLED | OUTPATIENT
Start: 2023-11-01

## 2023-11-01 RX ADMIN — FAMOTIDINE 20 MG: 10 INJECTION INTRAVENOUS at 10:47

## 2023-11-01 RX ADMIN — RITUXIMAB 1130 MG: 10 INJECTION, SOLUTION INTRAVENOUS at 11:34

## 2023-11-01 RX ADMIN — DIPHENHYDRAMINE HYDROCHLORIDE 25 MG: 50 INJECTION, SOLUTION INTRAMUSCULAR; INTRAVENOUS at 10:47

## 2023-11-01 RX ADMIN — ACETAMINOPHEN 650 MG: 325 TABLET ORAL at 10:47

## 2023-11-01 RX ADMIN — SODIUM CHLORIDE 250 ML: 9 INJECTION, SOLUTION INTRAVENOUS at 10:47

## 2023-11-02 ENCOUNTER — SPECIALTY PHARMACY (OUTPATIENT)
Dept: PHARMACY | Facility: HOSPITAL | Age: 68
End: 2023-11-02
Payer: MEDICARE

## 2023-11-30 ENCOUNTER — INFUSION (OUTPATIENT)
Dept: ONCOLOGY | Facility: HOSPITAL | Age: 68
End: 2023-11-30
Payer: MEDICARE

## 2023-11-30 ENCOUNTER — OFFICE VISIT (OUTPATIENT)
Dept: ONCOLOGY | Facility: CLINIC | Age: 68
End: 2023-11-30
Payer: MEDICARE

## 2023-11-30 VITALS
BODY MASS INDEX: 34.22 KG/M2 | HEART RATE: 65 BPM | OXYGEN SATURATION: 97 % | RESPIRATION RATE: 18 BRPM | SYSTOLIC BLOOD PRESSURE: 125 MMHG | TEMPERATURE: 98.7 F | DIASTOLIC BLOOD PRESSURE: 60 MMHG | WEIGHT: 239 LBS | HEIGHT: 70 IN

## 2023-11-30 DIAGNOSIS — C91.10 CLL (CHRONIC LYMPHOCYTIC LEUKEMIA): ICD-10-CM

## 2023-11-30 DIAGNOSIS — C91.10 CLL (CHRONIC LYMPHOCYTIC LEUKEMIA): Primary | ICD-10-CM

## 2023-11-30 DIAGNOSIS — Z79.899 HIGH RISK MEDICATION USE: ICD-10-CM

## 2023-11-30 LAB
ALBUMIN SERPL-MCNC: 4.2 G/DL (ref 3.5–5.2)
ALBUMIN/GLOB SERPL: 2.2 G/DL
ALP SERPL-CCNC: 115 U/L (ref 39–117)
ALT SERPL W P-5'-P-CCNC: 12 U/L (ref 1–41)
ANION GAP SERPL CALCULATED.3IONS-SCNC: 11 MMOL/L (ref 5–15)
AST SERPL-CCNC: 22 U/L (ref 1–40)
BASOPHILS # BLD AUTO: 0.01 10*3/MM3 (ref 0–0.2)
BASOPHILS NFR BLD AUTO: 0.3 % (ref 0–1.5)
BILIRUB SERPL-MCNC: 0.8 MG/DL (ref 0–1.2)
BUN SERPL-MCNC: 17 MG/DL (ref 8–23)
BUN/CREAT SERPL: 13.8 (ref 7–25)
CALCIUM SPEC-SCNC: 9.2 MG/DL (ref 8.6–10.5)
CHLORIDE SERPL-SCNC: 105 MMOL/L (ref 98–107)
CO2 SERPL-SCNC: 27 MMOL/L (ref 22–29)
CREAT SERPL-MCNC: 1.23 MG/DL (ref 0.7–1.3)
DEPRECATED RDW RBC AUTO: 47.6 FL (ref 37–54)
EGFRCR SERPLBLD CKD-EPI 2021: 63.2 ML/MIN/1.73
EOSINOPHIL # BLD AUTO: 0.04 10*3/MM3 (ref 0–0.4)
EOSINOPHIL NFR BLD AUTO: 1.2 % (ref 0.3–6.2)
ERYTHROCYTE [DISTWIDTH] IN BLOOD BY AUTOMATED COUNT: 13 % (ref 12.3–15.4)
GLOBULIN UR ELPH-MCNC: 1.9 GM/DL
GLUCOSE SERPL-MCNC: 149 MG/DL (ref 65–99)
HCT VFR BLD AUTO: 40 % (ref 37.5–51)
HGB BLD-MCNC: 13.1 G/DL (ref 13–17.7)
IMM GRANULOCYTES # BLD AUTO: 0.01 10*3/MM3 (ref 0–0.05)
IMM GRANULOCYTES NFR BLD AUTO: 0.3 % (ref 0–0.5)
LYMPHOCYTES # BLD AUTO: 0.52 10*3/MM3 (ref 0.7–3.1)
LYMPHOCYTES NFR BLD AUTO: 15.9 % (ref 19.6–45.3)
MAGNESIUM SERPL-MCNC: 1.9 MG/DL (ref 1.6–2.4)
MCH RBC QN AUTO: 32.6 PG (ref 26.6–33)
MCHC RBC AUTO-ENTMCNC: 32.8 G/DL (ref 31.5–35.7)
MCV RBC AUTO: 99.5 FL (ref 79–97)
MONOCYTES # BLD AUTO: 0.37 10*3/MM3 (ref 0.1–0.9)
MONOCYTES NFR BLD AUTO: 11.3 % (ref 5–12)
NEUTROPHILS NFR BLD AUTO: 2.33 10*3/MM3 (ref 1.7–7)
NEUTROPHILS NFR BLD AUTO: 71 % (ref 42.7–76)
NRBC BLD AUTO-RTO: 0 /100 WBC (ref 0–0.2)
PHOSPHATE SERPL-MCNC: 3.2 MG/DL (ref 2.5–4.5)
PLATELET # BLD AUTO: 164 10*3/MM3 (ref 140–450)
PMV BLD AUTO: 10.3 FL (ref 6–12)
POTASSIUM SERPL-SCNC: 3.8 MMOL/L (ref 3.5–5.2)
PROT SERPL-MCNC: 6.1 G/DL (ref 6–8.5)
RBC # BLD AUTO: 4.02 10*6/MM3 (ref 4.14–5.8)
SODIUM SERPL-SCNC: 143 MMOL/L (ref 136–145)
WBC NRBC COR # BLD AUTO: 3.28 10*3/MM3 (ref 3.4–10.8)

## 2023-11-30 PROCEDURE — 25010000002 RITUXIMAB 10 MG/ML SOLUTION 10 ML VIAL: Performed by: NURSE PRACTITIONER

## 2023-11-30 PROCEDURE — 25010000002 RITUXIMAB 10 MG/ML SOLUTION 50 ML VIAL: Performed by: NURSE PRACTITIONER

## 2023-11-30 PROCEDURE — 84100 ASSAY OF PHOSPHORUS: CPT

## 2023-11-30 PROCEDURE — 85025 COMPLETE CBC W/AUTO DIFF WBC: CPT

## 2023-11-30 PROCEDURE — 25810000003 SODIUM CHLORIDE 0.9 % SOLUTION: Performed by: NURSE PRACTITIONER

## 2023-11-30 PROCEDURE — 25010000002 DIPHENHYDRAMINE PER 50 MG: Performed by: NURSE PRACTITIONER

## 2023-11-30 PROCEDURE — 96415 CHEMO IV INFUSION ADDL HR: CPT

## 2023-11-30 PROCEDURE — 96413 CHEMO IV INFUSION 1 HR: CPT

## 2023-11-30 PROCEDURE — 83735 ASSAY OF MAGNESIUM: CPT

## 2023-11-30 PROCEDURE — 96375 TX/PRO/DX INJ NEW DRUG ADDON: CPT

## 2023-11-30 PROCEDURE — 80053 COMPREHEN METABOLIC PANEL: CPT

## 2023-11-30 PROCEDURE — 25810000003 SODIUM CHLORIDE 0.9 % SOLUTION 500 ML FLEX CONT: Performed by: NURSE PRACTITIONER

## 2023-11-30 RX ORDER — FAMOTIDINE 10 MG/ML
20 INJECTION, SOLUTION INTRAVENOUS ONCE
Status: CANCELLED | OUTPATIENT
Start: 2023-11-30

## 2023-11-30 RX ORDER — SODIUM CHLORIDE 9 MG/ML
250 INJECTION, SOLUTION INTRAVENOUS ONCE
Status: CANCELLED | OUTPATIENT
Start: 2023-11-30

## 2023-11-30 RX ORDER — FAMOTIDINE 10 MG/ML
20 INJECTION, SOLUTION INTRAVENOUS AS NEEDED
Status: CANCELLED | OUTPATIENT
Start: 2023-11-30

## 2023-11-30 RX ORDER — FAMOTIDINE 10 MG/ML
20 INJECTION, SOLUTION INTRAVENOUS ONCE
Status: COMPLETED | OUTPATIENT
Start: 2023-11-30 | End: 2023-11-30

## 2023-11-30 RX ORDER — ACETAMINOPHEN 325 MG/1
650 TABLET ORAL ONCE
Status: CANCELLED | OUTPATIENT
Start: 2023-11-30

## 2023-11-30 RX ORDER — SODIUM CHLORIDE 9 MG/ML
250 INJECTION, SOLUTION INTRAVENOUS ONCE
Status: COMPLETED | OUTPATIENT
Start: 2023-11-30 | End: 2023-11-30

## 2023-11-30 RX ORDER — DIPHENHYDRAMINE HYDROCHLORIDE 50 MG/ML
25 INJECTION INTRAMUSCULAR; INTRAVENOUS AS NEEDED
Status: CANCELLED | OUTPATIENT
Start: 2023-11-30

## 2023-11-30 RX ORDER — ACETAMINOPHEN 325 MG/1
650 TABLET ORAL ONCE
Status: COMPLETED | OUTPATIENT
Start: 2023-11-30 | End: 2023-11-30

## 2023-11-30 RX ADMIN — DIPHENHYDRAMINE HYDROCHLORIDE 25 MG: 50 INJECTION, SOLUTION INTRAMUSCULAR; INTRAVENOUS at 10:30

## 2023-11-30 RX ADMIN — RITUXIMAB 1130 MG: 10 INJECTION, SOLUTION INTRAVENOUS at 11:18

## 2023-11-30 RX ADMIN — ACETAMINOPHEN 650 MG: 325 TABLET ORAL at 10:27

## 2023-11-30 RX ADMIN — FAMOTIDINE 20 MG: 10 INJECTION INTRAVENOUS at 10:27

## 2023-11-30 RX ADMIN — SODIUM CHLORIDE 250 ML: 9 INJECTION, SOLUTION INTRAVENOUS at 10:27

## 2023-11-30 NOTE — PROGRESS NOTES
[facility] CBC GROUP OUTPATIENT FOLLOW UP CLINIC VISIT    REASON FOR FOLLOW-UP:    1.  Chronic lymphocytic leukemia diagnosed in November 2015.  CLL FISH panel negative at that time.    2.  Autoimmune hemolytic anemia associated with CLL.  He was treated with steroids and he received 4 weeks of weekly Rituxan.  Otherwise no treatment has been performed  3.  CT imaging of the chest abdomen and pelvis from 4/25/2018 showed lymphadenopathy with mildly enlarged retroperitoneal and mesenteric lymph nodes with the largest measuring about 3.2 cm.  The spleen measured 13.6 cm.  Slight increase in lymphadenopathy compared with 11/27/2015.  4.  Acute anemia with a hemoglobin of 6.9 as of 6/20/2018.  Low reticulocyte count.  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  Prednisone initiated.  Erythropoietin 73.9.  Parvovirus B19 PCR negative.  Steroids complete on 8/22/2018.  5.  Bone marrow biopsy on 7/3/2018 with 70% CLL (87% by flow cytometry) with a t(14;18) translocation by cytogenetics, 3 copies of IGH in 87.5% of cells but negative for CCND1/IGH rearrangement.  Negative for all other rearrangements/deletions.  IgVH somatic hypermutation was not detected.            6.  Therapy with ibrutinib initiated at the end of July 2018.  Discontinued in late 2022 due to cardiac arrest.      HISTORY OF PRESENT ILLNESS:  Aida Bal Jr. is a 68 y.o. male with the above-mentioned history today for lab review and evaluation.  Patient is due for his final cycle 6 rituximab.  Thankfully he has tolerated this very well.  We discussed plan of care going forward, specifically to continue on Venetoclax alone at 100 mg dosing for now with potential in the future to increase to 200 mg dosing if counts remain stable.  Patient reports good energy and good appetite.  He denies other concerns today.    REVIEW OF SYSTEMS:  As per the HPI    PHYSICAL EXAMINATION:  Vitals:    11/30/23 1009   BP: 125/60   Pulse: 65   Resp: 18   Temp: 98.7 °F (37.1 °C)  "  TempSrc: Temporal   SpO2: 97%   Weight: 108 kg (239 lb)   Height: 177.8 cm (70\")   PainSc: 0-No pain         General:  No acute distress, awake, alert and oriented  Skin:  Warm and dry, no visible rash  HEENT:  Normocephalic/atraumatic.    Chest:  Normal respiratory effort.  Lungs clear to auscultation bilaterally.  ICD in place.  Heart: Regular rate and rhythm  Extremities:  No visible clubbing, cyanosis, or edema  Lymphatics: No palpable cervical supraclavicular or axillary adenopathy present today  Neuro/psych:  Grossly nonfocal.  Normal mood and affect.       DIAGNOSTIC DATA:  Results from last 7 days   Lab Units 11/30/23  1002   WBC 10*3/mm3 3.28*   NEUTROS ABS 10*3/mm3 2.33   HEMOGLOBIN g/dL 13.1   HEMATOCRIT % 40.0   PLATELETS 10*3/mm3 164         IMAGING:  None reviewed      ASSESSMENT:  This is a 68 y.o. male with a history of chronic lymphocytic leukemia and related autoimmune hemolytic anemia.    *CLL:   Diagnosed 11/26/2015.    Normal FISH panel  Initially received Rituxan for 4 weeks at diagnosis for this and the autoimmune hemolytic anemia.  Given the worsening anemia even though it responded to steroids we needed to treat his CLL particularly given the 70% involvement on his bone marrow biopsy.  We started treating with ibrutinib 420 mg daily, initiated at the end of July 2018. Held around the time of COVID infection; resumed when he completed steroids.  Ibrutinib now discontinued due to cardiac arrest.  1/4/2023: White blood cell count remains normal at 5.57 with 66% lymphocytes  3/22/2023: White blood cell count 12,000 with 80% lymphocytes  CT imaging of the neck chest abdomen and pelvis from 5/17/2023 with enlarging adenopathy throughout the neck chest abdomen and pelvis  5/24/2023: Normal white blood cell count of 10.05 with 72.7% lymphocytes  Initiation of venetoclax on 6/6/2023.  Well-tolerated.  Following labs very closely.  6/13/2023 increased venetoclax to 50 mg daily, so far tolerating " quite well.  6/20/2023 increased venetoclax to 100 mg daily  6/27/2023 increase venetoclax to 200 mg daily continuing to tolerate well.  Patient did notice 2 nodular areas, 1 on each hip, is very lateral on the hip.  Advised him just to continue to closely monitor these areas, as it is nontender, no erythema, or warmth.  7/4/2023 venetoclax increase to 400 mg daily  7/12/2023 Initiate rituximab which will be given every 4 weeks.  Neutropenia noted with WBC 1.93, ANC 0.94.  We will therefore reduce venetoclax to 200 mg daily.  Ongoing neutropenia 7/20/2023, continue venetoclax at a reduced dose of 200 mg daily  Ultimately neutropenia worsened and he required G-CSF with prompt improvement  Venetoclax at 100 mg daily  CT imaging 8/31/2023 with near resolution of lymphadenopathy.  10/4/2023: Blood counts acceptable to continue treatment.  Cycle #4 rituximab today.  11/1/2023: Cycle #5 rituximab.  Blood counts acceptable.  11/30/2023: Final cycle #6 rituximab today.  Plan to continue venetoclax 100 mg daily hereafter with potential to increase to 200 mg daily dosing in the future if he continues to do well and blood counts remained stable.    *Neutropenia related to therapy  He received Zarzio 480 mcg 8/4 and 8/5/2023 with prompt improvement in his white blood cell count from 1.55 up to 12.77  11/30/2023: ANC remains acceptable for therapy    *Macrocytic anemia:   He has a history of autoimmune hemolytic anemia when he presented with CLL back in November 2015.  His reticulocyte count was very elevated at that time.  His hemoglobin dropped to as low as about 5.  He responded to steroids initially intravenously and then with prednisone and he was treated with 4 weeks of Rituxan.  He was noted to have worsening anemia.  His reticulocyte count was low.  He initially did not respond very well to transfusions and he required 4 units of packed red cells  There was no evidence for hemolysis although his ADOLFO is positive for IgG.   This was persistently positive.  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  He responded to steroids.  He discontinued steroids as of 8/22/2018.    5/2/2022: Hemoglobin lower at 7.9 with a high MCV of 102   He required red cell transfusion.  Hemoglobin has normalized, 13.1 today.      *History of systolic hypertension: Blood pressure acceptable at 144/78 today.    *History of thrombocytopenia:   Platelets normal at 164,000    *COVID-19 pandemic: He has received a total of 3 vaccinations.  Subsequent infection in April 2020.  He received a monoclonal antibody as an outpatient and remdesivir as an inpatient. He was readmitted on 5/2 with hypotension, persistent fevers, persistent COVID-19 positivity on testing. He received IVIG and steroids. He improved and was treated with outpatient Paxlovid.  Symptoms resolved at this point.  Last vaccine 9/21/2022, bivalent.  Cardiac arrest several days after receiving this on 9/24/2022.  He may hold off on getting the new COVID-19 booster shot for now    *Left lower extremity DVT and gastrocnemius vein and right lower extremity superficial thrombophlebitis in the small saphenous vein on bilateral lower extremity venous duplex on 4/15/2022  Associated with COVID-19 infection  Repeat venous duplex 8/1/22 without DVT, only superficial thrombophlebitis    *Out of hospital cardiac arrest on 9/24/2022  Quickly achieved return of spontaneous circulation with CPR and medication  He had an ICD placed at the [facility]  Ibrutinib discontinued as a result of this    *Possible zoster of the left upper extremity  Completed Valtrex 1 g 3 times daily x7 days  Now continue prophylactic acyclovir 400 mg twice daily.  Rash is completely resolved    *Erectile dysfunction: Patient referred to urology    PLAN:     Cycle #6 rituximab today (final treatment)  Continue acyclovir for prophylaxis.  Continue venetoclax 100 mg daily.  Patient will return in roughly 6  weeks for follow-up with Dr. Cheng.  Based on lab work at that visit and general overall performance status, consider increasing Venetoclax to 200 mg daily dosing.  Continue to follow with [facility] cardiology.    Patient is on a high risk medication requiring close monitoring for toxicity.      Kelsey Mccrary, APRN  11/30/2023

## 2023-12-01 ENCOUNTER — SPECIALTY PHARMACY (OUTPATIENT)
Dept: PHARMACY | Facility: HOSPITAL | Age: 68
End: 2023-12-01
Payer: MEDICARE

## 2023-12-12 ENCOUNTER — DOCUMENTATION (OUTPATIENT)
Dept: PHARMACY | Facility: HOSPITAL | Age: 68
End: 2023-12-12
Payer: MEDICARE

## 2023-12-12 ENCOUNTER — SPECIALTY PHARMACY (OUTPATIENT)
Dept: PHARMACY | Facility: HOSPITAL | Age: 68
End: 2023-12-12
Payer: MEDICARE

## 2023-12-12 DIAGNOSIS — C91.10 CLL (CHRONIC LYMPHOCYTIC LEUKEMIA): ICD-10-CM

## 2023-12-12 NOTE — PROGRESS NOTES
Re: Refills of Oral Specialty Medication - Venclexta (venetoclax)    Drug-Drug Interactions: The current medication list was reviewed and there are no relevant drug-drug interactions with the specialty medication.  Medication Allergies: The patient has no relevant allergies as it relates to their oral specialty medication  Review of Labs/Dose Adjustments: DOSE CHANGE - I reviewed the most recent note and labs. Due to neutropenia the dose is being reduced. I sent refills as described below.    Drug: Venclexta (venetoclax)  Strength: 100 mg  Directions: Take 1 tablet by mouth daily  Quantity: 30  Refills: 3  Pharmacy prescription sent to:  [facility]  Specialty Pharmacy    Name/Credentials: Eric Ghosh, MeryD, [facility]  Clinical Oncology Pharmacist    12/12/2023  13:17 EST

## 2023-12-12 NOTE — PROGRESS NOTES
Drug: Venclexta (venetoclax)  Strength: 100 mg  Directions: Take 1 tablet by mouth daily  Quantity: 30  Refills: 3  Pharmacy prescription sent to:  [facility]    Completed independent double check on medication order/RX.   Sheri Patel, Jeancarlos, BCOP

## 2023-12-20 ENCOUNTER — SPECIALTY PHARMACY (OUTPATIENT)
Dept: PHARMACY | Facility: HOSPITAL | Age: 68
End: 2023-12-20
Payer: MEDICARE

## 2023-12-20 NOTE — PROGRESS NOTES
Specialty Pharmacy Refill Coordination Note     Aida is a 68 y.o. male contacted today regarding refills of Venclexta specialty medication(s).    Reviewed and verified with patient:       Specialty medication(s) and dose(s) confirmed: yes  Venclexta 100 mg daily    Refill Questions      Flowsheet Row Most Recent Value   Changes to allergies? No   Changes to medications? Yes  [Pt's dose is 100 mg daily]   New conditions or infections since last clinic visit No   Unplanned office visit, urgent care, ED, or hospital admission in the last 4 weeks  No   How does patient/caregiver feel medication is working? Good   Financial problems or insurance changes  No   Since the previous refill, were any specialty medication doses or scheduled injections missed or delayed?  No  [No missed doses-Dose has been reduced since last dispense. Previous supply has lasted 4 months.]   Does this patient require a clinical escalation to a pharmacist? No            Delivery Questions      Flowsheet Row Most Recent Value   Delivery method [employer]  [Ship to home address-Ship 12/26 for delivery 12/27-$0 copay with [employer]-Address Confirmed]   Delivery address verified with patient/caregiver? Yes  [Ship to home address]   Delivery address Home   Number of medications in delivery 1   Medication(s) being filled and delivered Venetoclax   Doses left of specialty medications Pt has 7-10 days, He is only taking 100 mg daily   Copay verified? Yes   Copay amount $0 copay with [employer]   Copay form of payment No copayment ($0)          Venclexta delivery coordinated with pt for 12/27/2023 to his home address. $0 copay with [employer]. His last delivery was on 6/30/2023. His dose was reduced after this fill so this supply lasted him. No questions or concerns to report to MTM Team today. Due to manufacture packaging-this fill will last him 4 months.     Follow-up: 21 day(s)     Haylee Montanez, Pharmacy Technician  Specialty Pharmacy Technician

## 2024-01-03 NOTE — PROGRESS NOTES
"[facility] CBC GROUP OUTPATIENT FOLLOW UP CLINIC VISIT    REASON FOR FOLLOW-UP:    1.  Chronic lymphocytic leukemia diagnosed in November 2015.  CLL FISH panel negative at that time.    2.  Autoimmune hemolytic anemia associated with CLL.  He was treated with steroids and he received 4 weeks of weekly Rituxan.  Otherwise no treatment has been performed  3.  CT imaging of the chest abdomen and pelvis from 4/25/2018 showed lymphadenopathy with mildly enlarged retroperitoneal and mesenteric lymph nodes with the largest measuring about 3.2 cm.  The spleen measured 13.6 cm.  Slight increase in lymphadenopathy compared with 11/27/2015.  4.  Acute anemia with a hemoglobin of 6.9 as of 6/20/2018.  Low reticulocyte count.  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  Prednisone initiated.  Erythropoietin 73.9.  Parvovirus B19 PCR negative.  Steroids complete on 8/22/2018.  5.  Bone marrow biopsy on 7/3/2018 with 70% CLL (87% by flow cytometry) with a t(14;18) translocation by cytogenetics, 3 copies of IGH in 87.5% of cells but negative for CCND1/IGH rearrangement.  Negative for all other rearrangements/deletions.  IgVH somatic hypermutation was not detected.            6.  Therapy with ibrutinib initiated at the end of July 2018.  Discontinued in late 2022 due to cardiac arrest.      HISTORY OF PRESENT ILLNESS:  Aida Bal Jr. is a 68 y.o. male with the above-mentioned history today for lab review and evaluation.      He is doing very well.  No lymphadenopathy.  No new issues.  He continues venetoclax 100 mg daily without adverse effects.    REVIEW OF SYSTEMS:  As per the HPI    PHYSICAL EXAMINATION:  Vitals:    01/04/24 1109   BP: 144/75   Pulse: 75   Resp: 18   Temp: 98.1 °F (36.7 °C)   TempSrc: Temporal   SpO2: 99%   Weight: 124 kg (274 lb 1.6 oz)   Height: 177.8 cm (70\")   PainSc: 0-No pain           General:  No acute distress, awake, alert and oriented  Skin:  Warm and dry, no visible rash  HEENT:  " Normocephalic/atraumatic.    Chest:  Normal respiratory effort.  Lungs clear to auscultation bilaterally.  ICD in place.  Heart: Regular rate and rhythm  Extremities:  No visible clubbing, cyanosis, or edema  Lymphatics: No palpable cervical supraclavicular or axillary adenopathy present today  Neuro/psych:  Grossly nonfocal.  Normal mood and affect.       DIAGNOSTIC DATA:  CBC and Differential (01/04/2024 10:53)   Comprehensive metabolic panel (01/04/2024 10:53)     IMAGING:  None reviewed      ASSESSMENT:  This is a 68 y.o. male with a history of chronic lymphocytic leukemia and related autoimmune hemolytic anemia.    *CLL:   Diagnosed 11/26/2015.    Normal FISH panel  Initially received Rituxan for 4 weeks at diagnosis for this and the autoimmune hemolytic anemia.  Given the worsening anemia even though it responded to steroids we needed to treat his CLL particularly given the 70% involvement on his bone marrow biopsy.  We started treating with ibrutinib 420 mg daily, initiated at the end of July 2018. Held around the time of COVID infection; resumed when he completed steroids.  Ibrutinib now discontinued due to cardiac arrest.  1/4/2023: White blood cell count remains normal at 5.57 with 66% lymphocytes  3/22/2023: White blood cell count 12,000 with 80% lymphocytes  CT imaging of the neck chest abdomen and pelvis from 5/17/2023 with enlarging adenopathy throughout the neck chest abdomen and pelvis  5/24/2023: Normal white blood cell count of 10.05 with 72.7% lymphocytes  Initiation of venetoclax on 6/6/2023.  Well-tolerated.  Following labs very closely.  6/13/2023 increased venetoclax to 50 mg daily, so far tolerating quite well.  6/20/2023 increased venetoclax to 100 mg daily  6/27/2023 increase venetoclax to 200 mg daily continuing to tolerate well.  Patient did notice 2 nodular areas, 1 on each hip, is very lateral on the hip.  Advised him just to continue to closely monitor these areas, as it is nontender,  no erythema, or warmth.  7/4/2023 venetoclax increase to 400 mg daily  7/12/2023 Initiate rituximab which will be given every 4 weeks.  Neutropenia noted with WBC 1.93, ANC 0.94.  We will therefore reduce venetoclax to 200 mg daily.  Ongoing neutropenia 7/20/2023, continue venetoclax at a reduced dose of 200 mg daily  Ultimately neutropenia worsened and he required G-CSF with prompt improvement  Venetoclax at 100 mg daily  CT imaging 8/31/2023 with near resolution of lymphadenopathy.  10/4/2023: Blood counts acceptable to continue treatment.  Cycle #4 rituximab today.  11/1/2023: Cycle #5 rituximab.  Blood counts acceptable.  11/30/2023: Final cycle #6 rituximab today.  Plan to continue venetoclax 100 mg daily hereafter with potential to increase to 200 mg daily dosing in the future if he continues to do well and blood counts remained stable.  1/4/2024: Blood counts overall improved.  Increase venetoclax to 200 mg daily.    *Neutropenia related to therapy  He received Zarzio 480 mcg 8/4 and 8/5/2023 with prompt improvement in his white blood cell count from 1.55 up to 12.77  Resolved at this point    *Macrocytic anemia:   He has a history of autoimmune hemolytic anemia when he presented with CLL back in November 2015.  His reticulocyte count was very elevated at that time.  His hemoglobin dropped to as low as about 5.  He responded to steroids initially intravenously and then with prednisone and he was treated with 4 weeks of Rituxan.  He was noted to have worsening anemia.  His reticulocyte count was low.  He initially did not respond very well to transfusions and he required 4 units of packed red cells  There was no evidence for hemolysis although his ADOLFO is positive for IgG.  This was persistently positive.  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  He responded to steroids.  He discontinued steroids as of 8/22/2018.    5/2/2022: Hemoglobin lower at 7.9 with a high MCV of 102   He required red  cell transfusion.  Hemoglobin has normalized, 13.0 today.      *History of systolic hypertension: Blood pressure acceptable at 144/75 today.    *History of thrombocytopenia:   Platelets normal at 163,000    *COVID-19 pandemic: He has received a total of 3 vaccinations.  Subsequent infection in April 2020.  He received a monoclonal antibody as an outpatient and remdesivir as an inpatient. He was readmitted on 5/2 with hypotension, persistent fevers, persistent COVID-19 positivity on testing. He received IVIG and steroids. He improved and was treated with outpatient Paxlovid.  Symptoms resolved at this point.  Last vaccine 9/21/2022, bivalent.  Cardiac arrest several days after receiving this on 9/24/2022.  He may hold off on getting the new COVID-19 booster shot for now    *Left lower extremity DVT and gastrocnemius vein and right lower extremity superficial thrombophlebitis in the small saphenous vein on bilateral lower extremity venous duplex on 4/15/2022  Associated with COVID-19 infection  Repeat venous duplex 8/1/22 without DVT, only superficial thrombophlebitis    *Out of hospital cardiac arrest on 9/24/2022  Quickly achieved return of spontaneous circulation with CPR and medication  He had an ICD placed at the [facility]  Ibrutinib discontinued as a result of this    *Possible zoster of the left upper extremity  Completed Valtrex 1 g 3 times daily x7 days  Now continue prophylactic acyclovir 400 mg twice daily.  Rash is completely resolved    *Erectile dysfunction: Patient referred to urology    PLAN:     Continue acyclovir for prophylaxis.  Continue venetoclax with an increase in the dose to 200 mg daily today.  Return in about 1 month for follow-up with a nurse practitioner.  If blood counts are acceptable and pharmacy staff agrees, consider an increase in venetoclax to 400 mg  I will see him back in about 2 months for follow-up with labs  Continue to follow with [facility].    Patient is on a high risk medication requiring close monitoring for toxicity.      Toñito Cheng MD  01/04/2024

## 2024-01-04 ENCOUNTER — OFFICE VISIT (OUTPATIENT)
Dept: ONCOLOGY | Facility: CLINIC | Age: 69
End: 2024-01-04
Payer: MEDICARE

## 2024-01-04 ENCOUNTER — LAB (OUTPATIENT)
Dept: LAB | Facility: HOSPITAL | Age: 69
End: 2024-01-04
Payer: MEDICARE

## 2024-01-04 ENCOUNTER — SPECIALTY PHARMACY (OUTPATIENT)
Dept: PHARMACY | Facility: HOSPITAL | Age: 69
End: 2024-01-04
Payer: MEDICARE

## 2024-01-04 VITALS
RESPIRATION RATE: 18 BRPM | SYSTOLIC BLOOD PRESSURE: 144 MMHG | BODY MASS INDEX: 39.24 KG/M2 | HEIGHT: 70 IN | WEIGHT: 274.1 LBS | DIASTOLIC BLOOD PRESSURE: 75 MMHG | TEMPERATURE: 98.1 F | HEART RATE: 75 BPM | OXYGEN SATURATION: 99 %

## 2024-01-04 DIAGNOSIS — C91.10 CLL (CHRONIC LYMPHOCYTIC LEUKEMIA): Primary | ICD-10-CM

## 2024-01-04 DIAGNOSIS — C91.10 CLL (CHRONIC LYMPHOCYTIC LEUKEMIA): ICD-10-CM

## 2024-01-04 LAB
ALBUMIN SERPL-MCNC: 4.2 G/DL (ref 3.5–5.2)
ALBUMIN/GLOB SERPL: 2.2 G/DL
ALP SERPL-CCNC: 118 U/L (ref 39–117)
ALT SERPL W P-5'-P-CCNC: 13 U/L (ref 1–41)
ANION GAP SERPL CALCULATED.3IONS-SCNC: 7.9 MMOL/L (ref 5–15)
AST SERPL-CCNC: 26 U/L (ref 1–40)
BASOPHILS # BLD AUTO: 0.03 10*3/MM3 (ref 0–0.2)
BASOPHILS NFR BLD AUTO: 0.5 % (ref 0–1.5)
BILIRUB SERPL-MCNC: 0.7 MG/DL (ref 0–1.2)
BUN SERPL-MCNC: 16 MG/DL (ref 8–23)
BUN/CREAT SERPL: 12.8 (ref 7–25)
CALCIUM SPEC-SCNC: 9.2 MG/DL (ref 8.6–10.5)
CHLORIDE SERPL-SCNC: 106 MMOL/L (ref 98–107)
CO2 SERPL-SCNC: 30.1 MMOL/L (ref 22–29)
CREAT SERPL-MCNC: 1.25 MG/DL (ref 0.76–1.27)
DEPRECATED RDW RBC AUTO: 46.6 FL (ref 37–54)
EGFRCR SERPLBLD CKD-EPI 2021: 61.9 ML/MIN/1.73
EOSINOPHIL # BLD AUTO: 0.06 10*3/MM3 (ref 0–0.4)
EOSINOPHIL NFR BLD AUTO: 1 % (ref 0.3–6.2)
ERYTHROCYTE [DISTWIDTH] IN BLOOD BY AUTOMATED COUNT: 12.4 % (ref 12.3–15.4)
GLOBULIN UR ELPH-MCNC: 1.9 GM/DL
GLUCOSE SERPL-MCNC: 114 MG/DL (ref 65–99)
HCT VFR BLD AUTO: 40.2 % (ref 37.5–51)
HGB BLD-MCNC: 13 G/DL (ref 13–17.7)
IMM GRANULOCYTES # BLD AUTO: 0.01 10*3/MM3 (ref 0–0.05)
IMM GRANULOCYTES NFR BLD AUTO: 0.2 % (ref 0–0.5)
LYMPHOCYTES # BLD AUTO: 0.52 10*3/MM3 (ref 0.7–3.1)
LYMPHOCYTES NFR BLD AUTO: 8.9 % (ref 19.6–45.3)
MCH RBC QN AUTO: 32.8 PG (ref 26.6–33)
MCHC RBC AUTO-ENTMCNC: 32.3 G/DL (ref 31.5–35.7)
MCV RBC AUTO: 101.5 FL (ref 79–97)
MONOCYTES # BLD AUTO: 0.61 10*3/MM3 (ref 0.1–0.9)
MONOCYTES NFR BLD AUTO: 10.5 % (ref 5–12)
NEUTROPHILS NFR BLD AUTO: 4.59 10*3/MM3 (ref 1.7–7)
NEUTROPHILS NFR BLD AUTO: 78.9 % (ref 42.7–76)
NRBC BLD AUTO-RTO: 0 /100 WBC (ref 0–0.2)
PLATELET # BLD AUTO: 163 10*3/MM3 (ref 140–450)
PMV BLD AUTO: 9.8 FL (ref 6–12)
POTASSIUM SERPL-SCNC: 4.1 MMOL/L (ref 3.5–5.2)
PROT SERPL-MCNC: 6.1 G/DL (ref 6–8.5)
RBC # BLD AUTO: 3.96 10*6/MM3 (ref 4.14–5.8)
SODIUM SERPL-SCNC: 144 MMOL/L (ref 136–145)
WBC NRBC COR # BLD AUTO: 5.82 10*3/MM3 (ref 3.4–10.8)

## 2024-01-04 PROCEDURE — 1126F AMNT PAIN NOTED NONE PRSNT: CPT | Performed by: INTERNAL MEDICINE

## 2024-01-04 PROCEDURE — 3077F SYST BP >= 140 MM HG: CPT | Performed by: INTERNAL MEDICINE

## 2024-01-04 PROCEDURE — 80053 COMPREHEN METABOLIC PANEL: CPT

## 2024-01-04 PROCEDURE — 99214 OFFICE O/P EST MOD 30 MIN: CPT | Performed by: INTERNAL MEDICINE

## 2024-01-04 PROCEDURE — 1160F RVW MEDS BY RX/DR IN RCRD: CPT | Performed by: INTERNAL MEDICINE

## 2024-01-04 PROCEDURE — 1159F MED LIST DOCD IN RCRD: CPT | Performed by: INTERNAL MEDICINE

## 2024-01-04 PROCEDURE — 36415 COLL VENOUS BLD VENIPUNCTURE: CPT

## 2024-01-04 PROCEDURE — 85025 COMPLETE CBC W/AUTO DIFF WBC: CPT

## 2024-01-04 PROCEDURE — 3078F DIAST BP <80 MM HG: CPT | Performed by: INTERNAL MEDICINE

## 2024-01-04 RX ORDER — SILDENAFIL 100 MG/1
TABLET, FILM COATED ORAL
COMMUNITY
Start: 2023-12-28

## 2024-01-04 NOTE — PROGRESS NOTES
Drug: Venclexta (venetoclax)  Strength: 100 mg  Directions: Take 2 tablets by mouth daily  Quantity: 60  Refills: 0  Pharmacy prescription sent to:   [facility]    Completed independent double check on medication order/RX.  Eric Ghosh, MeryD, BCOP  Clinical Oncology Pharmacist

## 2024-01-04 NOTE — PROGRESS NOTES
Re: Refills of Oral Specialty Medication - Venclexta (venetoclax)    Drug-Drug Interactions: The current medication list was reviewed and there are no relevant drug-drug interactions with the specialty medication.  Medication Allergies: The patient has no relevant allergies as it relates to their oral specialty medication  Review of Labs/Dose Adjustments: DOSE CHANGE - I reviewed the most recent note and labs. Due to good tolerance the dose is being increased. I sent refills as described below.    Drug: Venclexta (venetoclax)  Strength: 100 mg  Directions: Take 2 tablets by mouth daily  Quantity: 60  Refills: 0  Pharmacy prescription sent to:   [facility]    Name/Credentials  Sheri Paetl Rph, RISHABH    1/4/2024  12:02 EST

## 2024-01-05 ENCOUNTER — SPECIALTY PHARMACY (OUTPATIENT)
Dept: PHARMACY | Facility: HOSPITAL | Age: 69
End: 2024-01-05
Payer: MEDICARE

## 2024-01-05 NOTE — PROGRESS NOTES
Staff message rec from Dr Cheng-Pt's Venclexta will be changed to 200 mg daily. If counts look good next visit-dose will be increased to 400 mg.    New rx sent to [facility] for the 200 mg dose-I will contact pt for a supply count. Pt rec a qty of 120 tabs on 12/27/23. At this time-Pt should have about 6 weeks on hand with the 200 mg dose. Pt has an appt on 2/1/24.     Toñito Cheng MD sent to [facility] Oral Onc Pool  Cc: Haylee Montanez, Pharmacy Technician  Increasing the venetoclax to 200 mg daily. If counts look ok next time I may increase back to 400 mg if you all agree. Thanks! SANDRA Montanez, Pharmacy Technician  Specialty Pharmacy Technician

## 2024-02-01 ENCOUNTER — SPECIALTY PHARMACY (OUTPATIENT)
Dept: ONCOLOGY | Facility: HOSPITAL | Age: 69
End: 2024-02-01
Payer: MEDICARE

## 2024-02-01 ENCOUNTER — SPECIALTY PHARMACY (OUTPATIENT)
Dept: PHARMACY | Facility: HOSPITAL | Age: 69
End: 2024-02-01
Payer: MEDICARE

## 2024-02-01 ENCOUNTER — LAB (OUTPATIENT)
Dept: LAB | Facility: HOSPITAL | Age: 69
End: 2024-02-01
Payer: MEDICARE

## 2024-02-01 ENCOUNTER — OFFICE VISIT (OUTPATIENT)
Dept: ONCOLOGY | Facility: CLINIC | Age: 69
End: 2024-02-01
Payer: MEDICARE

## 2024-02-01 VITALS
HEART RATE: 70 BPM | SYSTOLIC BLOOD PRESSURE: 129 MMHG | TEMPERATURE: 97.7 F | OXYGEN SATURATION: 100 % | WEIGHT: 248.3 LBS | RESPIRATION RATE: 16 BRPM | DIASTOLIC BLOOD PRESSURE: 73 MMHG | BODY MASS INDEX: 35.55 KG/M2 | HEIGHT: 70 IN

## 2024-02-01 DIAGNOSIS — C91.10 CLL (CHRONIC LYMPHOCYTIC LEUKEMIA): ICD-10-CM

## 2024-02-01 DIAGNOSIS — C91.10 CLL (CHRONIC LYMPHOCYTIC LEUKEMIA): Primary | ICD-10-CM

## 2024-02-01 DIAGNOSIS — Z79.899 HIGH RISK MEDICATION USE: ICD-10-CM

## 2024-02-01 LAB
ALBUMIN SERPL-MCNC: 4.1 G/DL (ref 3.5–5.2)
ALBUMIN/GLOB SERPL: 2.2 G/DL
ALP SERPL-CCNC: 114 U/L (ref 39–117)
ALT SERPL W P-5'-P-CCNC: 11 U/L (ref 1–41)
ANION GAP SERPL CALCULATED.3IONS-SCNC: 7.9 MMOL/L (ref 5–15)
AST SERPL-CCNC: 20 U/L (ref 1–40)
BASOPHILS # BLD AUTO: 0.01 10*3/MM3 (ref 0–0.2)
BASOPHILS NFR BLD AUTO: 0.3 % (ref 0–1.5)
BILIRUB SERPL-MCNC: 0.7 MG/DL (ref 0–1.2)
BUN SERPL-MCNC: 19 MG/DL (ref 8–23)
BUN/CREAT SERPL: 15.7 (ref 7–25)
CALCIUM SPEC-SCNC: 9.3 MG/DL (ref 8.6–10.5)
CHLORIDE SERPL-SCNC: 106 MMOL/L (ref 98–107)
CO2 SERPL-SCNC: 30.1 MMOL/L (ref 22–29)
CREAT SERPL-MCNC: 1.21 MG/DL (ref 0.76–1.27)
DEPRECATED RDW RBC AUTO: 47 FL (ref 37–54)
EGFRCR SERPLBLD CKD-EPI 2021: 64.4 ML/MIN/1.73
EOSINOPHIL # BLD AUTO: 0.01 10*3/MM3 (ref 0–0.4)
EOSINOPHIL NFR BLD AUTO: 0.3 % (ref 0.3–6.2)
ERYTHROCYTE [DISTWIDTH] IN BLOOD BY AUTOMATED COUNT: 12.3 % (ref 12.3–15.4)
GLOBULIN UR ELPH-MCNC: 1.9 GM/DL
GLUCOSE SERPL-MCNC: 108 MG/DL (ref 65–99)
HCT VFR BLD AUTO: 40 % (ref 37.5–51)
HGB BLD-MCNC: 12.6 G/DL (ref 13–17.7)
IMM GRANULOCYTES # BLD AUTO: 0.01 10*3/MM3 (ref 0–0.05)
IMM GRANULOCYTES NFR BLD AUTO: 0.3 % (ref 0–0.5)
LDH SERPL-CCNC: 165 U/L (ref 135–225)
LYMPHOCYTES # BLD AUTO: 0.49 10*3/MM3 (ref 0.7–3.1)
LYMPHOCYTES NFR BLD AUTO: 14.6 % (ref 19.6–45.3)
MCH RBC QN AUTO: 32.1 PG (ref 26.6–33)
MCHC RBC AUTO-ENTMCNC: 31.5 G/DL (ref 31.5–35.7)
MCV RBC AUTO: 102 FL (ref 79–97)
MONOCYTES # BLD AUTO: 0.55 10*3/MM3 (ref 0.1–0.9)
MONOCYTES NFR BLD AUTO: 16.4 % (ref 5–12)
NEUTROPHILS NFR BLD AUTO: 2.29 10*3/MM3 (ref 1.7–7)
NEUTROPHILS NFR BLD AUTO: 68.1 % (ref 42.7–76)
NRBC BLD AUTO-RTO: 0 /100 WBC (ref 0–0.2)
PLATELET # BLD AUTO: 157 10*3/MM3 (ref 140–450)
PMV BLD AUTO: 9.9 FL (ref 6–12)
POTASSIUM SERPL-SCNC: 4 MMOL/L (ref 3.5–5.2)
PROT SERPL-MCNC: 6 G/DL (ref 6–8.5)
RBC # BLD AUTO: 3.92 10*6/MM3 (ref 4.14–5.8)
SODIUM SERPL-SCNC: 144 MMOL/L (ref 136–145)
WBC NRBC COR # BLD AUTO: 3.36 10*3/MM3 (ref 3.4–10.8)

## 2024-02-01 PROCEDURE — 36415 COLL VENOUS BLD VENIPUNCTURE: CPT

## 2024-02-01 PROCEDURE — 85025 COMPLETE CBC W/AUTO DIFF WBC: CPT

## 2024-02-01 PROCEDURE — 80053 COMPREHEN METABOLIC PANEL: CPT

## 2024-02-01 PROCEDURE — 83615 LACTATE (LD) (LDH) ENZYME: CPT

## 2024-02-01 NOTE — PROGRESS NOTES
"[facility] CBC GROUP OUTPATIENT FOLLOW UP CLINIC VISIT    REASON FOR FOLLOW-UP:    1.  Chronic lymphocytic leukemia diagnosed in November 2015.  CLL FISH panel negative at that time.    2.  Autoimmune hemolytic anemia associated with CLL.  He was treated with steroids and he received 4 weeks of weekly Rituxan.  Otherwise no treatment has been performed  3.  CT imaging of the chest abdomen and pelvis from 4/25/2018 showed lymphadenopathy with mildly enlarged retroperitoneal and mesenteric lymph nodes with the largest measuring about 3.2 cm.  The spleen measured 13.6 cm.  Slight increase in lymphadenopathy compared with 11/27/2015.  4.  Acute anemia with a hemoglobin of 6.9 as of 6/20/2018.  Low reticulocyte count.  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  Prednisone initiated.  Erythropoietin 73.9.  Parvovirus B19 PCR negative.  Steroids complete on 8/22/2018.  5.  Bone marrow biopsy on 7/3/2018 with 70% CLL (87% by flow cytometry) with a t(14;18) translocation by cytogenetics, 3 copies of IGH in 87.5% of cells but negative for CCND1/IGH rearrangement.  Negative for all other rearrangements/deletions.  IgVH somatic hypermutation was not detected.            6.  Therapy with ibrutinib initiated at the end of July 2018.  Discontinued in late 2022 due to cardiac arrest.      HISTORY OF PRESENT ILLNESS:  Aida Bal Jr. is a 68 y.o. male with the above-mentioned history today for lab review and evaluation.      The patient returns the office today, 2/1/2024 for 1 month follow-up and review.  Milligrams daily with excellent tolerance.  He has no new concerns.  He is working symptoms.  He is eating and drinking adequately.  His bowels moving normally.  He has no GI toxicity.    REVIEW OF SYSTEMS:  As per the HPI    PHYSICAL EXAMINATION:  Vitals:    02/01/24 1251   BP: 129/73   Pulse: 70   Resp: 16   Temp: 97.7 °F (36.5 °C)   TempSrc: Temporal   SpO2: 100%   Weight: 113 kg (248 lb 4.8 oz)   Height: 177.8 cm (70\") "   PainSc: 0-No pain     General:  No acute distress, awake, alert and oriented  Skin:  Warm and dry, no visible rash  HEENT:  Normocephalic/atraumatic.    Chest:  Normal respiratory effort.  Lungs clear to auscultation bilaterally.  ICD in place.  Heart: Regular rate and rhythm  Extremities:  No visible clubbing, cyanosis, or edema  Lymphatics: No palpable cervical supraclavicular adenopathy on exam  Neuro/psych:  Grossly nonfocal.  Normal mood and affect.       DIAGNOSTIC DATA:  CBC & Differential (02/01/2024 12:43)  Lactate Dehydrogenase (02/01/2024 12:43)  Comprehensive Metabolic Panel (02/01/2024 12:43)    IMAGING:  None reviewed      ASSESSMENT:  This is a 68 y.o. male with a history of chronic lymphocytic leukemia and related autoimmune hemolytic anemia.    *CLL:   Diagnosed 11/26/2015.    Normal FISH panel  Initially received Rituxan for 4 weeks at diagnosis for this and the autoimmune hemolytic anemia.  Given the worsening anemia even though it responded to steroids we needed to treat his CLL particularly given the 70% involvement on his bone marrow biopsy.  We started treating with ibrutinib 420 mg daily, initiated at the end of July 2018. Held around the time of COVID infection; resumed when he completed steroids.  Ibrutinib now discontinued due to cardiac arrest.  1/4/2023: White blood cell count remains normal at 5.57 with 66% lymphocytes  3/22/2023: White blood cell count 12,000 with 80% lymphocytes  CT imaging of the neck chest abdomen and pelvis from 5/17/2023 with enlarging adenopathy throughout the neck chest abdomen and pelvis  5/24/2023: Normal white blood cell count of 10.05 with 72.7% lymphocytes  Initiation of venetoclax on 6/6/2023.  Well-tolerated.  Following labs very closely.  6/13/2023 increased venetoclax to 50 mg daily, so far tolerating quite well.  6/20/2023 increased venetoclax to 100 mg daily  6/27/2023 increase venetoclax to 200 mg daily continuing to tolerate well.  Patient did  notice 2 nodular areas, 1 on each hip, is very lateral on the hip.  Advised him just to continue to closely monitor these areas, as it is nontender, no erythema, or warmth.  7/4/2023 venetoclax increase to 400 mg daily  7/12/2023 Initiate rituximab which will be given every 4 weeks.  Neutropenia noted with WBC 1.93, ANC 0.94.  We will therefore reduce venetoclax to 200 mg daily.  Ongoing neutropenia 7/20/2023, continue venetoclax at a reduced dose of 200 mg daily  Ultimately neutropenia worsened and he required G-CSF with prompt improvement  Venetoclax at 100 mg daily  CT imaging 8/31/2023 with near resolution of lymphadenopathy.  10/4/2023: Blood counts acceptable to continue treatment.  Cycle #4 rituximab today.  11/1/2023: Cycle #5 rituximab.  Blood counts acceptable.  11/30/2023: Final cycle #6 rituximab today.  Plan to continue venetoclax 100 mg daily hereafter with potential to increase to 200 mg daily dosing in the future if he continues to do well and blood counts remained stable.  1/4/2024: Blood counts overall improved.  Increase venetoclax to 200 mg daily.  2/1/2024 ongoing excellent tolerance to venetoclax 200 mg daily.  Counts overall stable with plans to dose escalate to 400 mg daily    *Neutropenia related to therapy  He received Zarzio 480 mcg 8/4 and 8/5/2023 with prompt improvement in his white blood cell count from 1.55 up to 12.77  Resolved at this point    *Macrocytic anemia:   He has a history of autoimmune hemolytic anemia when he presented with CLL back in November 2015.  His reticulocyte count was very elevated at that time.  His hemoglobin dropped to as low as about 5.  He responded to steroids initially intravenously and then with prednisone and he was treated with 4 weeks of Rituxan.  He was noted to have worsening anemia.  His reticulocyte count was low.  He initially did not respond very well to transfusions and he required 4 units of packed red cells  There was no evidence for  hemolysis although his ADOLFO is positive for IgG.  This was persistently positive.  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  He responded to steroids.  He discontinued steroids as of 8/22/2018.    5/2/2022: Hemoglobin lower at 7.9 with a high MCV of 102   He required red cell transfusion.  Hemoglobin is slightly declined today at 12.6.  We will monitor closely with dose escalation of venetoclax    *History of systolic hypertension: Blood pressure acceptable at 144/75 today.    *History of thrombocytopenia:   Platelets normal at 157,000    *COVID-19 pandemic: He has received a total of 3 vaccinations.  Subsequent infection in April 2020.  He received a monoclonal antibody as an outpatient and remdesivir as an inpatient. He was readmitted on 5/2 with hypotension, persistent fevers, persistent COVID-19 positivity on testing. He received IVIG and steroids. He improved and was treated with outpatient Paxlovid.  Symptoms resolved at this point.  Last vaccine 9/21/2022, bivalent.  Cardiac arrest several days after receiving this on 9/24/2022.  He may hold off on getting the new COVID-19 booster shot for now    *Left lower extremity DVT and gastrocnemius vein and right lower extremity superficial thrombophlebitis in the small saphenous vein on bilateral lower extremity venous duplex on 4/15/2022  Associated with COVID-19 infection  Repeat venous duplex 8/1/22 without DVT, only superficial thrombophlebitis    *Out of hospital cardiac arrest on 9/24/2022  Quickly achieved return of spontaneous circulation with CPR and medication  He had an ICD placed at the [facility]  Ibrutinib discontinued as a result of this    *Possible zoster of the left upper extremity  Completed Valtrex 1 g 3 times daily x7 days  Now continue prophylactic acyclovir 400 mg twice daily.  Rash is completely resolved    *Erectile dysfunction: Patient referred to urology    PLAN:     Escalate venetoclax to 400 mg  daily due to ongoing excellent tolerance and stable blood counts  Continue acyclovir for prophylaxis.  Continue to follow with [facility] cardiology.  MD follow-up with Dr. Cheng in 1 month with CBC, CMP, LDH  With dose escalation, the patient understands to call if he develops any new side effects, worsening fatigue, shortness of breath or signs or symptoms of bleeding.    Patient is on a high risk medication requiring close monitoring for toxicity.      Felicity Lee, APRN  02/01/2024

## 2024-02-01 NOTE — PROGRESS NOTES
Drug: Venclexta (venetoclax)  Strength: 100 mg  Directions: Take 4 tablets by mouth daily  Quantity: 120  Refills: 3  Pharmacy prescription sent to:  [facility]    Completed independent double check on medication order/RX.  Marianela Romeo, MeryD, BCPS

## 2024-02-01 NOTE — PROGRESS NOTES
Specialty Pharmacy Patient Management Program  Oncology 6-Month Clinical Assessment       Aida Bal Jr. is a 68 y.o. male with CLL seen today to assess adherence and side effects.    Regimen: Venclexta 200 mg daily    Reason for Outreach: Routine medication check-in .       Problem list reviewed by Adela Ghosh RPH on 2/1/2024 at  1:18 PM  Medicines reviewed by Adela Ghosh RPH on 2/1/2024 at  1:18 PM  Allergies reviewed by Adela Ghosh RPH on 2/1/2024 at  1:18 PM     Goals Addressed Today        Specialty Pharmacy General Goal      adherence       Specialty Pharmacy General Goal      Progression free survival            Medication Assessment:  Medication Assessment  Follow Up Clinical Assessment  Linked Medication(s) Assessed: Venetoclax  Therapeutic appropriateness: Appropriate  Medication tolerability: Tolerating with no to minimal ADRs  Medication plan: Continue therapy with normal follow-up  Quality of Life Improvement Scale: 5-No change  Administration: Patient is taking every day at the same time  and as prescribed .   Patient can self administer medications: yes  Medication Follow-Up Plan: Next clinical assessment  Lab Review: The labs listed below have been reviewed. No dose adjustments are needed for the oral specialty medication(s) based on the labs.    Lab Results   Component Value Date    GLUCOSE 108 (H) 02/01/2024    CALCIUM 9.3 02/01/2024     02/01/2024    K 4.0 02/01/2024    CO2 30.1 (H) 02/01/2024     02/01/2024    BUN 19 02/01/2024    CREATININE 1.21 02/01/2024    EGFRIFAFRI 75 01/22/2022    EGFRIFNONA 66 05/19/2016    BCR 15.7 02/01/2024    ANIONGAP 7.9 02/01/2024     Lab Results   Component Value Date    WBC 3.36 (L) 02/01/2024    RBC 3.92 (L) 02/01/2024    HGB 12.6 (L) 02/01/2024    HCT 40.0 02/01/2024    .0 (H) 02/01/2024    MCH 32.1 02/01/2024    MCHC 31.5 02/01/2024    RDW 12.3 02/01/2024    RDWSD 47.0 02/01/2024    MPV 9.9 02/01/2024      02/01/2024    NEUTRORELPCT 68.1 02/01/2024    LYMPHORELPCT 14.6 (L) 02/01/2024    MONORELPCT 16.4 (H) 02/01/2024    EOSRELPCT 0.3 02/01/2024    BASORELPCT 0.3 02/01/2024    AUTOIGPER 0.3 02/01/2024    NEUTROABS 2.29 02/01/2024    LYMPHSABS 0.49 (L) 02/01/2024    MONOSABS 0.55 02/01/2024    EOSABS 0.01 02/01/2024    BASOSABS 0.01 02/01/2024    AUTOIGNUM 0.01 02/01/2024    NRBC 0.0 02/01/2024     Drug-drug interactions  Completed medication reconciliation today to assess for drug interactions. Patient denies starting or stopping any medications.    Assessed medication list for interactions, no significant drug interactions noted.   Advised patient to call the clinic if any new medications are started so we can assess for drug-drug interactions.  Drug-food interactions discussed:  none  Vaccines are coordinated by the patient's oncologist and primary care provider.    Allergies  Known allergies and reactions were discussed with the patient. The patient's chart has been reviewed for allergy information and updated as necessary.   Allergies   Allergen Reactions    Ace Inhibitors Unknown - Low Severity    Candesartan Unknown - Low Severity    Pravastatin Unknown - Low Severity     Annotation - [id number]: medication caused side effects but he is able to other statins ie Crestor        Hospitalizations and Urgent Care Visits Since Last Assessment:  Unplanned hospitalizations or inpatient admissions: no  ED Visits: no  Urgent Office Visits: no    Adherence Assessment:  Adherence Questions  Linked Medication(s) Assessed: Venetoclax  On average, how many doses/injections does the patient miss per month?: 0  What are the identified reasons for non-adherence or missed doses? : no problems identified  What is the estimated medication adherence level?: %  Based on the patient/caregiver response and refill history, does this patient require an MTP to track adherence improvements?: no    Quality of Life Assessment:  Quality of  Life Assessment  Quality of Life Improvement Scale: 5-No change  -- Quality of Life: 5/10    Financial Assessment:  Medication availability/affordability: Patient has had no issues obtaining medication from pharmacy.    Attestation     I attest the patient was actively involved in and has agreed to the above plan of care.  If the prescribed therapy is at any point deemed not appropriate based on the current or future assessments, a consultation will be initiated with the patient's specialty care provider to determine the best course of action. The revised plan of therapy will be documented along with any required assessments and/or additional patient education provided.       All questions addressed and patient had no additional concerns. Patient has pharmacy contact information.    Name/Credentials: Eric Ghosh PharmD, RISHABH  Clinical Oncology Pharmacist    2/1/2024  13:31 EST      Re: Refills of Oral Specialty Medication - Venclexta (venetoclax)    Drug-Drug Interactions: The current medication list was reviewed and there are no relevant drug-drug interactions with the specialty medication.  Medication Allergies: The patient has no relevant allergies as it relates to their oral specialty medication  Review of Labs/Dose Adjustments: DOSE CHANGE - I reviewed the most recent note and labs. Due to tolerance and improved counts the dose is being increased. I sent refills as described below.    Drug: Venclexta (venetoclax)  Strength: 100 mg  Directions: Take 4 tablets by mouth daily  Quantity: 120  Refills: 3  Pharmacy prescription sent to:  [facility]    Name/Credentials: Eric Ghosh PharmD, RISHABH  Clinical Oncology Pharmacist    2/1/2024  13:32 EST

## 2024-02-01 NOTE — PROGRESS NOTES
Pt seen by NP today-Venclexta dose increased to 400 mg daily.    New rx will be sent and I will contact to coordinate delivery.     Haylee Montanez, Pharmacy Technician  Specialty Pharmacy Technician

## 2024-02-05 ENCOUNTER — SPECIALTY PHARMACY (OUTPATIENT)
Dept: PHARMACY | Facility: HOSPITAL | Age: 69
End: 2024-02-05
Payer: MEDICARE

## 2024-02-05 NOTE — PROGRESS NOTES
"Specialty Pharmacy Refill Coordination Note     Aida is a 68 y.o. male contacted today regarding refills of  Venclexta specialty medication(s).    Reviewed and verified with patient:       Specialty medication(s) and dose(s) confirmed: yes  Venclexta 100 mg tabs-4 tabs daily.    Refill Questions      Flowsheet Row Most Recent Value   Changes to allergies? No   Changes to medications? Yes  [Pt's dose increased to 4 tabs daily on 2/1/24]   New conditions or infections since last clinic visit No   Unplanned office visit, urgent care, ED, or hospital admission in the last 4 weeks  No   How does patient/caregiver feel medication is working? Good   Financial problems or insurance changes  No   Since the previous refill, were any specialty medication doses or scheduled injections missed or delayed?  No   Does this patient require a clinical escalation to a pharmacist? No            Delivery Questions      Flowsheet Row Most Recent Value   Delivery method [employer]  [Ship to home address-Ship 2/7 for delivery 2/8-$0 copay with [employer] (now exhausted) and [employer]-Address Confirmed]   Delivery address verified with patient/caregiver? Yes  [Ship to home address]   Delivery address Home   Number of medications in delivery 1   Medication(s) being filled and delivered Venetoclax   Doses left of specialty medications Pt has 2 weeks on hand with the increased dose   Copay verified? Yes   Copay amount $0 copay with "[employer] (now exhausted) and [employer]   Copay form of payment No copayment ($0)          Venclexta delivery coordinated with pt for 2/8/2024 to his home address. $0 copay with [employer] (now exhausted) and [employer]. His last delivery was on 12/27/2023 (pt's dose was reduced to 1 tab per day after that fill so he has plenty on hand until now). Pt reports having a \"slight lump\" on the back of his neck. About pea size. Not painful. Noticed the lump after increasing the Venclexta dose. Message sent to MTM Team.     Follow-up: 21 " day(s)     Haylee Montanez, Pharmacy Technician  Specialty Pharmacy Technician

## 2024-03-01 NOTE — PROGRESS NOTES
"[facility] GROUP OUTPATIENT FOLLOW UP CLINIC VISIT    REASON FOR FOLLOW-UP:    1.  Chronic lymphocytic leukemia diagnosed in November 2015.  CLL FISH panel negative at that time.    2.  Autoimmune hemolytic anemia associated with CLL.  He was treated with steroids and he received 4 weeks of weekly Rituxan.  Otherwise no treatment has been performed  3.  CT imaging of the chest abdomen and pelvis from 4/25/2018 showed lymphadenopathy with mildly enlarged retroperitoneal and mesenteric lymph nodes with the largest measuring about 3.2 cm.  The spleen measured 13.6 cm.  Slight increase in lymphadenopathy compared with 11/27/2015.  4.  Acute anemia with a hemoglobin of 6.9 as of 6/20/2018.  Low reticulocyte count.  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  Prednisone initiated.  Erythropoietin 73.9.  Parvovirus B19 PCR negative.  Steroids complete on 8/22/2018.  5.  Bone marrow biopsy on 7/3/2018 with 70% CLL (87% by flow cytometry) with a t(14;18) translocation by cytogenetics, 3 copies of IGH in 87.5% of cells but negative for CCND1/IGH rearrangement.  Negative for all other rearrangements/deletions.  IgVH somatic hypermutation was not detected.            6.  Therapy with ibrutinib initiated at the end of July 2018.  Discontinued in late 2022 due to cardiac arrest.  7.  Subsequent treatment with rituximab and venetoclax.  Venetoclax continues      HISTORY OF PRESENT ILLNESS:  Aida Bal Jr. is a 69 y.o. male with the above-mentioned history today for lab review and evaluation.      He has tolerated venetoclax 400 mg daily well after an initial cutaneous reaction that quickly resolved.  Energy level is excellent.  He continues to exercise.      REVIEW OF SYSTEMS:  As per the HPI    PHYSICAL EXAMINATION:  Vitals:    03/07/24 1034   BP: 126/60   Pulse: 64   Resp: 18   Temp: 98.1 °F (36.7 °C)   TempSrc: Temporal   SpO2: 98%   Weight: 114 kg (251 lb 3.2 oz)   Height: 177.8 cm (70\")   PainSc: 0-No pain       General: "  No acute distress, awake, alert and oriented  Skin:  Warm and dry, no visible rash  HEENT:  Normocephalic/atraumatic.    Chest:  Normal respiratory effort.  Lungs clear to auscultation bilaterally.  ICD in place.  Heart: Regular rate and rhythm  Extremities:  No visible clubbing, cyanosis, or edema  Lymphatics: No palpable cervical supraclavicular adenopathy on exam again today  Neuro/psych:  Grossly nonfocal.  Normal mood and affect.       DIAGNOSTIC DATA:  CBC & Differential (03/07/2024 10:30)     IMAGING:  None reviewed      ASSESSMENT:  This is a 69 y.o. male with a history of chronic lymphocytic leukemia and related autoimmune hemolytic anemia.    *CLL:   Diagnosed 11/26/2015.    Normal FISH panel  Initially received Rituxan for 4 weeks at diagnosis for this and the autoimmune hemolytic anemia.  Given the worsening anemia even though it responded to steroids we needed to treat his CLL particularly given the 70% involvement on his bone marrow biopsy.  We started treating with ibrutinib 420 mg daily, initiated at the end of July 2018. Held around the time of COVID infection; resumed when he completed steroids.  Ibrutinib now discontinued due to cardiac arrest.  1/4/2023: White blood cell count remains normal at 5.57 with 66% lymphocytes  3/22/2023: White blood cell count 12,000 with 80% lymphocytes  CT imaging of the neck chest abdomen and pelvis from 5/17/2023 with enlarging adenopathy throughout the neck chest abdomen and pelvis  5/24/2023: Normal white blood cell count of 10.05 with 72.7% lymphocytes  Initiation of venetoclax on 6/6/2023.  Well-tolerated.  Following labs very closely.  6/13/2023 increased venetoclax to 50 mg daily, so far tolerating quite well.  6/20/2023 increased venetoclax to 100 mg daily  6/27/2023 increase venetoclax to 200 mg daily continuing to tolerate well.  Patient did notice 2 nodular areas, 1 on each hip, is very lateral on the hip.  Advised him just to continue to closely monitor  these areas, as it is nontender, no erythema, or warmth.  7/4/2023 venetoclax increase to 400 mg daily  7/12/2023 Initiate rituximab which will be given every 4 weeks.  Neutropenia noted with WBC 1.93, ANC 0.94.  We will therefore reduce venetoclax to 200 mg daily.  Ongoing neutropenia 7/20/2023, continue venetoclax at a reduced dose of 200 mg daily  Ultimately neutropenia worsened and he required G-CSF with prompt improvement  Venetoclax at 100 mg daily  CT imaging 8/31/2023 with near resolution of lymphadenopathy.  10/4/2023: Blood counts acceptable to continue treatment.  Cycle #4 rituximab today.  11/1/2023: Cycle #5 rituximab.  Blood counts acceptable.  11/30/2023: Final cycle #6 rituximab today.  Plan to continue venetoclax 100 mg daily hereafter with potential to increase to 200 mg daily dosing in the future if he continues to do well and blood counts remained stable.  1/4/2024: Blood counts overall improved.  Increase venetoclax to 200 mg daily.  2/1/2024 ongoing excellent tolerance to venetoclax 200 mg daily.  Counts overall stable with plans to dose escalate to 400 mg daily  3/7/2024: Tolerates venetoclax 400 mg daily well.    *Neutropenia related to therapy  He received Zarzio 480 mcg 8/4 and 8/5/2023 with prompt improvement in his white blood cell count from 1.55 up to 12.77  Resolved at this point.  Not neutropenic.    *Macrocytic anemia:   He has a history of autoimmune hemolytic anemia when he presented with CLL back in November 2015.  His reticulocyte count was very elevated at that time.  His hemoglobin dropped to as low as about 5.  He responded to steroids initially intravenously and then with prednisone and he was treated with 4 weeks of Rituxan.  He was noted to have worsening anemia.  His reticulocyte count was low.  He initially did not respond very well to transfusions and he required 4 units of packed red cells  There was no evidence for hemolysis although his ADOLFO is positive for IgG.  This  was persistently positive.  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  He responded to steroids.  He discontinued steroids as of 8/22/2018.    5/2/2022: Hemoglobin lower at 7.9 with a high MCV of 102   He required red cell transfusion.  Hemoglobin 12.9    *History of systolic hypertension: Blood pressure acceptable at 126/60 today.    *History of thrombocytopenia:   Platelets normal at 165,000    *COVID-19 pandemic: He has received a total of 3 vaccinations.  Subsequent infection in April 2020.  He received a monoclonal antibody as an outpatient and remdesivir as an inpatient. He was readmitted on 5/2 with hypotension, persistent fevers, persistent COVID-19 positivity on testing. He received IVIG and steroids. He improved and was treated with outpatient Paxlovid.  Symptoms resolved at this point.  Last vaccine 9/21/2022, bivalent.  Cardiac arrest several days after receiving this on 9/24/2022.  He may hold off on getting the new COVID-19 booster shot for now    *Left lower extremity DVT and gastrocnemius vein and right lower extremity superficial thrombophlebitis in the small saphenous vein on bilateral lower extremity venous duplex on 4/15/2022  Associated with COVID-19 infection  Repeat venous duplex 8/1/22 without DVT, only superficial thrombophlebitis    *Out of hospital cardiac arrest on 9/24/2022  Quickly achieved return of spontaneous circulation with CPR and medication  He had an ICD placed at the [facility]  Ibrutinib discontinued as a result of this    *Possible zoster of the left upper extremity  Completed Valtrex 1 g 3 times daily x7 days  Now continue prophylactic acyclovir 400 mg twice daily.  Rash is completely resolved    *Erectile dysfunction: Patient referred to urology    PLAN:   Continue venetoclax 400 mg daily for now  Up to 2-year course of therapy planned  Continue acyclovir for prophylaxis.  Continue to follow with [facility]  cardiology.  Follow-up with me in 5 weeks with labs    Patient is on a high risk medication requiring close monitoring for toxicity.  I encouraged him to notify us with any new or concerning issues prior to his scheduled follow-up.    Toñito Cheng MD  03/07/2024

## 2024-03-04 ENCOUNTER — SPECIALTY PHARMACY (OUTPATIENT)
Dept: PHARMACY | Facility: HOSPITAL | Age: 69
End: 2024-03-04
Payer: MEDICARE

## 2024-03-04 NOTE — PROGRESS NOTES
I contacted pt to coordinate the delivery of his Venclexta. Pt reports having 3 weeks on hand-he is taking 4 tabs daily.    I have moved the refill task out 2 weeks and I will coordinate delivery then.     Haylee Montanez, Pharmacy Technician  Specialty Pharmacy Technician

## 2024-03-07 ENCOUNTER — LAB (OUTPATIENT)
Dept: LAB | Facility: HOSPITAL | Age: 69
End: 2024-03-07
Payer: MEDICARE

## 2024-03-07 ENCOUNTER — SPECIALTY PHARMACY (OUTPATIENT)
Dept: PHARMACY | Facility: HOSPITAL | Age: 69
End: 2024-03-07
Payer: MEDICARE

## 2024-03-07 ENCOUNTER — OFFICE VISIT (OUTPATIENT)
Dept: ONCOLOGY | Facility: CLINIC | Age: 69
End: 2024-03-07
Payer: MEDICARE

## 2024-03-07 VITALS
BODY MASS INDEX: 35.96 KG/M2 | SYSTOLIC BLOOD PRESSURE: 126 MMHG | DIASTOLIC BLOOD PRESSURE: 60 MMHG | HEIGHT: 70 IN | TEMPERATURE: 98.1 F | HEART RATE: 64 BPM | OXYGEN SATURATION: 98 % | RESPIRATION RATE: 18 BRPM | WEIGHT: 251.2 LBS

## 2024-03-07 DIAGNOSIS — C91.10 CLL (CHRONIC LYMPHOCYTIC LEUKEMIA): Primary | ICD-10-CM

## 2024-03-07 DIAGNOSIS — C91.10 CLL (CHRONIC LYMPHOCYTIC LEUKEMIA): ICD-10-CM

## 2024-03-07 LAB
ALBUMIN SERPL-MCNC: 4.2 G/DL (ref 3.5–5.2)
ALBUMIN/GLOB SERPL: 2.5 G/DL
ALP SERPL-CCNC: 114 U/L (ref 39–117)
ALT SERPL W P-5'-P-CCNC: 13 U/L (ref 1–41)
ANION GAP SERPL CALCULATED.3IONS-SCNC: 11.3 MMOL/L (ref 5–15)
AST SERPL-CCNC: 23 U/L (ref 1–40)
BASOPHILS # BLD AUTO: 0.01 10*3/MM3 (ref 0–0.2)
BASOPHILS NFR BLD AUTO: 0.3 % (ref 0–1.5)
BILIRUB SERPL-MCNC: 0.8 MG/DL (ref 0–1.2)
BUN SERPL-MCNC: 15 MG/DL (ref 8–23)
BUN/CREAT SERPL: 12.6 (ref 7–25)
CALCIUM SPEC-SCNC: 9.1 MG/DL (ref 8.6–10.5)
CHLORIDE SERPL-SCNC: 105 MMOL/L (ref 98–107)
CO2 SERPL-SCNC: 26.7 MMOL/L (ref 22–29)
CREAT SERPL-MCNC: 1.19 MG/DL (ref 0.76–1.27)
DEPRECATED RDW RBC AUTO: 45.6 FL (ref 37–54)
EGFRCR SERPLBLD CKD-EPI 2021: 65.3 ML/MIN/1.73
EOSINOPHIL # BLD AUTO: 0.01 10*3/MM3 (ref 0–0.4)
EOSINOPHIL NFR BLD AUTO: 0.3 % (ref 0.3–6.2)
ERYTHROCYTE [DISTWIDTH] IN BLOOD BY AUTOMATED COUNT: 12.4 % (ref 12.3–15.4)
GLOBULIN UR ELPH-MCNC: 1.7 GM/DL
GLUCOSE SERPL-MCNC: 136 MG/DL (ref 65–99)
HCT VFR BLD AUTO: 40.1 % (ref 37.5–51)
HGB BLD-MCNC: 12.9 G/DL (ref 13–17.7)
IMM GRANULOCYTES # BLD AUTO: 0 10*3/MM3 (ref 0–0.05)
IMM GRANULOCYTES NFR BLD AUTO: 0 % (ref 0–0.5)
LDH SERPL-CCNC: 183 U/L (ref 135–225)
LYMPHOCYTES # BLD AUTO: 0.37 10*3/MM3 (ref 0.7–3.1)
LYMPHOCYTES NFR BLD AUTO: 12.5 % (ref 19.6–45.3)
MCH RBC QN AUTO: 32.1 PG (ref 26.6–33)
MCHC RBC AUTO-ENTMCNC: 32.2 G/DL (ref 31.5–35.7)
MCV RBC AUTO: 99.8 FL (ref 79–97)
MONOCYTES # BLD AUTO: 0.38 10*3/MM3 (ref 0.1–0.9)
MONOCYTES NFR BLD AUTO: 12.8 % (ref 5–12)
NEUTROPHILS NFR BLD AUTO: 2.2 10*3/MM3 (ref 1.7–7)
NEUTROPHILS NFR BLD AUTO: 74.1 % (ref 42.7–76)
NRBC BLD AUTO-RTO: 0 /100 WBC (ref 0–0.2)
PLATELET # BLD AUTO: 165 10*3/MM3 (ref 140–450)
PMV BLD AUTO: 9.9 FL (ref 6–12)
POTASSIUM SERPL-SCNC: 3.8 MMOL/L (ref 3.5–5.2)
PROT SERPL-MCNC: 5.9 G/DL (ref 6–8.5)
RBC # BLD AUTO: 4.02 10*6/MM3 (ref 4.14–5.8)
SODIUM SERPL-SCNC: 143 MMOL/L (ref 136–145)
WBC NRBC COR # BLD AUTO: 2.97 10*3/MM3 (ref 3.4–10.8)

## 2024-03-07 PROCEDURE — 99214 OFFICE O/P EST MOD 30 MIN: CPT | Performed by: INTERNAL MEDICINE

## 2024-03-07 PROCEDURE — 1160F RVW MEDS BY RX/DR IN RCRD: CPT | Performed by: INTERNAL MEDICINE

## 2024-03-07 PROCEDURE — 1126F AMNT PAIN NOTED NONE PRSNT: CPT | Performed by: INTERNAL MEDICINE

## 2024-03-07 PROCEDURE — 85025 COMPLETE CBC W/AUTO DIFF WBC: CPT

## 2024-03-07 PROCEDURE — 1159F MED LIST DOCD IN RCRD: CPT | Performed by: INTERNAL MEDICINE

## 2024-03-07 PROCEDURE — 80053 COMPREHEN METABOLIC PANEL: CPT

## 2024-03-07 PROCEDURE — 83615 LACTATE (LD) (LDH) ENZYME: CPT

## 2024-03-07 PROCEDURE — 3078F DIAST BP <80 MM HG: CPT | Performed by: INTERNAL MEDICINE

## 2024-03-07 PROCEDURE — 3074F SYST BP LT 130 MM HG: CPT | Performed by: INTERNAL MEDICINE

## 2024-03-07 PROCEDURE — 36415 COLL VENOUS BLD VENIPUNCTURE: CPT

## 2024-03-07 NOTE — PROGRESS NOTES
Staff message rec from Dr baldwin-He saw pt today and Venclexta dose will remain at 400 mg daily. Pt reported to Dr Baldwin he has about a week on hand. I will contact pt to coordinate the delivery.    When I spoke to him on 3/4/2024-Pt reported having 3 weeks on hand.    Toñito Baldwin MD sent to Haylee Montanez, Pharmacy Technician  Haylee,    Will continue venetoclax 400 mg daily for now. He has about one week left so will need more soon.      Thanks!  SANDRA Montanez, Pharmacy Technician  Specialty Pharmacy Technician

## 2024-03-11 ENCOUNTER — SPECIALTY PHARMACY (OUTPATIENT)
Dept: PHARMACY | Facility: HOSPITAL | Age: 69
End: 2024-03-11
Payer: MEDICARE

## 2024-03-11 NOTE — PROGRESS NOTES
Specialty Pharmacy Refill Coordination Note     Aida is a 69 y.o. male contacted today regarding refills of Venclexta specialty medication(s).    Reviewed and verified with patient:       Specialty medication(s) and dose(s) confirmed: yes  Venclexta 100 mg tabs-4 tabs daily.    Refill Questions      Flowsheet Row Most Recent Value   Changes to allergies? No   Changes to medications? No   New conditions or infections since last clinic visit No   Unplanned office visit, urgent care, ED, or hospital admission in the last 4 weeks  No   How does patient/caregiver feel medication is working? Good   Financial problems or insurance changes  No   Since the previous refill, were any specialty medication doses or scheduled injections missed or delayed?  No   Does this patient require a clinical escalation to a pharmacist? No            Delivery Questions      Flowsheet Row Most Recent Value   Delivery method [employer]  [Ship to home address-Ship 3/13 for delivery 3/14-$0 copay with insurance covering 100$-Address Confirmed]   Delivery address verified with patient/caregiver? Yes  [Ship to home address]   Delivery address Home   Number of medications in delivery 1   Medication(s) being filled and delivered Venetoclax   Doses left of specialty medications Pt has about 7 days on hand. He miscounted when we spoke on 3/4/2024 (he counted for 2 tabs per day and not 4 tabs per day).   Copay verified? Yes   Copay amount $0 copay with insurance covering 100%   Copay form of payment No copayment ($0)          Venclexta delivery coordinated with pt for 3/14/2024 to his home address. $0 copay with insurance covering 100%. His last delivery was on 2/8/2024. No questions or concerns to report to MTM Team today.      Follow-up: 21 day(s)     Haylee Montanez, Pharmacy Technician  Specialty Pharmacy Technician

## 2024-04-04 NOTE — PROGRESS NOTES
[facility] GROUP OUTPATIENT FOLLOW UP CLINIC VISIT    REASON FOR FOLLOW-UP:    1.  Chronic lymphocytic leukemia diagnosed in November 2015.  CLL FISH panel negative at that time.    2.  Autoimmune hemolytic anemia associated with CLL.  He was treated with steroids and he received 4 weeks of weekly Rituxan.  Otherwise no treatment has been performed  3.  CT imaging of the chest abdomen and pelvis from 4/25/2018 showed lymphadenopathy with mildly enlarged retroperitoneal and mesenteric lymph nodes with the largest measuring about 3.2 cm.  The spleen measured 13.6 cm.  Slight increase in lymphadenopathy compared with 11/27/2015.  4.  Acute anemia with a hemoglobin of 6.9 as of 6/20/2018.  Low reticulocyte count.  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  Prednisone initiated.  Erythropoietin 73.9.  Parvovirus B19 PCR negative.  Steroids complete on 8/22/2018.  5.  Bone marrow biopsy on 7/3/2018 with 70% CLL (87% by flow cytometry) with a t(14;18) translocation by cytogenetics, 3 copies of IGH in 87.5% of cells but negative for CCND1/IGH rearrangement.  Negative for all other rearrangements/deletions.  IgVH somatic hypermutation was not detected.            6.  Therapy with ibrutinib initiated at the end of July 2018.  Discontinued in late 2022 due to cardiac arrest.  7.  Subsequent treatment with rituximab and venetoclax.  Venetoclax continues      HISTORY OF PRESENT ILLNESS:  Aida Bal Jr. is a 69 y.o. male with the above-mentioned history today for lab review and evaluation.      He feels well at this point.  He continues to have osteoarthritis pain in the left knee which improved significantly with gel injections which he had done earlier today.  He tolerates venetoclax very well.      REVIEW OF SYSTEMS:  As per the HPI    PHYSICAL EXAMINATION:  Vitals:    04/10/24 1459   BP: 137/74   Pulse: 65   Resp: 18   Temp: 98 °F (36.7 °C)   TempSrc: Temporal   SpO2: 100%   Weight: 114 kg (252 lb 6.4 oz)   Height:  "177.8 cm (70\")   PainSc: 0-No pain       General:  No acute distress, awake, alert and oriented  Skin:  Warm and dry, no visible rash  HEENT:  Normocephalic/atraumatic.    Chest:  Normal respiratory effort.  Lungs clear to auscultation bilaterally.  ICD remains in place.  Heart: Regular rate and rhythm  Extremities:  No visible clubbing, cyanosis, or edema  Lymphatics: No palpable cervical supraclavicular adenopathy on exam today  Neuro/psych:  Grossly nonfocal.  Normal mood and affect.    DIAGNOSTIC DATA:  CBC & Differential (04/10/2024 14:07)     IMAGING:  None reviewed      ASSESSMENT:  This is a 69 y.o. male with a history of chronic lymphocytic leukemia and related autoimmune hemolytic anemia.    *CLL:   Diagnosed 11/26/2015.    Normal FISH panel  Initially received Rituxan for 4 weeks at diagnosis for this and the autoimmune hemolytic anemia.  Given the worsening anemia even though it responded to steroids we needed to treat his CLL particularly given the 70% involvement on his bone marrow biopsy.  We started treating with ibrutinib 420 mg daily, initiated at the end of July 2018. Held around the time of COVID infection; resumed when he completed steroids.  Ibrutinib now discontinued due to cardiac arrest.  1/4/2023: White blood cell count remains normal at 5.57 with 66% lymphocytes  3/22/2023: White blood cell count 12,000 with 80% lymphocytes  CT imaging of the neck chest abdomen and pelvis from 5/17/2023 with enlarging adenopathy throughout the neck chest abdomen and pelvis  5/24/2023: Normal white blood cell count of 10.05 with 72.7% lymphocytes  Initiation of venetoclax on 6/6/2023.  Well-tolerated.  Following labs very closely.  6/13/2023 increased venetoclax to 50 mg daily, so far tolerating quite well.  6/20/2023 increased venetoclax to 100 mg daily  6/27/2023 increase venetoclax to 200 mg daily continuing to tolerate well.  Patient did notice 2 nodular areas, 1 on each hip, is very lateral on the hip. "  Advised him just to continue to closely monitor these areas, as it is nontender, no erythema, or warmth.  7/4/2023 venetoclax increase to 400 mg daily  7/12/2023 Initiate rituximab which will be given every 4 weeks.  Neutropenia noted with WBC 1.93, ANC 0.94.  We will therefore reduce venetoclax to 200 mg daily.  Ongoing neutropenia 7/20/2023, continue venetoclax at a reduced dose of 200 mg daily  Ultimately neutropenia worsened and he required G-CSF with prompt improvement  Venetoclax at 100 mg daily  CT imaging 8/31/2023 with near resolution of lymphadenopathy.  10/4/2023: Blood counts acceptable to continue treatment.  Cycle #4 rituximab today.  11/1/2023: Cycle #5 rituximab.  Blood counts acceptable.  11/30/2023: Final cycle #6 rituximab today.  Plan to continue venetoclax 100 mg daily hereafter with potential to increase to 200 mg daily dosing in the future if he continues to do well and blood counts remained stable.  1/4/2024: Blood counts overall improved.  Increase venetoclax to 200 mg daily.  2/1/2024 ongoing excellent tolerance to venetoclax 200 mg daily.  Counts overall stable with plans to dose escalate to 400 mg daily  4/10/2024: Tolerates venetoclax 400 mg daily well.  Blood counts stable.    *Neutropenia related to therapy  He received Zarzio 480 mcg 8/4 and 8/5/2023 with prompt improvement in his white blood cell count from 1.55 up to 12.77  Resolved at this point.  Not neutropenic.    *Macrocytic anemia:   He has a history of autoimmune hemolytic anemia when he presented with CLL back in November 2015.  His reticulocyte count was very elevated at that time.  His hemoglobin dropped to as low as about 5.  He responded to steroids initially intravenously and then with prednisone and he was treated with 4 weeks of Rituxan.  He was noted to have worsening anemia.  His reticulocyte count was low.  He initially did not respond very well to transfusions and he required 4 units of packed red cells  There  was no evidence for hemolysis although his ADOLFO is positive for IgG.  This was persistently positive.  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  He responded to steroids.  He discontinued steroids as of 8/22/2018.    5/2/2022: Hemoglobin lower at 7.9 with a high MCV of 102   He required red cell transfusion.  Hemoglobin 12.7    *History of systolic hypertension: Blood pressure acceptable at 126/60 today.    *History of thrombocytopenia:   Platelets normal at 153,000    *COVID-19 pandemic: He has received a total of 3 vaccinations.  Subsequent infection in April 2020.  He received a monoclonal antibody as an outpatient and remdesivir as an inpatient. He was readmitted on 5/2 with hypotension, persistent fevers, persistent COVID-19 positivity on testing. He received IVIG and steroids. He improved and was treated with outpatient Paxlovid.  Symptoms resolved at this point.  Last vaccine 9/21/2022, bivalent.  Cardiac arrest several days after receiving this on 9/24/2022.  He may hold off on getting the new COVID-19 booster shot for now    *Left lower extremity DVT and gastrocnemius vein and right lower extremity superficial thrombophlebitis in the small saphenous vein on bilateral lower extremity venous duplex on 4/15/2022  Associated with COVID-19 infection  Repeat venous duplex 8/1/22 without DVT, only superficial thrombophlebitis    *Out of hospital cardiac arrest on 9/24/2022  Quickly achieved return of spontaneous circulation with CPR and medication  He had an ICD placed at the [facility]  Ibrutinib discontinued as a result of this    *Possible zoster of the left upper extremity  Completed Valtrex 1 g 3 times daily x7 days  Now continue prophylactic acyclovir 400 mg twice daily.  Rash is completely resolved    *Erectile dysfunction: Patient referred to urology    PLAN:   Continue venetoclax 400 mg daily for now.  Blood counts are stable and he tolerates this very well.  Up to  2-year course of therapy planned, through June 2025  Continue acyclovir for prophylaxis.  Continue to follow with [facility] cardiology.  I will see him in 2 months with labs    Patient remains on a high risk medication requiring close monitoring for toxicity.  I again today encouraged him to notify us with any new or concerning issues prior to his scheduled follow-up.    Toñito Cheng MD  04/10/2024

## 2024-04-09 ENCOUNTER — SPECIALTY PHARMACY (OUTPATIENT)
Dept: PHARMACY | Facility: HOSPITAL | Age: 69
End: 2024-04-09
Payer: MEDICARE

## 2024-04-09 NOTE — PROGRESS NOTES
Specialty Pharmacy Refill Coordination Note     Aida is a 69 y.o. male contacted today regarding refills of Venclexta specialty medication(s).    Reviewed and verified with patient:       Specialty medication(s) and dose(s) confirmed: yes  Venclexta 100 mg tabs-4 tabs daily.    Refill Questions      Flowsheet Row Most Recent Value   Changes to allergies? No   Changes to medications? No   New conditions or infections since last clinic visit No   Unplanned office visit, urgent care, ED, or hospital admission in the last 4 weeks  No   How does patient/caregiver feel medication is working? Good   Financial problems or insurance changes  No   Since the previous refill, were any specialty medication doses or scheduled injections missed or delayed?  No   Does this patient require a clinical escalation to a pharmacist? No            Delivery Questions      Flowsheet Row Most Recent Value   Delivery method Beeline  [Ship to home address-Ship 4/11 for delivery 4/12-$0 copay with insurance covering 100%-Address Confirmed]   Delivery address verified with patient/caregiver? Yes  [Ship to home address]   Delivery address Home   Number of medications in delivery 1   Medication(s) being filled and delivered Venetoclax   Doses left of specialty medications 10 days   Copay verified? Yes   Copay amount $0 copay with insurance covering 100%   Copay form of payment No copayment ($0)          Venclexta delivery coordinated with pt for 4/12/2024 to his home address. $0 copay with insurance covering 100%. His last delivery was on 3/14/2024. No questions or concerns to report to MTM Team today.     Follow-up: 21 day(s)     Haylee Montanez, Pharmacy Technician  Specialty Pharmacy Technician

## 2024-04-10 ENCOUNTER — LAB (OUTPATIENT)
Dept: LAB | Facility: HOSPITAL | Age: 69
End: 2024-04-10
Payer: MEDICARE

## 2024-04-10 ENCOUNTER — OFFICE VISIT (OUTPATIENT)
Dept: ONCOLOGY | Facility: CLINIC | Age: 69
End: 2024-04-10
Payer: MEDICARE

## 2024-04-10 VITALS
RESPIRATION RATE: 18 BRPM | WEIGHT: 252.4 LBS | DIASTOLIC BLOOD PRESSURE: 74 MMHG | BODY MASS INDEX: 36.13 KG/M2 | SYSTOLIC BLOOD PRESSURE: 137 MMHG | HEART RATE: 65 BPM | TEMPERATURE: 98 F | OXYGEN SATURATION: 100 % | HEIGHT: 70 IN

## 2024-04-10 DIAGNOSIS — C91.10 CLL (CHRONIC LYMPHOCYTIC LEUKEMIA): Primary | ICD-10-CM

## 2024-04-10 DIAGNOSIS — C91.10 CLL (CHRONIC LYMPHOCYTIC LEUKEMIA): ICD-10-CM

## 2024-04-10 LAB
ALBUMIN SERPL-MCNC: 4 G/DL (ref 3.5–5.2)
ALBUMIN/GLOB SERPL: 2 G/DL
ALP SERPL-CCNC: 113 U/L (ref 39–117)
ALT SERPL W P-5'-P-CCNC: 15 U/L (ref 1–41)
ANION GAP SERPL CALCULATED.3IONS-SCNC: 9.8 MMOL/L (ref 5–15)
AST SERPL-CCNC: 27 U/L (ref 1–40)
BASOPHILS # BLD AUTO: 0 10*3/MM3 (ref 0–0.2)
BASOPHILS NFR BLD AUTO: 0 % (ref 0–1.5)
BILIRUB SERPL-MCNC: 0.7 MG/DL (ref 0–1.2)
BUN SERPL-MCNC: 16 MG/DL (ref 8–23)
BUN/CREAT SERPL: 12.8 (ref 7–25)
CALCIUM SPEC-SCNC: 9.4 MG/DL (ref 8.6–10.5)
CHLORIDE SERPL-SCNC: 104 MMOL/L (ref 98–107)
CO2 SERPL-SCNC: 29.2 MMOL/L (ref 22–29)
CREAT SERPL-MCNC: 1.25 MG/DL (ref 0.76–1.27)
DEPRECATED RDW RBC AUTO: 46.9 FL (ref 37–54)
EGFRCR SERPLBLD CKD-EPI 2021: 61.6 ML/MIN/1.73
EOSINOPHIL # BLD AUTO: 0.01 10*3/MM3 (ref 0–0.4)
EOSINOPHIL NFR BLD AUTO: 0.3 % (ref 0.3–6.2)
ERYTHROCYTE [DISTWIDTH] IN BLOOD BY AUTOMATED COUNT: 12.6 % (ref 12.3–15.4)
GLOBULIN UR ELPH-MCNC: 2 GM/DL
GLUCOSE SERPL-MCNC: 81 MG/DL (ref 65–99)
HCT VFR BLD AUTO: 40 % (ref 37.5–51)
HGB BLD-MCNC: 12.7 G/DL (ref 13–17.7)
IMM GRANULOCYTES # BLD AUTO: 0 10*3/MM3 (ref 0–0.05)
IMM GRANULOCYTES NFR BLD AUTO: 0 % (ref 0–0.5)
LDH SERPL-CCNC: 182 U/L (ref 135–225)
LYMPHOCYTES # BLD AUTO: 0.51 10*3/MM3 (ref 0.7–3.1)
LYMPHOCYTES NFR BLD AUTO: 16.3 % (ref 19.6–45.3)
MCH RBC QN AUTO: 31.8 PG (ref 26.6–33)
MCHC RBC AUTO-ENTMCNC: 31.8 G/DL (ref 31.5–35.7)
MCV RBC AUTO: 100 FL (ref 79–97)
MONOCYTES # BLD AUTO: 0.46 10*3/MM3 (ref 0.1–0.9)
MONOCYTES NFR BLD AUTO: 14.7 % (ref 5–12)
NEUTROPHILS NFR BLD AUTO: 2.15 10*3/MM3 (ref 1.7–7)
NEUTROPHILS NFR BLD AUTO: 68.7 % (ref 42.7–76)
NRBC BLD AUTO-RTO: 0 /100 WBC (ref 0–0.2)
PLATELET # BLD AUTO: 153 10*3/MM3 (ref 140–450)
PMV BLD AUTO: 10.4 FL (ref 6–12)
POTASSIUM SERPL-SCNC: 3.9 MMOL/L (ref 3.5–5.2)
PROT SERPL-MCNC: 6 G/DL (ref 6–8.5)
RBC # BLD AUTO: 4 10*6/MM3 (ref 4.14–5.8)
SODIUM SERPL-SCNC: 143 MMOL/L (ref 136–145)
WBC NRBC COR # BLD AUTO: 3.13 10*3/MM3 (ref 3.4–10.8)

## 2024-04-10 PROCEDURE — 99214 OFFICE O/P EST MOD 30 MIN: CPT | Performed by: INTERNAL MEDICINE

## 2024-04-10 PROCEDURE — 36415 COLL VENOUS BLD VENIPUNCTURE: CPT

## 2024-04-10 PROCEDURE — 1159F MED LIST DOCD IN RCRD: CPT | Performed by: INTERNAL MEDICINE

## 2024-04-10 PROCEDURE — 83615 LACTATE (LD) (LDH) ENZYME: CPT

## 2024-04-10 PROCEDURE — 3075F SYST BP GE 130 - 139MM HG: CPT | Performed by: INTERNAL MEDICINE

## 2024-04-10 PROCEDURE — 80053 COMPREHEN METABOLIC PANEL: CPT

## 2024-04-10 PROCEDURE — 3078F DIAST BP <80 MM HG: CPT | Performed by: INTERNAL MEDICINE

## 2024-04-10 PROCEDURE — 85025 COMPLETE CBC W/AUTO DIFF WBC: CPT

## 2024-04-10 PROCEDURE — 1160F RVW MEDS BY RX/DR IN RCRD: CPT | Performed by: INTERNAL MEDICINE

## 2024-04-10 PROCEDURE — 1126F AMNT PAIN NOTED NONE PRSNT: CPT | Performed by: INTERNAL MEDICINE

## 2024-05-06 ENCOUNTER — SPECIALTY PHARMACY (OUTPATIENT)
Dept: PHARMACY | Facility: HOSPITAL | Age: 69
End: 2024-05-06
Payer: MEDICARE

## 2024-06-04 ENCOUNTER — SPECIALTY PHARMACY (OUTPATIENT)
Dept: PHARMACY | Facility: HOSPITAL | Age: 69
End: 2024-06-04
Payer: MEDICARE

## 2024-06-04 DIAGNOSIS — C91.10 CLL (CHRONIC LYMPHOCYTIC LEUKEMIA): ICD-10-CM

## 2024-06-04 NOTE — PROGRESS NOTES
Re: Refills of Oral Specialty Medication - Venclexta (venetoclax)    Drug-Drug Interactions: The current medication list was reviewed and there are no relevant drug-drug interactions with the specialty medication.  Medication Allergies: The patient has no relevant allergies as it relates to their oral specialty medication  Review of Labs/Dose Adjustments: NO DOSE CHANGE - I reviewed the most recent note and labs and the patient will continue without any dose changes.  I sent refills as described below.    Drug: Venclexta (venetoclax)  Strength: 100 mg  Directions: Take four tablets by mouth daily  Quantity: 120  Refills: 5  Pharmacy prescription sent to:  [facility]  Specialty Pharmacy    Name/Credentials: Marianela Romeo PharmD, BCPS    6/4/2024  10:33 EDT

## 2024-06-04 NOTE — PROGRESS NOTES
Re: Refills of Oral Specialty Medication - Venclexta (venetoclax) - dose double check      Drug: Venclexta (venetoclax)  Strength: 100 mg  Directions: Take 4 tablets by mouth daily  Quantity: 120  Refills: 5  Pharmacy prescription sent to: [facility] upon MD signature    Independent dose review completed.    Charlene Howell, Pharm.D.    6/4/2024  16:25 EDT

## 2024-06-04 NOTE — PROGRESS NOTES
Specialty Pharmacy Refill Coordination Note     Aida is a 69 y.o. male contacted today regarding refills of Venclexta specialty medication(s).    Reviewed and verified with patient:       Specialty medication(s) and dose(s) confirmed: yes  Venclexta 100 mg tabs-4 tabs daily    Refill Questions      Flowsheet Row Most Recent Value   Changes to allergies? No   Changes to medications? No   New conditions or infections since last clinic visit No   Unplanned office visit, urgent care, ED, or hospital admission in the last 4 weeks  No   How does patient/caregiver feel medication is working? Good   Financial problems or insurance changes  No   Since the previous refill, were any specialty medication doses or scheduled injections missed or delayed?  No   Does this patient require a clinical escalation to a pharmacist? No            Delivery Questions      Flowsheet Row Most Recent Value   Delivery method Beeline  [Ship to home address-[facility] 6/10 for delivery 6/11-$0 copay with insurance covering 100%-Address Confirmed]   Delivery address verified with patient/caregiver? Yes  [Ship to home address]   Delivery address Home  [Ship to home address-[facility] 6/10 for delivery 6/11-$0 copay with insurance covering 10%-Address Confirmed]   Number of medications in delivery 1   Medication(s) being filled and delivered Venetoclax   Doses left of specialty medications 10-14 days   Copay verified? Yes   Copay amount $0 copay with insurance covering 100%   Copay form of payment No copayment ($0)          Venclexta delivery coordinated with pt for 6/11/2024 to his home address. $0 copay with insurance covering 100%. His last delivery was on 5/9/2024. No questions or concerns to report to MTM Team today.     Follow-up: 21 day(s)     Haylee Montanez, Pharmacy Technician  Specialty Pharmacy Technician

## 2024-06-25 NOTE — PROGRESS NOTES
[facility] GROUP OUTPATIENT FOLLOW UP CLINIC VISIT    REASON FOR FOLLOW-UP:    1.  Chronic lymphocytic leukemia diagnosed in November 2015.  CLL FISH panel negative at that time.    2.  Autoimmune hemolytic anemia associated with CLL.  He was treated with steroids and he received 4 weeks of weekly Rituxan.  Otherwise no treatment has been performed  3.  CT imaging of the chest abdomen and pelvis from 4/25/2018 showed lymphadenopathy with mildly enlarged retroperitoneal and mesenteric lymph nodes with the largest measuring about 3.2 cm.  The spleen measured 13.6 cm.  Slight increase in lymphadenopathy compared with 11/27/2015.  4.  Acute anemia with a hemoglobin of 6.9 as of 6/20/2018.  Low reticulocyte count.  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  Prednisone initiated.  Erythropoietin 73.9.  Parvovirus B19 PCR negative.  Steroids complete on 8/22/2018.  5.  Bone marrow biopsy on 7/3/2018 with 70% CLL (87% by flow cytometry) with a t(14;18) translocation by cytogenetics, 3 copies of IGH in 87.5% of cells but negative for CCND1/IGH rearrangement.  Negative for all other rearrangements/deletions.  IgVH somatic hypermutation was not detected.            6.  Therapy with ibrutinib initiated at the end of July 2018.  Discontinued in late 2022 due to cardiac arrest.  7.  Subsequent treatment with rituximab and venetoclax.  Venetoclax continues      HISTORY OF PRESENT ILLNESS:  Aida Bal Jr. is a 69 y.o. male with the above-mentioned history today for lab review and evaluation.      Overall doing well except for worsening left knee pain related to osteoarthritis and he has a left total knee arthroplasty scheduled for 7/11.  He tolerates venetoclax very well.    REVIEW OF SYSTEMS:  As per the HPI    PHYSICAL EXAMINATION:  Vitals:    06/26/24 1554   BP: 134/77   Pulse: 58   Resp: 19   Temp: 98.1 °F (36.7 °C)   SpO2: 99%   Weight: 117 kg (258 lb)         General:  No acute distress, awake, alert and oriented  Skin:   Warm and dry, no visible rash  HEENT:  Normocephalic/atraumatic.    Chest:  Normal respiratory effort.  Lungs clear to auscultation bilaterally.  ICD remains in place.  Heart: Regular rate and rhythm  Extremities:  No visible clubbing, cyanosis, or edema  Lymphatics: No palpable cervical supraclavicular adenopathy on exam today  Neuro/psych:  Grossly nonfocal.  Normal mood and affect.    DIAGNOSTIC DATA:  Lactate Dehydrogenase (06/26/2024 15:44)  Comprehensive Metabolic Panel (06/26/2024 15:44)  CBC & Differential (06/26/2024 15:44)    IMAGING:  None reviewed      ASSESSMENT:  This is a 69 y.o. male with a history of chronic lymphocytic leukemia and related autoimmune hemolytic anemia.    *CLL:   Diagnosed 11/26/2015.    Normal FISH panel  Initially received Rituxan for 4 weeks at diagnosis for this and the autoimmune hemolytic anemia.  Given the worsening anemia even though it responded to steroids we needed to treat his CLL particularly given the 70% involvement on his bone marrow biopsy.  We started treating with ibrutinib 420 mg daily, initiated at the end of July 2018. Held around the time of COVID infection; resumed when he completed steroids.  Ibrutinib now discontinued due to cardiac arrest.  1/4/2023: White blood cell count remains normal at 5.57 with 66% lymphocytes  3/22/2023: White blood cell count 12,000 with 80% lymphocytes  CT imaging of the neck chest abdomen and pelvis from 5/17/2023 with enlarging adenopathy throughout the neck chest abdomen and pelvis  5/24/2023: Normal white blood cell count of 10.05 with 72.7% lymphocytes  Initiation of venetoclax on 6/6/2023.  Well-tolerated.  Following labs very closely.  6/13/2023 increased venetoclax to 50 mg daily, so far tolerating quite well.  6/20/2023 increased venetoclax to 100 mg daily  6/27/2023 increase venetoclax to 200 mg daily continuing to tolerate well.  Patient did notice 2 nodular areas, 1 on each hip, is very lateral on the hip.  Advised  him just to continue to closely monitor these areas, as it is nontender, no erythema, or warmth.  7/4/2023 venetoclax increase to 400 mg daily  7/12/2023 Initiate rituximab which will be given every 4 weeks.  Neutropenia noted with WBC 1.93, ANC 0.94.  We will therefore reduce venetoclax to 200 mg daily.  Ongoing neutropenia 7/20/2023, continue venetoclax at a reduced dose of 200 mg daily  Ultimately neutropenia worsened and he required G-CSF with prompt improvement  Venetoclax at 100 mg daily  CT imaging 8/31/2023 with near resolution of lymphadenopathy.  10/4/2023: Blood counts acceptable to continue treatment.  Cycle #4 rituximab today.  11/1/2023: Cycle #5 rituximab.  Blood counts acceptable.  11/30/2023: Final cycle #6 rituximab today.  Plan to continue venetoclax 100 mg daily hereafter with potential to increase to 200 mg daily dosing in the future if he continues to do well and blood counts remained stable.  1/4/2024: Blood counts overall improved.  Increase venetoclax to 200 mg daily.  2/1/2024 ongoing excellent tolerance to venetoclax 200 mg daily.  Counts overall stable with plans to dose escalate to 400 mg daily  4/10/2024: Tolerates venetoclax 400 mg daily well.  Blood counts stable.  6/26/2024: Tolerates venetoclax well.  Stable blood counts with stable leukopenia.    *Neutropenia related to therapy  He received Zarzio 480 mcg 8/4 and 8/5/2023 with prompt improvement in his white blood cell count   Resolved at this point.  Not neutropenic although white blood cell count remains low    *Macrocytic anemia:   He has a history of autoimmune hemolytic anemia when he presented with CLL back in November 2015.  His reticulocyte count was very elevated at that time.  His hemoglobin dropped to as low as about 5.  He responded to steroids initially intravenously and then with prednisone and he was treated with 4 weeks of Rituxan.  He was noted to have worsening anemia.  His reticulocyte count was low.  He  initially did not respond very well to transfusions and he required 4 units of packed red cells  There was no evidence for hemolysis although his ADOLFO is positive for IgG.  This was persistently positive.  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  He responded to steroids.  He discontinued steroids as of 8/22/2018.    5/2/2022: Hemoglobin lower at 7.9 with a high MCV of 102   He required red cell transfusion.  Hemoglobin 12.7    *History of systolic hypertension: Blood pressure acceptable at 134/77 today.    *History of thrombocytopenia:   Platelets normal at 145,000    *COVID-19 pandemic: He has received a total of 3 vaccinations.  Subsequent infection in April 2020.  He received a monoclonal antibody as an outpatient and remdesivir as an inpatient. He was readmitted on 5/2 with hypotension, persistent fevers, persistent COVID-19 positivity on testing. He received IVIG and steroids. He improved and was treated with outpatient Paxlovid.  Symptoms resolved at this point.  Last vaccine 9/21/2022, bivalent.  Cardiac arrest several days after receiving this on 9/24/2022.  He may hold off on getting the new COVID-19 booster shot for now    *Left lower extremity DVT and gastrocnemius vein and right lower extremity superficial thrombophlebitis in the small saphenous vein on bilateral lower extremity venous duplex on 4/15/2022  Associated with COVID-19 infection  Repeat venous duplex 8/1/22 without DVT, only superficial thrombophlebitis    *Out of hospital cardiac arrest on 9/24/2022  Quickly achieved return of spontaneous circulation with CPR and medication  He had [facility] ICD [facility] at the [facility]  Ibrutinib discontinued as a result of this    *Possible zoster of the left upper extremity  Completed Valtrex 1 g 3 times daily x7 days  Now continue prophylactic acyclovir 400 mg twice daily.  Rash is completely resolved    *Erectile dysfunction: Patient referred to urology    *Left knee  osteoarthritis, requiring left total knee arthroplasty 7/11/2024  Needs aggressive DVT prophylaxis.  I believe he will be able to stay on venetoclax throughout.  I will communicate with our pharmacist regarding this.    PLAN:   Continue venetoclax 400 mg daily for now.  Blood counts remain stable and he tolerates this very well.  No contraindication to left total knee arthroplasty from a hematology standpoint.  I will communicate with our pharmacy staff but I believe he will be able to stay on venetoclax throughout.  Needs appropriate DVT prophylaxis with a history of left lower extremity DVT 4/15/2022 associated with COVID-19 infection  Up to 2-year course of therapy planned, through June 2025  Continue acyclovir for prophylaxis.  Continue to follow with [facility] cardiology.  I will see him in approximately 3 months with labs    Patient remains on a high risk medication requiring close monitoring for toxicity.  I again today encouraged him to notify us with any new or concerning issues prior to his scheduled follow-up.    Toñito Cheng MD  06/26/2024

## 2024-06-26 ENCOUNTER — LAB (OUTPATIENT)
Dept: LAB | Facility: HOSPITAL | Age: 69
End: 2024-06-26
Payer: MEDICARE

## 2024-06-26 ENCOUNTER — OFFICE VISIT (OUTPATIENT)
Dept: ONCOLOGY | Facility: CLINIC | Age: 69
End: 2024-06-26
Payer: MEDICARE

## 2024-06-26 VITALS
SYSTOLIC BLOOD PRESSURE: 134 MMHG | RESPIRATION RATE: 19 BRPM | OXYGEN SATURATION: 99 % | TEMPERATURE: 98.1 F | DIASTOLIC BLOOD PRESSURE: 77 MMHG | WEIGHT: 258 LBS | HEART RATE: 58 BPM | BODY MASS INDEX: 37.02 KG/M2

## 2024-06-26 DIAGNOSIS — C91.10 CLL (CHRONIC LYMPHOCYTIC LEUKEMIA): Primary | ICD-10-CM

## 2024-06-26 DIAGNOSIS — C91.10 CLL (CHRONIC LYMPHOCYTIC LEUKEMIA): ICD-10-CM

## 2024-06-26 LAB
ALBUMIN SERPL-MCNC: 4.2 G/DL (ref 3.5–5.2)
ALBUMIN/GLOB SERPL: 2.3 G/DL
ALP SERPL-CCNC: 115 U/L (ref 39–117)
ALT SERPL W P-5'-P-CCNC: 12 U/L (ref 1–41)
ANION GAP SERPL CALCULATED.3IONS-SCNC: 12.2 MMOL/L (ref 5–15)
AST SERPL-CCNC: 27 U/L (ref 1–40)
BASOPHILS # BLD AUTO: 0.01 10*3/MM3 (ref 0–0.2)
BASOPHILS NFR BLD AUTO: 0.3 % (ref 0–1.5)
BILIRUB SERPL-MCNC: 0.8 MG/DL (ref 0–1.2)
BUN SERPL-MCNC: 18 MG/DL (ref 8–23)
BUN/CREAT SERPL: 15.3 (ref 7–25)
CALCIUM SPEC-SCNC: 9.3 MG/DL (ref 8.6–10.5)
CHLORIDE SERPL-SCNC: 105 MMOL/L (ref 98–107)
CO2 SERPL-SCNC: 25.8 MMOL/L (ref 22–29)
CREAT SERPL-MCNC: 1.18 MG/DL (ref 0.76–1.27)
DEPRECATED RDW RBC AUTO: 46.4 FL (ref 37–54)
EGFRCR SERPLBLD CKD-EPI 2021: 66 ML/MIN/1.73
EOSINOPHIL # BLD AUTO: 0.01 10*3/MM3 (ref 0–0.4)
EOSINOPHIL NFR BLD AUTO: 0.3 % (ref 0.3–6.2)
ERYTHROCYTE [DISTWIDTH] IN BLOOD BY AUTOMATED COUNT: 12.4 % (ref 12.3–15.4)
GLOBULIN UR ELPH-MCNC: 1.8 GM/DL
GLUCOSE SERPL-MCNC: 142 MG/DL (ref 65–99)
HCT VFR BLD AUTO: 38.9 % (ref 37.5–51)
HGB BLD-MCNC: 12.7 G/DL (ref 13–17.7)
IMM GRANULOCYTES # BLD AUTO: 0.01 10*3/MM3 (ref 0–0.05)
IMM GRANULOCYTES NFR BLD AUTO: 0.3 % (ref 0–0.5)
LDH SERPL-CCNC: 177 U/L (ref 135–225)
LYMPHOCYTES # BLD AUTO: 0.4 10*3/MM3 (ref 0.7–3.1)
LYMPHOCYTES NFR BLD AUTO: 13.9 % (ref 19.6–45.3)
MCH RBC QN AUTO: 32.8 PG (ref 26.6–33)
MCHC RBC AUTO-ENTMCNC: 32.6 G/DL (ref 31.5–35.7)
MCV RBC AUTO: 100.5 FL (ref 79–97)
MONOCYTES # BLD AUTO: 0.42 10*3/MM3 (ref 0.1–0.9)
MONOCYTES NFR BLD AUTO: 14.6 % (ref 5–12)
NEUTROPHILS NFR BLD AUTO: 2.02 10*3/MM3 (ref 1.7–7)
NEUTROPHILS NFR BLD AUTO: 70.6 % (ref 42.7–76)
NRBC BLD AUTO-RTO: 0 /100 WBC (ref 0–0.2)
PLATELET # BLD AUTO: 145 10*3/MM3 (ref 140–450)
PMV BLD AUTO: 9.8 FL (ref 6–12)
POTASSIUM SERPL-SCNC: 3.8 MMOL/L (ref 3.5–5.2)
PROT SERPL-MCNC: 6 G/DL (ref 6–8.5)
RBC # BLD AUTO: 3.87 10*6/MM3 (ref 4.14–5.8)
SODIUM SERPL-SCNC: 143 MMOL/L (ref 136–145)
WBC NRBC COR # BLD AUTO: 2.87 10*3/MM3 (ref 3.4–10.8)

## 2024-06-26 PROCEDURE — 36415 COLL VENOUS BLD VENIPUNCTURE: CPT

## 2024-06-26 PROCEDURE — 1160F RVW MEDS BY RX/DR IN RCRD: CPT | Performed by: INTERNAL MEDICINE

## 2024-06-26 PROCEDURE — 80053 COMPREHEN METABOLIC PANEL: CPT

## 2024-06-26 PROCEDURE — 1126F AMNT PAIN NOTED NONE PRSNT: CPT | Performed by: INTERNAL MEDICINE

## 2024-06-26 PROCEDURE — 99214 OFFICE O/P EST MOD 30 MIN: CPT | Performed by: INTERNAL MEDICINE

## 2024-06-26 PROCEDURE — 85025 COMPLETE CBC W/AUTO DIFF WBC: CPT

## 2024-06-26 PROCEDURE — 83615 LACTATE (LD) (LDH) ENZYME: CPT

## 2024-06-26 PROCEDURE — 1159F MED LIST DOCD IN RCRD: CPT | Performed by: INTERNAL MEDICINE

## 2024-06-26 PROCEDURE — 3078F DIAST BP <80 MM HG: CPT | Performed by: INTERNAL MEDICINE

## 2024-06-26 PROCEDURE — 3075F SYST BP GE 130 - 139MM HG: CPT | Performed by: INTERNAL MEDICINE

## 2024-06-27 ENCOUNTER — SPECIALTY PHARMACY (OUTPATIENT)
Dept: PHARMACY | Facility: HOSPITAL | Age: 69
End: 2024-06-27
Payer: MEDICARE

## 2024-06-27 NOTE — PROGRESS NOTES
Specialty Pharmacy Note: Venclexta (venetoclax)    Aida Bal Jr. is a 69 y.o. male with CLL was seen 6/26/24 by Dr. Cheng. Per provider dictation, no changes to oral oncology regimen Venclexta (venetoclax).  Labs Review: The CMP and CBC from 6/26/24 have been reviewed. No dose adjustments are needed for the oral specialty medication(s) based on the labs.    Specialty pharmacy will continue to follow patient.    Marianela Romeo, PharmD, BCPS  6/27/2024  11:22 EDT

## 2024-07-08 ENCOUNTER — SPECIALTY PHARMACY (OUTPATIENT)
Dept: PHARMACY | Facility: HOSPITAL | Age: 69
End: 2024-07-08
Payer: MEDICARE

## 2024-07-08 NOTE — PROGRESS NOTES
Specialty Pharmacy Refill Coordination Note     Aida is a 69 y.o. male contacted today regarding refills of Venclexta specialty medication(s).    Reviewed and verified with patient:       Specialty medication(s) and dose(s) confirmed: yes  Venclexta 100 mg tabs-4 tabs daily    Refill Questions      Flowsheet Row Most Recent Value   Changes to allergies? No   Changes to medications? No   New conditions or infections since last clinic visit No   Unplanned office visit, urgent care, ED, or hospital admission in the last 4 weeks  No  [Pt is scheduled for knee replacement on  7/11/2024]   How does patient/caregiver feel medication is working? Good   Financial problems or insurance changes  No   Since the previous refill, were any specialty medication doses or scheduled injections missed or delayed?  No   Does this patient require a clinical escalation to a pharmacist? No            Delivery Questions      Flowsheet Row Most Recent Value   Delivery method Beeline   Delivery address verified with patient/caregiver? Yes  [Ship to home address]   Delivery address Home  [Ship to home address-Ship 7/9 for delivery 7/10-$0 copay with insurance covering 100%-Address Confirmed]   Number of medications in delivery 1   Medication(s) being filled and delivered Venetoclax   Doses left of specialty medications 10 days   Copay verified? Yes   Copay amount $0 copay with insurance covering 100%   Copay form of payment No copayment ($0)   Ship Date 7/9/2024   Delivery Date 7/10/2024   Signature Required No          Venclexta delivery coordinated with pt for 7/10/2024 to his home address. $0 copay with insurance covering 100%. His last delivery was on 6/11/2024. No questions or concerns to report to MTM Team today. Pt will be having knee replacement surgery on 7/11/2024.     Follow-up: 21 day(s)     Haylee Montanez, Pharmacy Technician  Specialty Pharmacy Technician

## 2024-07-23 ENCOUNTER — SPECIALTY PHARMACY (OUTPATIENT)
Dept: PHARMACY | Facility: HOSPITAL | Age: 69
End: 2024-07-23
Payer: MEDICARE

## 2024-07-23 NOTE — PROGRESS NOTES
Specialty Pharmacy Patient Management Program  Oncology 6-Month Clinical Assessment       Aida Bal Jr. is a 69 y.o. male with CLL called today to assess adherence and side effects.    Regimen: Venclexta (venetoclax) 400 mg by mouth once daily    Reason for Outreach: Routine medication check-in .       Problem list reviewed by Yamil Montenegro, Pharmacy Intern on 7/23/2024 at 10:18 AM  Medicines reviewed by Yamil Montenegro, Pharmacy Intern on 7/23/2024 at 10:17 AM     Goals Addressed Today    None       Medication Assessment:  Medication Assessment  Side Effects: none reported  Administration: Patient is taking every day at the same time .   Patient can self administer medications: yes  Medication Follow-Up Plan: Next clinical assessment  Lab Review: The labs listed below have been reviewed. No dose adjustments are needed for the oral specialty medication(s) based on the labs.    Lab Results   Component Value Date    GLUCOSE 142 (H) 06/26/2024    CALCIUM 9.3 06/26/2024     06/26/2024    K 3.8 06/26/2024    CO2 25.8 06/26/2024     06/26/2024    BUN 18 06/26/2024    CREATININE 1.18 06/26/2024    EGFRIFAFRI 75 01/22/2022    EGFRIFNONA 66 05/19/2016    BCR 15.3 06/26/2024    ANIONGAP 12.2 06/26/2024     Lab Results   Component Value Date    WBC 2.87 (L) 06/26/2024    RBC 3.87 (L) 06/26/2024    HGB 12.7 (L) 06/26/2024    HCT 38.9 06/26/2024    .5 (H) 06/26/2024    MCH 32.8 06/26/2024    MCHC 32.6 06/26/2024    RDW 12.4 06/26/2024    RDWSD 46.4 06/26/2024    MPV 9.8 06/26/2024     06/26/2024    NEUTRORELPCT 70.6 06/26/2024    LYMPHORELPCT 13.9 (L) 06/26/2024    MONORELPCT 14.6 (H) 06/26/2024    EOSRELPCT 0.3 06/26/2024    BASORELPCT 0.3 06/26/2024    AUTOIGPER 0.3 06/26/2024    NEUTROABS 2.02 06/26/2024    LYMPHSABS 0.40 (L) 06/26/2024    MONOSABS 0.42 06/26/2024    EOSABS 0.01 06/26/2024    BASOSABS 0.01 06/26/2024    AUTOIGNUM 0.01 06/26/2024    NRBC 0.0 06/26/2024     Drug-drug  interactions  Completed medication reconciliation today to assess for drug interactions. Patient denies starting or stopping any medications.    Assessed medication list for interactions, no significant drug interactions noted.   Advised patient to call the clinic if any new medications are started so we can assess for drug-drug interactions.  Drug-food interactions discussed:  none  Vaccines are coordinated by the patient's oncologist and primary care provider.    Allergies  Known allergies and reactions were discussed with the patient. The patient's chart has been reviewed for allergy information and updated as necessary.   Allergies   Allergen Reactions    Ace Inhibitors Unknown - Low Severity    Candesartan Unknown - Low Severity    Pravastatin Unknown - Low Severity     Annotation - [id number]: medication caused side effects but he is able to other statins ie Crestor        Hospitalizations and Urgent Care Visits Since Last Assessment:  Unplanned hospitalizations or inpatient admissions: none  ED Visits: none  Urgent Office Visits: none    Adherence Assessment:  Patient reports 1 missed dose in the past month due to a knee replacement surgery on July 11th    Quality of Life Assessment:     -- Quality of Life: 5/10    Financial Assessment:  Medication availability/affordability: Patient has had no issues obtaining medication from pharmacy.    Attestation     I attest the patient was actively involved in and has agreed to the above plan of care.  If the prescribed therapy is at any point deemed not appropriate based on the current or future assessments, a consultation will be initiated with the patient's specialty care provider to determine the best course of action. The revised plan of therapy will be documented along with any required assessments and/or additional patient education provided.       All questions addressed and patient had no additional concerns. Patient has pharmacy contact  information.    Name/Credentials: Yamil Montenegro, Pharmacy Intern    7/23/2024  10:18 EDT

## 2024-08-02 ENCOUNTER — SPECIALTY PHARMACY (OUTPATIENT)
Dept: PHARMACY | Facility: HOSPITAL | Age: 69
End: 2024-08-02
Payer: MEDICARE

## 2024-08-02 NOTE — PROGRESS NOTES
Pt has 15 days of Venclexta on hand. He requested delivery on 8/13/2024. Task moved to 8/6/2024.     Haylee Montanez, Pharmacy Technician  Specialty Pharmacy Technician

## 2024-08-06 ENCOUNTER — SPECIALTY PHARMACY (OUTPATIENT)
Dept: PHARMACY | Facility: HOSPITAL | Age: 69
End: 2024-08-06
Payer: MEDICARE

## 2024-08-06 NOTE — PROGRESS NOTES
Specialty Pharmacy Refill Coordination Note     Aida is a 69 y.o. male contacted today regarding refills of Venclexta specialty medication(s).    Reviewed and verified with patient:       Specialty medication(s) and dose(s) confirmed: yes  Venclexta 100 mg tabs-4 tabs daily.    Refill Questions      Flowsheet Row Most Recent Value   Changes to allergies? No   Changes to medications? No   New conditions or infections since last clinic visit No  [Pt recovering from knee replacement surgery]   Unplanned office visit, urgent care, ED, or hospital admission in the last 4 weeks  No   How does patient/caregiver feel medication is working? Good   Financial problems or insurance changes  No   Since the previous refill, were any specialty medication doses or scheduled injections missed or delayed?  No   Does this patient require a clinical escalation to a pharmacist? No            Delivery Questions      Flowsheet Row Most Recent Value   Delivery method Beeline   Delivery address verified with patient/caregiver? Yes  [Ship to home address]   Delivery address Home  [Ship to home address-Ship 8/12 for delivery 8/13-$0 copay with insurance covering 100%-Address Confirmed]   Number of medications in delivery 1   Medication(s) being filled and delivered Venetoclax   Doses left of specialty medications 10 days   Copay verified? Yes   Copay amount $0 copay with insurance covering 100%   Copay form of payment No copayment ($0)   Ship Date 8/12/2024   Delivery Date 8/13/2024   Signature Required No          Venclexta  delivery coordinated with pt for 8/13/2024 to his home address. $0 copay with insurance covering 100%. His last delivery was on 7/10/2024. No questions or concerns to report to MTM Team today.     Follow-up: 21 day(s)     Haylee Montanez, Pharmacy Technician  Specialty Pharmacy Technician

## 2024-09-06 ENCOUNTER — SPECIALTY PHARMACY (OUTPATIENT)
Dept: PHARMACY | Facility: HOSPITAL | Age: 69
End: 2024-09-06
Payer: MEDICARE

## 2024-09-06 NOTE — PROGRESS NOTES
Specialty Pharmacy Patient Management Program  Oncology / Hematology Refill Outreach      Aida was contacted today regarding refills of his Venclexta specialty medication(s).    Specialty medication(s) and dose(s) confirmed: Yes  Venclexta 100 mg tabs-4 tabs daily    Refill Questions      Flowsheet Row Most Recent Value   Changes to allergies? No   Changes to medications? No   New conditions or infections since last clinic visit No   Unplanned office visit, urgent care, ED, or hospital admission in the last 4 weeks  No   How does patient/caregiver feel medication is working? Good   Financial problems or insurance changes  No   Since the previous refill, were any specialty medication doses or scheduled injections missed or delayed?  No   Does this patient require a clinical escalation to a pharmacist? No          Delivery Questions      Flowsheet Row Most Recent Value   Delivery method UPS   Delivery address verified with patient/caregiver? Yes  [Ship to home address]   Delivery address Home  [Ship to home address-Ship 9/9 for delivery 9/10-$0 copay with insurance covering 100%-Address Confirmed]   Number of medications in delivery 1   Medication(s) being filled and delivered Venetoclax   Doses left of specialty medications 9 days   Copay verified? Yes   Copay amount $0 copay with insurance covering 100%   Copay form of payment No copayment ($0)   Ship Date 9/9/2024   Delivery Date 9/10/2024   Signature Required No          Venclexta delivery coordinated with pt for 9/10/2024 to his home address. $0 copay with insurance covering 100%. His last delivery was on 8/13/2024. No questions or concerns to report to MTM Team today.    Follow-Up: 21 Days    Haylee Montanez, Pharmacy Technician  9/6/2024  13:51 EDT

## 2024-09-24 ENCOUNTER — SPECIALTY PHARMACY (OUTPATIENT)
Dept: PHARMACY | Facility: HOSPITAL | Age: 69
End: 2024-09-24
Payer: MEDICARE

## 2024-10-03 NOTE — PROGRESS NOTES
[facility] GROUP OUTPATIENT FOLLOW UP CLINIC VISIT    REASON FOR FOLLOW-UP:    1.  Chronic lymphocytic leukemia diagnosed in November 2015.  CLL FISH panel negative at that time.    2.  Autoimmune hemolytic anemia associated with CLL.  He was treated with steroids and he received 4 weeks of weekly Rituxan.  Otherwise no treatment has been performed  3.  CT imaging of the chest abdomen and pelvis from 4/25/2018 showed lymphadenopathy with mildly enlarged retroperitoneal and mesenteric lymph nodes with the largest measuring about 3.2 cm.  The spleen measured 13.6 cm.  Slight increase in lymphadenopathy compared with 11/27/2015.  4.  Acute anemia with a hemoglobin of 6.9 as of 6/20/2018.  Low reticulocyte count.  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  Prednisone initiated.  Erythropoietin 73.9.  Parvovirus B19 PCR negative.  Steroids complete on 8/22/2018.  5.  Bone marrow biopsy on 7/3/2018 with 70% CLL (87% by flow cytometry) with a t(14;18) translocation by cytogenetics, 3 copies of IGH in 87.5% of cells but negative for CCND1/IGH rearrangement.  Negative for all other rearrangements/deletions.  IgVH somatic hypermutation was not detected.            6.  Therapy with ibrutinib initiated at the end of July 2018.  Discontinued in late 2022 due to cardiac arrest.  7.  Subsequent treatment with rituximab and venetoclax.  Venetoclax continues      HISTORY OF PRESENT ILLNESS:  Aida Bal Jr. is a 69 y.o. male with the above-mentioned history today for lab review and evaluation.      He had a left knee replacement.  Rehabilitation from this has been difficult but he is nearing the end and his knee is feeling much better at this point.  He is able to be much more active now compared to before his knee replacement.  No fevers or chills.  He tolerates venetoclax well.      REVIEW OF SYSTEMS:  As per the HPI    PHYSICAL EXAMINATION:  Vitals:    10/17/24 0952   BP: 130/69   Pulse: 67   Temp: 97.8 °F (36.6 °C)  "  TempSrc: Oral   SpO2: 98%   Weight: 109 kg (241 lb 3.2 oz)   Height: 180.3 cm (71\")   PainSc: 0-No pain         General:  No acute distress, awake, alert and oriented  Skin:  Warm and dry, no visible rash  HEENT:  Normocephalic/atraumatic.    Chest:  Normal respiratory effort.  Lungs clear to auscultation bilaterally.  ICD remains in place.  Heart: Regular rate and rhythm  Extremities: Trace left lower extremity edema status post left knee replacement  Lymphatics: No palpable cervical supraclavicular adenopathy on exam today  Neuro/psych:  Grossly nonfocal.  Normal mood and affect.    DIAGNOSTIC DATA:  Lactate Dehydrogenase (10/17/2024 09:30)  Comprehensive Metabolic Panel (10/17/2024 09:30)  CBC & Differential (10/17/2024 09:30)    IMAGING:  None reviewed      ASSESSMENT:  This is a 69 y.o. male with a history of chronic lymphocytic leukemia and related autoimmune hemolytic anemia.    *CLL:   Diagnosed 11/26/2015.    Normal FISH panel  Initially received Rituxan for 4 weeks at diagnosis for this and the autoimmune hemolytic anemia.  Given the worsening anemia even though it responded to steroids we needed to treat his CLL particularly given the 70% involvement on his bone marrow biopsy.  We started treating with ibrutinib 420 mg daily, initiated at the end of July 2018. Held around the time of COVID infection; resumed when he completed steroids.  Ibrutinib now discontinued due to cardiac arrest.  1/4/2023: White blood cell count remains normal at 5.57 with 66% lymphocytes  3/22/2023: White blood cell count 12,000 with 80% lymphocytes  CT imaging of the neck chest abdomen and pelvis from 5/17/2023 with enlarging adenopathy throughout the neck chest abdomen and pelvis  5/24/2023: Normal white blood cell count of 10.05 with 72.7% lymphocytes  Initiation of venetoclax on 6/6/2023.  Well-tolerated.  Following labs very closely.  6/13/2023 increased venetoclax to 50 mg daily, so far tolerating quite well.  6/20/2023 " increased venetoclax to 100 mg daily  6/27/2023 increase venetoclax to 200 mg daily continuing to tolerate well.  Patient did notice 2 nodular areas, 1 on each hip, is very lateral on the hip.  Advised him just to continue to closely monitor these areas, as it is nontender, no erythema, or warmth.  7/4/2023 venetoclax increase to 400 mg daily  7/12/2023 Initiate rituximab which will be given every 4 weeks.  Neutropenia noted with WBC 1.93, ANC 0.94.  We will therefore reduce venetoclax to 200 mg daily.  Ongoing neutropenia 7/20/2023, continue venetoclax at a reduced dose of 200 mg daily  Ultimately neutropenia worsened and he required G-CSF with prompt improvement  Venetoclax at 100 mg daily  CT imaging 8/31/2023 with near resolution of lymphadenopathy.  10/4/2023: Blood counts acceptable to continue treatment.  Cycle #4 rituximab today.  11/1/2023: Cycle #5 rituximab.  Blood counts acceptable.  11/30/2023: Final cycle #6 rituximab today.  Plan to continue venetoclax 100 mg daily hereafter with potential to increase to 200 mg daily dosing in the future if he continues to do well and blood counts remained stable.  1/4/2024: Blood counts overall improved.  Increase venetoclax to 200 mg daily.  2/1/2024 ongoing excellent tolerance to venetoclax 200 mg daily.  Counts overall stable with plans to dose escalate to 400 mg daily  4/10/2024: Tolerates venetoclax 400 mg daily well.  Blood counts stable.  6/26/2024: Tolerates venetoclax well.  Stable blood counts with stable leukopenia.  10/17/2024: He continues to tolerate venetoclax very well.  White blood cell count is a little bit lower.  ANC remains acceptable to continue therapy.  No adjustments at this time.    *Neutropenia related to therapy  He received Zarzio 480 mcg 8/4 and 8/5/2023 with prompt improvement in his white blood cell count   10/17/2024: White blood cell count 2.24, ANC 1.54    *Macrocytic anemia:   He has a history of autoimmune hemolytic anemia when he  presented with CLL back in November 2015.  His reticulocyte count was very elevated at that time.  His hemoglobin dropped to as low as about 5.  He responded to steroids initially intravenously and then with prednisone and he was treated with 4 weeks of Rituxan.  He was noted to have worsening anemia.  His reticulocyte count was low.  He initially did not respond very well to transfusions and he required 4 units of packed red cells  There was no evidence for hemolysis although his ADOLFO is positive for IgG.  This was persistently positive.  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  He responded to steroids.  He discontinued steroids as of 8/22/2018.    5/2/2022: Hemoglobin lower at 7.9 with a high MCV of 102   He required red cell transfusion.  Hemoglobin 11.5    *History of systolic hypertension: Blood pressure acceptable at 130/69 today.    *History of thrombocytopenia:   Platelets normal at 149,000    *COVID-19 pandemic: He has received a total of 3 vaccinations.  Subsequent infection in April 2020.  He received a monoclonal antibody as an outpatient and remdesivir as an inpatient. He was readmitted on 5/2 with hypotension, persistent fevers, persistent COVID-19 positivity on testing. He received IVIG and steroids. He improved and was treated with outpatient Paxlovid.  Symptoms resolved at this point.  Last vaccine 9/21/2022, bivalent.  Cardiac arrest several days after receiving this on 9/24/2022.  No further vaccines planned    *Left lower extremity DVT and gastrocnemius vein and right lower extremity superficial thrombophlebitis in the small saphenous vein on bilateral lower extremity venous duplex on 4/15/2022  Associated with COVID-19 infection  Repeat venous duplex 8/1/22 without DVT, only superficial thrombophlebitis    *Out of hospital cardiac arrest on 9/24/2022  Quickly achieved return of spontaneous circulation with CPR and medication  He had an ICD placed at the [facility]  Ibrutinib discontinued as a result of this    *Possible zoster of the left upper extremity  Completed Valtrex 1 g 3 times daily x7 days  Now continue prophylactic acyclovir 400 mg twice daily.  Rash is completely resolved    *Erectile dysfunction: Patient referred to urology    *Left knee osteoarthritis, status post left total knee arthroplasty 7/11/2024  Difficult rehabilitation but doing much much better at this point    PLAN:   Continue venetoclax 400 mg daily for now.  Blood counts remain stable and he tolerates this very well.  No dose adjustments necessary at this time.  Up to 2-year course of venetoclax planned, through June 2025  Continue acyclovir for prophylaxis.  Continue to follow with [facility] cardiology.  Follow-up in 3 months with labs.  We are certainly available sooner if needed.    Patient remains on a high risk medication requiring close monitoring for toxicity.     Toñito Cheng MD  10/17/2024

## 2024-10-07 ENCOUNTER — SPECIALTY PHARMACY (OUTPATIENT)
Dept: PHARMACY | Facility: HOSPITAL | Age: 69
End: 2024-10-07
Payer: MEDICARE

## 2024-10-07 NOTE — PROGRESS NOTES
Specialty Pharmacy Patient Management Program  Oncology / Hematology Refill Outreach      Aida was contacted today regarding refills of his Venclexta specialty medication(s).    Specialty medication(s) and dose(s) confirmed: Yes  Venclexta 100 mg tabs-4 tabs daily.    Refill Questions      Flowsheet Row Most Recent Value   Changes to allergies? No   Changes to medications? No   New conditions or infections since last clinic visit No   Unplanned office visit, urgent care, ED, or hospital admission in the last 4 weeks  No   How does patient/caregiver feel medication is working? Good   Financial problems or insurance changes  No   Since the previous refill, were any specialty medication doses or scheduled injections missed or delayed?  No   Does this patient require a clinical escalation to a pharmacist? No          Delivery Questions      Flowsheet Row Most Recent Value   Delivery method UPS   Delivery address verified with patient/caregiver? Yes  [Ship to home address]   Delivery address Home  [Ship to home address-Ship 10/10 for delivery 10/11-$0 copay with insurance covering 100%-Address Confirmed]   Number of medications in delivery 1   Medication(s) being filled and delivered Venetoclax   Doses left of specialty medications 9 days   Copay verified? Yes   Copay amount $0 copay with insurance covering 100%   Copay form of payment No copayment ($0)   Ship Date 10/10/2024   Delivery Date 10/11/2024   Signature Required No          Venclexta delivery coordinated with pt for 10/11/2024 to his home address. $0 copay with insurance covering 100%. His last delivery was on 9/10/2024. No questions or concerns to report to MTM Team today. He reports no missed doses since last delivery. PDC is 78%. Pt has had some dose adjustments since starting treatment but continues to be adherent with taking his medication.    Follow-Up: 21 Days    Haylee Montanez, Pharmacy Technician  10/7/2024  12:35 EDT

## 2024-10-17 ENCOUNTER — OFFICE VISIT (OUTPATIENT)
Dept: ONCOLOGY | Facility: CLINIC | Age: 69
End: 2024-10-17
Payer: MEDICARE

## 2024-10-17 ENCOUNTER — LAB (OUTPATIENT)
Dept: LAB | Facility: HOSPITAL | Age: 69
End: 2024-10-17
Payer: MEDICARE

## 2024-10-17 ENCOUNTER — SPECIALTY PHARMACY (OUTPATIENT)
Dept: ONCOLOGY | Facility: HOSPITAL | Age: 69
End: 2024-10-17
Payer: MEDICARE

## 2024-10-17 VITALS
BODY MASS INDEX: 33.77 KG/M2 | DIASTOLIC BLOOD PRESSURE: 69 MMHG | WEIGHT: 241.2 LBS | HEIGHT: 71 IN | HEART RATE: 67 BPM | SYSTOLIC BLOOD PRESSURE: 130 MMHG | OXYGEN SATURATION: 98 % | TEMPERATURE: 97.8 F

## 2024-10-17 DIAGNOSIS — C91.10 CLL (CHRONIC LYMPHOCYTIC LEUKEMIA): ICD-10-CM

## 2024-10-17 DIAGNOSIS — C91.10 CLL (CHRONIC LYMPHOCYTIC LEUKEMIA): Primary | ICD-10-CM

## 2024-10-17 LAB
ALBUMIN SERPL-MCNC: 4.2 G/DL (ref 3.5–5.2)
ALBUMIN/GLOB SERPL: 2.5 G/DL
ALP SERPL-CCNC: 127 U/L (ref 39–117)
ALT SERPL W P-5'-P-CCNC: 13 U/L (ref 1–41)
ANION GAP SERPL CALCULATED.3IONS-SCNC: 10.9 MMOL/L (ref 5–15)
AST SERPL-CCNC: 17 U/L (ref 1–40)
BASOPHILS # BLD AUTO: 0.01 10*3/MM3 (ref 0–0.2)
BASOPHILS NFR BLD AUTO: 0.4 % (ref 0–1.5)
BILIRUB SERPL-MCNC: 0.8 MG/DL (ref 0–1.2)
BUN SERPL-MCNC: 11 MG/DL (ref 8–23)
BUN/CREAT SERPL: 10 (ref 7–25)
CALCIUM SPEC-SCNC: 9 MG/DL (ref 8.6–10.5)
CHLORIDE SERPL-SCNC: 104 MMOL/L (ref 98–107)
CO2 SERPL-SCNC: 26.1 MMOL/L (ref 22–29)
CREAT SERPL-MCNC: 1.1 MG/DL (ref 0.76–1.27)
DEPRECATED RDW RBC AUTO: 49.8 FL (ref 37–54)
EGFRCR SERPLBLD CKD-EPI 2021: 71.8 ML/MIN/1.73
EOSINOPHIL # BLD AUTO: 0.01 10*3/MM3 (ref 0–0.4)
EOSINOPHIL NFR BLD AUTO: 0.4 % (ref 0.3–6.2)
ERYTHROCYTE [DISTWIDTH] IN BLOOD BY AUTOMATED COUNT: 13.2 % (ref 12.3–15.4)
GLOBULIN UR ELPH-MCNC: 1.7 GM/DL
GLUCOSE SERPL-MCNC: 146 MG/DL (ref 65–99)
HCT VFR BLD AUTO: 37.2 % (ref 37.5–51)
HGB BLD-MCNC: 11.5 G/DL (ref 13–17.7)
IMM GRANULOCYTES # BLD AUTO: 0 10*3/MM3 (ref 0–0.05)
IMM GRANULOCYTES NFR BLD AUTO: 0 % (ref 0–0.5)
LDH SERPL-CCNC: 176 U/L (ref 135–225)
LYMPHOCYTES # BLD AUTO: 0.28 10*3/MM3 (ref 0.7–3.1)
LYMPHOCYTES NFR BLD AUTO: 12.5 % (ref 19.6–45.3)
MCH RBC QN AUTO: 31.8 PG (ref 26.6–33)
MCHC RBC AUTO-ENTMCNC: 30.9 G/DL (ref 31.5–35.7)
MCV RBC AUTO: 102.8 FL (ref 79–97)
MONOCYTES # BLD AUTO: 0.4 10*3/MM3 (ref 0.1–0.9)
MONOCYTES NFR BLD AUTO: 17.9 % (ref 5–12)
NEUTROPHILS NFR BLD AUTO: 1.54 10*3/MM3 (ref 1.7–7)
NEUTROPHILS NFR BLD AUTO: 68.8 % (ref 42.7–76)
NRBC BLD AUTO-RTO: 0 /100 WBC (ref 0–0.2)
PLATELET # BLD AUTO: 149 10*3/MM3 (ref 140–450)
PMV BLD AUTO: 9.9 FL (ref 6–12)
POTASSIUM SERPL-SCNC: 3.7 MMOL/L (ref 3.5–5.2)
PROT SERPL-MCNC: 5.9 G/DL (ref 6–8.5)
RBC # BLD AUTO: 3.62 10*6/MM3 (ref 4.14–5.8)
SODIUM SERPL-SCNC: 141 MMOL/L (ref 136–145)
WBC NRBC COR # BLD AUTO: 2.24 10*3/MM3 (ref 3.4–10.8)

## 2024-10-17 PROCEDURE — 80053 COMPREHEN METABOLIC PANEL: CPT

## 2024-10-17 PROCEDURE — 83615 LACTATE (LD) (LDH) ENZYME: CPT

## 2024-10-17 PROCEDURE — 36415 COLL VENOUS BLD VENIPUNCTURE: CPT

## 2024-10-17 PROCEDURE — 85025 COMPLETE CBC W/AUTO DIFF WBC: CPT

## 2024-10-17 NOTE — PROGRESS NOTES
MTM Encounter-Re: Adherence and side effects (venetoclax)    Today's encounter was conducted in person, face-to-face.     Medication:  venetoclax 400 mg po daily  - Reason for outreach: Routine medication check-in .  - Administration: Patient is taking every day at the same time .  - Missed doses: Patient reports missing 0 doses in the last 30 days.  - Self-administration: Patient demonstrates ability to self-administer medication. No barriers to adherence identified.   - Diagnosis/Indication: CLL. Progress toward achieving therapeutic goals reviewed.   - Patient denies side effects.    - Medication availability/affordability: Patient has had no issues obtaining medication from pharmacy.   - Questions/concerns about medications: none       Completed medication reconciliation today to assess for drug interactions.   Reviewed allergies, medical history, labs, quality of life, and medication history with patient.   Patient denies starting or stopping any medications.  Assessed medication list for interactions, no significant drug interactions noted.   Advised pt to call the clinic if any new medications are started so we can assess for drug-drug interactions.     All questions addressed. Patient had no additional concerns for MTM office.     Marianela Romeo RPH  10/17/2024  10:04 EDT   
Spine appears normal, movement of extremities grossly intact.

## 2024-10-18 ENCOUNTER — SPECIALTY PHARMACY (OUTPATIENT)
Dept: PHARMACY | Facility: HOSPITAL | Age: 69
End: 2024-10-18
Payer: MEDICARE

## 2024-10-18 NOTE — PROGRESS NOTES
Specialty Pharmacy Note: Venclexta (venetoclax)    Aida Bal Jr. is a 69 y.o. male with CLL was seen 10/17/24 by Dr. Cheng. Per provider dictation, no changes to oral oncology regimen Venclexta (venetoclax) 400 mg po daily.  Labs Review: The CMP and CBC from 10/17/24 have been reviewed. No dose adjustments are needed for the oral specialty medication(s) based on the labs.    Specialty pharmacy will continue to follow patient.    Venita Patel Rph, RISHABH  10/18/2024  15:41 EDT

## 2024-11-04 ENCOUNTER — SPECIALTY PHARMACY (OUTPATIENT)
Dept: PHARMACY | Facility: HOSPITAL | Age: 69
End: 2024-11-04
Payer: MEDICARE

## 2024-11-04 NOTE — PROGRESS NOTES
Specialty Pharmacy Patient Management Program  Oncology / Hematology Refill Outreach      Aida was contacted today regarding refills of his Venclexta specialty medication(s).    Specialty medication(s) and dose(s) confirmed: Yes  Venclexta 100 mg tabs-4 tabs daily.    Refill Questions      Flowsheet Row Most Recent Value   Changes to allergies? No   Changes to medications? No   New conditions or infections since last clinic visit No   Unplanned office visit, urgent care, ED, or hospital admission in the last 4 weeks  No   How does patient/caregiver feel medication is working? Good   Financial problems or insurance changes  No   Since the previous refill, were any specialty medication doses or scheduled injections missed or delayed?  No   Does this patient require a clinical escalation to a pharmacist? No          Delivery Questions      Flowsheet Row Most Recent Value   Delivery method UPS   Delivery address verified with patient/caregiver? Yes  [Ship to home address]   Delivery address Home  [Ship to home address-Psychiatric 11/11 for xavier 11/12-$0 copay with insurance covering 100%-Address Confirmed]   Number of medications in delivery 1   Medication(s) being filled and delivered Venetoclax (VENCLEXTA)   Doses left of specialty medications 10-24 days   Copay verified? Yes   Copay amount $0 copay with insurance covering 100%   Copay form of payment No copayment ($0)   Ship Date 11/11/2024   Delivery Date 11/12/2024   Signature Required No          Venclexta delivery coordinated with pt for 11/12/2024 to his home address. $0 copay with insurance covering 100%. His last delivery was on 10/11/2024. No questions or concerns to report to MTM Team today. He reports no missed doses since last delivery. PDC is 86%.    Follow-Up: 21 Days    Haylee Montanez, Pharmacy Technician  11/4/2024  15:53 EST

## 2024-12-06 ENCOUNTER — SPECIALTY PHARMACY (OUTPATIENT)
Dept: PHARMACY | Facility: HOSPITAL | Age: 69
End: 2024-12-06
Payer: MEDICARE

## 2024-12-06 DIAGNOSIS — C91.10 CLL (CHRONIC LYMPHOCYTIC LEUKEMIA): ICD-10-CM

## 2024-12-06 NOTE — TELEPHONE ENCOUNTER
Specialty Pharmacy Patient Management Program  Per Protocol Prescription Order or Refill     Patient will be filling or currently fills medications at [facility] and is enrolled in the Patient Management Program.    Requested Prescriptions     Pending Prescriptions Disp Refills    Venetoclax (Venclexta) 100 MG chemo tablet 120 tablet 5     Sig: Take 4 tablets by mouth Daily.     Prescription orders above were sent to the pharmacy per Collaborative Care Agreement Protocol.     Last Office Visit: 10/17/24  Next Office Visit: 1/28/25    Eric Ghosh PharmD, BCOP  Clinical Specialty Pharmacist, Oncology  12/6/2024  13:02 EST

## 2024-12-06 NOTE — PROGRESS NOTES
Specialty Pharmacy Patient Management Program  Oncology / Hematology Refill Outreach      Aida was contacted today regarding refills of his Venclexta specialty medication(s).    Specialty medication(s) and dose(s) confirmed: Yes  Venclexta 100 mg tabs-4 tabs daily.    Refill Questions      Flowsheet Row Most Recent Value   Changes to allergies? No   Changes to medications? No   New conditions or infections since last clinic visit No   Unplanned office visit, urgent care, ED, or hospital admission in the last 4 weeks  No   How does patient/caregiver feel medication is working? Good   Financial problems or insurance changes  No  [No insurance changes for 2025 and pt still has a [employer] john he can use for copay in January]   Since the previous refill, were any specialty medication doses or scheduled injections missed or delayed?  No   Does this patient require a clinical escalation to a pharmacist? No          Delivery Questions      Flowsheet Row Most Recent Value   Delivery method UPS   Delivery address verified with patient/caregiver? Yes  [Ship to home address]   Delivery address Home  [Ship to home address-Ship 12/9 for delivery 12/10-$0 copay with insurance covering 100%-Address Confirmed]   Number of medications in delivery 1   Medication(s) being filled and delivered Venetoclax (VENCLEXTA)   Doses left of specialty medications 7-10 days   Copay verified? Yes   Copay amount $0 copay with insurance covering 100%   Copay form of payment No copayment ($0)   Ship Date 12/9/2024   Delivery Date 12/10/2024   Signature Required No          Venclexta delivery coordinated with pt for 12/10/2024 to his home address. $0 copay with insurance covering 100%. His last delivery was on 11/12/2024. No questions or concerns to report to MTM Team today. He reports no missed doses since last delivery. PDC is 95%.    Follow-Up: 21 Days    Haylee Montanez, Pharmacy Technician  12/6/2024  13:31 EST

## 2024-12-06 NOTE — TELEPHONE ENCOUNTER
Specialty Pharmacy Patient Management Program  Per Protocol Prescription Order or Refill       Requested Prescriptions     Signed Prescriptions Disp Refills    Venetoclax (Venclexta) 100 MG chemo tablet 120 tablet 5     Sig: Take 4 tablets by mouth Daily.     Authorizing Provider: LO BLANCO     Ordering User: ALVARO KIM     Prescription orders above were sent to [facility] per Collaborative Care Agreement Protocol.     Last office visit: 10/17/24  Next office visit: 1/28/25    Completed independent double check on medication order/RX.    Kari DYSON, PharmD  Clinical Specialty Pharmacist, Oncology/Hematology  12/6/2024  13:22 EST    18

## 2025-01-07 ENCOUNTER — SPECIALTY PHARMACY (OUTPATIENT)
Dept: PHARMACY | Facility: HOSPITAL | Age: 70
End: 2025-01-07
Payer: MEDICARE

## 2025-01-07 NOTE — PROGRESS NOTES
Specialty Pharmacy Patient Management Program  Oncology / Hematology Refill Outreach      ADDENDUM-Delivery delayed due to weather-Next available delivery date is 1/15/2025    Aida was contacted today regarding refills of his Venclexta specialty medication(s).    Specialty medication(s) and dose(s) confirmed: Yes  Venclexta 100 mg tabs-4 tabs daily.    Refill Questions      Flowsheet Row Most Recent Value   Changes to allergies? No   Changes to medications? No   New conditions or infections since last clinic visit No   Unplanned office visit, urgent care, ED, or hospital admission in the last 4 weeks  No   How does patient/caregiver feel medication is working? Good   Financial problems or insurance changes  No   Since the previous refill, were any specialty medication doses or scheduled injections missed or delayed?  No   Does this patient require a clinical escalation to a pharmacist? No          Delivery Questions      Flowsheet Row Most Recent Value   Delivery method UPS   Delivery address verified with patient/caregiver? Yes  [Ship to home address]   Delivery address Home  [Ship to home address-Ship 1/9 for delivery 1/10-$0 copay with [employer]-Address Confirmed]   Number of medications in delivery 1   Medication(s) being filled and delivered Venetoclax (VENCLEXTA)   Doses left of specialty medications 8 days   Copay verified? Yes   Copay amount $0 copay with insurance and [employer]   Copay form of payment No copayment ($0)   Ship Date 1/9/2025   Delivery Date 1/10/2025   Signature Required No          Venclexta delivery coordinated with pt for 1/10/2025 to his home address. $0 copay with [employer] and [employer]. His last delivery was on 12/10/2025. No questions or concerns to report to MTM Team today. He reports no missed doses since last delivery. PDC is 100%.    Follow-Up: 21 Days    Haylee Montanez, Pharmacy Technician  1/7/2025  11:02 EST

## 2025-01-14 ENCOUNTER — SPECIALTY PHARMACY (OUTPATIENT)
Dept: PHARMACY | Facility: HOSPITAL | Age: 70
End: 2025-01-14
Payer: MEDICARE

## 2025-01-14 NOTE — PROGRESS NOTES
Venclexta supply from [facility] delivered to pt on 1/13/2025.    [facility] tracking # [id number]     Haylee Montanez, Pharmacy Technician  01/14/25  11:34 EST

## 2025-01-27 NOTE — PROGRESS NOTES
"[facility] CBC GROUP OUTPATIENT FOLLOW UP CLINIC VISIT    REASON FOR FOLLOW-UP:    1.  Chronic lymphocytic leukemia diagnosed in November 2015.  CLL FISH panel negative at that time.    2.  Autoimmune hemolytic anemia associated with CLL.  He was treated with steroids and he received 4 weeks of weekly Rituxan.  Otherwise no treatment has been performed  3.  CT imaging of the chest abdomen and pelvis from 4/25/2018 showed lymphadenopathy with mildly enlarged retroperitoneal and mesenteric lymph nodes with the largest measuring about 3.2 cm.  The spleen measured 13.6 cm.  Slight increase in lymphadenopathy compared with 11/27/2015.  4.  Acute anemia with a hemoglobin of 6.9 as of 6/20/2018.  Low reticulocyte count.  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  Prednisone initiated.  Erythropoietin 73.9.  Parvovirus B19 PCR negative.  Steroids complete on 8/22/2018.  5.  Bone marrow biopsy on 7/3/2018 with 70% CLL (87% by flow cytometry) with a t(14;18) translocation by cytogenetics, 3 copies of IGH in 87.5% of cells but negative for CCND1/IGH rearrangement.  Negative for all other rearrangements/deletions.  IgVH somatic hypermutation was not detected.            6.  Therapy with ibrutinib initiated at the end of July 2018.  Discontinued in late 2022 due to cardiac arrest.  7.  Subsequent treatment with rituximab and venetoclax.  Venetoclax continues      HISTORY OF PRESENT ILLNESS:  Aida Bal Jr. is a 69 y.o. male with the above-mentioned history today for lab review and evaluation.      He had the flu a few weeks ago.  He still has a mild lingering dry cough.  Otherwise he denies any new issues.      REVIEW OF SYSTEMS:  As per the HPI    PHYSICAL EXAMINATION:  Vitals:    01/28/25 1101   BP: 143/79   Pulse: 58   Resp: 16   Temp: 97.8 °F (36.6 °C)   TempSrc: Oral   SpO2: 100%   Weight: 113 kg (249 lb 14.4 oz)   Height: 180.3 cm (70.98\")   PainSc: 0-No pain         General:  No acute distress, awake, alert and " oriented  Skin:  Warm and dry, no visible rash  HEENT:  Normocephalic/atraumatic.    Chest:  Normal respiratory effort.  Lungs clear to auscultation bilaterally.  ICD in place.  Heart: Regular rate and rhythm  Extremities: Trace left lower extremity edema status post left knee replacement persists  Lymphatics: No palpable cervical supraclavicular adenopathy on exam today  Neuro/psych:  Grossly nonfocal.  Normal mood and affect.    DIAGNOSTIC DATA:  Lactate Dehydrogenase (01/28/2025 10:22)  Comprehensive Metabolic Panel (01/28/2025 10:22)  CBC & Differential (01/28/2025 10:22)    IMAGING:  None reviewed    ASSESSMENT:  This is a 69 y.o. male with a history of chronic lymphocytic leukemia and related autoimmune hemolytic anemia.    *CLL:   Diagnosed 11/26/2015.    Normal FISH panel  Initially received Rituxan for 4 weeks at diagnosis for this and the autoimmune hemolytic anemia.  Given the worsening anemia even though it responded to steroids we needed to treat his CLL particularly given the 70% involvement on his bone marrow biopsy.  We started treating with ibrutinib 420 mg daily, initiated at the end of July 2018. Held around the time of COVID infection; resumed when he completed steroids.  Ibrutinib now discontinued due to cardiac arrest.  1/4/2023: White blood cell count remains normal at 5.57 with 66% lymphocytes  3/22/2023: White blood cell count 12,000 with 80% lymphocytes  CT imaging of the neck chest abdomen and pelvis from 5/17/2023 with enlarging adenopathy throughout the neck chest abdomen and pelvis  5/24/2023: Normal white blood cell count of 10.05 with 72.7% lymphocytes  Initiation of venetoclax on 6/6/2023.  Well-tolerated.  Following labs very closely.  6/13/2023 increased venetoclax to 50 mg daily, so far tolerating quite well.  6/20/2023 increased venetoclax to 100 mg daily  6/27/2023 increase venetoclax to 200 mg daily continuing to tolerate well.  Patient did notice 2 nodular areas, 1 on each  hip, is very lateral on the hip.  Advised him just to continue to closely monitor these areas, as it is nontender, no erythema, or warmth.  7/4/2023 venetoclax increase to 400 mg daily  7/12/2023 Initiate rituximab which will be given every 4 weeks.  Neutropenia noted with WBC 1.93, ANC 0.94.  We will therefore reduce venetoclax to 200 mg daily.  Ongoing neutropenia 7/20/2023, continue venetoclax at a reduced dose of 200 mg daily  Ultimately neutropenia worsened and he required G-CSF with prompt improvement  Venetoclax at 100 mg daily  CT imaging 8/31/2023 with near resolution of lymphadenopathy.  10/4/2023: Blood counts acceptable to continue treatment.  Cycle #4 rituximab today.  11/1/2023: Cycle #5 rituximab.  Blood counts acceptable.  11/30/2023: Final cycle #6 rituximab today.  Plan to continue venetoclax 100 mg daily hereafter with potential to increase to 200 mg daily dosing in the future if he continues to do well and blood counts remained stable.  1/4/2024: Blood counts overall improved.  Increase venetoclax to 200 mg daily.  2/1/2024 ongoing excellent tolerance to venetoclax 200 mg daily.  Counts overall stable with plans to dose escalate to 400 mg daily  4/10/2024: Tolerates venetoclax 400 mg daily well.  Blood counts stable.  6/26/2024: Tolerates venetoclax well.  Stable blood counts with stable leukopenia.  10/17/2024: He continues to tolerate venetoclax very well.  White blood cell count a little bit lower at 2.03 with an ANC of 1.20, acceptable to continue venetoclax.    *Neutropenia related to therapy  He received Zarzio 480 mcg 8/4 and 8/5/2023 with prompt improvement in his white blood cell count   ANC 1.20, acceptable to continue therapy    *Macrocytic anemia:   He has a history of autoimmune hemolytic anemia when he presented with CLL back in November 2015.  His reticulocyte count was very elevated at that time.  His hemoglobin dropped to as low as about 5.  He responded to steroids initially  intravenously and then with prednisone and he was treated with 4 weeks of Rituxan.  He was noted to have worsening anemia.  His reticulocyte count was low.  He initially did not respond very well to transfusions and he required 4 units of packed red cells  There was no evidence for hemolysis although his ADOLFO is positive for IgG.  This was persistently positive.  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  He responded to steroids.  He discontinued steroids as of 8/22/2018.    5/2/2022: Hemoglobin lower at 7.9 with a high MCV of 102   He required red cell transfusion.  Hemoglobin stable at 12.0    *History of systolic hypertension: Blood pressure acceptable at 143/79 today.    *History of thrombocytopenia:   Platelets mildly low at 120,000    *COVID-19 pandemic: He has received a total of 3 vaccinations.  Subsequent infection in April 2020.  He received a monoclonal antibody as an outpatient and remdesivir as an inpatient. He was readmitted on 5/2 with hypotension, persistent fevers, persistent COVID-19 positivity on testing. He received IVIG and steroids. He improved and was treated with outpatient Paxlovid.  Symptoms resolved at this point.  Last vaccine 9/21/2022, bivalent.  Cardiac arrest several days after receiving this on 9/24/2022.  No further vaccines planned    *Left lower extremity DVT and gastrocnemius vein and right lower extremity superficial thrombophlebitis in the small saphenous vein on bilateral lower extremity venous duplex on 4/15/2022  Associated with COVID-19 infection  Repeat venous duplex 8/1/22 without DVT, only superficial thrombophlebitis    *Out of hospital cardiac arrest on 9/24/2022  Quickly achieved return of spontaneous circulation with CPR and medication  He had an ICD placed at the [facility]  Ibrutinib discontinued as a result of this    *Possible zoster of the left upper extremity  Completed Valtrex 1 g 3 times daily x7 days  Now continue  prophylactic acyclovir 400 mg twice daily.  Rash is completely resolved    *Erectile dysfunction: Patient referred to urology    *Left knee osteoarthritis, status post left total knee arthroplasty 7/11/2024  Difficult rehabilitation but doing much much better at this point    *Lingering cough following influenza infection.  Discussed a variety of potential etiologies and ways to try to address this.    PLAN:   Will plan to continue venetoclax 400 mg daily for now.  2 years of therapy will be complete June 2025.  At that time we will need to decide whether to perhaps continue at a lower dose or proceed then with observation off therapy  Continue acyclovir for prophylaxis.  Continue to follow with [facility] cardiology.  I will see him back in 3 months with labs.  We are certainly available sooner if needed.    Patient remains on a high risk medication requiring close monitoring for toxicity.     Toñito Cheng MD  01/28/2025

## 2025-01-28 ENCOUNTER — LAB (OUTPATIENT)
Dept: LAB | Facility: HOSPITAL | Age: 70
End: 2025-01-28
Payer: MEDICARE

## 2025-01-28 ENCOUNTER — OFFICE VISIT (OUTPATIENT)
Dept: ONCOLOGY | Facility: CLINIC | Age: 70
End: 2025-01-28
Payer: MEDICARE

## 2025-01-28 VITALS
WEIGHT: 249.9 LBS | TEMPERATURE: 97.8 F | HEIGHT: 71 IN | SYSTOLIC BLOOD PRESSURE: 143 MMHG | RESPIRATION RATE: 16 BRPM | HEART RATE: 58 BPM | DIASTOLIC BLOOD PRESSURE: 79 MMHG | OXYGEN SATURATION: 100 % | BODY MASS INDEX: 34.98 KG/M2

## 2025-01-28 DIAGNOSIS — C91.10 CLL (CHRONIC LYMPHOCYTIC LEUKEMIA): ICD-10-CM

## 2025-01-28 DIAGNOSIS — C91.10 CLL (CHRONIC LYMPHOCYTIC LEUKEMIA): Primary | ICD-10-CM

## 2025-01-28 DIAGNOSIS — Z79.899 HIGH RISK MEDICATION USE: ICD-10-CM

## 2025-01-28 LAB
ALBUMIN SERPL-MCNC: 4 G/DL (ref 3.5–5.2)
ALBUMIN/GLOB SERPL: 2.1 G/DL
ALP SERPL-CCNC: 130 U/L (ref 39–117)
ALT SERPL W P-5'-P-CCNC: 21 U/L (ref 1–41)
ANION GAP SERPL CALCULATED.3IONS-SCNC: 9.9 MMOL/L (ref 5–15)
AST SERPL-CCNC: 22 U/L (ref 1–40)
BASOPHILS # BLD AUTO: 0 10*3/MM3 (ref 0–0.2)
BASOPHILS NFR BLD AUTO: 0 % (ref 0–1.5)
BILIRUB SERPL-MCNC: 0.7 MG/DL (ref 0–1.2)
BUN SERPL-MCNC: 14 MG/DL (ref 8–23)
BUN/CREAT SERPL: 12.6 (ref 7–25)
CALCIUM SPEC-SCNC: 8.8 MG/DL (ref 8.6–10.5)
CHLORIDE SERPL-SCNC: 104 MMOL/L (ref 98–107)
CO2 SERPL-SCNC: 26.1 MMOL/L (ref 22–29)
CREAT SERPL-MCNC: 1.11 MG/DL (ref 0.76–1.27)
DEPRECATED RDW RBC AUTO: 51.4 FL (ref 37–54)
EGFRCR SERPLBLD CKD-EPI 2021: 71 ML/MIN/1.73
EOSINOPHIL # BLD AUTO: 0 10*3/MM3 (ref 0–0.4)
EOSINOPHIL NFR BLD AUTO: 0 % (ref 0.3–6.2)
ERYTHROCYTE [DISTWIDTH] IN BLOOD BY AUTOMATED COUNT: 13.4 % (ref 12.3–15.4)
GLOBULIN UR ELPH-MCNC: 1.9 GM/DL
GLUCOSE SERPL-MCNC: 114 MG/DL (ref 65–99)
HCT VFR BLD AUTO: 38.7 % (ref 37.5–51)
HGB BLD-MCNC: 12 G/DL (ref 13–17.7)
IMM GRANULOCYTES # BLD AUTO: 0 10*3/MM3 (ref 0–0.05)
IMM GRANULOCYTES NFR BLD AUTO: 0 % (ref 0–0.5)
LDH SERPL-CCNC: 168 U/L (ref 135–225)
LYMPHOCYTES # BLD AUTO: 0.34 10*3/MM3 (ref 0.7–3.1)
LYMPHOCYTES NFR BLD AUTO: 16.7 % (ref 19.6–45.3)
MCH RBC QN AUTO: 32.1 PG (ref 26.6–33)
MCHC RBC AUTO-ENTMCNC: 31 G/DL (ref 31.5–35.7)
MCV RBC AUTO: 103.5 FL (ref 79–97)
MONOCYTES # BLD AUTO: 0.49 10*3/MM3 (ref 0.1–0.9)
MONOCYTES NFR BLD AUTO: 24.1 % (ref 5–12)
NEUTROPHILS NFR BLD AUTO: 1.2 10*3/MM3 (ref 1.7–7)
NEUTROPHILS NFR BLD AUTO: 59.2 % (ref 42.7–76)
NRBC BLD AUTO-RTO: 0 /100 WBC (ref 0–0.2)
PLATELET # BLD AUTO: 120 10*3/MM3 (ref 140–450)
PMV BLD AUTO: 10.1 FL (ref 6–12)
POTASSIUM SERPL-SCNC: 4.1 MMOL/L (ref 3.5–5.2)
PROT SERPL-MCNC: 5.9 G/DL (ref 6–8.5)
RBC # BLD AUTO: 3.74 10*6/MM3 (ref 4.14–5.8)
SODIUM SERPL-SCNC: 140 MMOL/L (ref 136–145)
WBC NRBC COR # BLD AUTO: 2.03 10*3/MM3 (ref 3.4–10.8)

## 2025-01-28 PROCEDURE — 85025 COMPLETE CBC W/AUTO DIFF WBC: CPT

## 2025-01-28 PROCEDURE — 36415 COLL VENOUS BLD VENIPUNCTURE: CPT

## 2025-01-28 PROCEDURE — 83615 LACTATE (LD) (LDH) ENZYME: CPT

## 2025-01-28 PROCEDURE — 80053 COMPREHEN METABOLIC PANEL: CPT

## 2025-01-29 ENCOUNTER — SPECIALTY PHARMACY (OUTPATIENT)
Dept: PHARMACY | Facility: HOSPITAL | Age: 70
End: 2025-01-29
Payer: MEDICARE

## 2025-01-29 NOTE — PROGRESS NOTES
Specialty Pharmacy Note: Venclexta (venetoclax)    Aida Bal Jr. is a 69 y.o. male with CLL was seen 1/28/25 by Dr. Cheng. Per provider dictation, no changes to oral oncology regimen Venclexta (venetoclax).  Labs Review: The CMP and CBC from 1/28/25 have been reviewed. No dose adjustments are needed for the oral specialty medication(s) based on the labs.    Specialty pharmacy will continue to follow patient.    Marianela Romeo, Ava, BCPS  1/29/2025  10:13 EST

## 2025-02-07 ENCOUNTER — SPECIALTY PHARMACY (OUTPATIENT)
Dept: PHARMACY | Facility: HOSPITAL | Age: 70
End: 2025-02-07
Payer: MEDICARE

## 2025-02-07 NOTE — PROGRESS NOTES
Specialty Pharmacy Patient Management Program  Oncology / Hematology Refill Outreach      Aida was contacted today regarding refills of his Venclexta specialty medication(s).    Specialty medication(s) and dose(s) confirmed: Yes  Venclexta 100 mg-4 tabs daily.    Refill Questions      Flowsheet Row Most Recent Value   Changes to allergies? No   Changes to medications? No   New conditions or infections since last clinic visit No   Unplanned office visit, urgent care, ED, or hospital admission in the last 4 weeks  No   How does patient/caregiver feel medication is working? Good   Financial problems or insurance changes  No   Since the previous refill, were any specialty medication doses or scheduled injections missed or delayed?  No   Does this patient require a clinical escalation to a pharmacist? No          Delivery Questions      Flowsheet Row Most Recent Value   Delivery method UPS   Delivery address verified with patient/caregiver? Yes  [Ship to home address]   Delivery address Home  [Ship to home address-Ship 2/10 for delivery 2/11-$0 copay with insurance covering 100%-Address Confirmed]   Number of medications in delivery 1   Medication(s) being filled and delivered Venetoclax (VENCLEXTA)   Doses left of specialty medications 7 days   Copay verified? Yes   Copay amount $0 copay with insurance covering 100%   Copay form of payment No copayment ($0)   Ship Date 2/10/2025   Delivery Date Selection 02/11/25   Signature Required No          Venclexta  delivery coordinated with pt for 2/11/2025 to his home address. $0 copay with insurance covering 100%. His last delivery was on 1/10/2025. No questions or concerns to report to MTM Team today. He reports no missed doses since last delivery. PDC is 100%.    Follow-Up: 21 Days    Haylee Montanez, Pharmacy Technician  2/7/2025  16:01 EST

## 2025-03-06 ENCOUNTER — SPECIALTY PHARMACY (OUTPATIENT)
Dept: PHARMACY | Facility: HOSPITAL | Age: 70
End: 2025-03-06
Payer: MEDICARE

## 2025-03-06 NOTE — PROGRESS NOTES
Specialty Pharmacy Patient Management Program  Oncology / Hematology Refill Outreach      Aida was contacted today regarding refills of his Venclexta specialty medication(s).    Specialty medication(s) and dose(s) confirmed: Yes  Venclexta 100 mg tabs-4 tabs daily.    Refill Questions      Flowsheet Row Most Recent Value   Changes to allergies? No   Changes to medications? No   New conditions or infections since last clinic visit No   Unplanned office visit, urgent care, ED, or hospital admission in the last 4 weeks  No   How does patient/caregiver feel medication is working? Good   Financial problems or insurance changes  No   Since the previous refill, were any specialty medication doses or scheduled injections missed or delayed?  No   Does this patient require a clinical escalation to a pharmacist? No          Delivery Questions      Flowsheet Row Most Recent Value   Delivery method UPS   Delivery address verified with patient/caregiver? Yes  [Ship to home address]   Delivery address Home  [Ship to home address-Ship 3/10 for delivery 3/11-$0 copay with insurance covering 100%-Address Confirmed]   Number of medications in delivery 1   Medication(s) being filled and delivered Venetoclax (VENCLEXTA)   Doses left of specialty medications 8 days   Copay verified? Yes   Copay amount $0 copay with insurance covering 100%   Copay form of payment No copayment ($0)   Delivery Date Selection 03/11/25   Signature Required No          Venclexta delivery coordinated with pt for 3/11/2025 to his home address. $0 copay with insurance covering 100%. His last delivery was on 2/11/2025. No questions or concerns to report to MTM Team today. He reports no missed doses since last delivery. PDC is 100%.    Follow-Up: 21 Days    Haylee Montanez, Pharmacy Technician  3/6/2025  10:18 EST

## 2025-03-18 ENCOUNTER — SPECIALTY PHARMACY (OUTPATIENT)
Dept: PHARMACY | Facility: HOSPITAL | Age: 70
End: 2025-03-18
Payer: MEDICARE

## 2025-03-18 NOTE — PROGRESS NOTES
Specialty Pharmacy Patient Management Program  Oncology Reassessment     Aida Bal Jr. was referred by an their provider to the Oncology Patient Management program offered by [facility] for CLL. A follow-up outreach was conducted, including assessment of continued therapy appropriateness, medication adherence, and side effect incidence and management for venetoclax.    Changes to Insurance Coverage or Financial Support  none    Relevant Past Medical History and Comorbidities  Relevant medical history and concomitant health conditions were discussed with the patient. The patient's chart has been reviewed for relevant past medical history and comorbid health conditions and updated as necessary.   Past Medical History:   Diagnosis Date    AICD (automatic cardioverter/defibrillator) present 09/28/2022    FOLLOWS WITH DR. HARRISON    Autoimmune hemolytic anemia     Cardiac arrest 09/24/2022    trying to get into ballgame    Carotid artery occlusion     Left internal carotid artery    Colon polyp two years ago    Coronary artery disease     Deep vein thrombosis when I was in the hospital in May 4-2022    Diabetes mellitus     Steroid induced    ( while in hospital )    Gout     High blood sugar     High cholesterol     History of COVID-19 04/2022    History of transfusion Contact Dr. Toñito Cheng    Hypertension     Leukemia, chronic     dx 2014    Sleep apnea     c-pap    TIA (transient ischemic attack)      Social History     Socioeconomic History    Marital status:      Spouse name: Meghana   Tobacco Use    Smoking status: Never    Smokeless tobacco: Never   Vaping Use    Vaping status: Never Used   Substance and Sexual Activity    Alcohol use: No    Drug use: No    Sexual activity: Yes     Partners: Female     Birth control/protection: None     Comment: I only have sex with my wife     Problem list reviewed by Marianela Romeo RPH on 3/18/2025 at 12:50 PM    Hospitalizations and Urgent  Care Since Last Assessment  ED Visits, Admissions, or Hospitalizations: no  Urgent Office Visits: no    Allergies  Known allergies and reactions were discussed with the patient. The patient's chart has been reviewed for allergy information and updated as necessary.   Allergies   Allergen Reactions    Ace Inhibitors Unknown - Low Severity    Candesartan Unknown - Low Severity    Pravastatin Unknown - Low Severity     Annotation - 09Jan2017: medication caused side effects but he is able to other statins ie Crestor    Covid-19 (Mrna) Vaccine Other (See Comments)     Cardiac arrest 3 days post vaccine     Allergies reviewed by Marianela Romeo RPH on 3/18/2025 at 12:50 PM  Allergies reviewed by Marianela Romeo RPH on 3/18/2025 at 12:50 PM    Relevant Laboratory Values  Relevant laboratory values were discussed with the patient. The following specialty medication dose adjustment(s) are recommended: No dose adjustments are needed for the oral specialty medication(s) based on the labs.    Lab Results   Component Value Date    GLUCOSE 114 (H) 01/28/2025    CALCIUM 8.8 01/28/2025     01/28/2025    K 4.1 01/28/2025    CO2 26.1 01/28/2025     01/28/2025    BUN 14 01/28/2025    CREATININE 1.11 01/28/2025    EGFRIFAFRI 75 01/22/2022    EGFRIFNONA 66 05/19/2016    BCR 12.6 01/28/2025    ANIONGAP 9.9 01/28/2025     Lab Results   Component Value Date    WBC 2.03 (L) 01/28/2025    RBC 3.74 (L) 01/28/2025    HGB 12.0 (L) 01/28/2025    HCT 38.7 01/28/2025    .5 (H) 01/28/2025    MCH 32.1 01/28/2025    MCHC 31.0 (L) 01/28/2025    RDW 13.4 01/28/2025    RDWSD 51.4 01/28/2025    MPV 10.1 01/28/2025     (L) 01/28/2025    NEUTRORELPCT 59.2 01/28/2025    LYMPHORELPCT 16.7 (L) 01/28/2025    MONORELPCT 24.1 (H) 01/28/2025    EOSRELPCT 0.0 (L) 01/28/2025    BASORELPCT 0.0 01/28/2025    AUTOIGPER 0.0 01/28/2025    NEUTROABS 1.20 (L) 01/28/2025    LYMPHSABS 0.34 (L) 01/28/2025    MONOSABS 0.49 01/28/2025    EOSABS 0.00  01/28/2025    BASOSABS 0.00 01/28/2025    AUTOIGNUM 0.00 01/28/2025    NRBC 0.0 01/28/2025       Current Medication List  This medication list has been reviewed with the patient and evaluated for any interactions or necessary modifications/recommendations, and updated to include all prescription medications, OTC medications, and supplements the patient is currently taking.  This list reflects what is contained in the patient's profile, which has also been marked as reviewed to communicate to other providers it is the most up to date version of the patient's current medication therapy.     Current Outpatient Medications:     acyclovir (ZOVIRAX) 400 MG tablet, Take 1 tablet by mouth 2 (Two) Times a Day. Begin in 1 week after completing Valtrex for prophylaxis. (Patient not taking: Reported on 1/28/2025), Disp: 60 tablet, Rfl: 3    allopurinol (ZYLOPRIM) 300 MG tablet, Take 1 tablet by mouth Daily., Disp: , Rfl:     amLODIPine (NORVASC) 10 MG tablet, Take 1 tablet by mouth Every Morning., Disp: , Rfl:     Aspirin Low Dose 81 MG EC tablet, 1 tablet Daily., Disp: , Rfl:     atorvastatin (LIPITOR) 40 MG tablet, Hold until told to resume by primary care provider (Patient taking differently: Take 0.5 tablets by mouth Every Night.), Disp: , Rfl: 3    Cholecalciferol (VITAMIN D3) 2000 UNITS tablet, Take 1 tablet by mouth Daily., Disp: , Rfl:     doxazosin (CARDURA) 4 MG tablet, Take 1 tablet by mouth 2 (Two) Times a Day., Disp: , Rfl: 3    famotidine (PEPCID) 40 MG tablet, Take 1 tablet by mouth Every Night., Disp: , Rfl:     fluticasone (FLONASE) 50 MCG/ACT nasal spray, 1 spray into the nostril(s) as directed by provider As Needed., Disp: , Rfl:     irbesartan-hydrochlorothiazide (AVALIDE) 300-12.5 MG tablet, Take 1 tablet by mouth Daily., Disp: , Rfl:     Lumigan 0.01 % ophthalmic drops, Administer 1 drop to both eyes Every Night., Disp: , Rfl:     ondansetron (ZOFRAN) 8 MG tablet, Take 1 tablet by mouth 3 (Three) Times a  Day As Needed for Nausea or Vomiting. (Patient not taking: Reported on 1/28/2025), Disp: 30 tablet, Rfl: 5    pantoprazole (PROTONIX) 40 MG EC tablet, Take 1 tablet by mouth Daily. (Patient not taking: Reported on 1/28/2025), Disp: , Rfl:     sildenafil (VIAGRA) 100 MG tablet, , Disp: , Rfl:     Venetoclax (VENCLEXTA) 100 MG chemo tablet, Take 4 tablets by mouth Daily., Disp: 120 tablet, Rfl: 5    Medicines reviewed by Marianela Romeo RPH on 3/18/2025 at 12:50 PM    Drug Interactions  Assessed medication list for interactions, no significant drug interactions noted.   Advised patient to call the clinic if any new medications are started so we can assess for drug-drug interactions.  Drug-food interactions discussed: eating grapefruit and drinking grapefruit juice    Adverse Drug Reactions  Medication tolerability: Tolerating with no to minimal ADRs  Medication plan: Continue therapy with normal follow-up  Plan for ADR Management: not needed    Adherence, Self-Administration, and Current Therapy Problems  Adherence related to the patient's specialty therapy was discussed with the patient. The Adherence segment of this outreach has been reviewed and updated.     Adherence Questions  Linked Medication(s) Assessed: Venetoclax (VENCLEXTA)  On average, how many doses/injections does the patient miss per month?: 0  What are the identified reasons for non-adherence or missed doses? : no problems identified  What is the estimated medication adherence level?: %  Based on the patient/caregiver response and refill history, does this patient require an MTP to track adherence improvements?: no    Additional Barriers to Patient Self-Administration: no  Methods for Supporting Patient Self-Administration: no  Patient has had no issues obtaining medication from pharmacy.    Open Medication Therapy Problems  No medication therapy recommendations to display    Goals of Therapy  Goals related to the patient's specialty therapy were  discussed with the patient. The Patient Goals segment of this outreach has been reviewed and updated.   Goals Addressed Today        Specialty Pharmacy General Goal      Progression free survival based on WBC and scans (lymphadenopathy)   3/22/23 CT scan shows disease progression  8/9/23 CT scan shows dramatic improvement  9/26/24 no new scan results, continue treatment at this time.   3/18/25- blood counts stable, no new scans.              Quality of Life Assessment   Quality of Life related to the patient's enrollment in the patient management program and services provided was discussed with the patient. The QOL segment of this outreach has been reviewed and updated.  Quality of Life Improvement Scale: 7-Somewhat better    Discussed aforementioned material with patient over the phone.     Reassessment Plan & Follow-Up  1. Medication Therapy Changes: none  2. Related Plans, Therapy Recommendations, or Issues to Be Addressed: none  3. Pharmacist to perform regular assessments no more than (6) months from the previous assessment.   4. Care Coordinator to set up future refill outreaches, coordinate prescription delivery, and escalate clinical questions to pharmacist.    Attestation  Therapeutic appropriateness: Appropriate   I attest the patient was actively involved in and has agreed to the above plan of care.  If the prescribed therapy is at any point deemed not appropriate based on the current or future assessments, a consultation will be initiated with the patient's specialty care provider to determine the best course of action. The revised plan of therapy will be documented along with any required assessments and/or additional patient education provided.     Marianela Romeo, PharmD, [facility]  Clinical Specialty Pharmacist, Oncology  3/18/2025  12:51 EDT

## 2025-04-07 ENCOUNTER — SPECIALTY PHARMACY (OUTPATIENT)
Dept: PHARMACY | Facility: HOSPITAL | Age: 70
End: 2025-04-07
Payer: MEDICARE

## 2025-04-07 NOTE — PROGRESS NOTES
Specialty Pharmacy Patient Management Program  Oncology / Hematology Refill Outreach      Aida was contacted today regarding refills of his Venclexta specialty medication(s).    Specialty medication(s) and dose(s) confirmed: Yes  Venclexta 100 mg tabs-4 tabs daily.    Refill Questions      Flowsheet Row Most Recent Value   Changes to allergies? No   Changes to medications? No   New conditions or infections since last clinic visit No   Unplanned office visit, urgent care, ED, or hospital admission in the last 4 weeks  No   How does patient/caregiver feel medication is working? Good   Financial problems or insurance changes  No   Since the previous refill, were any specialty medication doses or scheduled injections missed or delayed?  No   Does this patient require a clinical escalation to a pharmacist? No          Delivery Questions      Flowsheet Row Most Recent Value   Delivery method UPS   Delivery address verified with patient/caregiver? Yes  [Ship to home address]   Delivery address Home  [Ship to home address-Ship 4/8 for delivery 4/9-$0 copay with insurance covering 100%-Address Confirmed]   Number of medications in delivery 1   Medication(s) being filled and delivered Venetoclax (VENCLEXTA)  [100 mg]   Doses left of specialty medications 7 days   Copay verified? Yes   Copay amount $0 copay with insurance covering 100%   Copay form of payment No copayment ($0)   Delivery Date Selection 04/09/25   Signature Required No   Do you consent to receive electronic handouts?  Yes          Venclexta delivery coordinated with pt for 4/9/2025 to his home address. $0 copay with insurance covering 100%. His last delivery was on 3/11/2025. No questions or concerns to report to MTM Team today. He reports no missed doses since last delivery. PDC is 100%.    Follow-Up: 21 Days    Haylee Montanez, Pharmacy Technician  4/7/2025  14:02 EDT

## 2025-05-05 ENCOUNTER — SPECIALTY PHARMACY (OUTPATIENT)
Dept: PHARMACY | Facility: HOSPITAL | Age: 70
End: 2025-05-05
Payer: MEDICARE

## 2025-05-05 NOTE — PROGRESS NOTES
Specialty Pharmacy Patient Management Program  Oncology / Hematology Refill Outreach      Aida was contacted today regarding refills of his Venclexta specialty medication(s).    Specialty medication(s) and dose(s) confirmed: Yes  Venclexta 100 mg tabs-4 tabs daily.    Refill Questions      Flowsheet Row Most Recent Value   Changes to allergies? No   Changes to medications? No   New conditions or infections since last clinic visit No   Unplanned office visit, urgent care, ED, or hospital admission in the last 4 weeks  No   How does patient/caregiver feel medication is working? Good   Financial problems or insurance changes  No   Since the previous refill, were any specialty medication doses or scheduled injections missed or delayed?  No   Does this patient require a clinical escalation to a pharmacist? No          Delivery Questions      Flowsheet Row Most Recent Value   Delivery method UPS   Delivery address verified with patient/caregiver? Yes  [Ship to home address]   Delivery address Home  [Ship to home address-Ship 5/8 for delivery 5/9-$0 copay with insurance covering 100%-Address Confirmed]   Number of medications in delivery 1   Medication(s) being filled and delivered Venetoclax (VENCLEXTA)  [100 mg]   Doses left of specialty medications 11 days   Copay verified? Yes   Copay amount $0 copay with insurance covering 100%   Copay form of payment No copayment ($0)   Delivery Date Selection 05/09/25   Signature Required No   Do you consent to receive electronic handouts?  Yes          Venclexta delivery coordinated with pt for 5/9/2025 to his home address. $0 copay with insurance covering 100%. His last delivery was on 4/9/2025. No questions or concerns to report to MTM Team today. He reports no missed doses since last delivery. PDC is 100%.    Follow-Up: 21 Days    Haylee Montanez, Pharmacy Technician  5/5/2025  12:42 EDT

## 2025-05-21 ENCOUNTER — OFFICE VISIT (OUTPATIENT)
Dept: ONCOLOGY | Facility: CLINIC | Age: 70
End: 2025-05-21
Payer: MEDICARE

## 2025-05-21 ENCOUNTER — LAB (OUTPATIENT)
Dept: LAB | Facility: HOSPITAL | Age: 70
End: 2025-05-21
Payer: MEDICARE

## 2025-05-21 ENCOUNTER — RESULTS FOLLOW-UP (OUTPATIENT)
Dept: LAB | Facility: HOSPITAL | Age: 70
End: 2025-05-21
Payer: MEDICARE

## 2025-05-21 ENCOUNTER — SPECIALTY PHARMACY (OUTPATIENT)
Dept: PHARMACY | Facility: HOSPITAL | Age: 70
End: 2025-05-21
Payer: MEDICARE

## 2025-05-21 VITALS
BODY MASS INDEX: 35.34 KG/M2 | SYSTOLIC BLOOD PRESSURE: 145 MMHG | TEMPERATURE: 98.1 F | HEART RATE: 75 BPM | WEIGHT: 252.4 LBS | DIASTOLIC BLOOD PRESSURE: 71 MMHG | RESPIRATION RATE: 16 BRPM | OXYGEN SATURATION: 100 % | HEIGHT: 71 IN

## 2025-05-21 DIAGNOSIS — D64.9 NORMOCYTIC ANEMIA: ICD-10-CM

## 2025-05-21 DIAGNOSIS — C91.10 CLL (CHRONIC LYMPHOCYTIC LEUKEMIA): ICD-10-CM

## 2025-05-21 DIAGNOSIS — C91.10 CLL (CHRONIC LYMPHOCYTIC LEUKEMIA): Primary | ICD-10-CM

## 2025-05-21 DIAGNOSIS — Z79.899 HIGH RISK MEDICATION USE: ICD-10-CM

## 2025-05-21 DIAGNOSIS — R74.8 ELEVATED LIVER ENZYMES: ICD-10-CM

## 2025-05-21 LAB
ALBUMIN SERPL-MCNC: 4.4 G/DL (ref 3.5–5.2)
ALBUMIN/GLOB SERPL: 2.6 G/DL
ALP SERPL-CCNC: 159 U/L (ref 39–117)
ALT SERPL W P-5'-P-CCNC: 122 U/L (ref 1–41)
ANION GAP SERPL CALCULATED.3IONS-SCNC: 9.8 MMOL/L (ref 5–15)
AST SERPL-CCNC: 71 U/L (ref 1–40)
BASOPHILS # BLD AUTO: 0.01 10*3/MM3 (ref 0–0.2)
BASOPHILS NFR BLD AUTO: 0.4 % (ref 0–1.5)
BILIRUB SERPL-MCNC: 1.1 MG/DL (ref 0–1.2)
BUN SERPL-MCNC: 17 MG/DL (ref 8–23)
BUN/CREAT SERPL: 13 (ref 7–25)
CALCIUM SPEC-SCNC: 9.2 MG/DL (ref 8.6–10.5)
CHLORIDE SERPL-SCNC: 105 MMOL/L (ref 98–107)
CO2 SERPL-SCNC: 26.2 MMOL/L (ref 22–29)
CREAT SERPL-MCNC: 1.31 MG/DL (ref 0.76–1.27)
DEPRECATED RDW RBC AUTO: 48 FL (ref 37–54)
EGFRCR SERPLBLD CKD-EPI 2021: 57.8 ML/MIN/1.73
EOSINOPHIL # BLD AUTO: 0.01 10*3/MM3 (ref 0–0.4)
EOSINOPHIL NFR BLD AUTO: 0.4 % (ref 0.3–6.2)
ERYTHROCYTE [DISTWIDTH] IN BLOOD BY AUTOMATED COUNT: 12.8 % (ref 12.3–15.4)
GLOBULIN UR ELPH-MCNC: 1.7 GM/DL
GLUCOSE SERPL-MCNC: 113 MG/DL (ref 65–99)
HCT VFR BLD AUTO: 38.3 % (ref 37.5–51)
HGB BLD-MCNC: 12.1 G/DL (ref 13–17.7)
IMM GRANULOCYTES # BLD AUTO: 0.01 10*3/MM3 (ref 0–0.05)
IMM GRANULOCYTES NFR BLD AUTO: 0.4 % (ref 0–0.5)
LDH SERPL-CCNC: 221 U/L (ref 135–225)
LYMPHOCYTES # BLD AUTO: 0.65 10*3/MM3 (ref 0.7–3.1)
LYMPHOCYTES NFR BLD AUTO: 25.7 % (ref 19.6–45.3)
MCH RBC QN AUTO: 32.3 PG (ref 26.6–33)
MCHC RBC AUTO-ENTMCNC: 31.6 G/DL (ref 31.5–35.7)
MCV RBC AUTO: 102.1 FL (ref 79–97)
MONOCYTES # BLD AUTO: 0.57 10*3/MM3 (ref 0.1–0.9)
MONOCYTES NFR BLD AUTO: 22.5 % (ref 5–12)
NEUTROPHILS NFR BLD AUTO: 1.28 10*3/MM3 (ref 1.7–7)
NEUTROPHILS NFR BLD AUTO: 50.6 % (ref 42.7–76)
NRBC BLD AUTO-RTO: 0 /100 WBC (ref 0–0.2)
PLATELET # BLD AUTO: 128 10*3/MM3 (ref 140–450)
PMV BLD AUTO: 10.1 FL (ref 6–12)
POTASSIUM SERPL-SCNC: 4.1 MMOL/L (ref 3.5–5.2)
PROT SERPL-MCNC: 6.1 G/DL (ref 6–8.5)
RBC # BLD AUTO: 3.75 10*6/MM3 (ref 4.14–5.8)
SODIUM SERPL-SCNC: 141 MMOL/L (ref 136–145)
WBC NRBC COR # BLD AUTO: 2.53 10*3/MM3 (ref 3.4–10.8)

## 2025-05-21 PROCEDURE — 83615 LACTATE (LD) (LDH) ENZYME: CPT

## 2025-05-21 PROCEDURE — 80053 COMPREHEN METABOLIC PANEL: CPT

## 2025-05-21 PROCEDURE — 36415 COLL VENOUS BLD VENIPUNCTURE: CPT

## 2025-05-21 PROCEDURE — 85025 COMPLETE CBC W/AUTO DIFF WBC: CPT

## 2025-05-21 NOTE — PROGRESS NOTES
Specialty Pharmacy Patient Management Program  Per Protocol Prescription Order or Refill       Requested Prescriptions     Signed Prescriptions Disp Refills    Venetoclax (VENCLEXTA) 100 MG chemo tablet 60 tablet 5     Sig: Take 2 tablets by mouth Daily.     Prescription orders above were sent to [facility] per Collaborative Care Agreement Protocol.     Completed independent double check on medication order/RX.    Eric Ghosh PharmD, BCOP  Clinical Specialty Pharmacist, Oncology  5/21/2025  16:25 EDT

## 2025-05-21 NOTE — PROGRESS NOTES
"[facility] CBC GROUP OUTPATIENT FOLLOW UP CLINIC VISIT    REASON FOR FOLLOW-UP:    1.  Chronic lymphocytic leukemia diagnosed in November 2015.  CLL FISH panel negative at that time.    2.  Autoimmune hemolytic anemia associated with CLL.  He was treated with steroids and he received 4 weeks of weekly Rituxan.  Otherwise no treatment has been performed  3.  CT imaging of the chest abdomen and pelvis from 4/25/2018 showed lymphadenopathy with mildly enlarged retroperitoneal and mesenteric lymph nodes with the largest measuring about 3.2 cm.  The spleen measured 13.6 cm.  Slight increase in lymphadenopathy compared with 11/27/2015.  4.  Acute anemia with a hemoglobin of 6.9 as of 6/20/2018.  Low reticulocyte count.  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  Prednisone initiated.  Erythropoietin 73.9.  Parvovirus B19 PCR negative.  Steroids complete on 8/22/2018.  5.  Bone marrow biopsy on 7/3/2018 with 70% CLL (87% by flow cytometry) with a t(14;18) translocation by cytogenetics, 3 copies of IGH in 87.5% of cells but negative for CCND1/IGH rearrangement.  Negative for all other rearrangements/deletions.  IgVH somatic hypermutation was not detected.            6.  Therapy with ibrutinib initiated at the end of July 2018.  Discontinued in late 2022 due to cardiac arrest.  7.  Subsequent treatment with rituximab and venetoclax.  Venetoclax continues      HISTORY OF PRESENT ILLNESS:  Aida Bal Jr. is a 70 y.o. male with the above-mentioned history today for lab review and evaluation.      He feels well. His electrophysiologist is planning to replace his ICD, and it's pacing more and he thinks he needs an upgraded device.      REVIEW OF SYSTEMS:  As per the HPI    PHYSICAL EXAMINATION:  Vitals:    05/21/25 1548   BP: 145/71   Pulse: 75   Resp: 16   Temp: 98.1 °F (36.7 °C)   TempSrc: Oral   SpO2: 100%   Weight: 114 kg (252 lb 6.4 oz)   Height: 180.3 cm (70.98\")   PainSc: 0-No pain     General:  No acute distress, awake, " alert and oriented  Skin:  Warm and dry, no visible rash  HEENT:  Normocephalic/atraumatic.    Chest:  Normal respiratory effort.  Lungs clear to auscultation bilaterally.  ICD in place.  Heart: Regular with ectopy  Extremities: Trace left lower extremity edema status post left knee replacement persists  Lymphatics: No palpable cervical supraclavicular adenopathy on exam today  Neuro/psych:  Grossly nonfocal.  Normal mood and affect.    DIAGNOSTIC DATA:  CBC & Differential (05/21/2025 15:40)   Comprehensive Metabolic Panel (05/21/2025 15:40)  Lactate Dehydrogenase (05/21/2025 15:40)    IMAGING:  None reviewed    ASSESSMENT:  This is a 70 y.o. male with a history of chronic lymphocytic leukemia and related autoimmune hemolytic anemia.    *CLL:   Diagnosed 11/26/2015.    Normal FISH panel  Initially received Rituxan for 4 weeks at diagnosis for this and the autoimmune hemolytic anemia.  Given the worsening anemia even though it responded to steroids we needed to treat his CLL particularly given the 70% involvement on his bone marrow biopsy.  We started treating with ibrutinib 420 mg daily, initiated at the end of July 2018. Held around the time of COVID infection; resumed when he completed steroids.  Ibrutinib now discontinued due to cardiac arrest.  1/4/2023: White blood cell count remains normal at 5.57 with 66% lymphocytes  3/22/2023: White blood cell count 12,000 with 80% lymphocytes  CT imaging of the neck chest abdomen and pelvis from 5/17/2023 with enlarging adenopathy throughout the neck chest abdomen and pelvis  5/24/2023: Normal white blood cell count of 10.05 with 72.7% lymphocytes  Initiation of venetoclax on 6/6/2023.  Well-tolerated.  Following labs very closely.  6/13/2023 increased venetoclax to 50 mg daily, so far tolerating quite well.  6/20/2023 increased venetoclax to 100 mg daily  6/27/2023 increase venetoclax to 200 mg daily continuing to tolerate well.  Patient did notice 2 nodular areas, 1 on  each hip, is very lateral on the hip.  Advised him just to continue to closely monitor these areas, as it is nontender, no erythema, or warmth.  7/4/2023 venetoclax increase to 400 mg daily  7/12/2023 Initiate rituximab which will be given every 4 weeks.  Neutropenia noted with WBC 1.93, ANC 0.94.  We will therefore reduce venetoclax to 200 mg daily.  Ongoing neutropenia 7/20/2023, continue venetoclax at a reduced dose of 200 mg daily  Ultimately neutropenia worsened and he required G-CSF with prompt improvement  Venetoclax at 100 mg daily  CT imaging 8/31/2023 with near resolution of lymphadenopathy.  10/4/2023: Blood counts acceptable to continue treatment.  Cycle #4 rituximab today.  11/1/2023: Cycle #5 rituximab.  Blood counts acceptable.  11/30/2023: Final cycle #6 rituximab today.  Plan to continue venetoclax 100 mg daily hereafter with potential to increase to 200 mg daily dosing in the future if he continues to do well and blood counts remained stable.  1/4/2024: Blood counts overall improved.  Increase venetoclax to 200 mg daily.  2/1/2024 ongoing excellent tolerance to venetoclax 200 mg daily.  Counts overall stable with plans to dose escalate to 400 mg daily  4/10/2024: Tolerates venetoclax 400 mg daily well.  Blood counts stable.  6/26/2024: Tolerates venetoclax well.  Stable blood counts with stable leukopenia.  Continues to tolerated venetoclax well  5/21/25: decrease venetoclax to 200 mg daily for extended use    *Neutropenia related to therapy  He received Zarzio 480 mcg 8/4 and 8/5/2023 with prompt improvement in his white blood cell count   ANC 1.28, acceptable to continue therapy    *Macrocytic anemia:   He has a history of autoimmune hemolytic anemia when he presented with CLL back in November 2015.  His reticulocyte count was very elevated at that time.  His hemoglobin dropped to as low as about 5.  He responded to steroids initially intravenously and then with prednisone and he was treated with  4 weeks of Rituxan.  He was noted to have worsening anemia.  His reticulocyte count was low.  He initially did not respond very well to transfusions and he required 4 units of packed red cells  There was no evidence for hemolysis although his ADOLFO is positive for IgG.  This was persistently positive.  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  He responded to steroids.  He discontinued steroids as of 8/22/2018.    5/2/2022: Hemoglobin lower at 7.9 with a high MCV of 102   He required red cell transfusion.  Hemoglobin stable at 12.1    *History of systolic hypertension: Blood pressure acceptable at 145/71 today.    *History of thrombocytopenia:   Platelets mildly low at 128,000    *COVID-19 pandemic: He has received a total of 3 vaccinations.  Subsequent infection in April 2020.  He received a monoclonal antibody as an outpatient and remdesivir as an inpatient. He was readmitted on 5/2 with hypotension, persistent fevers, persistent COVID-19 positivity on testing. He received IVIG and steroids. He improved and was treated with outpatient Paxlovid.  Symptoms resolved at this point.  Last vaccine 9/21/2022, bivalent.  Cardiac arrest several days after receiving this on 9/24/2022.  No further vaccines planned    *Left lower extremity DVT and gastrocnemius vein and right lower extremity superficial thrombophlebitis in the small saphenous vein on bilateral lower extremity venous duplex on 4/15/2022  Associated with COVID-19 infection  Repeat venous duplex 8/1/22 without DVT, only superficial thrombophlebitis    *Out of hospital cardiac arrest on 9/24/2022  Quickly achieved return of spontaneous circulation with CPR and medication  He had an ICD [facility] at the [facility]  Ibrutinib discontinued as a result of this    *Possible zoster of the left upper extremity  Completed Valtrex 1 g 3 times daily x7 days  Now continue prophylactic acyclovir 400 mg twice daily.  Rash is completely  resolved    *Erectile dysfunction: Patient referred to urology    *Left knee osteoarthritis, status post left total knee arthroplasty 7/11/2024  Difficult rehabilitation but doing much much better at this point    *Mild GARTH  Creatinine 1.31, a little high for him    *Elevated liver labs  , AST 71, Alk phos 159  Unclear etiology. Patient will be notified as labs resulted after the visit. Repeat labs in the near future.     PLAN:   Reduce venetoclax to 200 mg daily for long-term use  Patient will be notified of his lab abnormalities and we will see if an easily-identifiable etiology is discovered. Repeat labs in a couple of weeks. If they remain high, plan further labs and a liver ultrasound  Continue acyclovir for prophylaxis.  Continue to follow with [facility] cardiology with plans for a device change in the near future.  I will see him back in 3 months with labs.  We are certainly available sooner if needed.    Patient remains on a high risk medication requiring close monitoring for toxicity.     45 minutes in this visit today reviewing his record, coordinating care, communicating with him and his wife, examining him, placing orders, documenting the encounter.     Toñito Cheng MD  05/21/2025

## 2025-05-21 NOTE — PROGRESS NOTES
Specialty Pharmacy Patient Management Program  Per Protocol Prescription Order or Refill     Patient will be filling or currently fills medications at [facility] and is enrolled in the Patient Management Program.    Requested Prescriptions     Signed Prescriptions Disp Refills    Venetoclax (VENCLEXTA) 100 MG chemo tablet 60 tablet 5     Sig: Take 2 tablets by mouth Daily.     Prescription orders above were sent to the pharmacy per Collaborative Care Agreement Protocol.     Marianela Romeo PharmD, [facility]  Clinical Specialty Pharmacist, Oncology  5/21/2025  16:23 EDT

## 2025-05-22 ENCOUNTER — SPECIALTY PHARMACY (OUTPATIENT)
Dept: PHARMACY | Facility: HOSPITAL | Age: 70
End: 2025-05-22
Payer: MEDICARE

## 2025-05-22 NOTE — PROGRESS NOTES
Specialty Pharmacy Patient Management Program       Staff message rec from Dr Cheng-He would like to decrease pts Venclexta to 200 mg daily.    Pt rec a supply of #120 on 5/9/2025-rough estimate he should have almost a month on hand at the reduced dose. I will reach out to him in a couple weeks to check his supply.    Toñito Cheng MD sent to [facility] Oral Onc Pool; Haylee Montanez, Pharmacy Technician  Let's decrease the venetoclax to 200 mg daily for long-term use.    Thanks, SANDRA Montanez, Pharmacy Technician  05/22/25  07:56 EDT

## 2025-05-22 NOTE — PROGRESS NOTES
Liver labs are up, as is his creatinine. Make sure he is not taking a lot of acetaminophen or any other supplements. Need to repeat a CBC and CMP in a couple of weeks please. SANDRA

## 2025-05-22 NOTE — PROGRESS NOTES
Specialty Pharmacy Note: Venclexta (venetoclax)    Aida Bal Jr. is a 70 y.o. male with CLL was seen 5/21/25 by Dr. Cheng. Per provider dictation, reduce venetoclax to 200 mg daily for long-term use .  Labs Review: The CMP and CBC from 5/21/25 have been reviewed. The following labs are outside of normal limits: WBC, ANC, Hgb, Scr, AST/ALT, Alk Phos.  Dose adjusted per above.    Specialty pharmacy will continue to follow patient.    Eric Ghosh, PharmD, [facility]  Clinical Oncology Pharmacist    5/22/2025  13:15 EDT

## 2025-06-02 ENCOUNTER — SPECIALTY PHARMACY (OUTPATIENT)
Dept: PHARMACY | Facility: HOSPITAL | Age: 70
End: 2025-06-02
Payer: MEDICARE

## 2025-06-04 ENCOUNTER — LAB (OUTPATIENT)
Dept: LAB | Facility: HOSPITAL | Age: 70
End: 2025-06-04
Payer: MEDICARE

## 2025-06-04 DIAGNOSIS — D64.9 NORMOCYTIC ANEMIA: ICD-10-CM

## 2025-06-04 DIAGNOSIS — C91.10 CLL (CHRONIC LYMPHOCYTIC LEUKEMIA): ICD-10-CM

## 2025-06-04 DIAGNOSIS — R74.8 ELEVATED LIVER ENZYMES: ICD-10-CM

## 2025-06-04 LAB
ALBUMIN SERPL-MCNC: 4.4 G/DL (ref 3.5–5.2)
ALBUMIN/GLOB SERPL: 2.9 G/DL
ALP SERPL-CCNC: 139 U/L (ref 39–117)
ALT SERPL W P-5'-P-CCNC: 32 U/L (ref 1–41)
ANION GAP SERPL CALCULATED.3IONS-SCNC: 12.4 MMOL/L (ref 5–15)
AST SERPL-CCNC: 27 U/L (ref 1–40)
BASOPHILS # BLD AUTO: 0 10*3/MM3 (ref 0–0.2)
BASOPHILS NFR BLD AUTO: 0 % (ref 0–1.5)
BILIRUB SERPL-MCNC: 0.9 MG/DL (ref 0–1.2)
BUN SERPL-MCNC: 17.3 MG/DL (ref 8–23)
BUN/CREAT SERPL: 14.5 (ref 7–25)
CALCIUM SPEC-SCNC: 9.2 MG/DL (ref 8.6–10.5)
CHLORIDE SERPL-SCNC: 105 MMOL/L (ref 98–107)
CO2 SERPL-SCNC: 25.6 MMOL/L (ref 22–29)
CREAT SERPL-MCNC: 1.19 MG/DL (ref 0.76–1.27)
DEPRECATED RDW RBC AUTO: 48.9 FL (ref 37–54)
EGFRCR SERPLBLD CKD-EPI 2021: 64.9 ML/MIN/1.73
EOSINOPHIL # BLD AUTO: 0 10*3/MM3 (ref 0–0.4)
EOSINOPHIL NFR BLD AUTO: 0 % (ref 0.3–6.2)
ERYTHROCYTE [DISTWIDTH] IN BLOOD BY AUTOMATED COUNT: 12.9 % (ref 12.3–15.4)
GLOBULIN UR ELPH-MCNC: 1.5 GM/DL
GLUCOSE SERPL-MCNC: 132 MG/DL (ref 65–99)
HCT VFR BLD AUTO: 39.7 % (ref 37.5–51)
HGB BLD-MCNC: 12.5 G/DL (ref 13–17.7)
IMM GRANULOCYTES # BLD AUTO: 0 10*3/MM3 (ref 0–0.05)
IMM GRANULOCYTES NFR BLD AUTO: 0 % (ref 0–0.5)
LYMPHOCYTES # BLD AUTO: 0.56 10*3/MM3 (ref 0.7–3.1)
LYMPHOCYTES NFR BLD AUTO: 24 % (ref 19.6–45.3)
MCH RBC QN AUTO: 32.3 PG (ref 26.6–33)
MCHC RBC AUTO-ENTMCNC: 31.5 G/DL (ref 31.5–35.7)
MCV RBC AUTO: 102.6 FL (ref 79–97)
MONOCYTES # BLD AUTO: 0.42 10*3/MM3 (ref 0.1–0.9)
MONOCYTES NFR BLD AUTO: 18 % (ref 5–12)
NEUTROPHILS NFR BLD AUTO: 1.35 10*3/MM3 (ref 1.7–7)
NEUTROPHILS NFR BLD AUTO: 58 % (ref 42.7–76)
NRBC BLD AUTO-RTO: 0 /100 WBC (ref 0–0.2)
PLATELET # BLD AUTO: 143 10*3/MM3 (ref 140–450)
PMV BLD AUTO: 10.6 FL (ref 6–12)
POTASSIUM SERPL-SCNC: 4 MMOL/L (ref 3.5–5.2)
PROT SERPL-MCNC: 5.9 G/DL (ref 6–8.5)
RBC # BLD AUTO: 3.87 10*6/MM3 (ref 4.14–5.8)
SODIUM SERPL-SCNC: 143 MMOL/L (ref 136–145)
WBC NRBC COR # BLD AUTO: 2.33 10*3/MM3 (ref 3.4–10.8)

## 2025-06-04 PROCEDURE — 80053 COMPREHEN METABOLIC PANEL: CPT

## 2025-06-04 PROCEDURE — 36415 COLL VENOUS BLD VENIPUNCTURE: CPT

## 2025-06-04 PROCEDURE — 85025 COMPLETE CBC W/AUTO DIFF WBC: CPT

## 2025-06-18 ENCOUNTER — SPECIALTY PHARMACY (OUTPATIENT)
Dept: PHARMACY | Facility: HOSPITAL | Age: 70
End: 2025-06-18
Payer: MEDICARE

## 2025-06-18 NOTE — PROGRESS NOTES
Specialty Pharmacy Patient Management Program  Oncology / Hematology Refill Outreach      Aida was contacted today regarding refills of his Venclexta specialty  medication(s).    Specialty medication(s) and dose(s) confirmed: Yes  Venclexta 100 mg tabs-2 tabs daily.    Refill Questions      Flowsheet Row Most Recent Value   Changes to allergies? No   Changes to medications? Yes  [Venclexta dose reduced at last visit to 200 mg]   New conditions or infections since last clinic visit No   Unplanned office visit, urgent care, ED, or hospital admission in the last 4 weeks  No   How does patient/caregiver feel medication is working? Good   Financial problems or insurance changes  No   Since the previous refill, were any specialty medication doses or scheduled injections missed or delayed?  No   Does this patient require a clinical escalation to a pharmacist? No          Delivery Questions      Flowsheet Row Most Recent Value   Delivery method UPS   Delivery address verified with patient/caregiver? Yes  [Ship to home address]   Delivery address Home  [Ship to home address-Ship 6/23 for delivery 6/24-$0 copay with insurance covering 100%-Address Confirmed]   Number of medications in delivery 1   Medication(s) being filled and delivered Venetoclax (VENCLEXTA)  [100 mg]   Doses left of specialty medications 14 days   Copay verified? Yes   Copay amount $0 copay with insurance covering 100%   Copay form of payment No copayment ($0)   Delivery Date Selection 06/24/25   Signature Required No   Do you consent to receive electronic handouts?  Yes          Venclexta delivery coordinated with pt for 6/24/2025 to his home address. $0 copay with insurance covering 100%. His last delivery was on 5/9/2025. No questions or concerns to report to MTM Team today. He reports no missed doses since last delivery but his dose was reduced to 2 tabs daily at last visit. PDC is 100%.    Follow-Up: 21 Days    Haylee Montanez, Pharmacy  Technician  6/18/2025  11:01 EDT

## 2025-07-22 ENCOUNTER — SPECIALTY PHARMACY (OUTPATIENT)
Dept: PHARMACY | Facility: HOSPITAL | Age: 70
End: 2025-07-22
Payer: MEDICARE

## 2025-07-22 NOTE — PROGRESS NOTES
Specialty Pharmacy Patient Management Program  Oncology / Hematology Refill Outreach      Aida was contacted today regarding refills of his Venclexta specialty medication(s).    Specialty medication(s) and dose(s) confirmed: Yes  Venclexta 100 mg tabs-2 tabs daily.    Refill Questions      Flowsheet Row Most Recent Value   Changes to allergies? No   Changes to medications? No   New conditions or infections since last clinic visit No   Unplanned office visit, urgent care, ED, or hospital admission in the last 4 weeks  No   How does patient/caregiver feel medication is working? Good   Financial problems or insurance changes  No   Since the previous refill, were any specialty medication doses or scheduled injections missed or delayed?  No   Does this patient require a clinical escalation to a pharmacist? No          Delivery Questions      Flowsheet Row Most Recent Value   Delivery method UPS   Delivery address verified with patient/caregiver? Yes  [Ship to home address]   Delivery address Home  [Ship to home address-Ship 7/28 for delivery 7/29-$0 copay with insurance covering 100%-Address Confirmed]   Number of medications in delivery 1   Medication(s) being filled and delivered Venetoclax (VENCLEXTA)  [100 mg tabs]   Doses left of specialty medications 14 days   Copay verified? Yes   Copay amount $0.00 copay with insurance covering 100%   Copay form of payment No copayment ($0)   Delivery Date Selection 07/29/25   Signature Required No   Do you consent to receive electronic handouts?  Yes          Venclexta delivery coordinated with pt for 7/29/2025 to his home address. $0 copay with insurance covering 100%. His last delivery was on 6/24/2025. No questions or concerns to report to MTM Team today. He reports no missed doses since last delivery. PDC is 95%.    Follow-Up: 21 Days    Haylee Montanez, Pharmacy Technician  7/22/2025  12:00 EDT

## 2025-08-20 ENCOUNTER — LAB (OUTPATIENT)
Dept: LAB | Facility: HOSPITAL | Age: 70
End: 2025-08-20
Payer: MEDICARE

## 2025-08-20 ENCOUNTER — OFFICE VISIT (OUTPATIENT)
Dept: ONCOLOGY | Facility: CLINIC | Age: 70
End: 2025-08-20
Payer: MEDICARE

## 2025-08-20 VITALS
HEART RATE: 65 BPM | HEIGHT: 71 IN | TEMPERATURE: 98 F | BODY MASS INDEX: 34.85 KG/M2 | DIASTOLIC BLOOD PRESSURE: 77 MMHG | WEIGHT: 248.9 LBS | SYSTOLIC BLOOD PRESSURE: 148 MMHG | OXYGEN SATURATION: 98 %

## 2025-08-20 DIAGNOSIS — C91.10 CLL (CHRONIC LYMPHOCYTIC LEUKEMIA): ICD-10-CM

## 2025-08-20 DIAGNOSIS — C91.10 CLL (CHRONIC LYMPHOCYTIC LEUKEMIA): Primary | ICD-10-CM

## 2025-08-20 LAB
ALBUMIN SERPL-MCNC: 4.5 G/DL (ref 3.5–5.2)
ALBUMIN/GLOB SERPL: 2.8 G/DL
ALP SERPL-CCNC: 138 U/L (ref 39–117)
ALT SERPL W P-5'-P-CCNC: 38 U/L (ref 1–41)
ANION GAP SERPL CALCULATED.3IONS-SCNC: 10.5 MMOL/L (ref 5–15)
AST SERPL-CCNC: 30 U/L (ref 1–40)
BASOPHILS # BLD AUTO: 0.01 10*3/MM3 (ref 0–0.2)
BASOPHILS NFR BLD AUTO: 0.3 % (ref 0–1.5)
BILIRUB SERPL-MCNC: 0.7 MG/DL (ref 0–1.2)
BUN SERPL-MCNC: 17.3 MG/DL (ref 8–23)
BUN/CREAT SERPL: 14.1 (ref 7–25)
CALCIUM SPEC-SCNC: 9.3 MG/DL (ref 8.6–10.5)
CHLORIDE SERPL-SCNC: 104 MMOL/L (ref 98–107)
CO2 SERPL-SCNC: 27.5 MMOL/L (ref 22–29)
CREAT SERPL-MCNC: 1.23 MG/DL (ref 0.76–1.27)
DEPRECATED RDW RBC AUTO: 48.2 FL (ref 37–54)
EGFRCR SERPLBLD CKD-EPI 2021: 62.4 ML/MIN/1.73
EOSINOPHIL # BLD AUTO: 0.02 10*3/MM3 (ref 0–0.4)
EOSINOPHIL NFR BLD AUTO: 0.6 % (ref 0.3–6.2)
ERYTHROCYTE [DISTWIDTH] IN BLOOD BY AUTOMATED COUNT: 12.9 % (ref 12.3–15.4)
GLOBULIN UR ELPH-MCNC: 1.6 GM/DL
GLUCOSE SERPL-MCNC: 103 MG/DL (ref 65–99)
HCT VFR BLD AUTO: 36.5 % (ref 37.5–51)
HGB BLD-MCNC: 12.3 G/DL (ref 13–17.7)
HGB RETIC QN AUTO: 37.5 PG (ref 29.8–36.1)
IMM GRANULOCYTES # BLD AUTO: 0.01 10*3/MM3 (ref 0–0.05)
IMM GRANULOCYTES NFR BLD AUTO: 0.3 % (ref 0–0.5)
IMM RETICS NFR: 10.1 % (ref 3–15.8)
LDH SERPL-CCNC: 170 U/L (ref 135–225)
LYMPHOCYTES # BLD AUTO: 0.61 10*3/MM3 (ref 0.7–3.1)
LYMPHOCYTES NFR BLD AUTO: 19.2 % (ref 19.6–45.3)
MCH RBC QN AUTO: 34.1 PG (ref 26.6–33)
MCHC RBC AUTO-ENTMCNC: 33.7 G/DL (ref 31.5–35.7)
MCV RBC AUTO: 101.1 FL (ref 79–97)
MONOCYTES # BLD AUTO: 0.49 10*3/MM3 (ref 0.1–0.9)
MONOCYTES NFR BLD AUTO: 15.4 % (ref 5–12)
NEUTROPHILS NFR BLD AUTO: 2.04 10*3/MM3 (ref 1.7–7)
NEUTROPHILS NFR BLD AUTO: 64.2 % (ref 42.7–76)
NRBC BLD AUTO-RTO: 0 /100 WBC (ref 0–0.2)
PLATELET # BLD AUTO: 130 10*3/MM3 (ref 140–450)
PMV BLD AUTO: 9.8 FL (ref 6–12)
POTASSIUM SERPL-SCNC: 4 MMOL/L (ref 3.5–5.2)
PROT SERPL-MCNC: 6.1 G/DL (ref 6–8.5)
RBC # BLD AUTO: 3.61 10*6/MM3 (ref 4.14–5.8)
RETICS # AUTO: 0.06 10*6/MM3 (ref 0.02–0.13)
RETICS/RBC NFR AUTO: 1.58 % (ref 0.7–1.9)
SODIUM SERPL-SCNC: 142 MMOL/L (ref 136–145)
WBC NRBC COR # BLD AUTO: 3.18 10*3/MM3 (ref 3.4–10.8)

## 2025-08-20 PROCEDURE — 36415 COLL VENOUS BLD VENIPUNCTURE: CPT

## 2025-08-20 PROCEDURE — 80053 COMPREHEN METABOLIC PANEL: CPT

## 2025-08-20 PROCEDURE — 85025 COMPLETE CBC W/AUTO DIFF WBC: CPT

## 2025-08-20 PROCEDURE — 85046 RETICYTE/HGB CONCENTRATE: CPT

## 2025-08-20 PROCEDURE — 83615 LACTATE (LD) (LDH) ENZYME: CPT

## 2025-08-20 RX ORDER — CICLOPIROX 80 MG/ML
SOLUTION TOPICAL
COMMUNITY
Start: 2025-08-14

## 2025-08-20 RX ORDER — TESTOSTERONE 20.25 MG/1.25G
1 GEL TOPICAL DAILY
COMMUNITY

## 2025-08-20 RX ORDER — CHOLECALCIFEROL (VITAMIN D3) 50 MCG
1 TABLET ORAL DAILY
COMMUNITY

## 2025-08-20 RX ORDER — TADALAFIL 5 MG/1
1 TABLET ORAL DAILY
COMMUNITY
Start: 2025-06-20

## 2025-08-21 ENCOUNTER — SPECIALTY PHARMACY (OUTPATIENT)
Dept: PHARMACY | Facility: HOSPITAL | Age: 70
End: 2025-08-21
Payer: MEDICARE

## 2025-08-26 ENCOUNTER — SPECIALTY PHARMACY (OUTPATIENT)
Dept: PHARMACY | Facility: HOSPITAL | Age: 70
End: 2025-08-26
Payer: MEDICARE

## (undated) DEVICE — Device: Brand: DEFENDO AIR/WATER/SUCTION AND BIOPSY VALVE

## (undated) DEVICE — THE CARR-LOCKE INJECTION NEEDLE IS A SINGLE USE, DISPOSABLE, FLEXIBLE SHEATH INJECTION NEEDLE USED FOR THE INJECTION OF VARIOUS TYPES OF MEDIA THROUGH FLEXIBLE ENDOSCOPES.

## (undated) DEVICE — SUT SILK 2/0 TIES 18IN A185H

## (undated) DEVICE — SUT PDS 3/0 SH 27IN DYED Z316H

## (undated) DEVICE — GOWN,SIRUS,NON REINFRCD,LARGE,SET IN SL: Brand: MEDLINE

## (undated) DEVICE — TUBING, SUCTION, 1/4" X 10', STRAIGHT: Brand: MEDLINE

## (undated) DEVICE — THE SINGLE USE ETRAP – POLYP TRAP IS USED FOR SUCTION RETRIEVAL OF ENDOSCOPICALLY REMOVED POLYPS.: Brand: ETRAP

## (undated) DEVICE — ADHS SKIN SURG TISS VISC PREMIERPRO EXOFIN HI/VISC FAST/DRY

## (undated) DEVICE — SUT SILK 3/0 SH CR5 18IN C0135

## (undated) DEVICE — SUT SILK 0 SH 30IN K834H

## (undated) DEVICE — APPL CHLORAPREP HI/LITE 26ML ORNG

## (undated) DEVICE — GLV SURG SENSICARE POLYISPRN W/ALOE PF LF 6.5 GRN STRL

## (undated) DEVICE — SUT SILK 0 TIES 18IN SA66G

## (undated) DEVICE — BITEBLOCK OMNI BLOC

## (undated) DEVICE — GOWN ,SIRUS,NONREINFORCED SMALL: Brand: MEDLINE

## (undated) DEVICE — SNAR POLYP SENSATION STDOVL 27 240 BX40

## (undated) DEVICE — CANN NASL CO2 TRULINK W/O2 A/

## (undated) DEVICE — HARMONIC ACE +7 LAPAROSCOPIC SHEARS ADVANCED HEMOSTASIS 5MM DIAMETER 36CM SHAFT LENGTH  FOR USE WITH GRAY HAND PIECE ONLY: Brand: HARMONIC ACE

## (undated) DEVICE — SENSR O2 OXIMAX FNGR A/ 18IN NONSTR

## (undated) DEVICE — WOUND RETRACTOR AND PROTECTOR: Brand: ALEXIS WOUND PROTECTOR-RETRACTOR

## (undated) DEVICE — MSK PROC CURAPLEX O2 2/ADAPT 7FT

## (undated) DEVICE — ERBE NESSY®PLATE 170 SPLIT; 168CM²; CABLE 3M: Brand: ERBE

## (undated) DEVICE — ANTIBACTERIAL UNDYED BRAIDED (POLYGLACTIN 910), SYNTHETIC ABSORBABLE SUTURE: Brand: COATED VICRYL

## (undated) DEVICE — THE TORRENT IRRIGATION SCOPE CONNECTOR IS USED WITH THE TORRENT IRRIGATION TUBING TO PROVIDE IRRIGATION FLUIDS SUCH AS STERILE WATER DURING GASTROINTESTINAL ENDOSCOPIC PROCEDURES WHEN USED IN CONJUNCTION WITH AN IRRIGATION PUMP (OR ELECTROSURGICAL UNIT).: Brand: TORRENT

## (undated) DEVICE — LN SMPL CO2 SHTRM SD STREAM W/M LUER

## (undated) DEVICE — SUT SILK 3/0 TIES 18IN A184H

## (undated) DEVICE — 2, DISPOSABLE SUCTION/IRRIGATOR WITH DISPOSABLE TIP: Brand: STRYKEFLOW

## (undated) DEVICE — TBG 02 CRUSH RESIST LF CLR 7FT

## (undated) DEVICE — TBG PENCL TELESCP MEGADYNE SMOKE EVAC 10FT

## (undated) DEVICE — ADAPT CLN BIOGUARD AIR/H2O DISP

## (undated) DEVICE — SINGLE-USE BIOPSY FORCEPS: Brand: RADIAL JAW 4

## (undated) DEVICE — ENDOPATH XCEL UNIVERSAL TROCAR STABLILITY SLEEVES: Brand: ENDOPATH XCEL

## (undated) DEVICE — SUT PDS 0 CT1 36IN Z346H

## (undated) DEVICE — ENDOCUT SCISSOR TIP, DISPOSABLE: Brand: RENEW

## (undated) DEVICE — KT ORCA ORCAPOD DISP STRL

## (undated) DEVICE — LOU LAP SIGMOID COLON: Brand: MEDLINE INDUSTRIES, INC.

## (undated) DEVICE — SUT VIC 0/0 UR6 27IN DYED J603H

## (undated) DEVICE — TOTAL TRAY, 16FR 10ML SIL FOLEY, URN: Brand: MEDLINE

## (undated) DEVICE — ENDOPATH XCEL BLUNT TIP TROCARS WITH SMOOTH SLEEVES: Brand: ENDOPATH XCEL

## (undated) DEVICE — NDL HYPO PRECISIONGLIDE REG 25G 1 1/2

## (undated) DEVICE — GLV SURG BIOGEL LTX PF 6

## (undated) DEVICE — MARKR SPOT ENDOSCOPIC LESION INJ

## (undated) DEVICE — LAPAROVUE VISIBILITY SYSTEM LAPAROSCOPIC SOLUTIONS: Brand: LAPAROVUE

## (undated) DEVICE — ENDOPATH XCEL BLADELESS TROCARS WITH STABILITY SLEEVES: Brand: ENDOPATH XCEL

## (undated) DEVICE — TRAP FLD MINIVAC MEGADYNE 100ML